# Patient Record
Sex: MALE | Race: WHITE | Employment: OTHER | URBAN - METROPOLITAN AREA
[De-identification: names, ages, dates, MRNs, and addresses within clinical notes are randomized per-mention and may not be internally consistent; named-entity substitution may affect disease eponyms.]

---

## 2018-07-31 ENCOUNTER — OFFICE VISIT (OUTPATIENT)
Dept: VASCULAR SURGERY | Age: 68
End: 2018-07-31
Payer: MEDICARE

## 2018-07-31 ENCOUNTER — TELEPHONE (OUTPATIENT)
Dept: VASCULAR SURGERY | Age: 68
End: 2018-07-31

## 2018-07-31 DIAGNOSIS — I70.213 ATHEROSCLEROSIS OF NATIVE ARTERY OF BOTH LOWER EXTREMITIES WITH INTERMITTENT CLAUDICATION (HCC): Primary | ICD-10-CM

## 2018-07-31 PROCEDURE — 99203 OFFICE O/P NEW LOW 30 MIN: CPT | Performed by: SURGERY

## 2018-07-31 RX ORDER — ASPIRIN 81 MG/1
81 TABLET ORAL NIGHTLY
Status: ON HOLD | COMMUNITY
End: 2021-01-01 | Stop reason: SDUPTHER

## 2018-07-31 RX ORDER — LISINOPRIL 5 MG/1
5 TABLET ORAL NIGHTLY
COMMUNITY
Start: 2018-07-08 | End: 2020-01-01 | Stop reason: ALTCHOICE

## 2018-07-31 RX ORDER — MELOXICAM 15 MG/1
15 TABLET ORAL DAILY
COMMUNITY
Start: 2018-07-09 | End: 2020-01-01 | Stop reason: ALTCHOICE

## 2018-07-31 RX ORDER — PRAVASTATIN SODIUM 40 MG
40 TABLET ORAL NIGHTLY
COMMUNITY
Start: 2018-05-13 | End: 2020-01-01 | Stop reason: ALTCHOICE

## 2018-07-31 NOTE — TELEPHONE ENCOUNTER
Patient's wife notified of doppler study at [address] [place] [address] Dr Barton on Tuesday, 8-7-18.  Report to admitting at 12:30 pm.

## 2018-07-31 NOTE — PROGRESS NOTES
Vascular Surgery Outpatient Consultation      Chief Complaint   Patient presents with    Surgical Consult     PVD, (R) foot numb for three months, (R) calf tightens with walking       Reason for Consult:  Peripheral vascular disease    Requesting Physician:  Dr. Davee Jeans:                The patient is a 78 y.o. male who is referred for evaluation of peripheral vascular disease. He is accompanied by his wife. He states a long history of osteoarthritis involving his right hip. He has been receiving injections for this. He had initial improvement but now is experiencing pain in his right calf with prolonged ambulation. The pain progresses to the point that he has to stop and rest.  He describes the pain as severe cramping 10 out of 10. He denies a documented history of peripheral vascular disease. He is a former smoker having quit many years ago. He denies diabetes. He denies history of foot ulcerations. Past Medical History:        Diagnosis Date    Heart attack 2008, 3-2015     Past Surgical History:        Procedure Laterality Date    CORONARY ANGIOPLASTY      INGUINAL HERNIA REPAIR Right     STOMACH SURGERY       Current Medications:   Prior to Admission medications    Medication Sig Start Date End Date Taking? Authorizing Provider   metoprolol tartrate (LOPRESSOR) 25 MG tablet Take 25 mg by mouth 2 times daily 7/21/18  Yes Historical Provider, MD   lisinopril (PRINIVIL;ZESTRIL) 5 MG tablet Take 5 mg by mouth daily 7/8/18  Yes Historical Provider, MD   pravastatin (PRAVACHOL) 40 MG tablet Take 40 mg by mouth daily 5/13/18  Yes Historical Provider, MD   meloxicam (MOBIC) 15 MG tablet Take 15 mg by mouth daily 7/9/18  Yes Historical Provider, MD   aspirin EC 81 MG EC tablet Take 81 mg by mouth daily   Yes Historical Provider, MD     Allergies:  Patient has no known allergies.     Social History     Social History    Marital status: Unknown     Spouse name: N/A    Number [x]/Yes []  Skin:              Rash:    No [x]/Yes []      Ulcers:   No [x]/Yes []              Abnorm pigment: No [x]/Yes []  :              Frequency/urgency:  No [x]/Yes []      Hematuria:    No [x]/Yes []                      Incontinence:    No [x]/Yes []    PHYSICAL EXAM:  There were no vitals filed for this visit. General Appearance: alert and oriented to person, place and time, well developed and well- nourished, in no acute distress  Skin: warm and dry, no rash or erythema  Head: normocephalic and atraumatic  Eyes: extraocular eye movements intact, conjunctivae normal  ENT: external ear and ear canal normal bilaterally, nose without deformity  Pulmonary/Chest: clear to auscultation bilaterally- no wheezes, rales or rhonchi, normal air movement, no respiratory distress  Cardiovascular: normal rate, regular rhythm, normal S1 and S2, no murmurs, no carotid bruits  Abdomen: soft, non-tender, non-distended, normal bowel sounds, no masses or organomegaly  Musculoskeletal: normal range of motion, no joint swelling, deformity or tenderness  Neurologic: no cranial nerve deficit, gait, coordination and speech normal  Extremities: no leg edema bilaterally, feet warm, intact capillary refill, hair growth on toes.     PULSE EXAM      Right      Left   Brachial     Radial 3 3   Femoral     Popliteal 0 2   Dorsalis Pedis 0 0   Posterior Tibial 0 0   (3=normal, 2=diminished, 1=barely palpable, 4=widened)      Problem List Items Addressed This Visit     Atherosclerosis of native artery of both lower extremities with intermittent claudication (HCC) - Primary    Relevant Medications    metoprolol tartrate (LOPRESSOR) 25 MG tablet    lisinopril (PRINIVIL;ZESTRIL) 5 MG tablet    pravastatin (PRAVACHOL) 40 MG tablet    aspirin EC 81 MG EC tablet    Other Relevant Orders    VL LOWER EXTREMITY ARTERIAL SEGMENTAL PRESSURES W PPG BILATERAL            I reviewed with the patient that the circulation to his feet remains adequate and he does not require urgent revascularization. His symptoms are consistent with peripheral vascular disease and claudication. I would like to start with lower extremity arterial studies. I will review the results with them. He may benefit from an arteriogram with intervention in the future. Return for testing.

## 2018-08-07 ENCOUNTER — HOSPITAL ENCOUNTER (OUTPATIENT)
Dept: INTERVENTIONAL RADIOLOGY/VASCULAR | Age: 68
Discharge: HOME | End: 2018-08-09
Payer: MEDICARE

## 2018-08-07 DIAGNOSIS — I70.213 ATHEROSCLEROSIS OF NATIVE ARTERY OF BOTH LOWER EXTREMITIES WITH INTERMITTENT CLAUDICATION (HCC): ICD-10-CM

## 2018-08-07 PROCEDURE — 93923 UPR/LXTR ART STDY 3+ LVLS: CPT

## 2018-08-08 ENCOUNTER — TELEPHONE (OUTPATIENT)
Dept: VASCULAR SURGERY | Age: 68
End: 2018-08-08

## 2018-08-15 ENCOUNTER — HOSPITAL ENCOUNTER (OUTPATIENT)
Dept: CARDIAC CATH/INVASIVE PROCEDURES | Age: 68
Setting detail: OUTPATIENT SURGERY
Discharge: HOME | End: 2018-08-15
Payer: MEDICARE

## 2018-08-15 DIAGNOSIS — I70.213 ATHEROSCLEROSIS OF NATIVE ARTERY OF BOTH LOWER EXTREMITIES WITH INTERMITTENT CLAUDICATION (HCC): Primary | ICD-10-CM

## 2018-08-15 LAB
ABO/RH: NORMAL
ANION GAP SERPL CALCULATED.3IONS-SCNC: 11 MMOL/L (ref 7–16)
ANTIBODY SCREEN: NORMAL
BUN BLDV-MCNC: 14 MG/DL (ref 8–23)
CALCIUM SERPL-MCNC: 9.3 MG/DL (ref 8.6–10.2)
CHLORIDE BLD-SCNC: 106 MMOL/L (ref 98–107)
CO2: 27 MMOL/L (ref 22–29)
CREAT SERPL-MCNC: 0.9 MG/DL (ref 0.7–1.2)
GFR AFRICAN AMERICAN: >60
GFR NON-AFRICAN AMERICAN: >60 ML/MIN/1.73
GLUCOSE BLD-MCNC: 107 MG/DL (ref 74–109)
HCT VFR BLD CALC: 47.9 % (ref 37–54)
HEMOGLOBIN: 16.4 G/DL (ref 12.5–16.5)
MCH RBC QN AUTO: 30.5 PG (ref 26–35)
MCHC RBC AUTO-ENTMCNC: 34.2 % (ref 32–34.5)
MCV RBC AUTO: 89 FL (ref 80–99.9)
PDW BLD-RTO: 12.5 FL (ref 11.5–15)
PLATELET # BLD: 184 E9/L (ref 130–450)
PMV BLD AUTO: 11.3 FL (ref 7–12)
POTASSIUM SERPL-SCNC: 4.8 MMOL/L (ref 3.5–5)
RBC # BLD: 5.38 E12/L (ref 3.8–5.8)
SODIUM BLD-SCNC: 144 MMOL/L (ref 132–146)
WBC # BLD: 7.4 E9/L (ref 4.5–11.5)

## 2018-08-15 PROCEDURE — 80048 BASIC METABOLIC PNL TOTAL CA: CPT

## 2018-08-15 PROCEDURE — 36415 COLL VENOUS BLD VENIPUNCTURE: CPT

## 2018-08-15 PROCEDURE — 86900 BLOOD TYPING SEROLOGIC ABO: CPT

## 2018-08-15 PROCEDURE — 6360000002 HC RX W HCPCS

## 2018-08-15 PROCEDURE — 36200 PLACE CATHETER IN AORTA: CPT | Performed by: SURGERY

## 2018-08-15 PROCEDURE — 75625 CONTRAST EXAM ABDOMINL AORTA: CPT | Performed by: SURGERY

## 2018-08-15 PROCEDURE — 85027 COMPLETE CBC AUTOMATED: CPT

## 2018-08-15 PROCEDURE — C1769 GUIDE WIRE: HCPCS

## 2018-08-15 PROCEDURE — 86901 BLOOD TYPING SEROLOGIC RH(D): CPT

## 2018-08-15 PROCEDURE — 2709999900 HC NON-CHARGEABLE SUPPLY

## 2018-08-15 PROCEDURE — 75716 ARTERY X-RAYS ARMS/LEGS: CPT | Performed by: SURGERY

## 2018-08-15 PROCEDURE — 2500000003 HC RX 250 WO HCPCS

## 2018-08-15 PROCEDURE — 86850 RBC ANTIBODY SCREEN: CPT

## 2018-08-15 PROCEDURE — C1894 INTRO/SHEATH, NON-LASER: HCPCS

## 2018-08-15 RX ORDER — ACETAMINOPHEN 325 MG/1
650 TABLET ORAL EVERY 4 HOURS PRN
Status: DISCONTINUED | OUTPATIENT
Start: 2018-08-15 | End: 2018-08-16 | Stop reason: HOSPADM

## 2018-08-15 RX ORDER — SODIUM CHLORIDE 9 MG/ML
INJECTION, SOLUTION INTRAVENOUS CONTINUOUS
Status: DISCONTINUED | OUTPATIENT
Start: 2018-08-15 | End: 2018-08-16 | Stop reason: HOSPADM

## 2018-08-15 RX ORDER — ONDANSETRON 2 MG/ML
4 INJECTION INTRAMUSCULAR; INTRAVENOUS EVERY 8 HOURS PRN
Status: DISCONTINUED | OUTPATIENT
Start: 2018-08-15 | End: 2018-08-16 | Stop reason: HOSPADM

## 2018-08-15 RX ORDER — SODIUM CHLORIDE 0.9 % (FLUSH) 0.9 %
10 SYRINGE (ML) INJECTION PRN
Status: DISCONTINUED | OUTPATIENT
Start: 2018-08-15 | End: 2018-08-16 | Stop reason: HOSPADM

## 2018-08-15 NOTE — OP NOTE
Mary Ching  1950    Cardiovascular Lab    DATE OF PROCEDURE: 8/15/2018     PHYSICIAN: Hari Galvan M.D.     ASSISTANT:      PRE-PROCEDURE DIAGNOSIS: Peripheral vascular disease, claudication. POST-PROCEDURE DIAGNOSIS: Same    PROCEDURE: Left transfemoral aortic catheter, limited abdominal aortogram, bilateral lower extremity runoff      ANESTHESIA: Local with intravenous sedation. ESTIMATED BLOOD LOSS: Minimal     COMPLICATIONS: None    DESCRIPTION OF PROCEDURE: The patient was identified and the procedure was confirmed. The groins were prepped and draped in the usual sterile fashion. Lidocaine 1% for local anesthesia. The left common femoral artery was percutaneously entered and a 4-Yakut sheath was inserted. A pigtail catheter was inserted into the abdominal aorta. Serial images were obtained. The images identified a patent abdominal aorta with single renal arteries bilaterally without stenosis. There was calcifications but no significant stenosis involving the distal aorta or the bifurcation, the common internal or external iliac arteries bilaterally. The catheter was moved to the bifurcation and bilateral external runoff was obtained. The right common femoral artery was densely calcified with long segment high-grade stenosis. The stenosis extended into the origin of the superficial femoral artery. The superficial femoral artery otherwise was patent through the popliteal artery. There was disease of the tibioperoneal trunk and three-vessel runoff to the foot. On the left the common femoral artery was calcified but without hemodynamically significant stenosis. No stenosis was seen in the proximal superficial femoral or deep femoral vessels. There was disease of the superficial femoral artery in the adductor canal.  The popliteal artery was patent with three-vessel runoff to the foot. The sheath was removed and pressure was held to obtain hemostasis.   A sterile pressure

## 2018-08-15 NOTE — H&P
Vascular Surgery History & Physical Exam      Chief Complaint: Peripheral vascular disease, claudication. HISTORY OF PRESENT ILLNESS:                The patient is a 80 y.o. male who presents to the hospital for elective arteriogram with possible intervention. The patient has a history of peripheral vascular disease and disabling claudication. IMPRESSION:    Active Hospital Problems    Diagnosis    Atherosclerosis of native artery of both lower extremities with intermittent claudication ([facility] [I70.213]       PLAN:  Aortogram,  Bilateral lower extremity arteriogram, possible intervention. I reviewed the procedure with the patient. I discussed the risks, benefits, and alternatives of the procedure. The patient understands and consents. All questions were answered. Patient Active Problem List   Diagnosis Code    Atherosclerosis of native artery of both lower extremities with intermittent claudication ([facility] I70.213       Past Medical History:   Diagnosis Date    Heart attack ([facility] 2008, 3-2015        Past Surgical History:   Procedure Laterality Date    CORONARY ANGIOPLASTY      INGUINAL HERNIA REPAIR Right     STOMACH SURGERY         Current Medications:     Current Outpatient Prescriptions:     metoprolol tartrate (LOPRESSOR) 25 MG tablet, Take 25 mg by mouth 2 times daily, Disp: , Rfl:     lisinopril (PRINIVIL;ZESTRIL) 5 MG tablet, Take 5 mg by mouth daily, Disp: , Rfl:     pravastatin (PRAVACHOL) 40 MG tablet, Take 40 mg by mouth daily, Disp: , Rfl:     meloxicam (MOBIC) 15 MG tablet, Take 15 mg by mouth daily, Disp: , Rfl:     aspirin EC 81 MG EC tablet, Take 81 mg by mouth daily, Disp: , Rfl:     Allergies:  Patient has no known allergies. Social History     Social History    Marital status: Unknown     Spouse name: N/A    Number of children: N/A    Years of education: N/A     Occupational History    Not on file.      Social History Main Topics    Smoking status: Former Smoker Packs/day: 1.50     Types: Cigarettes     Quit date: 7/31/2008    Smokeless tobacco: Never Used      Comment: occassional cigar    Alcohol use Yes      Comment: daily beer    Drug use: No    Sexual activity: Not on file     Other Topics Concern    Not on file     Social History Narrative    No narrative on file        History reviewed. No pertinent family history. REVIEW OF SYSTEMS:  The chart was reviewed. PHYSICAL EXAM:    There were no vitals filed for this visit.   CONSTITUTIONAL:  awake, alert, cooperative, no apparent distress, and appears stated age  NECK:  supple, symmetrical, trachea midline  LUNGS:  no increased work of breathing, good air exchange and clear to auscultation  CARDIOVASCULAR:  regular rate and rhythm and femoral pulses R 0 L 2+  ABDOMEN:  soft, non-distended and non-tender    LABS:    Lab Results   Component Value Date    WBC 7.4 08/15/2018    HGB 16.4 08/15/2018    HCT 47.9 08/15/2018     08/15/2018

## 2018-09-04 ENCOUNTER — OFFICE VISIT (OUTPATIENT)
Dept: VASCULAR SURGERY | Age: 68
End: 2018-09-04

## 2018-09-04 DIAGNOSIS — I70.213 ATHEROSCLEROSIS OF NATIVE ARTERY OF BOTH LOWER EXTREMITIES WITH INTERMITTENT CLAUDICATION (HCC): Primary | ICD-10-CM

## 2018-09-04 PROCEDURE — 99024 POSTOP FOLLOW-UP VISIT: CPT | Performed by: SURGERY

## 2018-09-04 RX ORDER — M-VIT,TX,IRON,MINS/CALC/FOLIC 27MG-0.4MG
1 TABLET ORAL NIGHTLY
COMMUNITY
End: 2020-01-01 | Stop reason: ALTCHOICE

## 2018-09-11 ENCOUNTER — HOSPITAL ENCOUNTER (OUTPATIENT)
Dept: PREADMISSION TESTING | Age: 68
Discharge: HOME | End: 2018-09-11
Payer: MEDICARE

## 2018-09-11 ENCOUNTER — ANESTHESIA EVENT (OUTPATIENT)
Dept: OPERATING ROOM | Age: 68
DRG: 254 | End: 2018-09-11
Payer: MEDICARE

## 2018-09-11 VITALS
WEIGHT: 185 LBS | BODY MASS INDEX: 29.03 KG/M2 | SYSTOLIC BLOOD PRESSURE: 143 MMHG | HEART RATE: 59 BPM | DIASTOLIC BLOOD PRESSURE: 78 MMHG | RESPIRATION RATE: 18 BRPM | TEMPERATURE: 98 F | HEIGHT: 67 IN | OXYGEN SATURATION: 98 %

## 2018-09-11 DIAGNOSIS — I70.213 ATHEROSCLEROSIS OF NATIVE ARTERY OF BOTH LOWER EXTREMITIES WITH INTERMITTENT CLAUDICATION (HCC): ICD-10-CM

## 2018-09-11 DIAGNOSIS — Z01.812 PRE-OPERATIVE LABORATORY EXAMINATION: ICD-10-CM

## 2018-09-11 LAB
ABO/RH: NORMAL
ANION GAP SERPL CALCULATED.3IONS-SCNC: 16 MMOL/L (ref 7–16)
ANTIBODY SCREEN: NORMAL
APTT: 29.9 SEC (ref 24.5–35.1)
BUN BLDV-MCNC: 14 MG/DL (ref 8–23)
CALCIUM SERPL-MCNC: 9.6 MG/DL (ref 8.6–10.2)
CHLORIDE BLD-SCNC: 105 MMOL/L (ref 98–107)
CO2: 22 MMOL/L (ref 22–29)
CREAT SERPL-MCNC: 0.9 MG/DL (ref 0.7–1.2)
GFR AFRICAN AMERICAN: >60
GFR NON-AFRICAN AMERICAN: >60 ML/MIN/1.73
GLUCOSE BLD-MCNC: 106 MG/DL (ref 74–109)
HCT VFR BLD CALC: 49.9 % (ref 37–54)
HEMOGLOBIN: 16.9 G/DL (ref 12.5–16.5)
INR BLD: 0.9
MCH RBC QN AUTO: 29.9 PG (ref 26–35)
MCHC RBC AUTO-ENTMCNC: 33.9 % (ref 32–34.5)
MCV RBC AUTO: 88.3 FL (ref 80–99.9)
PDW BLD-RTO: 12.4 FL (ref 11.5–15)
PLATELET # BLD: 122 E9/L (ref 130–450)
PMV BLD AUTO: 11.7 FL (ref 7–12)
POTASSIUM REFLEX MAGNESIUM: 4.8 MMOL/L (ref 3.5–5)
PROTHROMBIN TIME: 10.8 SEC (ref 9.3–12.4)
RBC # BLD: 5.65 E12/L (ref 3.8–5.8)
SODIUM BLD-SCNC: 143 MMOL/L (ref 132–146)
WBC # BLD: 7.9 E9/L (ref 4.5–11.5)

## 2018-09-11 PROCEDURE — 85730 THROMBOPLASTIN TIME PARTIAL: CPT

## 2018-09-11 PROCEDURE — 86901 BLOOD TYPING SEROLOGIC RH(D): CPT

## 2018-09-11 PROCEDURE — 80048 BASIC METABOLIC PNL TOTAL CA: CPT

## 2018-09-11 PROCEDURE — 36415 COLL VENOUS BLD VENIPUNCTURE: CPT

## 2018-09-11 PROCEDURE — 87081 CULTURE SCREEN ONLY: CPT

## 2018-09-11 PROCEDURE — 85610 PROTHROMBIN TIME: CPT

## 2018-09-11 PROCEDURE — 86900 BLOOD TYPING SEROLOGIC ABO: CPT

## 2018-09-11 PROCEDURE — 86850 RBC ANTIBODY SCREEN: CPT

## 2018-09-11 PROCEDURE — 85027 COMPLETE CBC AUTOMATED: CPT

## 2018-09-11 ASSESSMENT — PAIN DESCRIPTION - DESCRIPTORS: DESCRIPTORS: CRAMPING

## 2018-09-11 ASSESSMENT — PAIN DESCRIPTION - ORIENTATION: ORIENTATION: RIGHT

## 2018-09-11 ASSESSMENT — PAIN SCALES - GENERAL: PAINLEVEL_OUTOF10: 10

## 2018-09-11 NOTE — PROGRESS NOTES
transportation to and from the hospital.  Arrange for someone to be with you for the remainder of the day due to having had anesthesia. PARKING INSTRUCTIONS:   · [x] Arrival [id number]  · [x] Parking lot 1 is located on [facility] (the corner of [facility] and [facility]). To enter, press the button and the gate will lift. A free token will be provided to exit the lot. One car per patient is allowed to park in this lot. All other cars are to park on [address] [address] either in the parking garage or the handicap lot. [] Free  parking is available on [address] [address]. · [] To reach the [facility] lobby from [address] [place] [address], upon entering the hospital, take elevator B to the 3rd floor. EDUCATION INSTRUCTIONS:      [] Knee or hip replacement booklet & exercise pamphlets given. [] [address] placed in chart. [x] Pre-admission Testing educational folder given  [x] Incentive Spirometry,coughing & deep breathing exercises reviewed. []Medication information sheet(s)   []Fluoroscopy-Xray used in surgery reviewed with patient. Educational pamphlet placed in chart. [x]Pain: Post-op pain is normal and to be expected. You will be asked to rate your pain from 0-10(a zero is not acceptable-education is needed). Your post-op pain goal is:  [x] Ask your nurse for your pain medication. [] Joint camp offered. [] Joint replacement booklets given. []Other     MEDICATION INSTRUCTIONS:   [x]Bring a complete list of your medications, please write the last time you took the medicine, give this list to the nurse. [x] Take the following medications the morning of surgery with 1-2 ounces of water: metoprolol  [x] Stop herbal supplements and vitamins 5 days before your surgery. [] DO NOT take any diabetic medicine the morning of surgery. Follow instructions for insulin the day before surgery.   [] If you are diabetic and your blood sugar is low or you feel symptomatic, you may drink 1-2 ounces of apple juice or take a glucose tablet. The morning of your procedure, you may call the pre-op area if you have concerns about your blood sugar [phone]. [] Use your inhalers the morning of surgery. Bring your emergency inhaler with you day of surgery. [x] Follow physician instructions regarding any blood thinners you may be taking. WHAT TO EXPECT:  [x] The day of surgery you will be greeted and  checked in by the [employer] .  A nurse will greet you in accordance to the time you are needed in the pre-op area to prepare you for surgery. Please do not be discouraged if you are not greeted in the order you arrive as there are many variables that are involved in patient preparation. Your patience is greatly appreciated as you wait for your nurse. Please bring in items such as: books, magazines, newspapers, electronics, or any other items  to occupy your time in the waiting area. [x]  Delays may occur with surgery and staff will make a sincere effort to keep you informed of delays. If any delays occur with your procedure, we apologize ahead of time for your inconvenience as we recognize the value of your time.

## 2018-09-12 LAB — MRSA CULTURE ONLY: NORMAL

## 2018-09-16 NOTE — H&P
Vascular Surgery History & Physical Exam      Chief Complaint:     HISTORY OF PRESENT ILLNESS:                The patient is a 80 y.o. male with a history of CAD and MI who presents to the hospital for elective Right femoral endarterectomy. During his aortogram with bilateral lower extremity runoff 8/17 reveiled a densely calcified long segment high grade stenosis of the Right common femoral artery extending into the origin of the superficial femoral artery and down to the tibioperoneal trunk and three vessel runoff to the foot. Past Medical History:   Diagnosis Date    CAD (coronary artery disease)     Heart attack ([address] 2008, 3-2015        Past Surgical History:   Procedure Laterality Date    CORONARY ANGIOPLASTY  2005    CABG TIMES 4 2015    INGUINAL HERNIA REPAIR Right     STOMACH SURGERY      VASCULAR SURGERY  08/15/2018    abdominal aortogram with runoff by Dr. Leonard Ralph       Current Medications:   No current facility-administered medications for this encounter. Current Outpatient Prescriptions:     Multiple Vitamins-Minerals (THERAPEUTIC MULTIVITAMIN-MINERALS) tablet, Take 1 tablet by mouth nightly , Disp: , Rfl:     metoprolol tartrate (LOPRESSOR) 25 MG tablet, Take 25 mg by mouth 2 times daily, Disp: , Rfl:     lisinopril (PRINIVIL;ZESTRIL) 5 MG tablet, Take 5 mg by mouth nightly , Disp: , Rfl:     pravastatin (PRAVACHOL) 40 MG tablet, Take 40 mg by mouth nightly , Disp: , Rfl:     meloxicam (MOBIC) 15 MG tablet, Take 15 mg by mouth daily, Disp: , Rfl:     aspirin EC 81 MG EC tablet, Take 81 mg by mouth nightly , Disp: , Rfl:     Allergies:  Patient has no known allergies. Social History     Social History    Marital status:      Spouse name: N/A    Number of children: N/A    Years of education: N/A     Occupational History    Not on file.      Social History Main Topics    Smoking status: Former Smoker     Packs/day: 1.50     Types: Cigarettes     Quit date: 7/31/2008   [facility]

## 2018-09-17 ENCOUNTER — HOSPITAL ENCOUNTER (INPATIENT)
Age: 68
LOS: 1 days | Discharge: HOME | DRG: 254 | End: 2018-09-18
Attending: SURGERY | Admitting: SURGERY
Payer: MEDICARE

## 2018-09-17 ENCOUNTER — ANESTHESIA (OUTPATIENT)
Dept: OPERATING ROOM | Age: 68
DRG: 254 | End: 2018-09-17
Payer: MEDICARE

## 2018-09-17 VITALS — DIASTOLIC BLOOD PRESSURE: 67 MMHG | SYSTOLIC BLOOD PRESSURE: 99 MMHG | OXYGEN SATURATION: 96 %

## 2018-09-17 DIAGNOSIS — I70.211 ATHEROSCLER OF NATIVE ARTERY OF RIGHT LEG WITH INTERMIT CLAUDICATION (HCC): ICD-10-CM

## 2018-09-17 DIAGNOSIS — I70.213 ATHEROSCLEROSIS OF NATIVE ARTERY OF BOTH LOWER EXTREMITIES WITH INTERMITTENT CLAUDICATION (HCC): Primary | ICD-10-CM

## 2018-09-17 DIAGNOSIS — Z01.812 PRE-OPERATIVE LABORATORY EXAMINATION: ICD-10-CM

## 2018-09-17 LAB
BASOPHILS ABSOLUTE: 0.02 E9/L (ref 0–0.2)
BASOPHILS RELATIVE PERCENT: 0.3 % (ref 0–2)
EOSINOPHILS ABSOLUTE: 0.05 E9/L (ref 0.05–0.5)
EOSINOPHILS RELATIVE PERCENT: 0.6 % (ref 0–6)
HCT VFR BLD CALC: 44.4 % (ref 37–54)
HEMOGLOBIN: 15.3 G/DL (ref 12.5–16.5)
IMMATURE GRANULOCYTES #: 0.06 E9/L
IMMATURE GRANULOCYTES %: 0.8 % (ref 0–5)
LYMPHOCYTES ABSOLUTE: 1.04 E9/L (ref 1.5–4)
LYMPHOCYTES RELATIVE PERCENT: 13.4 % (ref 20–42)
MCH RBC QN AUTO: 30.4 PG (ref 26–35)
MCHC RBC AUTO-ENTMCNC: 34.5 % (ref 32–34.5)
MCV RBC AUTO: 88.3 FL (ref 80–99.9)
MONOCYTES ABSOLUTE: 0.13 E9/L (ref 0.1–0.95)
MONOCYTES RELATIVE PERCENT: 1.7 % (ref 2–12)
NEUTROPHILS ABSOLUTE: 6.48 E9/L (ref 1.8–7.3)
NEUTROPHILS RELATIVE PERCENT: 83.2 % (ref 43–80)
PDW BLD-RTO: 12.3 FL (ref 11.5–15)
PLATELET # BLD: 140 E9/L (ref 130–450)
PMV BLD AUTO: 11.6 FL (ref 7–12)
RBC # BLD: 5.03 E12/L (ref 3.8–5.8)
WBC # BLD: 7.8 E9/L (ref 4.5–11.5)

## 2018-09-17 PROCEDURE — 7100000001 HC PACU RECOVERY - ADDTL 15 MIN: Performed by: SURGERY

## 2018-09-17 PROCEDURE — 6360000002 HC RX W HCPCS: Performed by: SURGERY

## 2018-09-17 PROCEDURE — 35371 RECHANNELING OF ARTERY: CPT | Performed by: SURGERY

## 2018-09-17 PROCEDURE — 6370000000 HC RX 637 (ALT 250 FOR IP): Performed by: SURGERY

## 2018-09-17 PROCEDURE — 3600000005 HC SURGERY LEVEL 5 BASE: Performed by: SURGERY

## 2018-09-17 PROCEDURE — 2500000003 HC RX 250 WO HCPCS: Performed by: NURSE ANESTHETIST, CERTIFIED REGISTERED

## 2018-09-17 PROCEDURE — 3600000015 HC SURGERY LEVEL 5 ADDTL 15MIN: Performed by: SURGERY

## 2018-09-17 PROCEDURE — 36415 COLL VENOUS BLD VENIPUNCTURE: CPT

## 2018-09-17 PROCEDURE — 2000000000 HC ICU R&B

## 2018-09-17 PROCEDURE — 3700000001 HC ADD 15 MINUTES (ANESTHESIA): Performed by: SURGERY

## 2018-09-17 PROCEDURE — 6360000002 HC RX W HCPCS

## 2018-09-17 PROCEDURE — 7100000000 HC PACU RECOVERY - FIRST 15 MIN: Performed by: SURGERY

## 2018-09-17 PROCEDURE — 3700000000 HC ANESTHESIA ATTENDED CARE: Performed by: SURGERY

## 2018-09-17 PROCEDURE — 6360000002 HC RX W HCPCS: Performed by: NURSE ANESTHETIST, CERTIFIED REGISTERED

## 2018-09-17 PROCEDURE — 88304 TISSUE EXAM BY PATHOLOGIST: CPT

## 2018-09-17 PROCEDURE — 2580000003 HC RX 258: Performed by: SURGERY

## 2018-09-17 PROCEDURE — C1781 MESH (IMPLANTABLE): HCPCS | Performed by: SURGERY

## 2018-09-17 PROCEDURE — 04CK0ZZ EXTIRPATION OF MATTER FROM RIGHT FEMORAL ARTERY, OPEN APPROACH: ICD-10-PCS | Performed by: SURGERY

## 2018-09-17 PROCEDURE — 2500000003 HC RX 250 WO HCPCS

## 2018-09-17 PROCEDURE — 2709999900 HC NON-CHARGEABLE SUPPLY: Performed by: SURGERY

## 2018-09-17 PROCEDURE — 85025 COMPLETE CBC W/AUTO DIFF WBC: CPT

## 2018-09-17 DEVICE — PATCH BIOLOGIC SURG 10CM WX16CM L .55MM THICKNESSXENOSURE: Type: IMPLANTABLE DEVICE | Site: GROIN | Status: FUNCTIONAL

## 2018-09-17 RX ORDER — MIDAZOLAM HYDROCHLORIDE 1 MG/ML
INJECTION INTRAMUSCULAR; INTRAVENOUS PRN
Status: DISCONTINUED | OUTPATIENT
Start: 2018-09-17 | End: 2018-09-17 | Stop reason: SDUPTHER

## 2018-09-17 RX ORDER — HEPARIN SODIUM 10000 [USP'U]/ML
INJECTION, SOLUTION INTRAVENOUS; SUBCUTANEOUS PRN
Status: DISCONTINUED | OUTPATIENT
Start: 2018-09-17 | End: 2018-09-18 | Stop reason: HOSPADM

## 2018-09-17 RX ORDER — PROPOFOL 10 MG/ML
INJECTION, EMULSION INTRAVENOUS PRN
Status: DISCONTINUED | OUTPATIENT
Start: 2018-09-17 | End: 2018-09-17 | Stop reason: SDUPTHER

## 2018-09-17 RX ORDER — ONDANSETRON 2 MG/ML
INJECTION INTRAMUSCULAR; INTRAVENOUS PRN
Status: DISCONTINUED | OUTPATIENT
Start: 2018-09-17 | End: 2018-09-17 | Stop reason: SDUPTHER

## 2018-09-17 RX ORDER — ONDANSETRON 2 MG/ML
4 INJECTION INTRAMUSCULAR; INTRAVENOUS EVERY 6 HOURS PRN
Status: DISCONTINUED | OUTPATIENT
Start: 2018-09-17 | End: 2018-09-18 | Stop reason: HOSPADM

## 2018-09-17 RX ORDER — PROTAMINE SULFATE 10 MG/ML
INJECTION, SOLUTION INTRAVENOUS PRN
Status: DISCONTINUED | OUTPATIENT
Start: 2018-09-17 | End: 2018-09-17 | Stop reason: SDUPTHER

## 2018-09-17 RX ORDER — SODIUM CHLORIDE 0.9 % (FLUSH) 0.9 %
10 SYRINGE (ML) INJECTION EVERY 12 HOURS SCHEDULED
Status: DISCONTINUED | OUTPATIENT
Start: 2018-09-17 | End: 2018-09-18 | Stop reason: HOSPADM

## 2018-09-17 RX ORDER — CALCIUM CHLORIDE 100 MG/ML
INJECTION INTRAVENOUS; INTRAVENTRICULAR PRN
Status: DISCONTINUED | OUTPATIENT
Start: 2018-09-17 | End: 2018-09-17 | Stop reason: SDUPTHER

## 2018-09-17 RX ORDER — HEPARIN SODIUM 1000 [USP'U]/ML
INJECTION, SOLUTION INTRAVENOUS; SUBCUTANEOUS PRN
Status: DISCONTINUED | OUTPATIENT
Start: 2018-09-17 | End: 2018-09-17 | Stop reason: SDUPTHER

## 2018-09-17 RX ORDER — NEOSTIGMINE METHYLSULFATE 1 MG/ML
INJECTION, SOLUTION INTRAVENOUS PRN
Status: DISCONTINUED | OUTPATIENT
Start: 2018-09-17 | End: 2018-09-17 | Stop reason: SDUPTHER

## 2018-09-17 RX ORDER — SODIUM CHLORIDE 0.9 % (FLUSH) 0.9 %
10 SYRINGE (ML) INJECTION PRN
Status: DISCONTINUED | OUTPATIENT
Start: 2018-09-17 | End: 2018-09-18 | Stop reason: HOSPADM

## 2018-09-17 RX ORDER — OXYCODONE HYDROCHLORIDE AND ACETAMINOPHEN 5; 325 MG/1; MG/1
2 TABLET ORAL EVERY 4 HOURS PRN
Status: DISCONTINUED | OUTPATIENT
Start: 2018-09-17 | End: 2018-09-18

## 2018-09-17 RX ORDER — ACETAMINOPHEN 325 MG/1
650 TABLET ORAL EVERY 4 HOURS PRN
Status: DISCONTINUED | OUTPATIENT
Start: 2018-09-17 | End: 2018-09-18 | Stop reason: HOSPADM

## 2018-09-17 RX ORDER — VECURONIUM BROMIDE 1 MG/ML
INJECTION, POWDER, LYOPHILIZED, FOR SOLUTION INTRAVENOUS PRN
Status: DISCONTINUED | OUTPATIENT
Start: 2018-09-17 | End: 2018-09-17 | Stop reason: SDUPTHER

## 2018-09-17 RX ORDER — DEXAMETHASONE SODIUM PHOSPHATE 10 MG/ML
INJECTION, SOLUTION INTRAMUSCULAR; INTRAVENOUS PRN
Status: DISCONTINUED | OUTPATIENT
Start: 2018-09-17 | End: 2018-09-17 | Stop reason: SDUPTHER

## 2018-09-17 RX ORDER — SODIUM CHLORIDE 0.9 % (FLUSH) 0.9 %
10 SYRINGE (ML) INJECTION EVERY 12 HOURS SCHEDULED
Status: DISCONTINUED | OUTPATIENT
Start: 2018-09-17 | End: 2018-09-17

## 2018-09-17 RX ORDER — MORPHINE SULFATE 2 MG/ML
2 INJECTION, SOLUTION INTRAMUSCULAR; INTRAVENOUS
Status: DISCONTINUED | OUTPATIENT
Start: 2018-09-17 | End: 2018-09-18

## 2018-09-17 RX ORDER — SODIUM CHLORIDE 0.9 % (FLUSH) 0.9 %
10 SYRINGE (ML) INJECTION PRN
Status: DISCONTINUED | OUTPATIENT
Start: 2018-09-17 | End: 2018-09-17

## 2018-09-17 RX ORDER — MORPHINE SULFATE 4 MG/ML
4 INJECTION, SOLUTION INTRAMUSCULAR; INTRAVENOUS
Status: DISCONTINUED | OUTPATIENT
Start: 2018-09-17 | End: 2018-09-18

## 2018-09-17 RX ORDER — SODIUM CHLORIDE 9 MG/ML
INJECTION, SOLUTION INTRAVENOUS CONTINUOUS
Status: DISCONTINUED | OUTPATIENT
Start: 2018-09-17 | End: 2018-09-18

## 2018-09-17 RX ORDER — GLYCOPYRROLATE 1 MG/5 ML
SYRINGE (ML) INTRAVENOUS PRN
Status: DISCONTINUED | OUTPATIENT
Start: 2018-09-17 | End: 2018-09-17 | Stop reason: SDUPTHER

## 2018-09-17 RX ORDER — FENTANYL CITRATE 50 UG/ML
INJECTION, SOLUTION INTRAMUSCULAR; INTRAVENOUS PRN
Status: DISCONTINUED | OUTPATIENT
Start: 2018-09-17 | End: 2018-09-17 | Stop reason: SDUPTHER

## 2018-09-17 RX ORDER — SODIUM CHLORIDE 9 MG/ML
INJECTION, SOLUTION INTRAVENOUS CONTINUOUS
Status: DISCONTINUED | OUTPATIENT
Start: 2018-09-17 | End: 2018-09-17

## 2018-09-17 RX ORDER — ASPIRIN 81 MG/1
81 TABLET ORAL NIGHTLY
Status: DISCONTINUED | OUTPATIENT
Start: 2018-09-18 | End: 2018-09-18 | Stop reason: HOSPADM

## 2018-09-17 RX ORDER — OXYCODONE HYDROCHLORIDE AND ACETAMINOPHEN 5; 325 MG/1; MG/1
1 TABLET ORAL EVERY 4 HOURS PRN
Status: DISCONTINUED | OUTPATIENT
Start: 2018-09-17 | End: 2018-09-18 | Stop reason: HOSPADM

## 2018-09-17 RX ORDER — LISINOPRIL 5 MG/1
5 TABLET ORAL NIGHTLY
Status: DISCONTINUED | OUTPATIENT
Start: 2018-09-17 | End: 2018-09-18 | Stop reason: HOSPADM

## 2018-09-17 RX ORDER — LABETALOL HYDROCHLORIDE 5 MG/ML
INJECTION, SOLUTION INTRAVENOUS PRN
Status: DISCONTINUED | OUTPATIENT
Start: 2018-09-17 | End: 2018-09-17 | Stop reason: SDUPTHER

## 2018-09-17 RX ORDER — LIDOCAINE HYDROCHLORIDE 20 MG/ML
INJECTION, SOLUTION INFILTRATION; PERINEURAL PRN
Status: DISCONTINUED | OUTPATIENT
Start: 2018-09-17 | End: 2018-09-17 | Stop reason: SDUPTHER

## 2018-09-17 RX ADMIN — OXYCODONE HYDROCHLORIDE AND ACETAMINOPHEN 1 TABLET: 5; 325 TABLET ORAL at 18:40

## 2018-09-17 RX ADMIN — SODIUM CHLORIDE: 9 INJECTION, SOLUTION INTRAVENOUS at 11:48

## 2018-09-17 RX ADMIN — VECURONIUM BROMIDE FOR INJECTION 8 MG: 1 INJECTION, POWDER, LYOPHILIZED, FOR SOLUTION INTRAVENOUS at 11:57

## 2018-09-17 RX ADMIN — SODIUM CHLORIDE: 9 INJECTION, SOLUTION INTRAVENOUS at 14:07

## 2018-09-17 RX ADMIN — Medication 3 MG: at 14:01

## 2018-09-17 RX ADMIN — Medication 2 G: at 20:10

## 2018-09-17 RX ADMIN — Medication 2 G: at 11:53

## 2018-09-17 RX ADMIN — PROPOFOL 50 MG: 10 INJECTION, EMULSION INTRAVENOUS at 13:53

## 2018-09-17 RX ADMIN — FENTANYL CITRATE 100 MCG: 50 INJECTION, SOLUTION INTRAMUSCULAR; INTRAVENOUS at 11:57

## 2018-09-17 RX ADMIN — OXYCODONE HYDROCHLORIDE AND ACETAMINOPHEN 1 TABLET: 5; 325 TABLET ORAL at 22:45

## 2018-09-17 RX ADMIN — CALCIUM CHLORIDE 1 G: 100 INJECTION, SOLUTION INTRAVENOUS; INTRAVENTRICULAR at 14:07

## 2018-09-17 RX ADMIN — MIDAZOLAM HYDROCHLORIDE 2 MG: 1 INJECTION, SOLUTION INTRAMUSCULAR; INTRAVENOUS at 11:45

## 2018-09-17 RX ADMIN — PHENYLEPHRINE HYDROCHLORIDE 100 MCG: 10 INJECTION INTRAMUSCULAR; INTRAVENOUS; SUBCUTANEOUS at 14:01

## 2018-09-17 RX ADMIN — PHENYLEPHRINE HYDROCHLORIDE 100 MCG: 10 INJECTION INTRAMUSCULAR; INTRAVENOUS; SUBCUTANEOUS at 14:12

## 2018-09-17 RX ADMIN — ONDANSETRON HYDROCHLORIDE 4 MG: 2 INJECTION, SOLUTION INTRAMUSCULAR; INTRAVENOUS at 13:55

## 2018-09-17 RX ADMIN — SODIUM CHLORIDE: 9 INJECTION, SOLUTION INTRAVENOUS at 19:00

## 2018-09-17 RX ADMIN — VECURONIUM BROMIDE FOR INJECTION 2 MG: 1 INJECTION, POWDER, LYOPHILIZED, FOR SOLUTION INTRAVENOUS at 12:45

## 2018-09-17 RX ADMIN — LISINOPRIL 5 MG: 5 TABLET ORAL at 20:55

## 2018-09-17 RX ADMIN — DEXAMETHASONE SODIUM PHOSPHATE 10 MG: 10 INJECTION INTRAMUSCULAR; INTRAVENOUS at 11:57

## 2018-09-17 RX ADMIN — PROTAMINE SULFATE 50 MG: 10 INJECTION, SOLUTION INTRAVENOUS at 14:05

## 2018-09-17 RX ADMIN — PROPOFOL 150 MG: 10 INJECTION, EMULSION INTRAVENOUS at 11:57

## 2018-09-17 RX ADMIN — Medication 0.6 MG: at 14:01

## 2018-09-17 RX ADMIN — FENTANYL CITRATE 100 MCG: 50 INJECTION, SOLUTION INTRAMUSCULAR; INTRAVENOUS at 13:54

## 2018-09-17 RX ADMIN — METOPROLOL TARTRATE 25 MG: 25 TABLET ORAL at 17:55

## 2018-09-17 RX ADMIN — LABETALOL HYDROCHLORIDE 5 MG: 5 INJECTION, SOLUTION INTRAVENOUS at 14:51

## 2018-09-17 RX ADMIN — LIDOCAINE HYDROCHLORIDE 100 MG: 20 INJECTION, SOLUTION INFILTRATION; PERINEURAL at 11:57

## 2018-09-17 RX ADMIN — LABETALOL HYDROCHLORIDE 5 MG: 5 INJECTION, SOLUTION INTRAVENOUS at 14:55

## 2018-09-17 RX ADMIN — LABETALOL HYDROCHLORIDE 5 MG: 5 INJECTION, SOLUTION INTRAVENOUS at 12:25

## 2018-09-17 RX ADMIN — HEPARIN SODIUM 10000 UNITS: 1000 INJECTION, SOLUTION INTRAVENOUS; SUBCUTANEOUS at 12:50

## 2018-09-17 RX ADMIN — FENTANYL CITRATE 50 MCG: 50 INJECTION, SOLUTION INTRAMUSCULAR; INTRAVENOUS at 12:22

## 2018-09-17 RX ADMIN — LABETALOL HYDROCHLORIDE 5 MG: 5 INJECTION, SOLUTION INTRAVENOUS at 12:38

## 2018-09-17 RX ADMIN — PHENYLEPHRINE HYDROCHLORIDE 100 MCG: 10 INJECTION INTRAMUSCULAR; INTRAVENOUS; SUBCUTANEOUS at 12:55

## 2018-09-17 ASSESSMENT — PULMONARY FUNCTION TESTS
PIF_VALUE: 3
PIF_VALUE: 23
PIF_VALUE: 22
PIF_VALUE: 4
PIF_VALUE: 23
PIF_VALUE: 24
PIF_VALUE: 22
PIF_VALUE: 1
PIF_VALUE: 25
PIF_VALUE: 23
PIF_VALUE: 22
PIF_VALUE: 23
PIF_VALUE: 22
PIF_VALUE: 21
PIF_VALUE: 22
PIF_VALUE: 25
PIF_VALUE: 21
PIF_VALUE: 25
PIF_VALUE: 22
PIF_VALUE: 23
PIF_VALUE: 25
PIF_VALUE: 21
PIF_VALUE: 2
PIF_VALUE: 22
PIF_VALUE: 25
PIF_VALUE: 3
PIF_VALUE: 25
PIF_VALUE: 21
PIF_VALUE: 3
PIF_VALUE: 3
PIF_VALUE: 23
PIF_VALUE: 0
PIF_VALUE: 24
PIF_VALUE: 23
PIF_VALUE: 3
PIF_VALUE: 21
PIF_VALUE: 21
PIF_VALUE: 23
PIF_VALUE: 23
PIF_VALUE: 24
PIF_VALUE: 2
PIF_VALUE: 23
PIF_VALUE: 3
PIF_VALUE: 21
PIF_VALUE: 22
PIF_VALUE: 24
PIF_VALUE: 0
PIF_VALUE: 23
PIF_VALUE: 23
PIF_VALUE: 25
PIF_VALUE: 22
PIF_VALUE: 21
PIF_VALUE: 22
PIF_VALUE: 1
PIF_VALUE: 22
PIF_VALUE: 2
PIF_VALUE: 21
PIF_VALUE: 3
PIF_VALUE: 3
PIF_VALUE: 22
PIF_VALUE: 25
PIF_VALUE: 21
PIF_VALUE: 23
PIF_VALUE: 23
PIF_VALUE: 3
PIF_VALUE: 22
PIF_VALUE: 23
PIF_VALUE: 15
PIF_VALUE: 22
PIF_VALUE: 2
PIF_VALUE: 22
PIF_VALUE: 2
PIF_VALUE: 21
PIF_VALUE: 3
PIF_VALUE: 1
PIF_VALUE: 25
PIF_VALUE: 22
PIF_VALUE: 0
PIF_VALUE: 3
PIF_VALUE: 21
PIF_VALUE: 1
PIF_VALUE: 22
PIF_VALUE: 21
PIF_VALUE: 23
PIF_VALUE: 2
PIF_VALUE: 22
PIF_VALUE: 27
PIF_VALUE: 22
PIF_VALUE: 21
PIF_VALUE: 22
PIF_VALUE: 25
PIF_VALUE: 24
PIF_VALUE: 21
PIF_VALUE: 22
PIF_VALUE: 24
PIF_VALUE: 24
PIF_VALUE: 22
PIF_VALUE: 22
PIF_VALUE: 23
PIF_VALUE: 21
PIF_VALUE: 21
PIF_VALUE: 24
PIF_VALUE: 22
PIF_VALUE: 3
PIF_VALUE: 23
PIF_VALUE: 22
PIF_VALUE: 21
PIF_VALUE: 23
PIF_VALUE: 3
PIF_VALUE: 23
PIF_VALUE: 3
PIF_VALUE: 24
PIF_VALUE: 22
PIF_VALUE: 24
PIF_VALUE: 26
PIF_VALUE: 23
PIF_VALUE: 3
PIF_VALUE: 23
PIF_VALUE: 3
PIF_VALUE: 3
PIF_VALUE: 1
PIF_VALUE: 23
PIF_VALUE: 1
PIF_VALUE: 21
PIF_VALUE: 21
PIF_VALUE: 1
PIF_VALUE: 1
PIF_VALUE: 3
PIF_VALUE: 0
PIF_VALUE: 23
PIF_VALUE: 25
PIF_VALUE: 1
PIF_VALUE: 23
PIF_VALUE: 23
PIF_VALUE: 24
PIF_VALUE: 3
PIF_VALUE: 21
PIF_VALUE: 0
PIF_VALUE: 22
PIF_VALUE: 23
PIF_VALUE: 25
PIF_VALUE: 24
PIF_VALUE: 22
PIF_VALUE: 21
PIF_VALUE: 23
PIF_VALUE: 26
PIF_VALUE: 24
PIF_VALUE: 21
PIF_VALUE: 22
PIF_VALUE: 20
PIF_VALUE: 23
PIF_VALUE: 19
PIF_VALUE: 21
PIF_VALUE: 23
PIF_VALUE: 1
PIF_VALUE: 21
PIF_VALUE: 2
PIF_VALUE: 1
PIF_VALUE: 21
PIF_VALUE: 1
PIF_VALUE: 3
PIF_VALUE: 22
PIF_VALUE: 23
PIF_VALUE: 0
PIF_VALUE: 22
PIF_VALUE: 25
PIF_VALUE: 2
PIF_VALUE: 3
PIF_VALUE: 23
PIF_VALUE: 3
PIF_VALUE: 23
PIF_VALUE: 3
PIF_VALUE: 23
PIF_VALUE: 25
PIF_VALUE: 22
PIF_VALUE: 0
PIF_VALUE: 22
PIF_VALUE: 21

## 2018-09-17 ASSESSMENT — PAIN DESCRIPTION - LOCATION
LOCATION: GROIN

## 2018-09-17 ASSESSMENT — PAIN DESCRIPTION - PAIN TYPE
TYPE: SURGICAL PAIN

## 2018-09-17 ASSESSMENT — PAIN DESCRIPTION - ORIENTATION
ORIENTATION: RIGHT

## 2018-09-17 ASSESSMENT — PAIN SCALES - GENERAL
PAINLEVEL_OUTOF10: 0
PAINLEVEL_OUTOF10: 2
PAINLEVEL_OUTOF10: 0
PAINLEVEL_OUTOF10: 4
PAINLEVEL_OUTOF10: 4

## 2018-09-17 ASSESSMENT — PAIN DESCRIPTION - DESCRIPTORS
DESCRIPTORS: DULL;DISCOMFORT
DESCRIPTORS: ACHING;BURNING
DESCRIPTORS: DISCOMFORT;SHARP

## 2018-09-17 ASSESSMENT — PAIN - FUNCTIONAL ASSESSMENT: PAIN_FUNCTIONAL_ASSESSMENT: 0-10

## 2018-09-17 NOTE — ANESTHESIA PROCEDURE NOTES
Arterial Line:    An arterial line was placed using surface landmarks, in the OR for the following indication(s): continuous blood pressure monitoring and blood sampling needed. A 20 gauge (size), 1 and 3/4 inch (length), Arrow (type) catheter was placed, Seldinger technique not used, into the left radial artery, secured by tape and Tegaderm. Anesthesia type: General    Events:  patient tolerated procedure well with no complications. 9/17/2018 11:57 AM9/17/2018 12:01 PM  Anesthesiologist: Ulysses Mcgregor E  Resident/CRNA: Maria R Hayes  Other anesthesia staff: Sascha Yo  Performed:  Other anesthesia staff   Preanesthetic Checklist  Completed: patient identified, site marked, surgical consent, pre-op evaluation, timeout performed, IV checked, risks and benefits discussed, monitors and equipment checked, anesthesia consent given, oxygen available and patient being monitored

## 2018-09-18 VITALS
HEIGHT: 67 IN | HEART RATE: 60 BPM | DIASTOLIC BLOOD PRESSURE: 74 MMHG | BODY MASS INDEX: 30.83 KG/M2 | OXYGEN SATURATION: 99 % | TEMPERATURE: 99.1 F | WEIGHT: 196.43 LBS | SYSTOLIC BLOOD PRESSURE: 128 MMHG | RESPIRATION RATE: 24 BRPM

## 2018-09-18 PROCEDURE — 6370000000 HC RX 637 (ALT 250 FOR IP): Performed by: SURGERY

## 2018-09-18 PROCEDURE — 2580000003 HC RX 258: Performed by: SURGERY

## 2018-09-18 PROCEDURE — 6360000002 HC RX W HCPCS: Performed by: SURGERY

## 2018-09-18 RX ORDER — OXYCODONE HYDROCHLORIDE AND ACETAMINOPHEN 5; 325 MG/1; MG/1
1 TABLET ORAL EVERY 6 HOURS PRN
Qty: 28 TABLET | Refills: 0 | Status: SHIPPED | OUTPATIENT
Start: 2018-09-18 | End: 2018-09-25

## 2018-09-18 RX ADMIN — Medication 10 ML: at 07:08

## 2018-09-18 RX ADMIN — SODIUM CHLORIDE: 9 INJECTION, SOLUTION INTRAVENOUS at 03:00

## 2018-09-18 RX ADMIN — Medication 2 G: at 04:21

## 2018-09-18 RX ADMIN — METOPROLOL TARTRATE 25 MG: 25 TABLET ORAL at 08:42

## 2018-09-18 ASSESSMENT — PAIN SCALES - GENERAL
PAINLEVEL_OUTOF10: 0

## 2018-09-18 NOTE — FLOWSHEET NOTE
Admitted to [id number] [facility] from PACU. Report received. Patient A X O X 3. Oriented to room and POC. IV patent, castellanos cath patent, denies pain, vs stable. RIGHT groin surgical site incision clean, dry, intact and well approximated with surgical glue.

## 2018-09-18 NOTE — ANESTHESIA POSTPROCEDURE EVALUATION
Department of Anesthesiology  Postprocedure Note    Patient: Willam Trujillo  MRN: [id number]  YOB: 1950  Date of evaluation: 9/18/2018  Time:  7:12 AM     Procedure Summary     Date:  09/17/18 Room / Location:  [address] [facility]    Anesthesia Start:  0325 Anesthesia Stop:  1500    Procedure:  FEMORAL ENDARTERECTOMY (Right ) Diagnosis:  (PERIPHERAL VASCULAR DISEASE)    Surgeon:  Don Farr MD Responsible Provider:  Fernando Sommers MD    Anesthesia Type:  general ASA Status:  3          Anesthesia Type: general    Nicole Phase I: Nicole Score: 9    Nicole Phase II:      Last vitals: Reviewed and per EMR flowsheets.        Anesthesia Post Evaluation    Patient location during evaluation: ICU  Patient participation: complete - patient participated  Level of consciousness: awake and alert  Airway patency: patent  Nausea & Vomiting: no nausea and no vomiting  Complications: no  Cardiovascular status: blood pressure returned to baseline and hemodynamically stable  Respiratory status: acceptable  Hydration status: euvolemic

## 2018-10-02 ENCOUNTER — OFFICE VISIT (OUTPATIENT)
Dept: VASCULAR SURGERY | Age: 68
End: 2018-10-02

## 2018-10-02 ENCOUNTER — TELEPHONE (OUTPATIENT)
Dept: VASCULAR SURGERY | Age: 68
End: 2018-10-02

## 2018-10-02 DIAGNOSIS — I70.213 ATHEROSCLEROSIS OF NATIVE ARTERY OF BOTH LOWER EXTREMITIES WITH INTERMITTENT CLAUDICATION (HCC): Primary | ICD-10-CM

## 2018-10-02 PROCEDURE — 99024 POSTOP FOLLOW-UP VISIT: CPT | Performed by: NURSE PRACTITIONER

## 2018-10-02 NOTE — PROGRESS NOTES
Vascular Surgery Progress Note    Chief Complaint   Patient presents with    Post-Op Check     s/p Right femoral endarterectomy. Patient returns for post operative evaluation status post right femoral endarterectomy. The patient denies any unexpected problems since hospital discharge. He reports that he is able to walk without any limitations. He states that he has a small open area at the top of his incision. Procedure Laterality Date    CORONARY ANGIOPLASTY  2005    CABG TIMES 4 2015    INGUINAL HERNIA REPAIR Right     OH THROMBOENDARTECTMY FEMORAL COMMON Right 9/17/2018    FEMORAL ENDARTERECTOMY performed by Edwar Abel MD at [address] [place] [address]  08/15/2018    abdominal aortogram with runoff by Dr. Pia Miranda       Physical Exam:  The incision(s) are healing without evidence of infection. Small superficial open area at proximal portion of the incision. No drainage or erythema noted. Heart rhythm is regular. Right      Left   Brachial     Radial 3    Femoral     Popliteal     Dorsalis Pedis 2    Posterior Tibial biphasic    (3=normal, 2=diminished, 1=barely palpable, 4=widened)    Problem List Items Addressed This Visit     Atherosclerosis of native artery of both lower extremities with intermittent claudication (HCC) - Primary    Relevant Orders    VL LOWER EXTREMITY ARTERIAL SEGMENTAL PRESSURES W PPG BILATERAL          I reviewed with the patient that normal activities can be resumed as tolerated. I instructed him to keep the open area of his incision dry and covered. I will plan to see him in one month with new baseline arterial studies. I asked him to call back sooner with any worsening of his incision. Pt seen and plan reviewed with Dr. Pia Miranda. Santiago Klein CNP     Plan: Return in 1 month(s) for follow-up office visit and arterial studies.

## 2018-10-29 ENCOUNTER — HOSPITAL ENCOUNTER (OUTPATIENT)
Dept: INTERVENTIONAL RADIOLOGY/VASCULAR | Age: 68
Discharge: HOME | End: 2018-10-31
Payer: MEDICARE

## 2018-10-29 DIAGNOSIS — I70.213 ATHEROSCLEROSIS OF NATIVE ARTERY OF BOTH LOWER EXTREMITIES WITH INTERMITTENT CLAUDICATION (HCC): ICD-10-CM

## 2018-10-29 PROCEDURE — 93923 UPR/LXTR ART STDY 3+ LVLS: CPT

## 2018-11-06 ENCOUNTER — OFFICE VISIT (OUTPATIENT)
Dept: VASCULAR SURGERY | Age: 68
End: 2018-11-06

## 2018-11-06 DIAGNOSIS — I70.213 ATHEROSCLEROSIS OF NATIVE ARTERY OF BOTH LOWER EXTREMITIES WITH INTERMITTENT CLAUDICATION (HCC): Primary | ICD-10-CM

## 2018-11-06 PROCEDURE — 99024 POSTOP FOLLOW-UP VISIT: CPT | Performed by: NURSE PRACTITIONER

## 2018-11-06 NOTE — PROGRESS NOTES
Vascular Surgery Progress Note    Chief Complaint   Patient presents with    Post-Op Check     s/p R femoral endarterectomy        Patient returns for post operative evaluation status post right femoral endarterectomy. The patient denies any unexpected problems since his last visit. He reports that he is able to walk without any limitations. He states that his incision is now healed. Procedure Laterality Date    CORONARY ANGIOPLASTY  2005    CABG TIMES 4 2015    INGUINAL HERNIA REPAIR Right     [place] THROMBOENDARTECTMY FEMORAL COMMON Right 9/17/2018    FEMORAL ENDARTERECTOMY performed by Edwar Abel MD at [address] [place] [address]  08/15/2018    abdominal aortogram with runoff by Dr. Pia Miranda       Physical Exam:  The incision(s) are healing without evidence of infection. Heart rhythm is regular. Right      Left   Brachial     Radial 3    Femoral     Popliteal     Dorsalis Pedis 2    Posterior Tibial biphasic    (3=normal, 2=diminished, 1=barely palpable, 4=widened)    ABIs: R 0.97 L 0.96  GTP: R 118  L 115    Problem List Items Addressed This Visit     Atherosclerosis of native artery of both lower extremities with intermittent claudication ([address] - Primary          I reviewed with the patient that normal activities can be resumed as tolerated. I will plan to see him in one year with repeat arterial studies. I asked him to call back sooner with any worsening of his incision. Pt seen and plan reviewed with Dr. Pia Miranda.      Santiago Klein, TIM

## 2019-04-09 ENCOUNTER — OFFICE VISIT (OUTPATIENT)
Dept: VASCULAR SURGERY | Age: 69
End: 2019-04-09
Payer: MEDICARE

## 2019-04-09 DIAGNOSIS — I70.213 ATHEROSCLEROSIS OF NATIVE ARTERY OF BOTH LOWER EXTREMITIES WITH INTERMITTENT CLAUDICATION (HCC): Primary | ICD-10-CM

## 2019-04-09 PROCEDURE — G8417 CALC BMI ABV UP PARAM F/U: HCPCS | Performed by: SURGERY

## 2019-04-09 PROCEDURE — G8598 ASA/ANTIPLAT THER USED: HCPCS | Performed by: SURGERY

## 2019-04-09 PROCEDURE — G8428 CUR MEDS NOT DOCUMENT: HCPCS | Performed by: SURGERY

## 2019-04-09 PROCEDURE — 1123F ACP DISCUSS/DSCN MKR DOCD: CPT | Performed by: SURGERY

## 2019-04-09 PROCEDURE — 3017F COLORECTAL CA SCREEN DOC REV: CPT | Performed by: SURGERY

## 2019-04-09 PROCEDURE — 99213 OFFICE O/P EST LOW 20 MIN: CPT | Performed by: SURGERY

## 2019-04-09 PROCEDURE — 4040F PNEUMOC VAC/ADMIN/RCVD: CPT | Performed by: SURGERY

## 2019-04-09 PROCEDURE — 1036F TOBACCO NON-USER: CPT | Performed by: SURGERY

## 2019-04-09 NOTE — PROGRESS NOTES
Vascular Surgery Outpatient Progress Note      Chief Complaint   Patient presents with    Circulatory Problem     intermittent cluadication L LE        HISTORY OF PRESENT ILLNESS:                The patient is a 80 y.o. male who returns for follow-up evaluation of peripheral vascular disease. He denies any new lower extremity ulcers, rest pain in feet, or disabling claudication. While in [place] he developed L LE calf claudication at approximately 100-150 yards. He denies that this is disabling at this time. Past Medical History:        Diagnosis Date    CAD (coronary artery disease)     Heart attack ([facility] [address] 2008, 3-2015     Past Surgical History:        Procedure Laterality Date    CORONARY ANGIOPLASTY  2005    CABG TIMES 4 2015    INGUINAL HERNIA REPAIR Right     OR THROMBOENDARTECTMY FEMORAL COMMON Right 9/17/2018    FEMORAL ENDARTERECTOMY performed by Latoya Asencio MD at [address] [place] [address]  08/15/2018    abdominal aortogram with runoff by Dr. Chaitanya Valenzuela     Current Medications:   Prior to Admission medications    Medication Sig Start Date End Date Taking? Authorizing Provider   Multiple Vitamins-Minerals (THERAPEUTIC MULTIVITAMIN-MINERALS) tablet Take 1 tablet by mouth nightly     Historical Provider, MD   metoprolol tartrate (LOPRESSOR) 25 MG tablet Take 25 mg by mouth 2 times daily 7/21/18   Historical Provider, MD   lisinopril (PRINIVIL;ZESTRIL) 5 MG tablet Take 5 mg by mouth nightly  7/8/18   Historical Provider, MD   pravastatin (PRAVACHOL) 40 MG tablet Take 40 mg by mouth nightly  5/13/18   Historical Provider, MD   meloxicam (MOBIC) 15 MG tablet Take 15 mg by mouth daily 7/9/18   Historical Provider, MD   aspirin EC 81 MG EC tablet Take 81 mg by mouth nightly     Historical Provider, MD     Allergies:  Patient has no known allergies.     Social History     Socioeconomic History    Marital status:      Spouse name: Not on file    Number of children: Not on [x]/Yes []      Wheezing:   No [x]/Yes []  Cardiovascular:             Angina:   No [x]/Yes []      Palpitations:   No [x]/Yes []          Claudication:    No [x]/Yes []      Leg swelling:   No [x]/Yes []  Gastrointestinal:             Nausea or vomiting:  No [x]/Yes []               Abdominal pain:  No [x]/Yes []                     Intestinal bleeding: No [x]/Yes []  Musculoskeletal:             Leg pain:   No []/Yes [x]      Back pain:   No [x]/Yes []                    Weakness:   No [x]/Yes []  Neurologic:             Numbness:   No [x]/Yes []      Paralysis:   No [x]/Yes []                       Headaches:   No [x]/Yes []  Hematologic, lymphatic:   Anemia:   No [x]/Yes []              Bleeding or bruising:  No [x]/Yes []              Fevers or chills: No [x]/Yes []  Endocrine:             Temp intolerance:   No [x]/Yes []                       Polydipsia, polyuria:  No [x]/Yes []  Skin:              Rash:    No [x]/Yes []      Ulcers:   No [x]/Yes []              Abnorm pigment: No [x]/Yes []  :              Frequency/urgency:  No [x]/Yes []      Hematuria:    No [x]/Yes []                      Incontinence:    No [x]/Yes []    PHYSICAL EXAM:  There were no vitals filed for this visit.   General Appearance: alert and oriented to person, place and time, well developed and well- nourished, in no acute distress  Skin: warm and dry, no rash or erythema  Head: normocephalic and atraumatic  Eyes: extraocular eye movements intact, conjunctivae normal  ENT: external ear and ear canal normal bilaterally, nose without deformity  Pulmonary/Chest: clear to auscultation bilaterally- no wheezes, rales or rhonchi, normal air movement, no respiratory distress  Cardiovascular: normal rate, regular rhythm, normal S1 and S2, no murmurs, no carotid bruits  Abdomen: soft, non-tender, non-distended, normal bowel sounds, no masses or organomegaly  Musculoskeletal: normal range of motion, no joint swelling, deformity or tenderness  Neurologic: no cranial nerve deficit, gait, coordination and speech normal  Extremities: no leg edema bilaterally    PULSE EXAM      Right      Left   Brachial     Radial 2 2   Femoral     Popliteal     Dorsalis Pedis  biphasic   Posterior Tibial  biphasic   (3=normal, 2=diminished, 1=barely palpable, 4=widened)    RADIOLOGY:     IMPRESSION/RECOMMENDATIONS:      Problem List Items Addressed This Visit     Atherosclerosis of native artery of both lower extremities with intermittent claudication ([facility] [address] - Primary        I reviewed with the patient that the circulation to his feet remains adequate and that I do not feel that he requires any additional testing or intervention at this time. I will have him keep his next scheduled appointment with arterial studies in November. If his symptoms were to worsen and become disabling, I instructed him to call sooner. We can proceed with arteriogram at that point. Pt seen and plan reviewed with Dr. Erin Jones. Tomer Reynolds CNP    Return in about 7 months (around 11/9/2019).

## 2019-11-26 DIAGNOSIS — I70.213 ATHEROSCLEROSIS OF NATIVE ARTERY OF BOTH LOWER EXTREMITIES WITH INTERMITTENT CLAUDICATION (HCC): Primary | ICD-10-CM

## 2020-01-01 ENCOUNTER — HOSPITAL ENCOUNTER (OUTPATIENT)
Dept: INFUSION THERAPY | Age: 70
Discharge: HOME | End: 2020-09-11
Payer: MEDICARE

## 2020-01-01 ENCOUNTER — HOSPITAL ENCOUNTER (OUTPATIENT)
Dept: INFUSION THERAPY | Age: 70
Discharge: HOME | End: 2020-06-29
Payer: MEDICARE

## 2020-01-01 ENCOUNTER — TELEPHONE (OUTPATIENT)
Dept: INFUSION THERAPY | Age: 70
End: 2020-01-01

## 2020-01-01 ENCOUNTER — HOSPITAL ENCOUNTER (OUTPATIENT)
Dept: INFUSION THERAPY | Age: 70
Discharge: HOME | End: 2020-07-24
Payer: MEDICARE

## 2020-01-01 ENCOUNTER — OFFICE VISIT (OUTPATIENT)
Dept: ONCOLOGY | Age: 70
End: 2020-01-01
Payer: MEDICARE

## 2020-01-01 ENCOUNTER — TELEPHONE (OUTPATIENT)
Dept: ONCOLOGY | Age: 70
End: 2020-01-01

## 2020-01-01 ENCOUNTER — HOSPITAL ENCOUNTER (OUTPATIENT)
Dept: INFUSION THERAPY | Age: 70
Discharge: HOME | End: 2020-12-10
Payer: MEDICARE

## 2020-01-01 ENCOUNTER — HOSPITAL ENCOUNTER (OUTPATIENT)
Dept: INFUSION THERAPY | Age: 70
Discharge: HOME | End: 2020-08-24
Payer: MEDICARE

## 2020-01-01 ENCOUNTER — HOSPITAL ENCOUNTER (OUTPATIENT)
Dept: INFUSION THERAPY | Age: 70
Discharge: HOME | End: 2020-08-31
Payer: MEDICARE

## 2020-01-01 ENCOUNTER — HOSPITAL ENCOUNTER (OUTPATIENT)
Dept: INFUSION THERAPY | Age: 70
Discharge: HOME | End: 2020-09-18
Payer: MEDICARE

## 2020-01-01 ENCOUNTER — OFFICE VISIT (OUTPATIENT)
Dept: ONCOLOGY | Age: 70
End: 2020-01-01

## 2020-01-01 ENCOUNTER — CARE COORDINATION (OUTPATIENT)
Dept: CASE MANAGEMENT | Age: 70
End: 2020-01-01

## 2020-01-01 ENCOUNTER — HOSPITAL ENCOUNTER (OUTPATIENT)
Dept: INFUSION THERAPY | Age: 70
Discharge: HOME | End: 2020-07-10
Payer: MEDICARE

## 2020-01-01 ENCOUNTER — HOSPITAL ENCOUNTER (OUTPATIENT)
Dept: INFUSION THERAPY | Age: 70
Discharge: HOME | End: 2020-10-19
Payer: MEDICARE

## 2020-01-01 ENCOUNTER — HOSPITAL ENCOUNTER (OUTPATIENT)
Dept: INFUSION THERAPY | Age: 70
Discharge: HOME | End: 2020-06-24
Payer: MEDICARE

## 2020-01-01 ENCOUNTER — OFFICE VISIT (OUTPATIENT)
Dept: ONCOLOGY | Age: 70
End: 2020-01-01
Payer: COMMERCIAL

## 2020-01-01 ENCOUNTER — HOSPITAL ENCOUNTER (OUTPATIENT)
Dept: INFUSION THERAPY | Age: 70
Discharge: HOME | DRG: 372 | End: 2020-12-15
Payer: MEDICARE

## 2020-01-01 ENCOUNTER — HOSPITAL ENCOUNTER (OUTPATIENT)
Dept: INFUSION THERAPY | Age: 70
Discharge: HOME | End: 2020-07-13
Payer: MEDICARE

## 2020-01-01 ENCOUNTER — HOSPITAL ENCOUNTER (OUTPATIENT)
Dept: INFUSION THERAPY | Age: 70
Discharge: HOME | DRG: 372 | End: 2020-12-14
Payer: MEDICARE

## 2020-01-01 ENCOUNTER — HOSPITAL ENCOUNTER (OUTPATIENT)
Dept: INFUSION THERAPY | Age: 70
Discharge: HOME | End: 2020-12-01
Payer: MEDICARE

## 2020-01-01 ENCOUNTER — HOSPITAL ENCOUNTER (OUTPATIENT)
Dept: INFUSION THERAPY | Age: 70
Discharge: HOME | End: 2020-09-21
Payer: MEDICARE

## 2020-01-01 ENCOUNTER — CARE COORDINATION (OUTPATIENT)
Dept: CARE COORDINATION | Age: 70
End: 2020-01-01

## 2020-01-01 ENCOUNTER — HOSPITAL ENCOUNTER (OUTPATIENT)
Dept: INFUSION THERAPY | Age: 70
Discharge: HOME | End: 2020-12-08
Payer: MEDICARE

## 2020-01-01 ENCOUNTER — HOSPITAL ENCOUNTER (OUTPATIENT)
Dept: INFUSION THERAPY | Age: 70
Discharge: HOME | End: 2020-11-23
Payer: MEDICARE

## 2020-01-01 ENCOUNTER — HOSPITAL ENCOUNTER (OUTPATIENT)
Dept: INFUSION THERAPY | Age: 70
Discharge: HOME | End: 2020-10-05
Payer: MEDICARE

## 2020-01-01 ENCOUNTER — HOSPITAL ENCOUNTER (OUTPATIENT)
Dept: INFUSION THERAPY | Age: 70
Discharge: HOME | End: 2020-11-09
Payer: MEDICARE

## 2020-01-01 ENCOUNTER — HOSPITAL ENCOUNTER (OUTPATIENT)
Dept: INFUSION THERAPY | Age: 70
Discharge: HOME | End: 2020-10-16
Payer: MEDICARE

## 2020-01-01 ENCOUNTER — HOSPITAL ENCOUNTER (OUTPATIENT)
Age: 70
Discharge: HOME | End: 2020-05-17

## 2020-01-01 ENCOUNTER — HOSPITAL ENCOUNTER (OUTPATIENT)
Dept: INFUSION THERAPY | Age: 70
Discharge: HOME | End: 2020-09-23
Payer: MEDICARE

## 2020-01-01 ENCOUNTER — HOSPITAL ENCOUNTER (OUTPATIENT)
Dept: INFUSION THERAPY | Age: 70
Discharge: HOME | End: 2020-08-10
Payer: MEDICARE

## 2020-01-01 ENCOUNTER — HOSPITAL ENCOUNTER (OUTPATIENT)
Dept: INFUSION THERAPY | Age: 70
Discharge: HOME | End: 2020-07-15
Payer: MEDICARE

## 2020-01-01 ENCOUNTER — CLINICAL DOCUMENTATION (OUTPATIENT)
Dept: ONCOLOGY | Age: 70
End: 2020-01-01

## 2020-01-01 ENCOUNTER — TELEPHONE (OUTPATIENT)
Dept: CASE MANAGEMENT | Age: 70
End: 2020-01-01

## 2020-01-01 ENCOUNTER — HOSPITAL ENCOUNTER (INPATIENT)
Age: 70
LOS: 2 days | Discharge: HOME | DRG: 177 | End: 2020-11-03
Attending: EMERGENCY MEDICINE | Admitting: INTERNAL MEDICINE
Payer: MEDICARE

## 2020-01-01 ENCOUNTER — APPOINTMENT (OUTPATIENT)
Dept: GENERAL RADIOLOGY | Age: 70
DRG: 177 | End: 2020-01-01
Payer: MEDICARE

## 2020-01-01 ENCOUNTER — HOSPITAL ENCOUNTER (OUTPATIENT)
Dept: INFUSION THERAPY | Age: 70
Discharge: HOME | End: 2020-10-02
Payer: MEDICARE

## 2020-01-01 ENCOUNTER — HOSPITAL ENCOUNTER (OUTPATIENT)
Dept: INFUSION THERAPY | Age: 70
Discharge: HOME | End: 2020-10-23
Payer: MEDICARE

## 2020-01-01 ENCOUNTER — HOSPITAL ENCOUNTER (OUTPATIENT)
Dept: INFUSION THERAPY | Age: 70
Discharge: HOME | End: 2020-08-12
Payer: MEDICARE

## 2020-01-01 ENCOUNTER — HOSPITAL ENCOUNTER (OUTPATIENT)
Dept: INFUSION THERAPY | Age: 70
Discharge: HOME | End: 2020-12-29
Payer: MEDICARE

## 2020-01-01 ENCOUNTER — HOSPITAL ENCOUNTER (OUTPATIENT)
Dept: INFUSION THERAPY | Age: 70
Setting detail: INFUSION SERIES
End: 2020-01-01
Payer: MEDICARE

## 2020-01-01 ENCOUNTER — HOSPITAL ENCOUNTER (OUTPATIENT)
Dept: INFUSION THERAPY | Age: 70
Discharge: HOME | End: 2020-12-07
Payer: MEDICARE

## 2020-01-01 ENCOUNTER — HOSPITAL ENCOUNTER (OUTPATIENT)
Dept: INFUSION THERAPY | Age: 70
Discharge: HOME | End: 2020-08-28
Payer: MEDICARE

## 2020-01-01 ENCOUNTER — HOSPITAL ENCOUNTER (EMERGENCY)
Age: 70
Discharge: HOME | End: 2020-09-01
Payer: MEDICARE

## 2020-01-01 ENCOUNTER — HOSPITAL ENCOUNTER (OUTPATIENT)
Dept: INFUSION THERAPY | Age: 70
Discharge: HOME | End: 2020-11-20
Payer: MEDICARE

## 2020-01-01 ENCOUNTER — HOSPITAL ENCOUNTER (OUTPATIENT)
Dept: INFUSION THERAPY | Age: 70
Discharge: HOME | End: 2020-09-14
Payer: MEDICARE

## 2020-01-01 ENCOUNTER — HOSPITAL ENCOUNTER (OUTPATIENT)
Dept: INFUSION THERAPY | Age: 70
Discharge: HOME | End: 2020-07-29
Payer: MEDICARE

## 2020-01-01 ENCOUNTER — HOSPITAL ENCOUNTER (OUTPATIENT)
Dept: INFUSION THERAPY | Age: 70
Discharge: HOME | End: 2020-08-21
Payer: MEDICARE

## 2020-01-01 ENCOUNTER — HOSPITAL ENCOUNTER (OUTPATIENT)
Dept: INFUSION THERAPY | Age: 70
Discharge: HOME | End: 2020-10-07
Payer: MEDICARE

## 2020-01-01 ENCOUNTER — HOSPITAL ENCOUNTER (OUTPATIENT)
Dept: INFUSION THERAPY | Age: 70
Discharge: HOME | End: 2020-07-27
Payer: MEDICARE

## 2020-01-01 ENCOUNTER — HOSPITAL ENCOUNTER (INPATIENT)
Age: 70
LOS: 1 days | Discharge: HOME | DRG: 372 | End: 2020-12-18
Attending: EMERGENCY MEDICINE | Admitting: INTERNAL MEDICINE
Payer: MEDICARE

## 2020-01-01 ENCOUNTER — HOSPITAL ENCOUNTER (OUTPATIENT)
Dept: INFUSION THERAPY | Age: 70
Discharge: HOME | End: 2020-07-01
Payer: MEDICARE

## 2020-01-01 ENCOUNTER — HOSPITAL ENCOUNTER (OUTPATIENT)
Dept: INFUSION THERAPY | Age: 70
Discharge: HOME | End: 2020-12-28
Payer: MEDICARE

## 2020-01-01 ENCOUNTER — HOSPITAL ENCOUNTER (OUTPATIENT)
Dept: INFUSION THERAPY | Age: 70
Discharge: HOME | End: 2020-10-26
Payer: MEDICARE

## 2020-01-01 ENCOUNTER — HOSPITAL ENCOUNTER (OUTPATIENT)
Dept: INFUSION THERAPY | Age: 70
Discharge: HOME | End: 2020-09-02
Payer: MEDICARE

## 2020-01-01 ENCOUNTER — HOSPITAL ENCOUNTER (OUTPATIENT)
Dept: INFUSION THERAPY | Age: 70
Discharge: HOME | End: 2020-10-28
Payer: MEDICARE

## 2020-01-01 VITALS
SYSTOLIC BLOOD PRESSURE: 128 MMHG | TEMPERATURE: 98.6 F | BODY MASS INDEX: 26.37 KG/M2 | HEART RATE: 83 BPM | OXYGEN SATURATION: 96 % | HEIGHT: 67 IN | DIASTOLIC BLOOD PRESSURE: 79 MMHG | WEIGHT: 168 LBS

## 2020-01-01 VITALS
TEMPERATURE: 97.3 F | DIASTOLIC BLOOD PRESSURE: 69 MMHG | HEIGHT: 67 IN | BODY MASS INDEX: 27.59 KG/M2 | SYSTOLIC BLOOD PRESSURE: 115 MMHG | OXYGEN SATURATION: 97 % | HEART RATE: 83 BPM | WEIGHT: 175.8 LBS

## 2020-01-01 VITALS
SYSTOLIC BLOOD PRESSURE: 124 MMHG | HEIGHT: 67 IN | BODY MASS INDEX: 27.37 KG/M2 | HEART RATE: 73 BPM | WEIGHT: 174.4 LBS | TEMPERATURE: 97.6 F | DIASTOLIC BLOOD PRESSURE: 46 MMHG

## 2020-01-01 VITALS
TEMPERATURE: 99 F | BODY MASS INDEX: 27.09 KG/M2 | HEIGHT: 67 IN | WEIGHT: 172.6 LBS | RESPIRATION RATE: 16 BRPM | HEART RATE: 99 BPM | OXYGEN SATURATION: 93 % | SYSTOLIC BLOOD PRESSURE: 140 MMHG | DIASTOLIC BLOOD PRESSURE: 80 MMHG

## 2020-01-01 VITALS
TEMPERATURE: 97.3 F | WEIGHT: 175 LBS | OXYGEN SATURATION: 98 % | DIASTOLIC BLOOD PRESSURE: 88 MMHG | HEIGHT: 67 IN | BODY MASS INDEX: 27.47 KG/M2 | SYSTOLIC BLOOD PRESSURE: 168 MMHG | HEART RATE: 83 BPM

## 2020-01-01 VITALS
HEART RATE: 83 BPM | HEIGHT: 67 IN | SYSTOLIC BLOOD PRESSURE: 133 MMHG | WEIGHT: 170 LBS | TEMPERATURE: 96.7 F | BODY MASS INDEX: 26.68 KG/M2 | OXYGEN SATURATION: 97 % | DIASTOLIC BLOOD PRESSURE: 89 MMHG

## 2020-01-01 VITALS
WEIGHT: 162.6 LBS | HEART RATE: 94 BPM | SYSTOLIC BLOOD PRESSURE: 124 MMHG | OXYGEN SATURATION: 96 % | TEMPERATURE: 98.9 F | BODY MASS INDEX: 25.52 KG/M2 | DIASTOLIC BLOOD PRESSURE: 74 MMHG | HEIGHT: 67 IN

## 2020-01-01 VITALS — SYSTOLIC BLOOD PRESSURE: 154 MMHG | RESPIRATION RATE: 16 BRPM | DIASTOLIC BLOOD PRESSURE: 86 MMHG | HEART RATE: 80 BPM

## 2020-01-01 VITALS
HEART RATE: 95 BPM | TEMPERATURE: 98.8 F | SYSTOLIC BLOOD PRESSURE: 134 MMHG | OXYGEN SATURATION: 95 % | WEIGHT: 169.5 LBS | HEIGHT: 67 IN | BODY MASS INDEX: 26.6 KG/M2 | DIASTOLIC BLOOD PRESSURE: 89 MMHG

## 2020-01-01 VITALS
BODY MASS INDEX: 27.78 KG/M2 | HEIGHT: 67 IN | DIASTOLIC BLOOD PRESSURE: 78 MMHG | TEMPERATURE: 98.1 F | HEART RATE: 94 BPM | SYSTOLIC BLOOD PRESSURE: 149 MMHG | OXYGEN SATURATION: 95 % | WEIGHT: 177 LBS

## 2020-01-01 VITALS
DIASTOLIC BLOOD PRESSURE: 72 MMHG | SYSTOLIC BLOOD PRESSURE: 145 MMHG | RESPIRATION RATE: 16 BRPM | HEART RATE: 64 BPM | TEMPERATURE: 97.4 F

## 2020-01-01 VITALS — DIASTOLIC BLOOD PRESSURE: 73 MMHG | HEART RATE: 59 BPM | SYSTOLIC BLOOD PRESSURE: 170 MMHG

## 2020-01-01 VITALS
BODY MASS INDEX: 26.84 KG/M2 | OXYGEN SATURATION: 97 % | SYSTOLIC BLOOD PRESSURE: 154 MMHG | HEART RATE: 88 BPM | HEIGHT: 67 IN | DIASTOLIC BLOOD PRESSURE: 88 MMHG | TEMPERATURE: 97 F | WEIGHT: 171 LBS

## 2020-01-01 VITALS
OXYGEN SATURATION: 96 % | TEMPERATURE: 97 F | DIASTOLIC BLOOD PRESSURE: 91 MMHG | WEIGHT: 177 LBS | HEART RATE: 91 BPM | BODY MASS INDEX: 27.78 KG/M2 | SYSTOLIC BLOOD PRESSURE: 130 MMHG | HEIGHT: 67 IN

## 2020-01-01 VITALS — SYSTOLIC BLOOD PRESSURE: 134 MMHG | DIASTOLIC BLOOD PRESSURE: 67 MMHG | HEART RATE: 66 BPM

## 2020-01-01 VITALS — HEART RATE: 59 BPM | RESPIRATION RATE: 12 BRPM | SYSTOLIC BLOOD PRESSURE: 114 MMHG | DIASTOLIC BLOOD PRESSURE: 63 MMHG

## 2020-01-01 VITALS
WEIGHT: 176.9 LBS | BODY MASS INDEX: 27.76 KG/M2 | SYSTOLIC BLOOD PRESSURE: 117 MMHG | DIASTOLIC BLOOD PRESSURE: 72 MMHG | HEART RATE: 82 BPM | TEMPERATURE: 97.1 F | HEIGHT: 67 IN

## 2020-01-01 VITALS
WEIGHT: 164 LBS | RESPIRATION RATE: 16 BRPM | BODY MASS INDEX: 25.74 KG/M2 | SYSTOLIC BLOOD PRESSURE: 116 MMHG | DIASTOLIC BLOOD PRESSURE: 68 MMHG | HEIGHT: 67 IN | TEMPERATURE: 98.7 F | HEART RATE: 92 BPM | OXYGEN SATURATION: 93 %

## 2020-01-01 VITALS — RESPIRATION RATE: 12 BRPM | HEART RATE: 52 BPM | DIASTOLIC BLOOD PRESSURE: 52 MMHG | SYSTOLIC BLOOD PRESSURE: 105 MMHG

## 2020-01-01 VITALS
TEMPERATURE: 98.5 F | WEIGHT: 172.6 LBS | HEART RATE: 82 BPM | SYSTOLIC BLOOD PRESSURE: 131 MMHG | DIASTOLIC BLOOD PRESSURE: 73 MMHG | HEIGHT: 67 IN | BODY MASS INDEX: 27.09 KG/M2 | OXYGEN SATURATION: 95 %

## 2020-01-01 VITALS
SYSTOLIC BLOOD PRESSURE: 93 MMHG | HEIGHT: 67 IN | DIASTOLIC BLOOD PRESSURE: 61 MMHG | TEMPERATURE: 95.7 F | OXYGEN SATURATION: 96 % | WEIGHT: 168.5 LBS | HEART RATE: 81 BPM | BODY MASS INDEX: 26.45 KG/M2

## 2020-01-01 VITALS
TEMPERATURE: 95.6 F | OXYGEN SATURATION: 97 % | SYSTOLIC BLOOD PRESSURE: 96 MMHG | WEIGHT: 165 LBS | HEART RATE: 77 BPM | DIASTOLIC BLOOD PRESSURE: 59 MMHG | BODY MASS INDEX: 25.84 KG/M2

## 2020-01-01 VITALS
HEART RATE: 90 BPM | RESPIRATION RATE: 18 BRPM | DIASTOLIC BLOOD PRESSURE: 89 MMHG | OXYGEN SATURATION: 98 % | SYSTOLIC BLOOD PRESSURE: 157 MMHG | TEMPERATURE: 97.5 F

## 2020-01-01 VITALS
WEIGHT: 165 LBS | BODY MASS INDEX: 25.9 KG/M2 | DIASTOLIC BLOOD PRESSURE: 70 MMHG | RESPIRATION RATE: 16 BRPM | HEIGHT: 67 IN | SYSTOLIC BLOOD PRESSURE: 116 MMHG | HEART RATE: 65 BPM

## 2020-01-01 VITALS
TEMPERATURE: 99 F | RESPIRATION RATE: 18 BRPM | SYSTOLIC BLOOD PRESSURE: 145 MMHG | HEART RATE: 75 BPM | DIASTOLIC BLOOD PRESSURE: 71 MMHG

## 2020-01-01 VITALS
HEART RATE: 86 BPM | WEIGHT: 170 LBS | OXYGEN SATURATION: 97 % | SYSTOLIC BLOOD PRESSURE: 134 MMHG | DIASTOLIC BLOOD PRESSURE: 87 MMHG | BODY MASS INDEX: 26.63 KG/M2 | TEMPERATURE: 96.5 F

## 2020-01-01 VITALS
TEMPERATURE: 99 F | SYSTOLIC BLOOD PRESSURE: 178 MMHG | HEART RATE: 80 BPM | RESPIRATION RATE: 18 BRPM | DIASTOLIC BLOOD PRESSURE: 84 MMHG

## 2020-01-01 VITALS
DIASTOLIC BLOOD PRESSURE: 90 MMHG | RESPIRATION RATE: 18 BRPM | SYSTOLIC BLOOD PRESSURE: 160 MMHG | HEART RATE: 68 BPM | TEMPERATURE: 97 F

## 2020-01-01 VITALS — SYSTOLIC BLOOD PRESSURE: 141 MMHG | HEART RATE: 59 BPM | DIASTOLIC BLOOD PRESSURE: 69 MMHG

## 2020-01-01 DIAGNOSIS — C25.0 CARCINOMA OF HEAD OF PANCREAS (HCC): Primary | ICD-10-CM

## 2020-01-01 DIAGNOSIS — C25.0 CARCINOMA OF HEAD OF PANCREAS (HCC): ICD-10-CM

## 2020-01-01 DIAGNOSIS — D70.1 AGRANULOCYTOSIS SECONDARY TO CANCER CHEMOTHERAPY (CODE) (HCC): Primary | ICD-10-CM

## 2020-01-01 DIAGNOSIS — D70.1 AGRANULOCYTOSIS SECONDARY TO CANCER CHEMOTHERAPY (CODE) (HCC): ICD-10-CM

## 2020-01-01 LAB
ACANTHOCYTES: ABNORMAL
ACANTHOCYTES: ABNORMAL
ALBUMIN SERPL-MCNC: 3 G/DL (ref 3.5–5.2)
ALBUMIN SERPL-MCNC: 3.1 G/DL (ref 3.5–5.2)
ALBUMIN SERPL-MCNC: 3.1 G/DL (ref 3.5–5.2)
ALBUMIN SERPL-MCNC: 3.2 G/DL (ref 3.5–5.2)
ALBUMIN SERPL-MCNC: 3.3 G/DL (ref 3.5–5.2)
ALBUMIN SERPL-MCNC: 3.4 G/DL (ref 3.5–5.2)
ALBUMIN SERPL-MCNC: 3.5 G/DL (ref 3.5–5.2)
ALBUMIN SERPL-MCNC: 3.6 G/DL (ref 3.5–5.2)
ALBUMIN SERPL-MCNC: 3.8 G/DL (ref 3.5–5.2)
ALP BLD-CCNC: 101 U/L (ref 40–129)
ALP BLD-CCNC: 104 U/L (ref 40–129)
ALP BLD-CCNC: 112 U/L (ref 40–129)
ALP BLD-CCNC: 115 U/L (ref 40–129)
ALP BLD-CCNC: 118 U/L (ref 40–129)
ALP BLD-CCNC: 122 U/L (ref 40–129)
ALP BLD-CCNC: 124 U/L (ref 40–129)
ALP BLD-CCNC: 159 U/L (ref 40–129)
ALP BLD-CCNC: 162 U/L (ref 40–129)
ALP BLD-CCNC: 178 U/L (ref 40–129)
ALP BLD-CCNC: 190 U/L (ref 40–129)
ALP BLD-CCNC: 82 U/L (ref 40–129)
ALP BLD-CCNC: 85 U/L (ref 40–129)
ALP BLD-CCNC: 93 U/L (ref 40–129)
ALP BLD-CCNC: 94 U/L (ref 40–129)
ALT SERPL-CCNC: 10 U/L (ref 0–40)
ALT SERPL-CCNC: 10 U/L (ref 0–40)
ALT SERPL-CCNC: 11 U/L (ref 0–40)
ALT SERPL-CCNC: 13 U/L (ref 0–40)
ALT SERPL-CCNC: 13 U/L (ref 0–40)
ALT SERPL-CCNC: 14 U/L (ref 0–40)
ALT SERPL-CCNC: 16 U/L (ref 0–40)
ALT SERPL-CCNC: 17 U/L (ref 0–40)
ALT SERPL-CCNC: 20 U/L (ref 0–40)
ALT SERPL-CCNC: 20 U/L (ref 0–40)
ALT SERPL-CCNC: 24 U/L (ref 0–40)
ANION GAP SERPL CALCULATED.3IONS-SCNC: 11 MMOL/L (ref 7–16)
ANION GAP SERPL CALCULATED.3IONS-SCNC: 12 MMOL/L (ref 7–16)
ANION GAP SERPL CALCULATED.3IONS-SCNC: 13 MMOL/L (ref 7–16)
ANION GAP SERPL CALCULATED.3IONS-SCNC: 14 MMOL/L (ref 7–16)
ANION GAP SERPL CALCULATED.3IONS-SCNC: 16 MMOL/L (ref 7–16)
ANION GAP SERPL CALCULATED.3IONS-SCNC: 17 MMOL/L (ref 7–16)
ANION GAP SERPL CALCULATED.3IONS-SCNC: 17 MMOL/L (ref 7–16)
ANION GAP SERPL CALCULATED.3IONS-SCNC: 18 MMOL/L (ref 7–16)
ANION GAP SERPL CALCULATED.3IONS-SCNC: 9 MMOL/L (ref 7–16)
ANION GAP SERPL CALCULATED.3IONS-SCNC: 9 MMOL/L (ref 7–16)
ANISOCYTOSIS: ABNORMAL
APTT: 30.8 SEC (ref 24.5–35.1)
AST SERPL-CCNC: 10 U/L (ref 0–39)
AST SERPL-CCNC: 13 U/L (ref 0–39)
AST SERPL-CCNC: 15 U/L (ref 0–39)
AST SERPL-CCNC: 17 U/L (ref 0–39)
AST SERPL-CCNC: 19 U/L (ref 0–39)
AST SERPL-CCNC: 20 U/L (ref 0–39)
AST SERPL-CCNC: 20 U/L (ref 0–39)
AST SERPL-CCNC: 22 U/L (ref 0–39)
AST SERPL-CCNC: 25 U/L (ref 0–39)
AST SERPL-CCNC: 26 U/L (ref 0–39)
AST SERPL-CCNC: 28 U/L (ref 0–39)
AST SERPL-CCNC: 32 U/L (ref 0–39)
AST SERPL-CCNC: 33 U/L (ref 0–39)
AST SERPL-CCNC: 33 U/L (ref 0–39)
AST SERPL-CCNC: 34 U/L (ref 0–39)
AST SERPL-CCNC: 36 U/L (ref 0–39)
AST SERPL-CCNC: 38 U/L (ref 0–39)
AST SERPL-CCNC: 39 U/L (ref 0–39)
AST SERPL-CCNC: 44 U/L (ref 0–39)
ATYPICAL LYMPHOCYTE RELATIVE PERCENT: 1 % (ref 0–4)
ATYPICAL LYMPHOCYTE RELATIVE PERCENT: 3 % (ref 0–4)
BACTERIA: ABNORMAL /HPF
BASOPHILS ABSOLUTE: 0 E9/L (ref 0–0.2)
BASOPHILS ABSOLUTE: 0.02 E9/L (ref 0–0.2)
BASOPHILS ABSOLUTE: 0.03 E9/L (ref 0–0.2)
BASOPHILS ABSOLUTE: 0.03 E9/L (ref 0–0.2)
BASOPHILS ABSOLUTE: 0.04 E9/L (ref 0–0.2)
BASOPHILS ABSOLUTE: 0.05 E9/L (ref 0–0.2)
BASOPHILS ABSOLUTE: 0.07 E9/L (ref 0–0.2)
BASOPHILS ABSOLUTE: 0.09 E9/L (ref 0–0.2)
BASOPHILS RELATIVE PERCENT: 0 % (ref 0–2)
BASOPHILS RELATIVE PERCENT: 0.2 % (ref 0–2)
BASOPHILS RELATIVE PERCENT: 0.2 % (ref 0–2)
BASOPHILS RELATIVE PERCENT: 0.3 % (ref 0–2)
BASOPHILS RELATIVE PERCENT: 0.4 % (ref 0–2)
BASOPHILS RELATIVE PERCENT: 0.5 % (ref 0–2)
BASOPHILS RELATIVE PERCENT: 0.7 % (ref 0–2)
BASOPHILS RELATIVE PERCENT: 0.8 % (ref 0–2)
BASOPHILS RELATIVE PERCENT: 0.9 % (ref 0–2)
BASOPHILS RELATIVE PERCENT: 0.9 % (ref 0–2)
BASOPHILS RELATIVE PERCENT: 2 % (ref 0–2)
BILIRUB SERPL-MCNC: 0.2 MG/DL (ref 0–1.2)
BILIRUB SERPL-MCNC: 0.2 MG/DL (ref 0–1.2)
BILIRUB SERPL-MCNC: 0.3 MG/DL (ref 0–1.2)
BILIRUB SERPL-MCNC: 0.4 MG/DL (ref 0–1.2)
BILIRUB SERPL-MCNC: 0.5 MG/DL (ref 0–1.2)
BILIRUB SERPL-MCNC: 0.5 MG/DL (ref 0–1.2)
BILIRUB SERPL-MCNC: 0.6 MG/DL (ref 0–1.2)
BILIRUB SERPL-MCNC: 0.7 MG/DL (ref 0–1.2)
BILIRUB SERPL-MCNC: 0.8 MG/DL (ref 0–1.2)
BILIRUB SERPL-MCNC: <0.2 MG/DL (ref 0–1.2)
BILIRUB SERPL-MCNC: <0.2 MG/DL (ref 0–1.2)
BILIRUBIN DIRECT: 0.3 MG/DL (ref 0–0.3)
BILIRUBIN URINE: NEGATIVE
BILIRUBIN, INDIRECT: 0.5 MG/DL (ref 0–1)
BLOOD CULTURE, ROUTINE: NORMAL
BLOOD, URINE: ABNORMAL
BUN BLDV-MCNC: 10 MG/DL (ref 8–23)
BUN BLDV-MCNC: 10 MG/DL (ref 8–23)
BUN BLDV-MCNC: 11 MG/DL (ref 8–23)
BUN BLDV-MCNC: 12 MG/DL (ref 8–23)
BUN BLDV-MCNC: 12 MG/DL (ref 8–23)
BUN BLDV-MCNC: 13 MG/DL (ref 8–23)
BUN BLDV-MCNC: 14 MG/DL (ref 8–23)
BUN BLDV-MCNC: 14 MG/DL (ref 8–23)
BUN BLDV-MCNC: 15 MG/DL (ref 8–23)
BUN BLDV-MCNC: 16 MG/DL (ref 8–23)
BUN BLDV-MCNC: 6 MG/DL (ref 8–23)
BUN BLDV-MCNC: 7 MG/DL (ref 8–23)
BUN BLDV-MCNC: 7 MG/DL (ref 8–23)
BUN BLDV-MCNC: 8 MG/DL (ref 8–23)
BUN BLDV-MCNC: 9 MG/DL (ref 8–23)
BURR CELLS: ABNORMAL
C DIFF TOXIN/ANTIGEN: ABNORMAL
C DIFF TOXIN/ANTIGEN: NORMAL
C-REACTIVE PROTEIN: 6.1 MG/DL (ref 0–0.4)
CA 19-9: 14 U/ML (ref 0–37)
CA 19-9: 15 U/ML (ref 0–37)
CA 19-9: 7 U/ML (ref 0–37)
CA 19-9: 7 U/ML (ref 0–37)
CA 19-9: 8 U/ML (ref 0–37)
CA 19-9: 8 U/ML (ref 0–37)
CA 19-9: 9 U/ML (ref 0–37)
CA 19-9: 9 U/ML (ref 0–37)
CALCIUM SERPL-MCNC: 8 MG/DL (ref 8.6–10.2)
CALCIUM SERPL-MCNC: 8.2 MG/DL (ref 8.6–10.2)
CALCIUM SERPL-MCNC: 8.2 MG/DL (ref 8.6–10.2)
CALCIUM SERPL-MCNC: 8.3 MG/DL (ref 8.6–10.2)
CALCIUM SERPL-MCNC: 8.3 MG/DL (ref 8.6–10.2)
CALCIUM SERPL-MCNC: 8.4 MG/DL (ref 8.6–10.2)
CALCIUM SERPL-MCNC: 8.4 MG/DL (ref 8.6–10.2)
CALCIUM SERPL-MCNC: 8.5 MG/DL (ref 8.6–10.2)
CALCIUM SERPL-MCNC: 8.6 MG/DL (ref 8.6–10.2)
CALCIUM SERPL-MCNC: 8.7 MG/DL (ref 8.6–10.2)
CALCIUM SERPL-MCNC: 8.8 MG/DL (ref 8.6–10.2)
CALCIUM SERPL-MCNC: 8.8 MG/DL (ref 8.6–10.2)
CALCIUM SERPL-MCNC: 8.9 MG/DL (ref 8.6–10.2)
CALCIUM SERPL-MCNC: 9 MG/DL (ref 8.6–10.2)
CALCIUM SERPL-MCNC: 9 MG/DL (ref 8.6–10.2)
CALCIUM SERPL-MCNC: 9.1 MG/DL (ref 8.6–10.2)
CEA: 11.4 NG/ML (ref 0–5.2)
CEA: 11.6 NG/ML (ref 0–5.2)
CEA: 13 NG/ML (ref 0–5.2)
CEA: 19.2 NG/ML (ref 0–5.2)
CEA: 43.5 NG/ML (ref 0–5.2)
CEA: 8.9 NG/ML (ref 0–5.2)
CHLORIDE BLD-SCNC: 100 MMOL/L (ref 98–107)
CHLORIDE BLD-SCNC: 101 MMOL/L (ref 98–107)
CHLORIDE BLD-SCNC: 102 MMOL/L (ref 98–107)
CHLORIDE BLD-SCNC: 102 MMOL/L (ref 98–107)
CHLORIDE BLD-SCNC: 103 MMOL/L (ref 98–107)
CHLORIDE BLD-SCNC: 104 MMOL/L (ref 98–107)
CHLORIDE BLD-SCNC: 105 MMOL/L (ref 98–107)
CHLORIDE BLD-SCNC: 105 MMOL/L (ref 98–107)
CHLORIDE BLD-SCNC: 106 MMOL/L (ref 98–107)
CHLORIDE BLD-SCNC: 106 MMOL/L (ref 98–107)
CHLORIDE BLD-SCNC: 107 MMOL/L (ref 98–107)
CHLORIDE BLD-SCNC: 108 MMOL/L (ref 98–107)
CHLORIDE BLD-SCNC: 108 MMOL/L (ref 98–107)
CHLORIDE BLD-SCNC: 109 MMOL/L (ref 98–107)
CHLORIDE BLD-SCNC: 96 MMOL/L (ref 98–107)
CHLORIDE BLD-SCNC: 98 MMOL/L (ref 98–107)
CHOLESTEROL, TOTAL: 141 MG/DL (ref 0–199)
CLARITY: CLEAR
CO2: 17 MMOL/L (ref 22–29)
CO2: 18 MMOL/L (ref 22–29)
CO2: 19 MMOL/L (ref 22–29)
CO2: 19 MMOL/L (ref 22–29)
CO2: 20 MMOL/L (ref 22–29)
CO2: 20 MMOL/L (ref 22–29)
CO2: 21 MMOL/L (ref 22–29)
CO2: 22 MMOL/L (ref 22–29)
CO2: 24 MMOL/L (ref 22–29)
CO2: 28 MMOL/L (ref 22–29)
COLOR: YELLOW
CREAT SERPL-MCNC: 0.6 MG/DL (ref 0.7–1.2)
CREAT SERPL-MCNC: 0.7 MG/DL (ref 0.7–1.2)
CREAT SERPL-MCNC: 0.8 MG/DL (ref 0.7–1.2)
CREAT SERPL-MCNC: 0.9 MG/DL (ref 0.7–1.2)
CREAT SERPL-MCNC: 0.9 MG/DL (ref 0.7–1.2)
CREAT SERPL-MCNC: 1 MG/DL (ref 0.7–1.2)
CULTURE, BLOOD 2: NORMAL
CULTURE, STOOL: NORMAL
D DIMER: 2853 NG/ML DDU
DOHLE BODIES: ABNORMAL
EKG ATRIAL RATE: 99 BPM
EKG P AXIS: 22 DEGREES
EKG P-R INTERVAL: 122 MS
EKG Q-T INTERVAL: 376 MS
EKG QRS DURATION: 130 MS
EKG QTC CALCULATION (BAZETT): 482 MS
EKG R AXIS: -23 DEGREES
EKG T AXIS: 131 DEGREES
EKG VENTRICULAR RATE: 99 BPM
EOSINOPHILS ABSOLUTE: 0 E9/L (ref 0.05–0.5)
EOSINOPHILS ABSOLUTE: 0.03 E9/L (ref 0.05–0.5)
EOSINOPHILS ABSOLUTE: 0.04 E9/L (ref 0.05–0.5)
EOSINOPHILS ABSOLUTE: 0.05 E9/L (ref 0.05–0.5)
EOSINOPHILS ABSOLUTE: 0.07 E9/L (ref 0.05–0.5)
EOSINOPHILS ABSOLUTE: 0.08 E9/L (ref 0.05–0.5)
EOSINOPHILS ABSOLUTE: 0.09 E9/L (ref 0.05–0.5)
EOSINOPHILS ABSOLUTE: 0.09 E9/L (ref 0.05–0.5)
EOSINOPHILS ABSOLUTE: 0.12 E9/L (ref 0.05–0.5)
EOSINOPHILS ABSOLUTE: 0.15 E9/L (ref 0.05–0.5)
EOSINOPHILS ABSOLUTE: 0.19 E9/L (ref 0.05–0.5)
EOSINOPHILS ABSOLUTE: 0.22 E9/L (ref 0.05–0.5)
EOSINOPHILS RELATIVE PERCENT: 0 % (ref 0–6)
EOSINOPHILS RELATIVE PERCENT: 0.1 % (ref 0–6)
EOSINOPHILS RELATIVE PERCENT: 0.6 % (ref 0–6)
EOSINOPHILS RELATIVE PERCENT: 0.6 % (ref 0–6)
EOSINOPHILS RELATIVE PERCENT: 0.7 % (ref 0–6)
EOSINOPHILS RELATIVE PERCENT: 0.7 % (ref 0–6)
EOSINOPHILS RELATIVE PERCENT: 0.8 % (ref 0–6)
EOSINOPHILS RELATIVE PERCENT: 0.9 % (ref 0–6)
EOSINOPHILS RELATIVE PERCENT: 0.9 % (ref 0–6)
EOSINOPHILS RELATIVE PERCENT: 1.3 % (ref 0–6)
EOSINOPHILS RELATIVE PERCENT: 1.7 % (ref 0–6)
EOSINOPHILS RELATIVE PERCENT: 1.7 % (ref 0–6)
EOSINOPHILS RELATIVE PERCENT: 1.8 % (ref 0–6)
EOSINOPHILS RELATIVE PERCENT: 1.9 % (ref 0–6)
EOSINOPHILS RELATIVE PERCENT: 3.5 % (ref 0–6)
EPITHELIAL CELLS, UA: ABNORMAL /HPF
FERRITIN: 2361 NG/ML
FERRITIN: 2434 NG/ML
FIBRINOGEN: >700 MG/DL (ref 225–540)
GFR AFRICAN AMERICAN: >60
GFR NON-AFRICAN AMERICAN: >60 ML/MIN/1.73
GIARDIA ANTIGEN STOOL: NORMAL
GLUCOSE BLD-MCNC: 101 MG/DL (ref 74–99)
GLUCOSE BLD-MCNC: 102 MG/DL (ref 74–99)
GLUCOSE BLD-MCNC: 103 MG/DL (ref 74–99)
GLUCOSE BLD-MCNC: 108 MG/DL (ref 74–99)
GLUCOSE BLD-MCNC: 108 MG/DL (ref 74–99)
GLUCOSE BLD-MCNC: 109 MG/DL (ref 74–99)
GLUCOSE BLD-MCNC: 111 MG/DL (ref 74–99)
GLUCOSE BLD-MCNC: 112 MG/DL (ref 74–99)
GLUCOSE BLD-MCNC: 115 MG/DL (ref 74–99)
GLUCOSE BLD-MCNC: 115 MG/DL (ref 74–99)
GLUCOSE BLD-MCNC: 122 MG/DL (ref 74–99)
GLUCOSE BLD-MCNC: 130 MG/DL (ref 74–99)
GLUCOSE BLD-MCNC: 134 MG/DL (ref 74–99)
GLUCOSE BLD-MCNC: 134 MG/DL (ref 74–99)
GLUCOSE BLD-MCNC: 158 MG/DL (ref 74–99)
GLUCOSE BLD-MCNC: 161 MG/DL (ref 74–99)
GLUCOSE BLD-MCNC: 77 MG/DL (ref 74–99)
GLUCOSE BLD-MCNC: 92 MG/DL (ref 74–99)
GLUCOSE BLD-MCNC: 96 MG/DL (ref 74–99)
GLUCOSE BLD-MCNC: 99 MG/DL (ref 74–99)
GLUCOSE URINE: NEGATIVE MG/DL
HCT VFR BLD CALC: 26.6 % (ref 37–54)
HCT VFR BLD CALC: 30.6 % (ref 37–54)
HCT VFR BLD CALC: 31.1 % (ref 37–54)
HCT VFR BLD CALC: 31.4 % (ref 37–54)
HCT VFR BLD CALC: 31.7 % (ref 37–54)
HCT VFR BLD CALC: 32 % (ref 37–54)
HCT VFR BLD CALC: 32.1 % (ref 37–54)
HCT VFR BLD CALC: 32.6 % (ref 37–54)
HCT VFR BLD CALC: 32.8 % (ref 37–54)
HCT VFR BLD CALC: 33 % (ref 37–54)
HCT VFR BLD CALC: 33.3 % (ref 37–54)
HCT VFR BLD CALC: 34 % (ref 37–54)
HCT VFR BLD CALC: 34.2 % (ref 37–54)
HCT VFR BLD CALC: 34.6 % (ref 37–54)
HCT VFR BLD CALC: 34.6 % (ref 37–54)
HCT VFR BLD CALC: 35.3 % (ref 37–54)
HCT VFR BLD CALC: 36.9 % (ref 37–54)
HCT VFR BLD CALC: 38.1 % (ref 37–54)
HCT VFR BLD CALC: 39.4 % (ref 37–54)
HCT VFR BLD CALC: 40.2 % (ref 37–54)
HDLC SERPL-MCNC: 48 MG/DL
HEMOGLOBIN: 10.1 G/DL (ref 12.5–16.5)
HEMOGLOBIN: 10.3 G/DL (ref 12.5–16.5)
HEMOGLOBIN: 10.4 G/DL (ref 12.5–16.5)
HEMOGLOBIN: 10.5 G/DL (ref 12.5–16.5)
HEMOGLOBIN: 10.6 G/DL (ref 12.5–16.5)
HEMOGLOBIN: 10.7 G/DL (ref 12.5–16.5)
HEMOGLOBIN: 11 G/DL (ref 12.5–16.5)
HEMOGLOBIN: 11.2 G/DL (ref 12.5–16.5)
HEMOGLOBIN: 11.8 G/DL (ref 12.5–16.5)
HEMOGLOBIN: 11.8 G/DL (ref 12.5–16.5)
HEMOGLOBIN: 12.2 G/DL (ref 12.5–16.5)
HEMOGLOBIN: 12.4 G/DL (ref 12.5–16.5)
HEMOGLOBIN: 13 G/DL (ref 12.5–16.5)
HEMOGLOBIN: 13.2 G/DL (ref 12.5–16.5)
HEMOGLOBIN: 9 G/DL (ref 12.5–16.5)
IMMATURE GRANULOCYTES #: 0.02 E9/L
IMMATURE GRANULOCYTES #: 0.03 E9/L
IMMATURE GRANULOCYTES #: 0.05 E9/L
IMMATURE GRANULOCYTES #: 0.09 E9/L
IMMATURE GRANULOCYTES #: 0.14 E9/L
IMMATURE GRANULOCYTES %: 0.3 % (ref 0–5)
IMMATURE GRANULOCYTES %: 0.3 % (ref 0–5)
IMMATURE GRANULOCYTES %: 0.5 % (ref 0–5)
IMMATURE GRANULOCYTES %: 0.6 % (ref 0–5)
IMMATURE GRANULOCYTES %: 0.6 % (ref 0–5)
IMMATURE GRANULOCYTES %: 1.3 % (ref 0–5)
IMMATURE GRANULOCYTES %: 2.3 % (ref 0–5)
INR BLD: 1.2
KETONES, URINE: NEGATIVE MG/DL
LACTATE DEHYDROGENASE: 261 U/L (ref 135–225)
LACTIC ACID: 1.3 MMOL/L (ref 0.5–2.2)
LACTIC ACID: 1.8 MMOL/L (ref 0.5–2.2)
LACTIC ACID: 2.6 MMOL/L (ref 0.5–2.2)
LACTIC ACID: 4.9 MMOL/L (ref 0.5–2.2)
LDL CHOLESTEROL CALCULATED: 77 MG/DL (ref 0–99)
LEUKOCYTE ESTERASE, URINE: NEGATIVE
LIPASE: 22 U/L (ref 13–60)
LYMPHOCYTES ABSOLUTE: 0.28 E9/L (ref 1.5–4)
LYMPHOCYTES ABSOLUTE: 0.43 E9/L (ref 1.5–4)
LYMPHOCYTES ABSOLUTE: 0.48 E9/L (ref 1.5–4)
LYMPHOCYTES ABSOLUTE: 0.67 E9/L (ref 1.5–4)
LYMPHOCYTES ABSOLUTE: 0.83 E9/L (ref 1.5–4)
LYMPHOCYTES ABSOLUTE: 1.08 E9/L (ref 1.5–4)
LYMPHOCYTES ABSOLUTE: 1.09 E9/L (ref 1.5–4)
LYMPHOCYTES ABSOLUTE: 1.1 E9/L (ref 1.5–4)
LYMPHOCYTES ABSOLUTE: 1.2 E9/L (ref 1.5–4)
LYMPHOCYTES ABSOLUTE: 1.22 E9/L (ref 1.5–4)
LYMPHOCYTES ABSOLUTE: 1.25 E9/L (ref 1.5–4)
LYMPHOCYTES ABSOLUTE: 1.28 E9/L (ref 1.5–4)
LYMPHOCYTES ABSOLUTE: 1.44 E9/L (ref 1.5–4)
LYMPHOCYTES ABSOLUTE: 1.53 E9/L (ref 1.5–4)
LYMPHOCYTES ABSOLUTE: 1.54 E9/L (ref 1.5–4)
LYMPHOCYTES ABSOLUTE: 2.04 E9/L (ref 1.5–4)
LYMPHOCYTES ABSOLUTE: 2.11 E9/L (ref 1.5–4)
LYMPHOCYTES ABSOLUTE: 2.17 E9/L (ref 1.5–4)
LYMPHOCYTES ABSOLUTE: 3.53 E9/L (ref 1.5–4)
LYMPHOCYTES RELATIVE PERCENT: 10 % (ref 20–42)
LYMPHOCYTES RELATIVE PERCENT: 11.3 % (ref 20–42)
LYMPHOCYTES RELATIVE PERCENT: 11.6 % (ref 20–42)
LYMPHOCYTES RELATIVE PERCENT: 13.6 % (ref 20–42)
LYMPHOCYTES RELATIVE PERCENT: 14.9 % (ref 20–42)
LYMPHOCYTES RELATIVE PERCENT: 16.4 % (ref 20–42)
LYMPHOCYTES RELATIVE PERCENT: 18.9 % (ref 20–42)
LYMPHOCYTES RELATIVE PERCENT: 19.7 % (ref 20–42)
LYMPHOCYTES RELATIVE PERCENT: 20.6 % (ref 20–42)
LYMPHOCYTES RELATIVE PERCENT: 20.9 % (ref 20–42)
LYMPHOCYTES RELATIVE PERCENT: 21.7 % (ref 20–42)
LYMPHOCYTES RELATIVE PERCENT: 23 % (ref 20–42)
LYMPHOCYTES RELATIVE PERCENT: 23.3 % (ref 20–42)
LYMPHOCYTES RELATIVE PERCENT: 23.5 % (ref 20–42)
LYMPHOCYTES RELATIVE PERCENT: 29.6 % (ref 20–42)
LYMPHOCYTES RELATIVE PERCENT: 32.2 % (ref 20–42)
LYMPHOCYTES RELATIVE PERCENT: 37.8 % (ref 20–42)
LYMPHOCYTES RELATIVE PERCENT: 53 % (ref 20–42)
LYMPHOCYTES RELATIVE PERCENT: 9.6 % (ref 20–42)
MACRO-OVALOCYTES: ABNORMAL
MAGNESIUM: 1.5 MG/DL (ref 1.6–2.6)
MAGNESIUM: 1.8 MG/DL (ref 1.6–2.6)
MAGNESIUM: 1.8 MG/DL (ref 1.6–2.6)
MAGNESIUM: 1.9 MG/DL (ref 1.6–2.6)
MAGNESIUM: 1.9 MG/DL (ref 1.6–2.6)
MCH RBC QN AUTO: 28.2 PG (ref 26–35)
MCH RBC QN AUTO: 28.7 PG (ref 26–35)
MCH RBC QN AUTO: 28.9 PG (ref 26–35)
MCH RBC QN AUTO: 29.5 PG (ref 26–35)
MCH RBC QN AUTO: 30.4 PG (ref 26–35)
MCH RBC QN AUTO: 30.9 PG (ref 26–35)
MCH RBC QN AUTO: 31 PG (ref 26–35)
MCH RBC QN AUTO: 31.5 PG (ref 26–35)
MCH RBC QN AUTO: 31.5 PG (ref 26–35)
MCH RBC QN AUTO: 31.6 PG (ref 26–35)
MCH RBC QN AUTO: 31.8 PG (ref 26–35)
MCH RBC QN AUTO: 32 PG (ref 26–35)
MCH RBC QN AUTO: 32.4 PG (ref 26–35)
MCH RBC QN AUTO: 32.7 PG (ref 26–35)
MCH RBC QN AUTO: 32.9 PG (ref 26–35)
MCH RBC QN AUTO: 33 PG (ref 26–35)
MCH RBC QN AUTO: 33 PG (ref 26–35)
MCH RBC QN AUTO: 33.1 PG (ref 26–35)
MCHC RBC AUTO-ENTMCNC: 31.9 % (ref 32–34.5)
MCHC RBC AUTO-ENTMCNC: 32 % (ref 32–34.5)
MCHC RBC AUTO-ENTMCNC: 32.4 % (ref 32–34.5)
MCHC RBC AUTO-ENTMCNC: 32.7 % (ref 32–34.5)
MCHC RBC AUTO-ENTMCNC: 32.8 % (ref 32–34.5)
MCHC RBC AUTO-ENTMCNC: 33 % (ref 32–34.5)
MCHC RBC AUTO-ENTMCNC: 33 % (ref 32–34.5)
MCHC RBC AUTO-ENTMCNC: 33.4 % (ref 32–34.5)
MCHC RBC AUTO-ENTMCNC: 33.6 % (ref 32–34.5)
MCHC RBC AUTO-ENTMCNC: 33.7 % (ref 32–34.5)
MCHC RBC AUTO-ENTMCNC: 33.8 % (ref 32–34.5)
MCHC RBC AUTO-ENTMCNC: 34 % (ref 32–34.5)
MCHC RBC AUTO-ENTMCNC: 34.1 % (ref 32–34.5)
MCHC RBC AUTO-ENTMCNC: 34.1 % (ref 32–34.5)
MCHC RBC AUTO-ENTMCNC: 34.3 % (ref 32–34.5)
MCHC RBC AUTO-ENTMCNC: 34.7 % (ref 32–34.5)
MCV RBC AUTO: 100.6 FL (ref 80–99.9)
MCV RBC AUTO: 101.8 FL (ref 80–99.9)
MCV RBC AUTO: 85.4 FL (ref 80–99.9)
MCV RBC AUTO: 87.2 FL (ref 80–99.9)
MCV RBC AUTO: 87.6 FL (ref 80–99.9)
MCV RBC AUTO: 88.2 FL (ref 80–99.9)
MCV RBC AUTO: 89.1 FL (ref 80–99.9)
MCV RBC AUTO: 89.7 FL (ref 80–99.9)
MCV RBC AUTO: 90.8 FL (ref 80–99.9)
MCV RBC AUTO: 92.7 FL (ref 80–99.9)
MCV RBC AUTO: 93.3 FL (ref 80–99.9)
MCV RBC AUTO: 94.2 FL (ref 80–99.9)
MCV RBC AUTO: 94.2 FL (ref 80–99.9)
MCV RBC AUTO: 95.4 FL (ref 80–99.9)
MCV RBC AUTO: 95.4 FL (ref 80–99.9)
MCV RBC AUTO: 96.7 FL (ref 80–99.9)
MCV RBC AUTO: 96.8 FL (ref 80–99.9)
MCV RBC AUTO: 96.9 FL (ref 80–99.9)
MCV RBC AUTO: 97 FL (ref 80–99.9)
MCV RBC AUTO: 97.7 FL (ref 80–99.9)
METAMYELOCYTES RELATIVE PERCENT: 0.9 % (ref 0–1)
METAMYELOCYTES RELATIVE PERCENT: 0.9 % (ref 0–1)
METAMYELOCYTES RELATIVE PERCENT: 3 % (ref 0–1)
MONOCYTES ABSOLUTE: 0.03 E9/L (ref 0.1–0.95)
MONOCYTES ABSOLUTE: 0.09 E9/L (ref 0.1–0.95)
MONOCYTES ABSOLUTE: 0.1 E9/L (ref 0.1–0.95)
MONOCYTES ABSOLUTE: 0.22 E9/L (ref 0.1–0.95)
MONOCYTES ABSOLUTE: 0.26 E9/L (ref 0.1–0.95)
MONOCYTES ABSOLUTE: 0.29 E9/L (ref 0.1–0.95)
MONOCYTES ABSOLUTE: 0.4 E9/L (ref 0.1–0.95)
MONOCYTES ABSOLUTE: 0.41 E9/L (ref 0.1–0.95)
MONOCYTES ABSOLUTE: 0.48 E9/L (ref 0.1–0.95)
MONOCYTES ABSOLUTE: 0.53 E9/L (ref 0.1–0.95)
MONOCYTES ABSOLUTE: 0.57 E9/L (ref 0.1–0.95)
MONOCYTES ABSOLUTE: 0.62 E9/L (ref 0.1–0.95)
MONOCYTES ABSOLUTE: 0.64 E9/L (ref 0.1–0.95)
MONOCYTES ABSOLUTE: 0.65 E9/L (ref 0.1–0.95)
MONOCYTES ABSOLUTE: 0.67 E9/L (ref 0.1–0.95)
MONOCYTES ABSOLUTE: 0.85 E9/L (ref 0.1–0.95)
MONOCYTES ABSOLUTE: 0.89 E9/L (ref 0.1–0.95)
MONOCYTES ABSOLUTE: 1.18 E9/L (ref 0.1–0.95)
MONOCYTES ABSOLUTE: 1.58 E9/L (ref 0.1–0.95)
MONOCYTES RELATIVE PERCENT: 1 % (ref 2–12)
MONOCYTES RELATIVE PERCENT: 10 % (ref 2–12)
MONOCYTES RELATIVE PERCENT: 10.4 % (ref 2–12)
MONOCYTES RELATIVE PERCENT: 10.4 % (ref 2–12)
MONOCYTES RELATIVE PERCENT: 12 % (ref 2–12)
MONOCYTES RELATIVE PERCENT: 12.5 % (ref 2–12)
MONOCYTES RELATIVE PERCENT: 13 % (ref 2–12)
MONOCYTES RELATIVE PERCENT: 13.4 % (ref 2–12)
MONOCYTES RELATIVE PERCENT: 17.1 % (ref 2–12)
MONOCYTES RELATIVE PERCENT: 2.6 % (ref 2–12)
MONOCYTES RELATIVE PERCENT: 2.6 % (ref 2–12)
MONOCYTES RELATIVE PERCENT: 3.4 % (ref 2–12)
MONOCYTES RELATIVE PERCENT: 4.3 % (ref 2–12)
MONOCYTES RELATIVE PERCENT: 6.1 % (ref 2–12)
MONOCYTES RELATIVE PERCENT: 7 % (ref 2–12)
MONOCYTES RELATIVE PERCENT: 7.8 % (ref 2–12)
MONOCYTES RELATIVE PERCENT: 9 % (ref 2–12)
MONOCYTES RELATIVE PERCENT: 9 % (ref 2–12)
MONOCYTES RELATIVE PERCENT: 9.9 % (ref 2–12)
MYCOPLASMA PNEUMONIAE IGM: NORMAL
MYELOCYTE PERCENT: 0.9 % (ref 0–0)
MYELOCYTE PERCENT: 1.7 % (ref 0–0)
MYELOCYTE PERCENT: 1.7 % (ref 0–0)
MYELOCYTE PERCENT: 2 % (ref 0–0)
MYELOCYTE PERCENT: 3.4 % (ref 0–0)
MYELOCYTE PERCENT: 4.3 % (ref 0–0)
NEUTROPHILS ABSOLUTE: 1.39 E9/L (ref 1.8–7.3)
NEUTROPHILS ABSOLUTE: 2.1 E9/L (ref 1.8–7.3)
NEUTROPHILS ABSOLUTE: 2.3 E9/L (ref 1.8–7.3)
NEUTROPHILS ABSOLUTE: 2.4 E9/L (ref 1.8–7.3)
NEUTROPHILS ABSOLUTE: 2.69 E9/L (ref 1.8–7.3)
NEUTROPHILS ABSOLUTE: 2.84 E9/L (ref 1.8–7.3)
NEUTROPHILS ABSOLUTE: 2.93 E9/L (ref 1.8–7.3)
NEUTROPHILS ABSOLUTE: 3.06 E9/L (ref 1.8–7.3)
NEUTROPHILS ABSOLUTE: 3.51 E9/L (ref 1.8–7.3)
NEUTROPHILS ABSOLUTE: 3.86 E9/L (ref 1.8–7.3)
NEUTROPHILS ABSOLUTE: 4.19 E9/L (ref 1.8–7.3)
NEUTROPHILS ABSOLUTE: 4.26 E9/L (ref 1.8–7.3)
NEUTROPHILS ABSOLUTE: 4.38 E9/L (ref 1.8–7.3)
NEUTROPHILS ABSOLUTE: 5.24 E9/L (ref 1.8–7.3)
NEUTROPHILS ABSOLUTE: 6.17 E9/L (ref 1.8–7.3)
NEUTROPHILS ABSOLUTE: 6.29 E9/L (ref 1.8–7.3)
NEUTROPHILS ABSOLUTE: 6.3 E9/L (ref 1.8–7.3)
NEUTROPHILS ABSOLUTE: 7.2 E9/L (ref 1.8–7.3)
NEUTROPHILS ABSOLUTE: 9.05 E9/L (ref 1.8–7.3)
NEUTROPHILS RELATIVE PERCENT: 29 % (ref 43–80)
NEUTROPHILS RELATIVE PERCENT: 45 % (ref 43–80)
NEUTROPHILS RELATIVE PERCENT: 53.9 % (ref 43–80)
NEUTROPHILS RELATIVE PERCENT: 60 % (ref 43–80)
NEUTROPHILS RELATIVE PERCENT: 62.6 % (ref 43–80)
NEUTROPHILS RELATIVE PERCENT: 65 % (ref 43–80)
NEUTROPHILS RELATIVE PERCENT: 65.8 % (ref 43–80)
NEUTROPHILS RELATIVE PERCENT: 67.2 % (ref 43–80)
NEUTROPHILS RELATIVE PERCENT: 68 % (ref 43–80)
NEUTROPHILS RELATIVE PERCENT: 69.6 % (ref 43–80)
NEUTROPHILS RELATIVE PERCENT: 71.7 % (ref 43–80)
NEUTROPHILS RELATIVE PERCENT: 72 % (ref 43–80)
NEUTROPHILS RELATIVE PERCENT: 72.2 % (ref 43–80)
NEUTROPHILS RELATIVE PERCENT: 72.2 % (ref 43–80)
NEUTROPHILS RELATIVE PERCENT: 73.5 % (ref 43–80)
NEUTROPHILS RELATIVE PERCENT: 76.7 % (ref 43–80)
NEUTROPHILS RELATIVE PERCENT: 80.9 % (ref 43–80)
NEUTROPHILS RELATIVE PERCENT: 84.3 % (ref 43–80)
NEUTROPHILS RELATIVE PERCENT: 86 % (ref 43–80)
NITRITE, URINE: NEGATIVE
NUCLEATED RED BLOOD CELLS: 0.9 /100 WBC
NUCLEATED RED BLOOD CELLS: 4 /100 WBC
OVALOCYTES: ABNORMAL
PDW BLD-RTO: 12.6 FL (ref 11.5–15)
PDW BLD-RTO: 12.9 FL (ref 11.5–15)
PDW BLD-RTO: 13 FL (ref 11.5–15)
PDW BLD-RTO: 15.4 FL (ref 11.5–15)
PDW BLD-RTO: 15.7 FL (ref 11.5–15)
PDW BLD-RTO: 15.8 FL (ref 11.5–15)
PDW BLD-RTO: 15.9 FL (ref 11.5–15)
PDW BLD-RTO: 15.9 FL (ref 11.5–15)
PDW BLD-RTO: 16 FL (ref 11.5–15)
PDW BLD-RTO: 16.8 FL (ref 11.5–15)
PDW BLD-RTO: 17.3 FL (ref 11.5–15)
PDW BLD-RTO: 17.5 FL (ref 11.5–15)
PDW BLD-RTO: 18.3 FL (ref 11.5–15)
PDW BLD-RTO: 19.3 FL (ref 11.5–15)
PDW BLD-RTO: 20.8 FL (ref 11.5–15)
PDW BLD-RTO: 21 FL (ref 11.5–15)
PDW BLD-RTO: 21.3 FL (ref 11.5–15)
PDW BLD-RTO: 21.8 FL (ref 11.5–15)
PH UA: 6 (ref 5–9)
PHOSPHORUS: 1.9 MG/DL (ref 2.5–4.5)
PLATELET # BLD: 102 E9/L (ref 130–450)
PLATELET # BLD: 106 E9/L (ref 130–450)
PLATELET # BLD: 108 E9/L (ref 130–450)
PLATELET # BLD: 109 E9/L (ref 130–450)
PLATELET # BLD: 110 E9/L (ref 130–450)
PLATELET # BLD: 122 E9/L (ref 130–450)
PLATELET # BLD: 132 E9/L (ref 130–450)
PLATELET # BLD: 137 E9/L (ref 130–450)
PLATELET # BLD: 179 E9/L (ref 130–450)
PLATELET # BLD: 18 E9/L (ref 130–450)
PLATELET # BLD: 186 E9/L (ref 130–450)
PLATELET # BLD: 28 E9/L (ref 130–450)
PLATELET # BLD: 38 E9/L (ref 130–450)
PLATELET # BLD: 58 E9/L (ref 130–450)
PLATELET # BLD: 61 E9/L (ref 130–450)
PLATELET # BLD: 64 E9/L (ref 130–450)
PLATELET # BLD: 81 E9/L (ref 130–450)
PLATELET # BLD: 81 E9/L (ref 130–450)
PLATELET # BLD: 85 E9/L (ref 130–450)
PLATELET # BLD: 88 E9/L (ref 130–450)
PLATELET CONFIRMATION: NORMAL
PMV BLD AUTO: 10.5 FL (ref 7–12)
PMV BLD AUTO: 11.2 FL (ref 7–12)
PMV BLD AUTO: 11.2 FL (ref 7–12)
PMV BLD AUTO: 11.3 FL (ref 7–12)
PMV BLD AUTO: 11.5 FL (ref 7–12)
PMV BLD AUTO: 11.6 FL (ref 7–12)
PMV BLD AUTO: 11.7 FL (ref 7–12)
PMV BLD AUTO: 11.7 FL (ref 7–12)
PMV BLD AUTO: 11.8 FL (ref 7–12)
PMV BLD AUTO: 11.8 FL (ref 7–12)
PMV BLD AUTO: 12 FL (ref 7–12)
PMV BLD AUTO: 12.2 FL (ref 7–12)
PMV BLD AUTO: 12.3 FL (ref 7–12)
PMV BLD AUTO: 12.7 FL (ref 7–12)
PMV BLD AUTO: 12.9 FL (ref 7–12)
PMV BLD AUTO: ABNORMAL FL (ref 7–12)
POIKILOCYTES: ABNORMAL
POLYCHROMASIA: ABNORMAL
POTASSIUM REFLEX MAGNESIUM: 3 MMOL/L (ref 3.5–5)
POTASSIUM REFLEX MAGNESIUM: 3.5 MMOL/L (ref 3.5–5)
POTASSIUM REFLEX MAGNESIUM: 4.3 MMOL/L (ref 3.5–5)
POTASSIUM SERPL-SCNC: 3 MMOL/L (ref 3.5–5)
POTASSIUM SERPL-SCNC: 3.5 MMOL/L (ref 3.5–5)
POTASSIUM SERPL-SCNC: 3.5 MMOL/L (ref 3.5–5)
POTASSIUM SERPL-SCNC: 3.7 MMOL/L (ref 3.5–5)
POTASSIUM SERPL-SCNC: 3.8 MMOL/L (ref 3.5–5)
POTASSIUM SERPL-SCNC: 3.8 MMOL/L (ref 3.5–5)
POTASSIUM SERPL-SCNC: 4.1 MMOL/L (ref 3.5–5)
POTASSIUM SERPL-SCNC: 4.3 MMOL/L (ref 3.5–5)
POTASSIUM SERPL-SCNC: 4.4 MMOL/L (ref 3.5–5)
POTASSIUM SERPL-SCNC: 4.5 MMOL/L (ref 3.5–5)
POTASSIUM SERPL-SCNC: 4.5 MMOL/L (ref 3.5–5)
POTASSIUM SERPL-SCNC: 4.6 MMOL/L (ref 3.5–5)
POTASSIUM SERPL-SCNC: 4.9 MMOL/L (ref 3.5–5)
POTASSIUM SERPL-SCNC: 5 MMOL/L (ref 3.5–5)
PROCALCITONIN: 0.12 NG/ML (ref 0–0.08)
PROCALCITONIN: 0.43 NG/ML (ref 0–0.08)
PROTEIN UA: 30 MG/DL
PROTHROMBIN TIME: 13.1 SEC (ref 9.3–12.4)
RBC # BLD: 2.85 E12/L (ref 3.8–5.8)
RBC # BLD: 3.12 E12/L (ref 3.8–5.8)
RBC # BLD: 3.3 E12/L (ref 3.8–5.8)
RBC # BLD: 3.32 E12/L (ref 3.8–5.8)
RBC # BLD: 3.39 E12/L (ref 3.8–5.8)
RBC # BLD: 3.4 E12/L (ref 3.8–5.8)
RBC # BLD: 3.46 E12/L (ref 3.8–5.8)
RBC # BLD: 3.46 E12/L (ref 3.8–5.8)
RBC # BLD: 3.49 E12/L (ref 3.8–5.8)
RBC # BLD: 3.5 E12/L (ref 3.8–5.8)
RBC # BLD: 3.61 E12/L (ref 3.8–5.8)
RBC # BLD: 3.63 E12/L (ref 3.8–5.8)
RBC # BLD: 3.64 E12/L (ref 3.8–5.8)
RBC # BLD: 3.93 E12/L (ref 3.8–5.8)
RBC # BLD: 3.97 E12/L (ref 3.8–5.8)
RBC # BLD: 4.32 E12/L (ref 3.8–5.8)
RBC # BLD: 4.48 E12/L (ref 3.8–5.8)
RBC # BLD: 4.5 E12/L (ref 3.8–5.8)
RBC UA: ABNORMAL /HPF (ref 0–2)
ROTAVIRUS ANTIGEN: NORMAL
SARS-COV-2, NAAT: DETECTED
SCHISTOCYTES: ABNORMAL
SODIUM BLD-SCNC: 129 MMOL/L (ref 132–146)
SODIUM BLD-SCNC: 132 MMOL/L (ref 132–146)
SODIUM BLD-SCNC: 132 MMOL/L (ref 132–146)
SODIUM BLD-SCNC: 134 MMOL/L (ref 132–146)
SODIUM BLD-SCNC: 135 MMOL/L (ref 132–146)
SODIUM BLD-SCNC: 136 MMOL/L (ref 132–146)
SODIUM BLD-SCNC: 137 MMOL/L (ref 132–146)
SODIUM BLD-SCNC: 139 MMOL/L (ref 132–146)
SODIUM BLD-SCNC: 140 MMOL/L (ref 132–146)
SODIUM BLD-SCNC: 140 MMOL/L (ref 132–146)
SODIUM BLD-SCNC: 141 MMOL/L (ref 132–146)
SODIUM BLD-SCNC: 141 MMOL/L (ref 132–146)
SODIUM BLD-SCNC: 143 MMOL/L (ref 132–146)
SODIUM BLD-SCNC: 143 MMOL/L (ref 132–146)
SODIUM BLD-SCNC: 145 MMOL/L (ref 132–146)
SPECIFIC GRAVITY UA: 1.02 (ref 1–1.03)
SPHEROCYTES: ABNORMAL
TARGET CELLS: ABNORMAL
TARGET CELLS: ABNORMAL
TEAR DROP CELLS: ABNORMAL
TOTAL PROTEIN: 5.5 G/DL (ref 6.4–8.3)
TOTAL PROTEIN: 5.6 G/DL (ref 6.4–8.3)
TOTAL PROTEIN: 5.7 G/DL (ref 6.4–8.3)
TOTAL PROTEIN: 5.8 G/DL (ref 6.4–8.3)
TOTAL PROTEIN: 5.8 G/DL (ref 6.4–8.3)
TOTAL PROTEIN: 6 G/DL (ref 6.4–8.3)
TOTAL PROTEIN: 6.1 G/DL (ref 6.4–8.3)
TOTAL PROTEIN: 6.3 G/DL (ref 6.4–8.3)
TOTAL PROTEIN: 6.6 G/DL (ref 6.4–8.3)
TOTAL PROTEIN: 6.7 G/DL (ref 6.4–8.3)
TOXIC GRANULATION: ABNORMAL
TRIGL SERPL-MCNC: 80 MG/DL (ref 0–149)
TROPONIN: <0.01 NG/ML (ref 0–0.03)
TSH SERPL DL<=0.05 MIU/L-ACNC: 0.5 UIU/ML (ref 0.27–4.2)
URINE CULTURE, ROUTINE: NORMAL
UROBILINOGEN, URINE: 0.2 E.U./DL
VANCOMYCIN TROUGH: <4 MCG/ML (ref 5–16)
VITAMIN D 25-HYDROXY: 26 NG/ML (ref 30–100)
VLDLC SERPL CALC-MCNC: 16 MG/DL
WBC # BLD: 10.9 E9/L (ref 4.5–11.5)
WBC # BLD: 2.5 E9/L (ref 4.5–11.5)
WBC # BLD: 3 E9/L (ref 4.5–11.5)
WBC # BLD: 3.2 E9/L (ref 4.5–11.5)
WBC # BLD: 3.3 E9/L (ref 4.5–11.5)
WBC # BLD: 3.9 E9/L (ref 4.5–11.5)
WBC # BLD: 4.1 E9/L (ref 4.5–11.5)
WBC # BLD: 4.5 E9/L (ref 4.5–11.5)
WBC # BLD: 4.8 E9/L (ref 4.5–11.5)
WBC # BLD: 5.1 E9/L (ref 4.5–11.5)
WBC # BLD: 5.3 E9/L (ref 4.5–11.5)
WBC # BLD: 6.2 E9/L (ref 4.5–11.5)
WBC # BLD: 6.3 E9/L (ref 4.5–11.5)
WBC # BLD: 6.4 E9/L (ref 4.5–11.5)
WBC # BLD: 7.1 E9/L (ref 4.5–11.5)
WBC # BLD: 8.5 E9/L (ref 4.5–11.5)
WBC # BLD: 8.6 E9/L (ref 4.5–11.5)
WBC # BLD: 8.8 E9/L (ref 4.5–11.5)
WBC # BLD: 9.3 E9/L (ref 4.5–11.5)
WBC # BLD: 9.6 E9/L (ref 4.5–11.5)
WBC UA: ABNORMAL /HPF (ref 0–5)
WHITE BLOOD CELLS (WBC), STOOL: NORMAL

## 2020-01-01 PROCEDURE — 6370000000 HC RX 637 (ALT 250 FOR IP): Performed by: INTERNAL MEDICINE

## 2020-01-01 PROCEDURE — 96368 THER/DIAG CONCURRENT INF: CPT

## 2020-01-01 PROCEDURE — 96417 CHEMO IV INFUS EACH ADDL SEQ: CPT

## 2020-01-01 PROCEDURE — 36591 DRAW BLOOD OFF VENOUS DEVICE: CPT

## 2020-01-01 PROCEDURE — 96361 HYDRATE IV INFUSION ADD-ON: CPT

## 2020-01-01 PROCEDURE — 6360000002 HC RX W HCPCS: Performed by: EMERGENCY MEDICINE

## 2020-01-01 PROCEDURE — 96411 CHEMO IV PUSH ADDL DRUG: CPT

## 2020-01-01 PROCEDURE — 6360000002 HC RX W HCPCS: Performed by: INTERNAL MEDICINE

## 2020-01-01 PROCEDURE — 36415 COLL VENOUS BLD VENIPUNCTURE: CPT

## 2020-01-01 PROCEDURE — 96416 CHEMO PROLONG INFUSE W/PUMP: CPT

## 2020-01-01 PROCEDURE — 96365 THER/PROPH/DIAG IV INF INIT: CPT

## 2020-01-01 PROCEDURE — 82728 ASSAY OF FERRITIN: CPT

## 2020-01-01 PROCEDURE — 6360000002 HC RX W HCPCS: Performed by: NURSE PRACTITIONER

## 2020-01-01 PROCEDURE — 2580000003 HC RX 258: Performed by: INTERNAL MEDICINE

## 2020-01-01 PROCEDURE — 80053 COMPREHEN METABOLIC PANEL: CPT

## 2020-01-01 PROCEDURE — 96366 THER/PROPH/DIAG IV INF ADDON: CPT

## 2020-01-01 PROCEDURE — 2580000003 HC RX 258: Performed by: NURSE PRACTITIONER

## 2020-01-01 PROCEDURE — 96367 TX/PROPH/DG ADDL SEQ IV INF: CPT

## 2020-01-01 PROCEDURE — 83735 ASSAY OF MAGNESIUM: CPT

## 2020-01-01 PROCEDURE — 99214 OFFICE O/P EST MOD 30 MIN: CPT | Performed by: INTERNAL MEDICINE

## 2020-01-01 PROCEDURE — 96375 TX/PRO/DX INJ NEW DRUG ADDON: CPT

## 2020-01-01 PROCEDURE — G8417 CALC BMI ABV UP PARAM F/U: HCPCS | Performed by: INTERNAL MEDICINE

## 2020-01-01 PROCEDURE — 80061 LIPID PANEL: CPT

## 2020-01-01 PROCEDURE — G8484 FLU IMMUNIZE NO ADMIN: HCPCS | Performed by: INTERNAL MEDICINE

## 2020-01-01 PROCEDURE — 6360000002 HC RX W HCPCS

## 2020-01-01 PROCEDURE — 87045 FECES CULTURE AEROBIC BACT: CPT

## 2020-01-01 PROCEDURE — 82378 CARCINOEMBRYONIC ANTIGEN: CPT

## 2020-01-01 PROCEDURE — 80048 BASIC METABOLIC PNL TOTAL CA: CPT

## 2020-01-01 PROCEDURE — XW033E5 INTRODUCTION OF REMDESIVIR ANTI-INFECTIVE INTO PERIPHERAL VEIN, PERCUTANEOUS APPROACH, NEW TECHNOLOGY GROUP 5: ICD-10-PCS | Performed by: SPECIALIST

## 2020-01-01 PROCEDURE — 87329 GIARDIA AG IA: CPT

## 2020-01-01 PROCEDURE — 86301 IMMUNOASSAY TUMOR CA 19-9: CPT

## 2020-01-01 PROCEDURE — 1036F TOBACCO NON-USER: CPT | Performed by: INTERNAL MEDICINE

## 2020-01-01 PROCEDURE — 99214 OFFICE O/P EST MOD 30 MIN: CPT

## 2020-01-01 PROCEDURE — 4040F PNEUMOC VAC/ADMIN/RCVD: CPT | Performed by: INTERNAL MEDICINE

## 2020-01-01 PROCEDURE — 86140 C-REACTIVE PROTEIN: CPT

## 2020-01-01 PROCEDURE — 85025 COMPLETE CBC W/AUTO DIFF WBC: CPT

## 2020-01-01 PROCEDURE — 99283 EMERGENCY DEPT VISIT LOW MDM: CPT

## 2020-01-01 PROCEDURE — 96415 CHEMO IV INFUSION ADDL HR: CPT

## 2020-01-01 PROCEDURE — 96413 CHEMO IV INFUSION 1 HR: CPT

## 2020-01-01 PROCEDURE — 1111F DSCHRG MED/CURRENT MED MERGE: CPT | Performed by: INTERNAL MEDICINE

## 2020-01-01 PROCEDURE — 2580000003 HC RX 258: Performed by: EMERGENCY MEDICINE

## 2020-01-01 PROCEDURE — 96360 HYDRATION IV INFUSION INIT: CPT

## 2020-01-01 PROCEDURE — 96372 THER/PROPH/DIAG INJ SC/IM: CPT

## 2020-01-01 PROCEDURE — 87324 CLOSTRIDIUM AG IA: CPT

## 2020-01-01 PROCEDURE — 6370000000 HC RX 637 (ALT 250 FOR IP): Performed by: NURSE PRACTITIONER

## 2020-01-01 PROCEDURE — 83605 ASSAY OF LACTIC ACID: CPT

## 2020-01-01 PROCEDURE — 88342 IMHCHEM/IMCYTCHM 1ST ANTB: CPT

## 2020-01-01 PROCEDURE — 81001 URINALYSIS AUTO W/SCOPE: CPT

## 2020-01-01 PROCEDURE — 6360000002 HC RX W HCPCS: Performed by: SPECIALIST

## 2020-01-01 PROCEDURE — 87449 NOS EACH ORGANISM AG IA: CPT

## 2020-01-01 PROCEDURE — 1123F ACP DISCUSS/DSCN MKR DOCD: CPT | Performed by: INTERNAL MEDICINE

## 2020-01-01 PROCEDURE — 85730 THROMBOPLASTIN TIME PARTIAL: CPT

## 2020-01-01 PROCEDURE — 2060000000 HC ICU INTERMEDIATE R&B

## 2020-01-01 PROCEDURE — 1200000000 HC SEMI PRIVATE

## 2020-01-01 PROCEDURE — 85027 COMPLETE CBC AUTOMATED: CPT

## 2020-01-01 PROCEDURE — 3017F COLORECTAL CA SCREEN DOC REV: CPT | Performed by: INTERNAL MEDICINE

## 2020-01-01 PROCEDURE — G8427 DOCREV CUR MEDS BY ELIG CLIN: HCPCS | Performed by: INTERNAL MEDICINE

## 2020-01-01 PROCEDURE — 85384 FIBRINOGEN ACTIVITY: CPT

## 2020-01-01 PROCEDURE — 6370000000 HC RX 637 (ALT 250 FOR IP): Performed by: SPECIALIST

## 2020-01-01 PROCEDURE — 2580000003 HC RX 258

## 2020-01-01 PROCEDURE — 6370000000 HC RX 637 (ALT 250 FOR IP): Performed by: EMERGENCY MEDICINE

## 2020-01-01 PROCEDURE — 99213 OFFICE O/P EST LOW 20 MIN: CPT

## 2020-01-01 PROCEDURE — 99204 OFFICE O/P NEW MOD 45 MIN: CPT

## 2020-01-01 PROCEDURE — 2500000003 HC RX 250 WO HCPCS: Performed by: SPECIALIST

## 2020-01-01 PROCEDURE — 2580000003 HC RX 258: Performed by: SPECIALIST

## 2020-01-01 PROCEDURE — 87088 URINE BACTERIA CULTURE: CPT

## 2020-01-01 PROCEDURE — 99205 OFFICE O/P NEW HI 60 MIN: CPT | Performed by: INTERNAL MEDICINE

## 2020-01-01 PROCEDURE — 82306 VITAMIN D 25 HYDROXY: CPT

## 2020-01-01 PROCEDURE — 84145 PROCALCITONIN (PCT): CPT

## 2020-01-01 PROCEDURE — 88305 TISSUE EXAM BY PATHOLOGIST: CPT

## 2020-01-01 PROCEDURE — 87040 BLOOD CULTURE FOR BACTERIA: CPT

## 2020-01-01 PROCEDURE — 85378 FIBRIN DEGRADE SEMIQUANT: CPT

## 2020-01-01 PROCEDURE — 97161 PT EVAL LOW COMPLEX 20 MIN: CPT

## 2020-01-01 PROCEDURE — 83615 LACTATE (LD) (LDH) ENZYME: CPT

## 2020-01-01 PROCEDURE — 93005 ELECTROCARDIOGRAM TRACING: CPT | Performed by: EMERGENCY MEDICINE

## 2020-01-01 PROCEDURE — 97530 THERAPEUTIC ACTIVITIES: CPT

## 2020-01-01 PROCEDURE — 84100 ASSAY OF PHOSPHORUS: CPT

## 2020-01-01 PROCEDURE — 71045 X-RAY EXAM CHEST 1 VIEW: CPT

## 2020-01-01 PROCEDURE — 99212 OFFICE O/P EST SF 10 MIN: CPT | Performed by: INTERNAL MEDICINE

## 2020-01-01 PROCEDURE — 93010 ELECTROCARDIOGRAM REPORT: CPT | Performed by: INTERNAL MEDICINE

## 2020-01-01 PROCEDURE — 86738 MYCOPLASMA ANTIBODY: CPT

## 2020-01-01 PROCEDURE — 84484 ASSAY OF TROPONIN QUANT: CPT

## 2020-01-01 PROCEDURE — 97165 OT EVAL LOW COMPLEX 30 MIN: CPT

## 2020-01-01 PROCEDURE — 99285 EMERGENCY DEPT VISIT HI MDM: CPT

## 2020-01-01 PROCEDURE — 85610 PROTHROMBIN TIME: CPT

## 2020-01-01 PROCEDURE — 96401 CHEMO ANTI-NEOPL SQ/IM: CPT

## 2020-01-01 PROCEDURE — 87425 ROTAVIRUS AG IA: CPT

## 2020-01-01 PROCEDURE — 96549 UNLISTED CHEMOTHERAPY PX: CPT

## 2020-01-01 PROCEDURE — 83690 ASSAY OF LIPASE: CPT

## 2020-01-01 PROCEDURE — 80076 HEPATIC FUNCTION PANEL: CPT

## 2020-01-01 PROCEDURE — 99282 EMERGENCY DEPT VISIT SF MDM: CPT

## 2020-01-01 PROCEDURE — 71046 X-RAY EXAM CHEST 2 VIEWS: CPT

## 2020-01-01 PROCEDURE — U0002 COVID-19 LAB TEST NON-CDC: HCPCS

## 2020-01-01 PROCEDURE — 89055 LEUKOCYTE ASSESSMENT FECAL: CPT

## 2020-01-01 PROCEDURE — 84443 ASSAY THYROID STIM HORMONE: CPT

## 2020-01-01 PROCEDURE — 96409 CHEMO IV PUSH SNGL DRUG: CPT

## 2020-01-01 PROCEDURE — 80202 ASSAY OF VANCOMYCIN: CPT

## 2020-01-01 RX ORDER — SODIUM CHLORIDE 0.9 % (FLUSH) 0.9 %
10 SYRINGE (ML) INJECTION PRN
Status: CANCELLED | OUTPATIENT
Start: 2020-01-01

## 2020-01-01 RX ORDER — 0.9 % SODIUM CHLORIDE 0.9 %
1000 INTRAVENOUS SOLUTION INTRAVENOUS ONCE
Status: CANCELLED
Start: 2020-01-01

## 2020-01-01 RX ORDER — SODIUM CHLORIDE 0.9 % (FLUSH) 0.9 %
10 SYRINGE (ML) INJECTION PRN
Status: DISCONTINUED | OUTPATIENT
Start: 2020-01-01 | End: 2020-01-01 | Stop reason: HOSPADM

## 2020-01-01 RX ORDER — HEPARIN SODIUM (PORCINE) LOCK FLUSH IV SOLN 100 UNIT/ML 100 UNIT/ML
500 SOLUTION INTRAVENOUS PRN
Status: DISCONTINUED | OUTPATIENT
Start: 2020-01-01 | End: 2020-01-01 | Stop reason: HOSPADM

## 2020-01-01 RX ORDER — PALONOSETRON HYDROCHLORIDE 0.05 MG/ML
0.25 INJECTION, SOLUTION INTRAVENOUS ONCE
Status: COMPLETED | OUTPATIENT
Start: 2020-01-01 | End: 2020-01-01

## 2020-01-01 RX ORDER — HEPARIN SODIUM (PORCINE) LOCK FLUSH IV SOLN 100 UNIT/ML 100 UNIT/ML
500 SOLUTION INTRAVENOUS PRN
Status: CANCELLED | OUTPATIENT
Start: 2020-01-01

## 2020-01-01 RX ORDER — SODIUM CHLORIDE 9 MG/ML
INJECTION, SOLUTION INTRAVENOUS CONTINUOUS
Status: CANCELLED | OUTPATIENT
Start: 2020-01-01

## 2020-01-01 RX ORDER — EPINEPHRINE 1 MG/ML
0.3 INJECTION, SOLUTION, CONCENTRATE INTRAVENOUS PRN
Status: CANCELLED | OUTPATIENT
Start: 2020-01-01

## 2020-01-01 RX ORDER — ATROPINE SULFATE 0.4 MG/ML
0.4 AMPUL (ML) INJECTION ONCE
Status: COMPLETED | OUTPATIENT
Start: 2020-01-01 | End: 2020-01-01

## 2020-01-01 RX ORDER — DEXTROSE MONOHYDRATE 50 MG/ML
250 INJECTION, SOLUTION INTRAVENOUS ONCE
Status: COMPLETED | OUTPATIENT
Start: 2020-01-01 | End: 2020-01-01

## 2020-01-01 RX ORDER — PALONOSETRON HYDROCHLORIDE 0.05 MG/ML
0.25 INJECTION, SOLUTION INTRAVENOUS ONCE
Status: CANCELLED | OUTPATIENT
Start: 2020-01-01

## 2020-01-01 RX ORDER — PANCRELIPASE 24000; 76000; 120000 [USP'U]/1; [USP'U]/1; [USP'U]/1
24000 CAPSULE, DELAYED RELEASE PELLETS ORAL
Qty: 90 CAPSULE | Refills: 0 | Status: SHIPPED
Start: 2020-01-01 | End: 2020-01-01 | Stop reason: SDUPTHER

## 2020-01-01 RX ORDER — DULOXETIN HYDROCHLORIDE 30 MG/1
30 CAPSULE, DELAYED RELEASE ORAL 2 TIMES DAILY
COMMUNITY

## 2020-01-01 RX ORDER — DEXAMETHASONE SODIUM PHOSPHATE 10 MG/ML
10 INJECTION INTRAMUSCULAR; INTRAVENOUS ONCE
Status: COMPLETED | OUTPATIENT
Start: 2020-01-01 | End: 2020-01-01

## 2020-01-01 RX ORDER — DEXTROSE MONOHYDRATE 50 MG/ML
250 INJECTION, SOLUTION INTRAVENOUS ONCE
Status: DISCONTINUED | OUTPATIENT
Start: 2020-01-01 | End: 2020-01-01 | Stop reason: HOSPADM

## 2020-01-01 RX ORDER — 0.9 % SODIUM CHLORIDE 0.9 %
1000 INTRAVENOUS SOLUTION INTRAVENOUS ONCE
Status: COMPLETED | OUTPATIENT
Start: 2020-01-01 | End: 2020-01-01

## 2020-01-01 RX ORDER — OXYCODONE HYDROCHLORIDE 5 MG/1
CAPSULE ORAL SEE ADMIN INSTRUCTIONS
COMMUNITY

## 2020-01-01 RX ORDER — DEXTROSE MONOHYDRATE 50 MG/ML
INJECTION, SOLUTION INTRAVENOUS ONCE
Status: CANCELLED | OUTPATIENT
Start: 2020-01-01

## 2020-01-01 RX ORDER — METHYLPREDNISOLONE SODIUM SUCCINATE 125 MG/2ML
125 INJECTION, POWDER, LYOPHILIZED, FOR SOLUTION INTRAMUSCULAR; INTRAVENOUS ONCE
Status: CANCELLED | OUTPATIENT
Start: 2020-01-01

## 2020-01-01 RX ORDER — POLYETHYLENE GLYCOL 3350 17 G/17G
17 POWDER, FOR SOLUTION ORAL DAILY PRN
Status: DISCONTINUED | OUTPATIENT
Start: 2020-01-01 | End: 2020-01-01 | Stop reason: HOSPADM

## 2020-01-01 RX ORDER — HEPARIN SODIUM (PORCINE) LOCK FLUSH IV SOLN 100 UNIT/ML 100 UNIT/ML
SOLUTION INTRAVENOUS
Status: COMPLETED
Start: 2020-01-01 | End: 2020-01-01

## 2020-01-01 RX ORDER — HEPARIN SODIUM (PORCINE) LOCK FLUSH IV SOLN 100 UNIT/ML 100 UNIT/ML
SOLUTION INTRAVENOUS
Status: DISPENSED
Start: 2020-01-01 | End: 2020-01-01

## 2020-01-01 RX ORDER — ZINC GLUCONATE 50 MG
50 TABLET ORAL DAILY
Qty: 30 TABLET | Refills: 3 | Status: SHIPPED | OUTPATIENT
Start: 2020-01-01

## 2020-01-01 RX ORDER — 0.9 % SODIUM CHLORIDE 0.9 %
1000 INTRAVENOUS SOLUTION INTRAVENOUS ONCE
Status: CANCELLED | OUTPATIENT
Start: 2020-01-01

## 2020-01-01 RX ORDER — ACETAMINOPHEN 325 MG/1
650 TABLET ORAL EVERY 6 HOURS PRN
Status: DISCONTINUED | OUTPATIENT
Start: 2020-01-01 | End: 2020-01-01 | Stop reason: SDUPTHER

## 2020-01-01 RX ORDER — DIPHENHYDRAMINE HYDROCHLORIDE 50 MG/ML
50 INJECTION INTRAMUSCULAR; INTRAVENOUS ONCE
Status: CANCELLED | OUTPATIENT
Start: 2020-01-01

## 2020-01-01 RX ORDER — ATROPINE SULFATE 0.4 MG/ML
0.4 AMPUL (ML) INJECTION
Status: CANCELLED | OUTPATIENT
Start: 2020-01-01

## 2020-01-01 RX ORDER — DEXAMETHASONE SODIUM PHOSPHATE 10 MG/ML
INJECTION INTRAMUSCULAR; INTRAVENOUS
Status: COMPLETED
Start: 2020-01-01 | End: 2020-01-01

## 2020-01-01 RX ORDER — ATROPINE SULFATE 0.4 MG/ML
0.4 AMPUL (ML) INJECTION ONCE
Status: CANCELLED | OUTPATIENT
Start: 2020-01-01

## 2020-01-01 RX ORDER — FLUOROURACIL 50 MG/ML
800 INJECTION, SOLUTION INTRAVENOUS ONCE
Status: CANCELLED | OUTPATIENT
Start: 2020-01-01

## 2020-01-01 RX ORDER — ZINC SULFATE 50(220)MG
50 CAPSULE ORAL DAILY
Status: DISCONTINUED | OUTPATIENT
Start: 2020-01-01 | End: 2020-01-01 | Stop reason: HOSPADM

## 2020-01-01 RX ORDER — DOXYCYCLINE HYCLATE 100 MG
100 TABLET ORAL 2 TIMES DAILY
Qty: 14 TABLET | Refills: 0 | Status: SHIPPED | OUTPATIENT
Start: 2020-01-01 | End: 2020-01-01

## 2020-01-01 RX ORDER — 0.9 % SODIUM CHLORIDE 0.9 %
500 INTRAVENOUS SOLUTION INTRAVENOUS ONCE
Status: COMPLETED | OUTPATIENT
Start: 2020-01-01 | End: 2020-01-01

## 2020-01-01 RX ORDER — DEXTROSE MONOHYDRATE 50 MG/ML
INJECTION, SOLUTION INTRAVENOUS
Status: COMPLETED
Start: 2020-01-01 | End: 2020-01-01

## 2020-01-01 RX ORDER — FLUOROURACIL 50 MG/ML
800 INJECTION, SOLUTION INTRAVENOUS ONCE
Status: COMPLETED | OUTPATIENT
Start: 2020-01-01 | End: 2020-01-01

## 2020-01-01 RX ORDER — PREDNISONE 10 MG/1
10 TABLET ORAL DAILY
Qty: 30 TABLET | Refills: 0 | Status: SHIPPED | OUTPATIENT
Start: 2020-01-01 | End: 2020-01-01

## 2020-01-01 RX ORDER — 0.9 % SODIUM CHLORIDE 0.9 %
500 INTRAVENOUS SOLUTION INTRAVENOUS ONCE
Status: CANCELLED | OUTPATIENT
Start: 2020-01-01

## 2020-01-01 RX ORDER — ACETAMINOPHEN 325 MG/1
650 TABLET ORAL EVERY 6 HOURS PRN
Status: DISCONTINUED | OUTPATIENT
Start: 2020-01-01 | End: 2020-01-01 | Stop reason: HOSPADM

## 2020-01-01 RX ORDER — OXYCODONE HYDROCHLORIDE 5 MG/1
5 TABLET ORAL EVERY 4 HOURS PRN
Status: DISCONTINUED | OUTPATIENT
Start: 2020-01-01 | End: 2020-01-01 | Stop reason: HOSPADM

## 2020-01-01 RX ORDER — 0.9 % SODIUM CHLORIDE 0.9 %
500 INTRAVENOUS SOLUTION INTRAVENOUS ONCE
Status: CANCELLED
Start: 2020-01-01

## 2020-01-01 RX ORDER — SODIUM CHLORIDE 9 MG/ML
INJECTION, SOLUTION INTRAVENOUS ONCE
Status: CANCELLED | OUTPATIENT
Start: 2020-01-01

## 2020-01-01 RX ORDER — DEXAMETHASONE SODIUM PHOSPHATE 10 MG/ML
10 INJECTION INTRAMUSCULAR; INTRAVENOUS ONCE
Status: CANCELLED | OUTPATIENT
Start: 2020-01-01

## 2020-01-01 RX ORDER — DEXAMETHASONE SODIUM PHOSPHATE 10 MG/ML
10 INJECTION, SOLUTION INTRAMUSCULAR; INTRAVENOUS ONCE
Status: CANCELLED | OUTPATIENT
Start: 2020-01-01

## 2020-01-01 RX ORDER — MIRTAZAPINE 15 MG/1
15 TABLET, FILM COATED ORAL NIGHTLY
Qty: 30 TABLET | Refills: 2 | Status: SHIPPED | OUTPATIENT
Start: 2020-01-01 | End: 2021-01-01

## 2020-01-01 RX ORDER — POTASSIUM CHLORIDE 20 MEQ/1
40 TABLET, EXTENDED RELEASE ORAL
Status: DISCONTINUED | OUTPATIENT
Start: 2020-01-01 | End: 2020-01-01 | Stop reason: HOSPADM

## 2020-01-01 RX ORDER — SODIUM CHLORIDE 9 MG/ML
INJECTION, SOLUTION INTRAVENOUS ONCE
Status: COMPLETED | OUTPATIENT
Start: 2020-01-01 | End: 2020-01-01

## 2020-01-01 RX ORDER — LOPERAMIDE HYDROCHLORIDE 2 MG/1
2 CAPSULE ORAL 4 TIMES DAILY PRN
COMMUNITY
Start: 2020-01-01 | End: 2021-01-01

## 2020-01-01 RX ORDER — VITAMIN B COMPLEX
1 CAPSULE ORAL DAILY
COMMUNITY

## 2020-01-01 RX ORDER — DEXAMETHASONE SODIUM PHOSPHATE 10 MG/ML
10 INJECTION, SOLUTION INTRAMUSCULAR; INTRAVENOUS ONCE
Status: COMPLETED | OUTPATIENT
Start: 2020-01-01 | End: 2020-01-01

## 2020-01-01 RX ORDER — LANOLIN ALCOHOL/MO/W.PET/CERES
50 CREAM (GRAM) TOPICAL DAILY
Status: DISCONTINUED | OUTPATIENT
Start: 2020-01-01 | End: 2020-01-01 | Stop reason: HOSPADM

## 2020-01-01 RX ORDER — SODIUM CHLORIDE 9 MG/ML
1000 INJECTION, SOLUTION INTRAVENOUS CONTINUOUS
Status: DISCONTINUED | OUTPATIENT
Start: 2020-01-01 | End: 2020-01-01

## 2020-01-01 RX ORDER — SODIUM CHLORIDE 0.9 % (FLUSH) 0.9 %
10 SYRINGE (ML) INJECTION EVERY 12 HOURS SCHEDULED
Status: DISCONTINUED | OUTPATIENT
Start: 2020-01-01 | End: 2020-01-01 | Stop reason: HOSPADM

## 2020-01-01 RX ORDER — ASPIRIN 81 MG/1
81 TABLET ORAL NIGHTLY
Status: DISCONTINUED | OUTPATIENT
Start: 2020-01-01 | End: 2020-01-01 | Stop reason: HOSPADM

## 2020-01-01 RX ORDER — MIRTAZAPINE 15 MG/1
15 TABLET, FILM COATED ORAL NIGHTLY
Status: DISCONTINUED | OUTPATIENT
Start: 2020-01-01 | End: 2020-01-01 | Stop reason: HOSPADM

## 2020-01-01 RX ORDER — MIRTAZAPINE 15 MG/1
15 TABLET, FILM COATED ORAL NIGHTLY
Qty: 30 TABLET | Refills: 0 | Status: SHIPPED
Start: 2020-01-01 | End: 2020-01-01 | Stop reason: SDUPTHER

## 2020-01-01 RX ORDER — PROMETHAZINE HYDROCHLORIDE 12.5 MG/1
12.5 TABLET ORAL 4 TIMES DAILY
COMMUNITY
End: 2021-01-01

## 2020-01-01 RX ORDER — MAGNESIUM SULFATE IN WATER 40 MG/ML
2 INJECTION, SOLUTION INTRAVENOUS ONCE
Status: COMPLETED | OUTPATIENT
Start: 2020-01-01 | End: 2020-01-01

## 2020-01-01 RX ORDER — LEVOFLOXACIN 500 MG/1
500 TABLET, FILM COATED ORAL DAILY
Qty: 5 TABLET | Refills: 0 | Status: SHIPPED | OUTPATIENT
Start: 2020-01-01 | End: 2020-01-01

## 2020-01-01 RX ORDER — SODIUM CHLORIDE 0.9 % (FLUSH) 0.9 %
SYRINGE (ML) INJECTION
Status: COMPLETED
Start: 2020-01-01 | End: 2020-01-01

## 2020-01-01 RX ORDER — M-VIT,TX,IRON,MINS/CALC/FOLIC 27MG-0.4MG
1 TABLET ORAL DAILY
Status: DISCONTINUED | OUTPATIENT
Start: 2020-01-01 | End: 2020-01-01 | Stop reason: HOSPADM

## 2020-01-01 RX ORDER — SODIUM CHLORIDE 9 MG/ML
INJECTION, SOLUTION INTRAVENOUS EVERY 12 HOURS
Status: DISCONTINUED | OUTPATIENT
Start: 2020-01-01 | End: 2020-01-01

## 2020-01-01 RX ORDER — PALONOSETRON HYDROCHLORIDE 0.05 MG/ML
INJECTION, SOLUTION INTRAVENOUS
Status: COMPLETED
Start: 2020-01-01 | End: 2020-01-01

## 2020-01-01 RX ORDER — TRIAMCINOLONE ACETONIDE 1 MG/G
CREAM TOPICAL 2 TIMES DAILY
COMMUNITY
End: 2021-01-01

## 2020-01-01 RX ORDER — HEPARIN SODIUM (PORCINE) LOCK FLUSH IV SOLN 100 UNIT/ML 100 UNIT/ML
500 SOLUTION INTRAVENOUS PRN
Status: CANCELLED | OUTPATIENT
Start: 2021-01-01

## 2020-01-01 RX ORDER — ACETAMINOPHEN 500 MG
1000 TABLET ORAL ONCE
Status: COMPLETED | OUTPATIENT
Start: 2020-01-01 | End: 2020-01-01

## 2020-01-01 RX ORDER — ONDANSETRON HYDROCHLORIDE 8 MG/1
8 TABLET, FILM COATED ORAL EVERY 8 HOURS PRN
Qty: 20 TABLET | Refills: 2 | Status: SHIPPED | OUTPATIENT
Start: 2020-01-01 | End: 2020-01-01

## 2020-01-01 RX ORDER — LACTOBACILLUS RHAMNOSUS GG 10B CELL
1 CAPSULE ORAL DAILY
Status: DISCONTINUED | OUTPATIENT
Start: 2020-01-01 | End: 2020-01-01 | Stop reason: HOSPADM

## 2020-01-01 RX ORDER — DEXTROSE MONOHYDRATE 50 MG/ML
250 INJECTION, SOLUTION INTRAVENOUS ONCE
Status: CANCELLED | OUTPATIENT
Start: 2020-01-01

## 2020-01-01 RX ORDER — DULOXETIN HYDROCHLORIDE 20 MG/1
20 CAPSULE, DELAYED RELEASE ORAL DAILY
Status: DISCONTINUED | OUTPATIENT
Start: 2020-01-01 | End: 2020-01-01 | Stop reason: HOSPADM

## 2020-01-01 RX ORDER — OXYCODONE HYDROCHLORIDE 5 MG/1
5 TABLET ORAL EVERY 6 HOURS PRN
Status: DISCONTINUED | OUTPATIENT
Start: 2020-01-01 | End: 2020-01-01 | Stop reason: HOSPADM

## 2020-01-01 RX ORDER — ATROPINE SULFATE 0.4 MG/ML
AMPUL (ML) INJECTION
Status: COMPLETED
Start: 2020-01-01 | End: 2020-01-01

## 2020-01-01 RX ORDER — CARVEDILOL 6.25 MG/1
12.5 TABLET ORAL 2 TIMES DAILY WITH MEALS
Status: DISCONTINUED | OUTPATIENT
Start: 2020-01-01 | End: 2020-01-01 | Stop reason: HOSPADM

## 2020-01-01 RX ORDER — PANCRELIPASE 24000; 76000; 120000 [USP'U]/1; [USP'U]/1; [USP'U]/1
24000 CAPSULE, DELAYED RELEASE PELLETS ORAL
Qty: 90 CAPSULE | Refills: 0 | Status: SHIPPED | OUTPATIENT
Start: 2020-01-01 | End: 2021-01-01

## 2020-01-01 RX ORDER — PYRIDOXINE HCL (VITAMIN B6) 50 MG
50 TABLET ORAL DAILY
Qty: 30 TABLET | Refills: 3 | Status: SHIPPED | OUTPATIENT
Start: 2020-01-01 | End: 2021-01-01

## 2020-01-01 RX ORDER — SODIUM CHLORIDE 9 MG/ML
INJECTION, SOLUTION INTRAVENOUS
Status: COMPLETED
Start: 2020-01-01 | End: 2020-01-01

## 2020-01-01 RX ORDER — ZINC GLUCONATE 50 MG
50 TABLET ORAL DAILY
Status: DISCONTINUED | OUTPATIENT
Start: 2020-01-01 | End: 2020-01-01 | Stop reason: HOSPADM

## 2020-01-01 RX ORDER — SODIUM CHLORIDE 9 MG/ML
INJECTION, SOLUTION INTRAVENOUS
Status: DISPENSED
Start: 2020-01-01 | End: 2020-01-01

## 2020-01-01 RX ORDER — ACETAMINOPHEN 650 MG/1
650 SUPPOSITORY RECTAL EVERY 6 HOURS PRN
Status: DISCONTINUED | OUTPATIENT
Start: 2020-01-01 | End: 2020-01-01 | Stop reason: HOSPADM

## 2020-01-01 RX ORDER — CHOLECALCIFEROL (VITAMIN D3) 25 MCG
1000 TABLET ORAL DAILY
Qty: 60 TABLET | Refills: 0 | Status: SHIPPED | OUTPATIENT
Start: 2020-01-01 | End: 2020-01-01

## 2020-01-01 RX ORDER — TRIAMCINOLONE ACETONIDE 1 MG/G
CREAM TOPICAL 2 TIMES DAILY
Status: DISCONTINUED | OUTPATIENT
Start: 2020-01-01 | End: 2020-01-01 | Stop reason: HOSPADM

## 2020-01-01 RX ORDER — CARVEDILOL 12.5 MG/1
12.5 TABLET ORAL 2 TIMES DAILY WITH MEALS
COMMUNITY
Start: 2019-09-25

## 2020-01-01 RX ORDER — ASCORBIC ACID 500 MG
500 TABLET ORAL DAILY
Qty: 30 TABLET | Refills: 3 | Status: SHIPPED | OUTPATIENT
Start: 2020-01-01 | End: 2020-01-01

## 2020-01-01 RX ORDER — CEPHALEXIN 500 MG/1
500 CAPSULE ORAL 3 TIMES DAILY
Status: ON HOLD | COMMUNITY
Start: 2020-01-01 | End: 2020-01-01 | Stop reason: HOSPADM

## 2020-01-01 RX ORDER — SODIUM CHLORIDE 9 MG/ML
1000 INJECTION, SOLUTION INTRAVENOUS ONCE
Status: COMPLETED | OUTPATIENT
Start: 2020-01-01 | End: 2020-01-01

## 2020-01-01 RX ORDER — VITAMIN B COMPLEX
1000 TABLET ORAL DAILY
Status: DISCONTINUED | OUTPATIENT
Start: 2020-01-01 | End: 2020-01-01 | Stop reason: HOSPADM

## 2020-01-01 RX ORDER — POTASSIUM CHLORIDE 750 MG/1
10 CAPSULE, EXTENDED RELEASE ORAL 2 TIMES DAILY
Qty: 30 CAPSULE | Refills: 0 | Status: SHIPPED
Start: 2020-01-01 | End: 2020-01-01 | Stop reason: ALTCHOICE

## 2020-01-01 RX ORDER — LEVOFLOXACIN 500 MG/1
500 TABLET, FILM COATED ORAL DAILY
Status: DISCONTINUED | OUTPATIENT
Start: 2020-01-01 | End: 2020-01-01 | Stop reason: HOSPADM

## 2020-01-01 RX ORDER — PROCHLORPERAZINE MALEATE 10 MG
10 TABLET ORAL EVERY 6 HOURS PRN
Qty: 40 TABLET | Refills: 2 | Status: SHIPPED
Start: 2020-01-01 | End: 2021-01-01

## 2020-01-01 RX ORDER — 0.9 % SODIUM CHLORIDE 0.9 %
30 INTRAVENOUS SOLUTION INTRAVENOUS PRN
Status: DISCONTINUED | OUTPATIENT
Start: 2020-01-01 | End: 2020-01-01 | Stop reason: HOSPADM

## 2020-01-01 RX ORDER — DOXYCYCLINE HYCLATE 100 MG
100 TABLET ORAL 2 TIMES DAILY
Status: ON HOLD | COMMUNITY
End: 2020-01-01 | Stop reason: HOSPADM

## 2020-01-01 RX ORDER — DIPHENOXYLATE HYDROCHLORIDE AND ATROPINE SULFATE 2.5; .025 MG/1; MG/1
1-2 TABLET ORAL EVERY 6 HOURS
COMMUNITY
Start: 2020-01-01 | End: 2021-01-01

## 2020-01-01 RX ORDER — SODIUM CHLORIDE 0.9 % (FLUSH) 0.9 %
10 SYRINGE (ML) INJECTION PRN
Status: CANCELLED | OUTPATIENT
Start: 2021-01-01

## 2020-01-01 RX ORDER — SODIUM CHLORIDE 9 MG/ML
INJECTION, SOLUTION INTRAVENOUS ONCE
Status: DISCONTINUED | OUTPATIENT
Start: 2020-01-01 | End: 2020-01-01 | Stop reason: HOSPADM

## 2020-01-01 RX ORDER — ACETAMINOPHEN 650 MG/1
650 SUPPOSITORY RECTAL EVERY 6 HOURS PRN
Status: DISCONTINUED | OUTPATIENT
Start: 2020-01-01 | End: 2020-01-01 | Stop reason: SDUPTHER

## 2020-01-01 RX ORDER — SODIUM CHLORIDE 9 MG/ML
250 INJECTION, SOLUTION INTRAVENOUS ONCE
Status: DISCONTINUED | OUTPATIENT
Start: 2020-01-01 | End: 2020-01-01 | Stop reason: HOSPADM

## 2020-01-01 RX ORDER — ASCORBIC ACID 500 MG
500 TABLET ORAL DAILY
Status: DISCONTINUED | OUTPATIENT
Start: 2020-01-01 | End: 2020-01-01 | Stop reason: HOSPADM

## 2020-01-01 RX ORDER — SODIUM CHLORIDE, SODIUM LACTATE, POTASSIUM CHLORIDE, CALCIUM CHLORIDE 600; 310; 30; 20 MG/100ML; MG/100ML; MG/100ML; MG/100ML
1000 INJECTION, SOLUTION INTRAVENOUS ONCE
Status: COMPLETED | OUTPATIENT
Start: 2020-01-01 | End: 2020-01-01

## 2020-01-01 RX ORDER — PROCHLORPERAZINE MALEATE 10 MG
10 TABLET ORAL EVERY 6 HOURS PRN
Status: DISCONTINUED | OUTPATIENT
Start: 2020-01-01 | End: 2020-01-01 | Stop reason: HOSPADM

## 2020-01-01 RX ADMIN — LEUCOVORIN CALCIUM 800 MG: 10 INJECTION INTRAMUSCULAR; INTRAVENOUS at 13:19

## 2020-01-01 RX ADMIN — ATROPINE SULFATE 0.4 MG: 0.4 INJECTION, SOLUTION INTRAMUSCULAR; INTRAVENOUS; SUBCUTANEOUS at 13:35

## 2020-01-01 RX ADMIN — PANCRELIPASE 24000 UNITS: 60000; 12000; 38000 CAPSULE, DELAYED RELEASE PELLETS ORAL at 12:06

## 2020-01-01 RX ADMIN — OXALIPLATIN 140 MG: 5 INJECTION, SOLUTION INTRAVENOUS at 11:31

## 2020-01-01 RX ADMIN — PALONOSETRON 0.25 MG: 0.25 INJECTION, SOLUTION INTRAVENOUS at 09:37

## 2020-01-01 RX ADMIN — FOSAPREPITANT 150 MG: 150 INJECTION, POWDER, LYOPHILIZED, FOR SOLUTION INTRAVENOUS at 10:25

## 2020-01-01 RX ADMIN — FOSAPREPITANT 150 MG: 150 INJECTION, POWDER, LYOPHILIZED, FOR SOLUTION INTRAVENOUS at 10:30

## 2020-01-01 RX ADMIN — DEXTROSE MONOHYDRATE 250 ML: 50 INJECTION, SOLUTION INTRAVENOUS at 09:46

## 2020-01-01 RX ADMIN — ATROPINE SULFATE 0.4 MG: 0.4 INJECTION, SOLUTION INTRAMUSCULAR; INTRAVENOUS; SUBCUTANEOUS at 09:53

## 2020-01-01 RX ADMIN — ZINC SULFATE 220 MG (50 MG) CAPSULE 50 MG: CAPSULE at 10:12

## 2020-01-01 RX ADMIN — SODIUM CHLORIDE, PRESERVATIVE FREE 10 ML: 5 INJECTION INTRAVENOUS at 11:30

## 2020-01-01 RX ADMIN — OXYCODONE HYDROCHLORIDE AND ACETAMINOPHEN 500 MG: 500 TABLET ORAL at 08:45

## 2020-01-01 RX ADMIN — CEFEPIME 2 G: 2 INJECTION, POWDER, FOR SOLUTION INTRAVENOUS at 02:38

## 2020-01-01 RX ADMIN — PALONOSETRON 0.25 MG: 0.25 INJECTION, SOLUTION INTRAVENOUS at 09:52

## 2020-01-01 RX ADMIN — SODIUM CHLORIDE 1000 ML: 9 INJECTION, SOLUTION INTRAVENOUS at 13:57

## 2020-01-01 RX ADMIN — SODIUM CHLORIDE: 9 INJECTION, SOLUTION INTRAVENOUS at 20:31

## 2020-01-01 RX ADMIN — ATROPINE SULFATE 0.4 MG: 0.4 INJECTION, SOLUTION INTRAMUSCULAR; INTRAVENOUS; SUBCUTANEOUS at 10:01

## 2020-01-01 RX ADMIN — PEGFILGRASTIM-CBQV 6 MG: 6 INJECTION, SOLUTION SUBCUTANEOUS at 13:08

## 2020-01-01 RX ADMIN — DEXTROSE MONOHYDRATE 250 ML: 50 INJECTION, SOLUTION INTRAVENOUS at 09:28

## 2020-01-01 RX ADMIN — OXALIPLATIN 85 MG: 5 INJECTION, SOLUTION INTRAVENOUS at 10:37

## 2020-01-01 RX ADMIN — HEPARIN 500 UNITS: 100 SYRINGE at 09:51

## 2020-01-01 RX ADMIN — FLUOROURACIL 800 MG: 50 INJECTION, SOLUTION INTRAVENOUS at 14:31

## 2020-01-01 RX ADMIN — PANCRELIPASE 24000 UNITS: 60000; 12000; 38000 CAPSULE, DELAYED RELEASE PELLETS ORAL at 16:40

## 2020-01-01 RX ADMIN — SODIUM CHLORIDE, PRESERVATIVE FREE 10 ML: 5 INJECTION INTRAVENOUS at 12:29

## 2020-01-01 RX ADMIN — Medication 10 ML: at 08:45

## 2020-01-01 RX ADMIN — SODIUM CHLORIDE, PRESERVATIVE FREE 500 UNITS: 5 INJECTION INTRAVENOUS at 13:32

## 2020-01-01 RX ADMIN — SODIUM CHLORIDE 360 MG: 9 INJECTION, SOLUTION INTRAVENOUS at 13:00

## 2020-01-01 RX ADMIN — FLUOROURACIL 800 MG: 50 INJECTION, SOLUTION INTRAVENOUS at 15:12

## 2020-01-01 RX ADMIN — NYSTATIN 500000 UNITS: 100000 SUSPENSION ORAL at 13:10

## 2020-01-01 RX ADMIN — FOSAPREPITANT 150 MG: 150 INJECTION, POWDER, LYOPHILIZED, FOR SOLUTION INTRAVENOUS at 10:03

## 2020-01-01 RX ADMIN — ENOXAPARIN SODIUM 40 MG: 40 INJECTION SUBCUTANEOUS at 10:11

## 2020-01-01 RX ADMIN — PYRIDOXINE HCL TAB 50 MG 50 MG: 50 TAB at 08:45

## 2020-01-01 RX ADMIN — Medication 500 UNITS: at 12:29

## 2020-01-01 RX ADMIN — CHOLECALCIFEROL TAB 25 MCG (1000 UNIT) 1000 UNITS: 25 TAB at 17:24

## 2020-01-01 RX ADMIN — DEXAMETHASONE SODIUM PHOSPHATE 10 MG: 10 INJECTION INTRAMUSCULAR; INTRAVENOUS at 09:20

## 2020-01-01 RX ADMIN — Medication 500 ML: at 10:04

## 2020-01-01 RX ADMIN — SODIUM CHLORIDE 300 MG: 9 INJECTION, SOLUTION INTRAVENOUS at 13:46

## 2020-01-01 RX ADMIN — DEXTROSE MONOHYDRATE 250 ML: 50 INJECTION, SOLUTION INTRAVENOUS at 11:00

## 2020-01-01 RX ADMIN — PALONOSETRON 0.25 MG: 0.25 INJECTION, SOLUTION INTRAVENOUS at 09:47

## 2020-01-01 RX ADMIN — CARVEDILOL 12.5 MG: 6.25 TABLET, FILM COATED ORAL at 16:41

## 2020-01-01 RX ADMIN — PANCRELIPASE 24000 UNITS: 60000; 12000; 38000 CAPSULE, DELAYED RELEASE PELLETS ORAL at 09:09

## 2020-01-01 RX ADMIN — PYRIDOXINE HCL TAB 50 MG 50 MG: 50 TAB at 09:08

## 2020-01-01 RX ADMIN — Medication 500 UNITS: at 11:30

## 2020-01-01 RX ADMIN — SODIUM CHLORIDE: 9 INJECTION, SOLUTION INTRAVENOUS at 06:08

## 2020-01-01 RX ADMIN — FLUOROURACIL 800 MG: 50 INJECTION, SOLUTION INTRAVENOUS at 14:52

## 2020-01-01 RX ADMIN — OXYCODONE HYDROCHLORIDE AND ACETAMINOPHEN 500 MG: 500 TABLET ORAL at 09:08

## 2020-01-01 RX ADMIN — ATROPINE SULFATE 0.4 MG: 0.4 INJECTION, SOLUTION INTRAMUSCULAR; INTRAVENOUS; SUBCUTANEOUS at 13:30

## 2020-01-01 RX ADMIN — LEUCOVORIN CALCIUM 800 MG: 350 INJECTION, POWDER, LYOPHILIZED, FOR SOLUTION INTRAMUSCULAR; INTRAVENOUS at 13:05

## 2020-01-01 RX ADMIN — LEUCOVORIN CALCIUM 800 MG: 350 INJECTION, POWDER, LYOPHILIZED, FOR SOLUTION INTRAMUSCULAR; INTRAVENOUS at 14:03

## 2020-01-01 RX ADMIN — CEFEPIME 2 G: 2 INJECTION, POWDER, FOR SOLUTION INTRAVENOUS at 15:04

## 2020-01-01 RX ADMIN — SODIUM CHLORIDE, PRESERVATIVE FREE 10 ML: 5 INJECTION INTRAVENOUS at 09:49

## 2020-01-01 RX ADMIN — Medication 500 UNITS: at 13:03

## 2020-01-01 RX ADMIN — Medication 500 UNITS: at 10:39

## 2020-01-01 RX ADMIN — Medication 500 UNITS: at 14:31

## 2020-01-01 RX ADMIN — HEPARIN 500 UNITS: 100 SYRINGE at 12:07

## 2020-01-01 RX ADMIN — FLUOROURACIL 800 MG: 50 INJECTION, SOLUTION INTRAVENOUS at 15:07

## 2020-01-01 RX ADMIN — PANCRELIPASE 24000 UNITS: 60000; 12000; 38000 CAPSULE, DELAYED RELEASE PELLETS ORAL at 10:13

## 2020-01-01 RX ADMIN — FOSAPREPITANT 150 MG: 150 INJECTION, POWDER, LYOPHILIZED, FOR SOLUTION INTRAVENOUS at 09:55

## 2020-01-01 RX ADMIN — SODIUM CHLORIDE, PRESERVATIVE FREE 500 UNITS: 5 INJECTION INTRAVENOUS at 10:42

## 2020-01-01 RX ADMIN — Medication 500 UNITS: at 09:37

## 2020-01-01 RX ADMIN — SODIUM CHLORIDE, PRESERVATIVE FREE 10 ML: 5 INJECTION INTRAVENOUS at 09:18

## 2020-01-01 RX ADMIN — FLUOROURACIL 800 MG: 50 INJECTION, SOLUTION INTRAVENOUS at 15:24

## 2020-01-01 RX ADMIN — DEXTROSE MONOHYDRATE 250 ML: 5 INJECTION, SOLUTION INTRAVENOUS at 11:31

## 2020-01-01 RX ADMIN — ACETAMINOPHEN 650 MG: 325 TABLET, FILM COATED ORAL at 09:21

## 2020-01-01 RX ADMIN — PALONOSETRON HYDROCHLORIDE 0.25 MG: 0.05 INJECTION, SOLUTION INTRAVENOUS at 09:37

## 2020-01-01 RX ADMIN — DEXTROSE MONOHYDRATE 250 ML: 50 INJECTION, SOLUTION INTRAVENOUS at 10:37

## 2020-01-01 RX ADMIN — VANCOMYCIN HYDROCHLORIDE 250 MG: 10 INJECTION, POWDER, LYOPHILIZED, FOR SOLUTION INTRAVENOUS at 05:00

## 2020-01-01 RX ADMIN — SODIUM CHLORIDE, POTASSIUM CHLORIDE, SODIUM LACTATE AND CALCIUM CHLORIDE 1000 ML: 600; 310; 30; 20 INJECTION, SOLUTION INTRAVENOUS at 04:31

## 2020-01-01 RX ADMIN — Medication 500 UNITS: at 13:41

## 2020-01-01 RX ADMIN — SODIUM CHLORIDE 1000 ML: 9 INJECTION, SOLUTION INTRAVENOUS at 13:10

## 2020-01-01 RX ADMIN — OXALIPLATIN 170 MG: 5 INJECTION, SOLUTION INTRAVENOUS at 10:50

## 2020-01-01 RX ADMIN — DEXTROSE MONOHYDRATE: 5 INJECTION, SOLUTION INTRAVENOUS at 09:34

## 2020-01-01 RX ADMIN — TRIAMCINOLONE ACETONIDE: 1 CREAM TOPICAL at 09:10

## 2020-01-01 RX ADMIN — LEUCOVORIN CALCIUM 800 MG: 10 INJECTION INTRAMUSCULAR; INTRAVENOUS at 13:46

## 2020-01-01 RX ADMIN — CARVEDILOL 12.5 MG: 6.25 TABLET, FILM COATED ORAL at 10:12

## 2020-01-01 RX ADMIN — SODIUM CHLORIDE 150 MG: 900 INJECTION, SOLUTION INTRAVENOUS at 10:19

## 2020-01-01 RX ADMIN — OXALIPLATIN 130 MG: 5 INJECTION, SOLUTION INTRAVENOUS at 10:59

## 2020-01-01 RX ADMIN — SODIUM CHLORIDE 1000 ML: 9 INJECTION, SOLUTION INTRAVENOUS at 09:49

## 2020-01-01 RX ADMIN — PEGFILGRASTIM-CBQV 6 MG: 6 INJECTION, SOLUTION SUBCUTANEOUS at 13:53

## 2020-01-01 RX ADMIN — PANCRELIPASE 24000 UNITS: 60000; 12000; 38000 CAPSULE, DELAYED RELEASE PELLETS ORAL at 12:33

## 2020-01-01 RX ADMIN — NYSTATIN 500000 UNITS: 100000 SUSPENSION ORAL at 20:31

## 2020-01-01 RX ADMIN — OXALIPLATIN 170 MG: 5 INJECTION, SOLUTION INTRAVENOUS at 10:55

## 2020-01-01 RX ADMIN — SODIUM CHLORIDE, PRESERVATIVE FREE 10 ML: 5 INJECTION INTRAVENOUS at 10:12

## 2020-01-01 RX ADMIN — SODIUM CHLORIDE: 9 INJECTION, SOLUTION INTRAVENOUS at 12:48

## 2020-01-01 RX ADMIN — DEXAMETHASONE SODIUM PHOSPHATE 10 MG: 10 INJECTION INTRAMUSCULAR; INTRAVENOUS at 09:48

## 2020-01-01 RX ADMIN — OXALIPLATIN 170 MG: 5 INJECTION, SOLUTION INTRAVENOUS at 10:33

## 2020-01-01 RX ADMIN — PEGFILGRASTIM-CBQV 6 MG: 6 INJECTION, SOLUTION SUBCUTANEOUS at 13:44

## 2020-01-01 RX ADMIN — SODIUM CHLORIDE 360 MG: 9 INJECTION, SOLUTION INTRAVENOUS at 13:26

## 2020-01-01 RX ADMIN — HEPARIN 500 UNITS: 100 SYRINGE at 14:08

## 2020-01-01 RX ADMIN — PALONOSETRON 0.25 MG: 0.25 INJECTION, SOLUTION INTRAVENOUS at 09:57

## 2020-01-01 RX ADMIN — SODIUM CHLORIDE, PRESERVATIVE FREE 10 ML: 5 INJECTION INTRAVENOUS at 10:50

## 2020-01-01 RX ADMIN — SODIUM CHLORIDE, PRESERVATIVE FREE 10 ML: 5 INJECTION INTRAVENOUS at 13:00

## 2020-01-01 RX ADMIN — CARVEDILOL 12.5 MG: 6.25 TABLET, FILM COATED ORAL at 09:08

## 2020-01-01 RX ADMIN — LEUCOVORIN CALCIUM 800 MG: 10 INJECTION INTRAMUSCULAR; INTRAVENOUS at 13:13

## 2020-01-01 RX ADMIN — PEGFILGRASTIM-CBQV 6 MG: 6 INJECTION, SOLUTION SUBCUTANEOUS at 13:35

## 2020-01-01 RX ADMIN — Medication 1 TABLET: at 10:13

## 2020-01-01 RX ADMIN — Medication 500 UNITS: at 15:21

## 2020-01-01 RX ADMIN — SODIUM CHLORIDE 1000 ML: 9 INJECTION, SOLUTION INTRAVENOUS at 23:04

## 2020-01-01 RX ADMIN — SODIUM CHLORIDE 500 ML: 9 INJECTION, SOLUTION INTRAVENOUS at 10:04

## 2020-01-01 RX ADMIN — VANCOMYCIN HYDROCHLORIDE 1000 MG: 1 INJECTION, POWDER, LYOPHILIZED, FOR SOLUTION INTRAVENOUS at 23:28

## 2020-01-01 RX ADMIN — SODIUM CHLORIDE 360 MG: 9 INJECTION, SOLUTION INTRAVENOUS at 12:50

## 2020-01-01 RX ADMIN — PALONOSETRON 0.25 MG: 0.25 INJECTION, SOLUTION INTRAVENOUS at 09:15

## 2020-01-01 RX ADMIN — DEXTROSE MONOHYDRATE: 50 INJECTION, SOLUTION INTRAVENOUS at 09:34

## 2020-01-01 RX ADMIN — NYSTATIN 500000 UNITS: 100000 SUSPENSION ORAL at 16:41

## 2020-01-01 RX ADMIN — REMDESIVIR 100 MG: 100 INJECTION, POWDER, LYOPHILIZED, FOR SOLUTION INTRAVENOUS at 13:10

## 2020-01-01 RX ADMIN — SODIUM CHLORIDE 300 MG: 9 INJECTION, SOLUTION INTRAVENOUS at 13:33

## 2020-01-01 RX ADMIN — SODIUM CHLORIDE 360 MG: 9 INJECTION, SOLUTION INTRAVENOUS at 14:03

## 2020-01-01 RX ADMIN — PIPERACILLIN SODIUM,TAZOBACTAM SODIUM 3.38 G: 3; .375 INJECTION, POWDER, FOR SOLUTION INTRAVENOUS at 18:32

## 2020-01-01 RX ADMIN — OXALIPLATIN 130 MG: 5 INJECTION, SOLUTION INTRAVENOUS at 10:55

## 2020-01-01 RX ADMIN — SODIUM CHLORIDE: 9 INJECTION, SOLUTION INTRAVENOUS at 09:56

## 2020-01-01 RX ADMIN — SODIUM CHLORIDE 1000 ML: 9 INJECTION, SOLUTION INTRAVENOUS at 17:51

## 2020-01-01 RX ADMIN — OXALIPLATIN 170 MG: 5 INJECTION, SOLUTION INTRAVENOUS at 10:30

## 2020-01-01 RX ADMIN — SODIUM CHLORIDE: 9 INJECTION, SOLUTION INTRAVENOUS at 09:37

## 2020-01-01 RX ADMIN — SODIUM CHLORIDE, PRESERVATIVE FREE 10 ML: 5 INJECTION INTRAVENOUS at 09:51

## 2020-01-01 RX ADMIN — DEXAMETHASONE SODIUM PHOSPHATE 10 MG: 10 INJECTION INTRAMUSCULAR; INTRAVENOUS at 09:58

## 2020-01-01 RX ADMIN — ZINC SULFATE 220 MG (50 MG) CAPSULE 50 MG: CAPSULE at 09:09

## 2020-01-01 RX ADMIN — LEUCOVORIN CALCIUM 800 MG: 10 INJECTION INTRAMUSCULAR; INTRAVENOUS at 13:31

## 2020-01-01 RX ADMIN — Medication 0.4 MG: at 13:30

## 2020-01-01 RX ADMIN — VANCOMYCIN HYDROCHLORIDE 250 MG: 10 INJECTION, POWDER, LYOPHILIZED, FOR SOLUTION INTRAVENOUS at 12:48

## 2020-01-01 RX ADMIN — DEXTROSE MONOHYDRATE 250 ML: 50 INJECTION, SOLUTION INTRAVENOUS at 10:25

## 2020-01-01 RX ADMIN — SODIUM CHLORIDE, PRESERVATIVE FREE 10 ML: 5 INJECTION INTRAVENOUS at 09:24

## 2020-01-01 RX ADMIN — SODIUM CHLORIDE, PRESERVATIVE FREE 10 ML: 5 INJECTION INTRAVENOUS at 09:53

## 2020-01-01 RX ADMIN — CARVEDILOL 12.5 MG: 6.25 TABLET, FILM COATED ORAL at 16:55

## 2020-01-01 RX ADMIN — VANCOMYCIN HYDROCHLORIDE 250 MG: 10 INJECTION, POWDER, LYOPHILIZED, FOR SOLUTION INTRAVENOUS at 16:54

## 2020-01-01 RX ADMIN — Medication 10 ML: at 10:49

## 2020-01-01 RX ADMIN — SODIUM CHLORIDE, PRESERVATIVE FREE 10 ML: 5 INJECTION INTRAVENOUS at 10:35

## 2020-01-01 RX ADMIN — SODIUM CHLORIDE 500 ML: 9 INJECTION, SOLUTION INTRAVENOUS at 09:10

## 2020-01-01 RX ADMIN — OXALIPLATIN 170 MG: 5 INJECTION, SOLUTION INTRAVENOUS at 10:43

## 2020-01-01 RX ADMIN — HEPARIN 500 UNITS: 100 SYRINGE at 10:55

## 2020-01-01 RX ADMIN — HEPARIN SODIUM (PORCINE) LOCK FLUSH IV SOLN 100 UNIT/ML 500 UNITS: 100 SOLUTION at 12:07

## 2020-01-01 RX ADMIN — SODIUM CHLORIDE, PRESERVATIVE FREE 10 ML: 5 INJECTION INTRAVENOUS at 13:03

## 2020-01-01 RX ADMIN — SODIUM CHLORIDE 150 MG: 900 INJECTION, SOLUTION INTRAVENOUS at 09:45

## 2020-01-01 RX ADMIN — TRIAMCINOLONE ACETONIDE: 1 CREAM TOPICAL at 10:12

## 2020-01-01 RX ADMIN — SODIUM CHLORIDE: 9 INJECTION, SOLUTION INTRAVENOUS at 08:45

## 2020-01-01 RX ADMIN — SODIUM CHLORIDE, PRESERVATIVE FREE 10 ML: 5 INJECTION INTRAVENOUS at 10:54

## 2020-01-01 RX ADMIN — FLUOROURACIL 800 MG: 50 INJECTION, SOLUTION INTRAVENOUS at 15:10

## 2020-01-01 RX ADMIN — DEXTROSE MONOHYDRATE 250 ML: 50 INJECTION, SOLUTION INTRAVENOUS at 10:55

## 2020-01-01 RX ADMIN — TRIAMCINOLONE ACETONIDE: 1 CREAM TOPICAL at 21:40

## 2020-01-01 RX ADMIN — ASPIRIN 81 MG: 81 TABLET, COATED ORAL at 23:03

## 2020-01-01 RX ADMIN — CARVEDILOL 12.5 MG: 6.25 TABLET, FILM COATED ORAL at 08:45

## 2020-01-01 RX ADMIN — DEXAMETHASONE SODIUM PHOSPHATE 10 MG: 10 INJECTION INTRAMUSCULAR; INTRAVENOUS at 09:37

## 2020-01-01 RX ADMIN — SODIUM CHLORIDE 500 ML: 9 INJECTION, SOLUTION INTRAVENOUS at 09:56

## 2020-01-01 RX ADMIN — Medication 50 MG: at 10:12

## 2020-01-01 RX ADMIN — SODIUM CHLORIDE 150 MG: 900 INJECTION, SOLUTION INTRAVENOUS at 09:54

## 2020-01-01 RX ADMIN — DULOXETINE HYDROCHLORIDE 20 MG: 20 CAPSULE, DELAYED RELEASE ORAL at 09:09

## 2020-01-01 RX ADMIN — Medication 1 CAPSULE: at 10:12

## 2020-01-01 RX ADMIN — VANCOMYCIN HYDROCHLORIDE 1000 MG: 1 INJECTION, POWDER, LYOPHILIZED, FOR SOLUTION INTRAVENOUS at 11:57

## 2020-01-01 RX ADMIN — DEXTROSE MONOHYDRATE 250 ML: 50 INJECTION, SOLUTION INTRAVENOUS at 10:47

## 2020-01-01 RX ADMIN — SODIUM CHLORIDE, PRESERVATIVE FREE 10 ML: 5 INJECTION INTRAVENOUS at 13:41

## 2020-01-01 RX ADMIN — MIRTAZAPINE 15 MG: 15 TABLET, FILM COATED ORAL at 20:31

## 2020-01-01 RX ADMIN — ASPIRIN 81 MG: 81 TABLET, COATED ORAL at 21:40

## 2020-01-01 RX ADMIN — POTASSIUM CHLORIDE 40 MEQ: 1500 TABLET, EXTENDED RELEASE ORAL at 12:33

## 2020-01-01 RX ADMIN — LEUCOVORIN CALCIUM 800 MG: 350 INJECTION, POWDER, LYOPHILIZED, FOR SOLUTION INTRAMUSCULAR; INTRAVENOUS at 13:00

## 2020-01-01 RX ADMIN — PANCRELIPASE 24000 UNITS: 60000; 12000; 38000 CAPSULE, DELAYED RELEASE PELLETS ORAL at 09:07

## 2020-01-01 RX ADMIN — DEXAMETHASONE SODIUM PHOSPHATE 10 MG: 10 INJECTION INTRAMUSCULAR; INTRAVENOUS at 09:38

## 2020-01-01 RX ADMIN — DEXAMETHASONE SODIUM PHOSPHATE 10 MG: 10 INJECTION INTRAMUSCULAR; INTRAVENOUS at 09:32

## 2020-01-01 RX ADMIN — FLUOROURACIL 800 MG: 50 INJECTION, SOLUTION INTRAVENOUS at 15:15

## 2020-01-01 RX ADMIN — Medication 500 UNITS: at 10:35

## 2020-01-01 RX ADMIN — ACETAMINOPHEN 650 MG: 325 TABLET, FILM COATED ORAL at 20:35

## 2020-01-01 RX ADMIN — LEUCOVORIN CALCIUM 800 MG: 350 INJECTION, POWDER, LYOPHILIZED, FOR SOLUTION INTRAMUSCULAR; INTRAVENOUS at 12:48

## 2020-01-01 RX ADMIN — PEGFILGRASTIM-CBQV 6 MG: 6 INJECTION, SOLUTION SUBCUTANEOUS at 13:41

## 2020-01-01 RX ADMIN — Medication 50 MG: at 08:45

## 2020-01-01 RX ADMIN — Medication 10 ML: at 23:04

## 2020-01-01 RX ADMIN — DEXAMETHASONE SODIUM PHOSPHATE 10 MG: 10 INJECTION INTRAMUSCULAR; INTRAVENOUS at 09:56

## 2020-01-01 RX ADMIN — Medication 500 UNITS: at 09:33

## 2020-01-01 RX ADMIN — SODIUM CHLORIDE, PRESERVATIVE FREE 10 ML: 5 INJECTION INTRAVENOUS at 15:13

## 2020-01-01 RX ADMIN — SODIUM CHLORIDE, PRESERVATIVE FREE 10 ML: 5 INJECTION INTRAVENOUS at 09:09

## 2020-01-01 RX ADMIN — VANCOMYCIN HYDROCHLORIDE 250 MG: 10 INJECTION, POWDER, LYOPHILIZED, FOR SOLUTION INTRAVENOUS at 00:36

## 2020-01-01 RX ADMIN — Medication 500 UNITS: at 15:04

## 2020-01-01 RX ADMIN — CHOLECALCIFEROL TAB 25 MCG (1000 UNIT) 1000 UNITS: 25 TAB at 08:45

## 2020-01-01 RX ADMIN — SODIUM CHLORIDE, PRESERVATIVE FREE 10 ML: 5 INJECTION INTRAVENOUS at 15:21

## 2020-01-01 RX ADMIN — PEGFILGRASTIM-CBQV 6 MG: 6 INJECTION, SOLUTION SUBCUTANEOUS at 12:14

## 2020-01-01 RX ADMIN — SODIUM CHLORIDE, PRESERVATIVE FREE 10 ML: 5 INJECTION INTRAVENOUS at 09:37

## 2020-01-01 RX ADMIN — LEUCOVORIN CALCIUM 800 MG: 350 INJECTION, POWDER, LYOPHILIZED, FOR SOLUTION INTRAMUSCULAR; INTRAVENOUS at 13:26

## 2020-01-01 RX ADMIN — MIRTAZAPINE 15 MG: 15 TABLET, FILM COATED ORAL at 23:03

## 2020-01-01 RX ADMIN — LEVOFLOXACIN 500 MG: 500 TABLET, FILM COATED ORAL at 15:22

## 2020-01-01 RX ADMIN — CARVEDILOL 12.5 MG: 6.25 TABLET, FILM COATED ORAL at 09:07

## 2020-01-01 RX ADMIN — REMDESIVIR 200 MG: 100 INJECTION, POWDER, LYOPHILIZED, FOR SOLUTION INTRAVENOUS at 14:02

## 2020-01-01 RX ADMIN — Medication 500 UNITS: at 13:00

## 2020-01-01 RX ADMIN — SODIUM CHLORIDE, PRESERVATIVE FREE 10 ML: 5 INJECTION INTRAVENOUS at 10:49

## 2020-01-01 RX ADMIN — SODIUM CHLORIDE, PRESERVATIVE FREE 10 ML: 5 INJECTION INTRAVENOUS at 21:42

## 2020-01-01 RX ADMIN — Medication 500 UNITS: at 12:12

## 2020-01-01 RX ADMIN — SODIUM CHLORIDE, PRESERVATIVE FREE 10 ML: 5 INJECTION INTRAVENOUS at 14:31

## 2020-01-01 RX ADMIN — SODIUM CHLORIDE, PRESERVATIVE FREE 10 ML: 5 INJECTION INTRAVENOUS at 10:55

## 2020-01-01 RX ADMIN — SODIUM CHLORIDE, PRESERVATIVE FREE 10 ML: 5 INJECTION INTRAVENOUS at 09:33

## 2020-01-01 RX ADMIN — SODIUM CHLORIDE 1000 ML: 9 INJECTION, SOLUTION INTRAVENOUS at 10:49

## 2020-01-01 RX ADMIN — SODIUM CHLORIDE, PRESERVATIVE FREE 10 ML: 5 INJECTION INTRAVENOUS at 09:50

## 2020-01-01 RX ADMIN — ACETAMINOPHEN 1000 MG: 500 TABLET, FILM COATED ORAL at 17:33

## 2020-01-01 RX ADMIN — IRINOTECAN HYDROCHLORIDE 280 MG: 20 INJECTION, SOLUTION INTRAVENOUS at 13:13

## 2020-01-01 RX ADMIN — FLUOROURACIL 800 MG: 50 INJECTION, SOLUTION INTRAVENOUS at 15:51

## 2020-01-01 RX ADMIN — Medication 500 UNITS: at 09:18

## 2020-01-01 RX ADMIN — ATROPINE SULFATE 0.4 MG: 0.4 INJECTION, SOLUTION INTRAMUSCULAR; INTRAVENOUS; SUBCUTANEOUS at 09:16

## 2020-01-01 RX ADMIN — SODIUM CHLORIDE 500 ML: 9 INJECTION, SOLUTION INTRAVENOUS at 09:32

## 2020-01-01 RX ADMIN — ATROPINE SULFATE 0.4 MG: 0.4 INJECTION, SOLUTION INTRAMUSCULAR; INTRAVENOUS; SUBCUTANEOUS at 09:31

## 2020-01-01 RX ADMIN — SODIUM CHLORIDE 360 MG: 9 INJECTION, SOLUTION INTRAVENOUS at 13:20

## 2020-01-01 RX ADMIN — SODIUM CHLORIDE 500 ML: 9 INJECTION, SOLUTION INTRAVENOUS at 09:45

## 2020-01-01 RX ADMIN — SODIUM CHLORIDE 150 MG: 900 INJECTION, SOLUTION INTRAVENOUS at 10:00

## 2020-01-01 RX ADMIN — MIRTAZAPINE 15 MG: 15 TABLET, FILM COATED ORAL at 21:40

## 2020-01-01 RX ADMIN — PEGFILGRASTIM-CBQV 6 MG: 6 INJECTION, SOLUTION SUBCUTANEOUS at 13:12

## 2020-01-01 RX ADMIN — Medication 30 ML: at 10:42

## 2020-01-01 RX ADMIN — ATROPINE SULFATE 0.4 MG: 0.4 INJECTION, SOLUTION INTRAMUSCULAR; INTRAVENOUS; SUBCUTANEOUS at 13:59

## 2020-01-01 RX ADMIN — DULOXETINE HYDROCHLORIDE 20 MG: 20 CAPSULE, DELAYED RELEASE ORAL at 10:12

## 2020-01-01 RX ADMIN — Medication 20 ML: at 13:32

## 2020-01-01 RX ADMIN — SODIUM CHLORIDE 150 MG: 900 INJECTION, SOLUTION INTRAVENOUS at 10:15

## 2020-01-01 RX ADMIN — SODIUM CHLORIDE 360 MG: 9 INJECTION, SOLUTION INTRAVENOUS at 13:06

## 2020-01-01 RX ADMIN — MAGNESIUM SULFATE 2 G: 2 INJECTION INTRAVENOUS at 05:12

## 2020-01-01 RX ADMIN — FLUOROURACIL 800 MG: 50 INJECTION, SOLUTION INTRAVENOUS at 15:16

## 2020-01-01 RX ADMIN — ENOXAPARIN SODIUM 40 MG: 40 INJECTION SUBCUTANEOUS at 09:09

## 2020-01-01 RX ADMIN — Medication 1 CAPSULE: at 09:09

## 2020-01-01 RX ADMIN — SODIUM CHLORIDE, PRESERVATIVE FREE 10 ML: 5 INJECTION INTRAVENOUS at 12:12

## 2020-01-01 RX ADMIN — PALONOSETRON 0.25 MG: 0.25 INJECTION, SOLUTION INTRAVENOUS at 09:56

## 2020-01-01 RX ADMIN — Medication 500 UNITS: at 09:24

## 2020-01-01 RX ADMIN — Medication 1 TABLET: at 09:10

## 2020-01-01 RX ADMIN — Medication 10 ML: at 20:35

## 2020-01-01 RX ADMIN — SODIUM CHLORIDE 1000 ML: 9 INJECTION, SOLUTION INTRAVENOUS at 13:52

## 2020-01-01 RX ADMIN — SODIUM CHLORIDE 1000 ML: 9 INJECTION, SOLUTION INTRAVENOUS at 07:33

## 2020-01-01 RX ADMIN — PANCRELIPASE 24000 UNITS: 60000; 12000; 38000 CAPSULE, DELAYED RELEASE PELLETS ORAL at 11:56

## 2020-01-01 RX ADMIN — PANCRELIPASE 24000 UNITS: 60000; 12000; 38000 CAPSULE, DELAYED RELEASE PELLETS ORAL at 16:54

## 2020-01-01 RX ADMIN — ATROPINE SULFATE 0.4 MG: 0.4 INJECTION, SOLUTION INTRAMUSCULAR; INTRAVENOUS; SUBCUTANEOUS at 13:12

## 2020-01-01 RX ADMIN — SODIUM CHLORIDE, PRESERVATIVE FREE 10 ML: 5 INJECTION INTRAVENOUS at 10:51

## 2020-01-01 RX ADMIN — PEGFILGRASTIM-CBQV 6 MG: 6 INJECTION, SOLUTION SUBCUTANEOUS at 13:14

## 2020-01-01 RX ADMIN — ATROPINE SULFATE 0.4 MG: 0.4 INJECTION, SOLUTION INTRAMUSCULAR; INTRAVENOUS; SUBCUTANEOUS at 09:37

## 2020-01-01 RX ADMIN — Medication 50 MG: at 09:09

## 2020-01-01 RX ADMIN — PALONOSETRON 0.25 MG: 0.25 INJECTION, SOLUTION INTRAVENOUS at 09:29

## 2020-01-01 RX ADMIN — PEGFILGRASTIM-CBQV 6 MG: 6 INJECTION, SOLUTION SUBCUTANEOUS at 12:53

## 2020-01-01 RX ADMIN — SODIUM CHLORIDE, PRESERVATIVE FREE 10 ML: 5 INJECTION INTRAVENOUS at 10:38

## 2020-01-01 RX ADMIN — PANCRELIPASE 24000 UNITS: 60000; 12000; 38000 CAPSULE, DELAYED RELEASE PELLETS ORAL at 08:44

## 2020-01-01 RX ADMIN — Medication 500 UNITS: at 11:12

## 2020-01-01 RX ADMIN — DEXAMETHASONE SODIUM PHOSPHATE 10 MG: 10 INJECTION, SOLUTION INTRAMUSCULAR; INTRAVENOUS at 09:49

## 2020-01-01 RX ADMIN — SODIUM CHLORIDE 500 ML: 9 INJECTION, SOLUTION INTRAVENOUS at 09:38

## 2020-01-01 RX ADMIN — SODIUM CHLORIDE, PRESERVATIVE FREE 30 ML: 5 INJECTION INTRAVENOUS at 11:12

## 2020-01-01 RX ADMIN — Medication 50 MG: at 09:08

## 2020-01-01 RX ADMIN — SODIUM CHLORIDE, PRESERVATIVE FREE 10 ML: 5 INJECTION INTRAVENOUS at 13:50

## 2020-01-01 RX ADMIN — ASPIRIN 81 MG: 81 TABLET, COATED ORAL at 20:31

## 2020-01-01 RX ADMIN — VANCOMYCIN HYDROCHLORIDE 250 MG: 10 INJECTION, POWDER, LYOPHILIZED, FOR SOLUTION INTRAVENOUS at 13:16

## 2020-01-01 RX ADMIN — NYSTATIN 500000 UNITS: 100000 SUSPENSION ORAL at 08:45

## 2020-01-01 ASSESSMENT — PAIN SCALES - GENERAL
PAINLEVEL_OUTOF10: 0

## 2020-01-01 ASSESSMENT — ENCOUNTER SYMPTOMS
SHORTNESS OF BREATH: 0
COLOR CHANGE: 0
DIARRHEA: 1
TROUBLE SWALLOWING: 0
COUGH: 0
NAUSEA: 0
BLOOD IN STOOL: 0
ABDOMINAL PAIN: 0
VOMITING: 0
RHINORRHEA: 0

## 2020-01-01 ASSESSMENT — PAIN SCALES - WONG BAKER
WONGBAKER_NUMERICALRESPONSE: 0

## 2020-03-05 NOTE — ANESTHESIA PRE PROCEDURE
I have performed a complete physical for preop examination.  The EKG was performed today.  That EKG was normal.  I will reviewed the patient's CBC, CMP, and coagulation profile prior to medically clearing the patient for scheduled surgery.   3-2015       Past Surgical History:        Procedure Laterality Date    CORONARY ANGIOPLASTY      INGUINAL HERNIA REPAIR Right     STOMACH SURGERY      VASCULAR SURGERY  08/15/2018    abdominal aortogram with runoff by Dr. Ava Waite       Social History:    Social History   Substance Use Topics    Smoking status: Former Smoker     Packs/day: 1.50     Types: Cigarettes     Quit date: 7/31/2008    Smokeless tobacco: Never Used      Comment: occassional cigar    Alcohol use Yes      Comment: daily beer                                Counseling given: Not Answered      Vital Signs (Current): There were no vitals filed for this visit. BP Readings from Last 3 Encounters:   No data found for BP       NPO Status:  before midnight                                                                               BMI:   Wt Readings from Last 3 Encounters:   No data found for Wt     There is no height or weight on file to calculate BMI.    CBC:   Lab Results   Component Value Date    WBC 7.4 08/15/2018    RBC 5.38 08/15/2018    HGB 16.4 08/15/2018    HCT 47.9 08/15/2018    MCV 89.0 08/15/2018    RDW 12.5 08/15/2018     08/15/2018       CMP:   Lab Results   Component Value Date     08/15/2018    K 4.8 08/15/2018     08/15/2018    CO2 27 08/15/2018    BUN 14 08/15/2018    CREATININE 0.9 08/15/2018    GFRAA >60 08/15/2018    LABGLOM >60 08/15/2018    GLUCOSE 107 08/15/2018    CALCIUM 9.3 08/15/2018       POC Tests: No results for input(s): POCGLU, POCNA, POCK, POCCL, POCBUN, POCHEMO, POCHCT in the last 72 hours.     Coags: No results found for: PROTIME, INR, APTT    HCG (If Applicable): No results found for: PREGTESTUR, PREGSERUM, HCG, HCGQUANT     ABGs: No results found for: PHART, PO2ART, QJM4BKY, WHT8DBP, BEART, Y2MLMHOV     Type & Screen (If Applicable):  No results found for: LABABO, [address]    Anesthesia Evaluation  Patient summary reviewed history of anesthetic complications (Strong Gag Reflex - Place / Remove ETT while asleep. Has aspirated in past due to gaging on ETT. ): Airway: Mallampati: II  TM distance: >3 FB   Neck ROM: full  Mouth opening: > = 3 FB Dental:          Pulmonary:Negative Pulmonary ROS breath sounds clear to auscultation                             Cardiovascular:    (+) past MI: > 6 months, CABG/stent (Stent 3yrs ago):,       ECG reviewed  Rhythm: regular  Rate: normal  Echocardiogram reviewed  Stress test reviewed  Cleared by cardiology           ROS comment: Cardiology evaluation in August 2018. Low risk. Asymptomatic. EF 55-60% 6/17  Stress 2017 mild inferior ischemia. CABG 2015     Neuro/Psych:                ROS comment: Right foot numbness GI/Hepatic/Renal:   (+) hiatal hernia,           Endo/Other: Negative Endo/Other ROS                    Abdominal:           Vascular:   + PVD, aortic or cerebral (Right Femoral Artery), . Anesthesia Plan      general     ASA 3       Induction: intravenous. MIPS: Postoperative opioids intended and Prophylactic antiemetics administered. Anesthetic plan and risks discussed with patient. Deni Aguilar MD   9/11/2018      DOS STAFF ADDENDUM:    Patient seen and chart reviewed. Physical exam and history updated as indicated. NPO status confirmed. Anesthesia options and plan discussed including risks benefits with patient/legal guardian and family as available. Concerns and questions addressed. Consent verbalized to proceed.   Anesthesia plan, options and intraoperative/postoperative concerns discussed with care team.    Maricarmen Orozco MD  9/17/2018  11:06 AM

## 2020-06-23 NOTE — PROGRESS NOTES
Medications  Prior to Admission medications    Medication Sig Start Date End Date Taking? Authorizing Provider   Multiple Vitamins-Minerals (THERAPEUTIC MULTIVITAMIN-MINERALS) tablet Take 1 tablet by mouth nightly     Historical Provider, MD   metoprolol tartrate (LOPRESSOR) 25 MG tablet Take 25 mg by mouth 2 times daily 7/21/18   Historical Provider, MD   lisinopril (PRINIVIL;ZESTRIL) 5 MG tablet Take 5 mg by mouth nightly  7/8/18   Historical Provider, MD   pravastatin (PRAVACHOL) 40 MG tablet Take 40 mg by mouth nightly  5/13/18   Historical Provider, MD   meloxicam (MOBIC) 15 MG tablet Take 15 mg by mouth daily 7/9/18   Historical Provider, MD   aspirin EC 81 MG EC tablet Take 81 mg by mouth nightly     Historical Provider, MD       Allergies  No Known Allergies    Review of Systems:     Relevant for epigastric pain radiating to the back which is much better following a celiac block as well as bloating. Objective  BP (!) 130/91 (Site: Left Upper Arm, Position: Sitting, Cuff Size: Medium Adult)   Pulse 91   Temp 97 °F (36.1 °C) (Temporal)   Ht 5' 7\" (1.702 m)   Wt 177 lb (80.3 kg)   SpO2 96%   BMI 27.72 kg/m²   Physical Exam:   Performance Status:  General: AAO to person, place, time, in no acute distress,   Head and neck: PERRLA, EOMI . Sclera non icteric. Oropharynx: Clear. Neck: no JVD,  no adenopathy,   Heart: Regular rate and regular rhythm, no murmur. Lungs: Clear to auscultation . Abdomen: Soft, non-tender;no masses, no organomegaly. Extremities: No edema,no cyanosis, no clubbing. Neurologic:Cranial nerves grossly intact. No focal motor or sensory deficits. Skin:  No rash.   Medi-port: Right        Recent Laboratory Data-   Lab Results   Component Value Date    WBC 7.8 09/17/2018    HGB 15.3 09/17/2018    HCT 44.4 09/17/2018    MCV 88.3 09/17/2018     09/17/2018    LYMPHOPCT 13.4 (L) 09/17/2018    RBC 5.03 09/17/2018    MCH 30.4 09/17/2018    MCHC 34.5 09/17/2018    RDW 12.3 09/17/2018    NEUTOPHILPCT 83.2 (H) 09/17/2018    MONOPCT 1.7 (L) 09/17/2018    BASOPCT 0.3 09/17/2018    NEUTROABS 6.48 09/17/2018    LYMPHSABS 1.04 (L) 09/17/2018    MONOSABS 0.13 09/17/2018    EOSABS 0.05 09/17/2018    BASOSABS 0.02 09/17/2018       Lab Results   Component Value Date     09/11/2018    K 4.8 09/11/2018     09/11/2018    CO2 22 09/11/2018    BUN 14 09/11/2018    CREATININE 0.9 09/11/2018    GLUCOSE 106 09/11/2018    CALCIUM 9.6 09/11/2018    LABGLOM >60 09/11/2018    GFRAA >60 09/11/2018           Radiology-  No results found. ASSESSMENT/PLAN : 60-year-old man    Locally advanced inoperable pancreatic head adenocarcinoma with regional lymphadenopathy but no clear evidence of metastatic disease with tiny subcentimeter pulmonary nodules. Seen at [facility] by Dr. Gerardo Moreno and recommended systemic chemotherapy with FOLFIRINOX regimen with follow-up imaging in 3 months with repeat CT scan of chest as well as pancreas and pelvis to be done at [facility] with a CT scan of abdomen and venous phase to assess for peritoneal metastasis. Genetic counseling referral was done at [facility] and next generation sequencing was also performed and results are pending  He has been referred to palliative care and is S/P celiac plexus block with much better pain control. If he achieves adequate cytoreduction he will be considered in several months for Keshia knife radiation. All potential side effects of FOLFIRINOX were discussed. Alice Haney. Frida Zimmer M.D., F.A.C.P.   Electronically signed 6/24/2020 at 9:15 AM

## 2020-06-24 PROBLEM — C25.0 CARCINOMA OF HEAD OF PANCREAS (HCC): Status: ACTIVE | Noted: 2020-01-01

## 2020-06-24 NOTE — PROGRESS NOTES
Patient presents to clinic for office visit/labs/port flush today. 20  SQ port accessed per policy using 0.36 Garg needle for good blood return. Specimen sent to lab. Site flushed easily with 10 mL NSS followed by 5 mL Heparin solution 100 units/ml rinse prior to de-access. Dry sterile dressing to area. Tolerated procedure well. Encouraged to schedule port flush every 4 weeks.

## 2020-06-24 NOTE — PROGRESS NOTES
Patient seen by Dr. Yadav Portal today and has been prescribed FOLFIRINOX for treatment of Pancreatic cancer to start 6/29/20. Printed FOLFIRINOX handouts were given to Nurse Navigator, [place], to provide to the patient. Nurse Navigator informed me patient had also received some information about the regimen at [facility].

## 2020-06-24 NOTE — PROGRESS NOTES
Maternal Aunt         Bladder    Cancer Maternal Grandfather         Bladder    Cancer Maternal Cousin         Leukemia    Breast Cancer Maternal Aunt     Cancer Maternal Cousin         Lung       Social History     Socioeconomic History    Marital status:      Spouse name: Not on file    Number of children: Not on file    Years of education: Not on file    Highest education level: Not on file   Occupational History    Not on file   Social Needs    Financial resource strain: Not on file    Food insecurity     Worry: Not on file     Inability: Not on file    Transportation needs     Medical: Not on file     Non-medical: Not on file   Tobacco Use    Smoking status: Former Smoker     Packs/day: 1.50     Years: 40.00     Pack years: 60.00     Types: Cigarettes     Last attempt to quit: 2008     Years since quittin.9    Smokeless tobacco: Never Used    Tobacco comment: occassional cigar   Substance and Sexual Activity    Alcohol use: Not Currently    Drug use: No    Sexual activity: Not Currently     Partners: Female   Lifestyle    Physical activity     Days per week: Not on file     Minutes per session: Not on file    Stress: Not on file   Relationships    Social connections     Talks on phone: Not on file     Gets together: Not on file     Attends Jew service: Not on file     Active member of club or organization: Not on file     Attends meetings of clubs or organizations: Not on file     Relationship status: Not on file    Intimate partner violence     Fear of current or ex partner: Not on file     Emotionally abused: Not on file     Physically abused: Not on file     Forced sexual activity: Not on file   Other Topics Concern    Not on file   Social History Narrative    Not on file       Occupation: Sales  Retired:  YES: Patient is retired from [employer].     Pacemaker/Defibulator/ICD:  No    Mediport: Yes- Right CW       FALLS RISK SCREENING ASSESSMENT    Instructions:  Assess

## 2020-06-26 NOTE — PROGRESS NOTES
[address] [facility]. [place], [place]        Pt Name: Johnny Groves: 1950  Date of evaluation: 6/29/2020  Primary Care Physician: Arash Dahl MD  Reason for evaluation:   Chief Complaint   Patient presents with    Pancreatic Cancer    Follow-up    Chemotherapy        Subjective: Here for initiation of chemotherapy and follow up. Had CT Abdomen and Pelvis 6/22/2020 at [address]. OBJECTIVE:  VITALS:  height is 5' 7\" (1.702 m) and weight is 175 lb 12.8 oz (79.7 kg). His temporal temperature is 97.3 °F (36.3 °C). His blood pressure is 115/69 and his pulse is 83. His oxygen saturation is 97%. Physical Exam:  Performance Status: 0  Well developed, well nourished male  General: AAO to person, place, time, in no acute distress,   Head and neck: PERRLA, EOMI . Sclera non icteric. Oropharynx: Clear. Neck: no JVD,  no adenopathy,   Heart: Regular rate and regular rhythm, no murmur. Lungs: Clear to auscultation . Abdomen: Soft, non-tender;no masses, no organomegaly. Extremities: No edema,no cyanosis, no clubbing. Neurologic:Cranial nerves grossly intact. No focal motor or sensory deficits. Skin:  No rash. Medi-port: Right        Medications  Prior to Admission medications    Medication Sig Start Date End Date Taking? Authorizing Provider   carvedilol (COREG) 12.5 MG tablet Take 12.5 mg by mouth 2 times daily (with meals) 9/25/19  Yes Historical Provider, MD   oxyCODONE 5 MG capsule Take 5 mg by mouth every 4 hours as needed for Pain.    Yes Historical Provider, MD   promethazine (PHENERGAN) 12.5 MG tablet Take 12.5 mg by mouth 4 times daily   Yes Historical Provider, MD   lisinopril (PRINIVIL;ZESTRIL) 5 MG tablet Take 5 mg by mouth nightly  7/8/18  Yes Historical Provider, MD   aspirin EC 81 MG EC tablet Take 81 mg by mouth nightly    Yes Historical Provider, MD    Scheduled Meds:  Continuous Infusions:  PRN Meds:.        Recent Laboratory Data-     Lab

## 2020-06-29 NOTE — PROGRESS NOTES
Aniyah Wick  6/29/2020  Wt Readings from Last 10 Encounters:   06/29/20 175 lb 12.8 oz (79.7 kg)   06/24/20 177 lb (80.3 kg)   09/18/18 196 lb 6.9 oz (89.1 kg)   09/11/18 185 lb (83.9 kg)     Ht Readings from Last 1 Encounters:   06/29/20 5' 7\" (1.702 m)     There is no height or weight on file to calculate BMI. Met with patient today for introduction to this clinicians role during treatment. He is here with his wife. He reports a fair appetite, he has noticed a 5# weight loss since dx, but nothing significant. He denies n/v/d/c. He does take Colace on and off for constipation as needed. His wife cooks for him, he likes to eat a lot of small meals throughout the day. He is doing one Boost Milkshake daily, usually Boost blended with ice cream. He is not diabetic. He starts FOLFIRINOX today, he is anxious to begin. He will be on this for at least 3mo, then scans following that for progress. This clinician spent time with patient and his wife today reviewing a safe diet during chemo, reviewed what foods to avoid due to risk for bacteria contamination. Recommended small, frequent, quality meals, adding protein in lean forms. Patient was encouraged to hydrate with 84-92oz fluids daily, which he states he already does. He was encouraged to add gatorade as able as well to help with electrolytes. He was encouraged to adjust his diet with diarrhea or other side effects. Follow with need for further intervention as treatment continues. Weight change:5# loss in 3mo (1.5%)  Appetite: fair-good  N/V/D/C: constipation here and there  Calculated Needs if applicable: [id number] (80x30-32), 104gm PRO (80x1.3), 2500ml (80x32)    Pre-Hab Eligible?: no        Recommendations: Small, frequent meals, neutropenic precautions;  Aim for 84-92oz fluids daily; Continue Boost Plus once daily, but increase if appetite diminishes with the treatment        ASPEN GUIDELINES FOR CLINICAL CHARACTERISTICS OF MALNUTRITION IN CHRONIC ILLNESS     Moderate Malnutrition  Severe Malnutrition    Energy intake  <75% energy intake compared to estimated needs for >1month <75% energy intake compared to estimated needs for >1month   Weight changes  5% x 1 month  7.5% x 3 months   10% x 6 months   20% x 1 year  >5% x 1 month  >7.5% x 3 months   >10% x 6 months   >20% x 1 year    Physical findings  Mild   Decrease subcutaneous fat    Decrease muscle mass     Increase fluid/edema   Severe  Decrease subcutaneous fat    Decrease muscle mass     Increase fluid/edema      No malnutrition at this time.     Birgit Barlow

## 2020-06-29 NOTE — TELEPHONE ENCOUNTER
Verified with Camilla He from [facility], [phone], that patient's home health care pump would be covered by 80% and that all they would need is a current order for processing. Voiced understanding.

## 2020-06-29 NOTE — PROGRESS NOTES
WHAT YOU SHOULD KNOW AFTER CHEMOTHERAPY  PAIN:  If you received NEULASTA (for keeping white blood cells elevated) injection, you may have pain in your bones and feel achy. You can take Tylenol as directed for pain. Do not exceed 3,000 mg in a 24 hour period. DIFFICULTY FALLING ASLEEP: You may take acetaminophen with diphenhydramine (Tylenol PM) 1-2 tablets at bedtime or diphenhydramine 25-50 mg at bedtime. If you use acetaminophen with diphenhydramine, do not take any additional Tylenol within 8 hours of using this product. FEELING TIRED:  Fatigue caused by treatment is temporary. If you feel tired, be sure to plan rest periods to save energy. Attempt to follow your usual routine if possible, but rest when you feel tired. Accept help when offered. NAUSEA:  Preventing nausea is especially important in the first 48 hours after chemo.  Take one Ondansetron (Zofran) tonight before bedtime    Take one Ondansetron every eight hours for two days after chemotherapy. After two days, you may take this every 8 hours as needed. At any hint of nausea, take medication immediately.  If you have nausea/vomiting in between the 8 hour interval of the Ondansetron, you may take the prochlorperazine (Compazine) every 8 hours in between doses of Ondansetron. DIET:  You may eat your usual diet. However, you should avoid eating uncooked foods such as sushi, buffets, or situations in which you are unsure how the food was prepared. You may eat in restaurants, but order from the menu instead of eating from the buffet. Please call the dietitian for any specific dietary concerns or questions. MOUTH CARE:  Good oral hygiene is a must!  Be sure to brush teeth/dentures frequently. Rinse mouth often using a solution of:  1 teaspoon baking soda  1 cup of warm water  Mix well to dissolve the baking soda.  This is a good rinse to use before and after a meal because it may soothe any oral pain you have, making it easier for you to eat well. Swish and spit, but don't swallow this mixture. HYDRATION:  Fluids are important to flush the chemotherapy from your system. Drink 8-10, 8oz servings of fluid per day (water, jell-o, juice, broth, soda, milk, Gatorade); If having diarrhea or vomiting, increase fluids by at least one serving for each episode. CONSTIPATION:   Take Senekot (generic is okay) or Senekot-S. Start with 1-2 tablets in the evening. You may add 2 tablets in the morning and evening according to package directions. If no Bowel movement in 24-48 hours, take a dose of MOM or Miralax. If still no BM, or this is associated with nausea/vomiting, or abdominal distention, notify physician office. DIARRHEA:  Imodium is taken at the first sign of diarrhea. The first dose of Imodium is usually two tablets after the first episode of diarrhea and then 1 tab for each episode after, not to exceed 4 tabs in 24 hours unless instructed by your health care team.   FLUSHING:  Your face may appear flushed due to the steroid you were given during your chemotherapy treatment and should not last more than 24-48 hours. If you are a diabetic, be sure to monitor your blood sugar levels for the first 24 hours, as the steroid you were given can elevate your glucose levels. CALL OFFICE IMMEDIATELY OR REPORT TO NEAREST EMERGENCY ROOM FOR:  · A Fever over 100.5F or Temp of 100.0 Fever lasting for longer than 8 hours (you should take your temperature twice a day during chemotherapy). · Shaking or Chills with no fever. · Uncontrolled nausea, vomiting, diarrhea, or constipation. · Pain at the injection site or any uncontrolled pain.   · You are too tired to get out of bed for 24 hours  · You are feeling confused or unable to think clearly  · Fatigue becomes worse every day  · You feel short of breath or you are having hard time breathing  KEEPING YOU AND YOUR LOVED ONES SAFE      It takes about 48 hours for your body to break down and/or get rid of most chemotherapy drugs. During this time, a small amount of chemotherapy comes out in your urine, stool, and vomit. ALWAYS wear gloves when handling linen, blood, urine, stool, or emesis. Dispose of the gloves after each use and wash your hands. · If using a bedpan for body wastes or a container for vomiting, be careful not to splash or spill the contents while you are emptying them into the toilet. If the container used not disposable, rinse it with dishwashing or laundry detergent and water, and put the rinse water in the toilet. Flush the toilet with the lid down. Any sink or basin that is used for vomiting should be rinsed with dishwashing or laundry detergent and rinsed with water. Dry with paper towels and discard towels. · Wash clothing or bed linens that have body wastes on them with laundry detergent and hot water, separately from the other laundry, as soon as possible. If you are unable to wash them immediately, place them in a sealed plastic bag until they can be washed.    FLUSH TOILET TWICE:  If you have low volume toilets, be sure to flush toilets twice each time they are used. If possible, patients should use a separate toilet from others in the home. 8 Carrie Oneill Labidi YOUR HANDS: After using any devices for bodily waste, patients should thoroughly wash their hands and the devices with soap and water. Dry the devices with paper towels, and discard the towels. INTIMACY:  If you are sexually active, you should abstain from sexual activity or practice barrier method for the first 72 hours after chemotherapy in order to avoid exposure of your partner to the chemotherapy. Hugging and kissing (no intimate kissing) are safe. If you have any questions regarding the above instructions, please ask your Doctor or Nurse.

## 2020-06-29 NOTE — PROGRESS NOTES
CHEMOTHERAPY TEACHING    Chemotherapy teaching performed today for patient and wife. The teaching included:    1. Rationale for chemotherapy    2. Actions of chemotherapy    3. Actions of pre and/or post chemotherapy medications    4. Administration plan: approximate length of treatment and interval between doses. A.  Chemotherapeutic Agents:  Oxaliplatin, leucovorin, Ironotecan, 5FU, 5FU PUMP    5. Management of side effects:     A. Nausea and vomiting:  · Eat light the day of treatment  · Dietary alterations: no greasy or spicy foods. Stay away from favorites. B.  Alopecia:  · Obtain hairpiece prior to hair loss  · Alternatives to wigs  C. Bone Marrow Depression:  · Frequent CBC - Keyur count (lab time prior to appointment with doctor)   D.  WBC:  · Function and recovery  · Precautionary measures when low (temperatures BID - avoid ill people/crowds)  E. Hemoglobin:  · Function and recovery  · Signs/symptoms if low  · Possibility of transfusion  F.  Platelets:  · Function and recovery  · Signs/symptoms if low    6. Mental status changes post chemotherapy:  A. Patient will need a  after 1st treatment (pre-meds)  B. Encourage family to stay with patient 24 hours post treatment. 7.  Sexuality and Reproduction:   A. Teratogenic effects of chemotherapy (prevent pregnancy during treatment                     and at least 2 months post therapy). B. Sperm banking (young males). 8.  Encourage patient to call clinic with questions. 9.  Copy of home going instructions    10. Written consent obtained  11. Patient declined  viewing chemotherapy teaching DVD \"Understanding Chemotherapy\" by the          [facility]. After answering the patient's questions, the patient received \"Chemotherapy and You\" booklet. A thermometer was given to the patient.         Chantelle Talbot  6/29/2020

## 2020-07-01 NOTE — PROGRESS NOTES
Presents to clinic for CADD pump removal. Port site appears normal. Denies problems/concerns. Received 247.8 ml of 5-FU & reservoir 12.2 of 5-FU. Port flushed with 10 ml. NSS followed by 5 ml. Heparin Rinse prior to de access. DSD to area. Tolerated well. Encouraged to call clinic with questions/concerns.

## 2020-07-06 NOTE — TELEPHONE ENCOUNTER
Patients wife, Mikeal Rubinstein, called with questions about patients nutrition. She states that ever since treatment, patients appetite has been poor, she reports he is down to 165#. This is down 10# since last week. She reports he is just not very hungry and cannot eat meals like he used to. He maintains a good intake of water, up to 100oz daily. She is asking about encouraging to replace some of the water with Boost or another protein drink. She was given information on other protein drinks, such as protein water variations. She was encouraged by this. Spoke with her about his maximum protein needs and minimum requirements daily, and she will make sure to monitor this. She was given other recommendations for making protein pudding or jello with the Boost or protein water as well. She was receptive. Continue to follow.  Deann Ospina RD,,LD

## 2020-07-10 NOTE — PROGRESS NOTES
[address] [facility]. [facility] [place]        Pt Name: Tomasa Rivera: 1950  Date of evaluation: 7/13/2020  Primary Care Physician: Maria De Jesus Boothe MD  Reason for evaluation:   Chief Complaint   Patient presents with    Pancreatic Cancer     Head of the pancreas    Follow-up    Chemotherapy          Subjective: Here for chemotherapy and follow up. He tolerated FOLFIRI Castillo amazingly well  C/O mild constipation. Did well with Neulasta too . OBJECTIVE:  VITALS:  height is 5' 7\" (1.702 m) and weight is 174 lb 6.4 oz (79.1 kg). His temporal temperature is 97.6 °F (36.4 °C). His blood pressure is 124/46 (abnormal) and his pulse is 73. Physical Exam:  Performance Status: 0  Well developed, well nourished male  General: AAO to person, place, time, in no acute distress,   Head and neck: PERRLA, EOMI . Sclera non icteric. Oropharynx: Clear. Neck: no JVD,  no adenopathy,   Heart: Regular rate and regular rhythm, no murmur. Lungs: Clear to auscultation . Abdomen: Soft, non-tender;no masses, no organomegaly. Extremities: No edema,no cyanosis, no clubbing. Neurologic:Cranial nerves grossly intact. No focal motor or sensory deficits. Skin:  No rash. Medi-port: Right        Medications  Prior to Admission medications    Medication Sig Start Date End Date Taking? Authorizing Provider   carvedilol (COREG) 12.5 MG tablet Take 12.5 mg by mouth 2 times daily (with meals) 9/25/19  Yes Historical Provider, MD   prochlorperazine (COMPAZINE) 10 MG tablet Take 1 tablet by mouth every 6 hours as needed (NAUSEA) 6/29/20  Yes Pinky Handing, APRN - CNP   ondansetron (ZOFRAN) 8 MG tablet Take 1 tablet by mouth every 8 hours as needed for Nausea or Vomiting 6/29/20 7/19/20 Yes Pinky Handing, APRN - CNP   oxyCODONE 5 MG capsule Take 5 mg by mouth every 4 hours as needed for Pain.    Yes Historical Provider, MD   promethazine (PHENERGAN) 12.5 MG tablet Take 12.5 mg by mouth 4 times daily   Yes Historical Provider, MD   lisinopril (PRINIVIL;ZESTRIL) 5 MG tablet Take 5 mg by mouth nightly  7/8/18  Yes Historical Provider, MD   aspirin EC 81 MG EC tablet Take 81 mg by mouth nightly    Yes Historical Provider, MD    Scheduled Meds:  Continuous Infusions:  PRN Meds:.        Recent Laboratory Data-     Lab Results   Component Value Date    WBC 10.9 07/10/2020    HGB 13.0 07/10/2020    HCT 39.4 07/10/2020    MCV 87.6 07/10/2020     07/10/2020    LYMPHOPCT 9.6 (L) 07/10/2020    RBC 4.50 07/10/2020    MCH 28.9 07/10/2020    MCHC 33.0 07/10/2020    RDW 12.9 07/10/2020    NEUTOPHILPCT 80.9 (H) 07/10/2020    MONOPCT 6.1 07/10/2020    BASOPCT 0.2 07/10/2020    NEUTROABS 9.05 (H) 07/10/2020    LYMPHSABS 1.09 (L) 07/10/2020    MONOSABS 0.65 07/10/2020    EOSABS 0.19 07/10/2020    BASOSABS 0.00 07/10/2020       Lab Results   Component Value Date     07/10/2020    K 4.1 07/10/2020     07/10/2020    CO2 22 07/10/2020    BUN 11 07/10/2020    CREATININE 0.9 07/10/2020    GLUCOSE 158 (H) 07/10/2020    CALCIUM 8.6 07/10/2020    PROT 5.7 (L) 07/10/2020    LABALBU 3.5 07/10/2020    BILITOT <0.2 07/10/2020    ALKPHOS 101 07/10/2020    AST 13 07/10/2020    ALT 10 07/10/2020    LABGLOM >60 07/10/2020    GFRAA >60 07/10/2020           Lab Results   Component Value Date    CEA 43.5 (H) 06/24/2020           Radiology-  CT ABDOMEN AND PELVIS:  6/22/2020 at [address] IN SIZE OF 6.3 X 6.0 X 7.5 CM IRREGULAR,   INFILTRATIVE PANCREATIC MASS, BIOPSY PROVEN ADENOCARCINOMA, WITH   PREDOMINANT EXTRAPANCREATIC COMPONENT THAT INVADES THE LIVER, DUODENUM,   AND IVC. ADJACENT CONTIGUOUS NECROTIC LESION IN THE PORTACAVAL SPACE MAY   REPRESENT A CONGLOMERATE PORTION OF ABOVE DESCRIBED LESION VERSUS   METASTATIC ADENOPATHY. VASCULAR INVOLVEMENT AS DESCRIBED. NON-SPECIFIC WALL THICKENING OF THE DISTAL ESOPHAGUS.   CONSIDER DIRECT         ASSESSMENT/PLAN :  A 61-year-old man with:     Locally advanced inoperable pancreatic head adenocarcinoma with regional lymphadenopathy but no clear evidence of metastatic disease with tiny subcentimeter pulmonary nodules. Seen at [facility] by Dr. Aranza Patel and recommended systemic chemotherapy with FOLFIRINOX regimen with follow-up imaging in 3 months with repeat CT scan of chest as well as pancreas and pelvis to be done at [facility] with a CT scan of abdomen and venous phase to assess for peritoneal metastasis. Genetic counseling referral was done at [facility] and next generation sequencing was also performed and results are pending  He has been referred to palliative care and is S/P celiac plexus block with much better pain control. If he achieves adequate cytoreduction he will be considered in several months for Keshia knife radiation. All potential side effects of FOLFIRINOX were discussed. His CT scan of abdomen and pelvis done at [facility] on 6/22/2020 again shows large pancreatic head mass measuring up to 7 cm with a component extrinsic to the pancreas directly invading the liver duodenum and IVC with contiguous necrotic portacaval nodes. Received Day #1 of Cycle #1 FOLFIRINOX o -6/29/2020. PLAN:  To receive Cycle #2 FOLFIRINOX today-----7/13/2020. To return  7/27/2020 for Cycle #3  FOLFIRINOX. Misael Finney M.D., F.A.C.P.   Electronically signed 7/13/2020 at 8:25 AM

## 2020-07-10 NOTE — PROGRESS NOTES
PORT FLUSH    Patient presents to clinic for [facility] today. right  SQ port accessed per policy using #48 .75 Garg needle for good blood return. Aspirate for waste and specimen sent to lab. Site flushed easily with 10 mL NSS followed by 5 mL Heparin solution 100 units/ml rinse prior to de-access. Dry sterile dressing to area. Tolerated procedure well. Encouraged to schedule port flush every 4 weeks.

## 2020-07-13 NOTE — PROGRESS NOTES
Patient reports new onset of back pain over the weekend. As well as left ear pain and vision changes.

## 2020-07-15 NOTE — PROGRESS NOTES
Presents to clinic for CADD pump removal. Port site appears normal. Denies problems/concerns. Received 250.4 ml of 5-FU & reservoir 9.6 of 5-FU. Port flushed with 10 ml. NSS followed by 5 ml. Heparin Rinse prior to de access. DSD to area. Tolerated well. Encouraged to call clinic with questions/concerns.

## 2020-07-24 NOTE — PROGRESS NOTES
[address] [facility]. [facility] [place]        Pt Name: Nikunj Michael: 1950  Date of evaluation: 7/27/2020  Primary Care Physician: Yovanny Schwartz MD  Reason for evaluation:   Chief Complaint   Patient presents with    Pancreatic Cancer     Head of Pancrease    Follow-up    Chemotherapy          Subjective: Here for chemotherapy and follow up. He tolerated FOLFIRINOX but has decreased appetite and occasional diarrhea  Using Imodium, Appetite is decreased. C//O Increased pain   Taking pain medication 1 tablet every 3 hours or 2 tablets every 4-5 hours. Receives pain meds from Dr. Mike Santiago. He lost few pounds and he is to see dietitian today. OBJECTIVE:  VITALS:  height is 5' 7\" (1.702 m) and weight is 168 lb 8 oz (76.4 kg). His temporal temperature is 95.7 °F (35.4 °C). His blood pressure is 93/61 and his pulse is 81. His oxygen saturation is 96%. Physical Exam:  Performance Status: 0  Well developed, well nourished male  General: AAO to person, place, time, in no acute distress,   Head and neck: PERRLA, EOMI . Sclera non icteric. Oropharynx: Clear. Neck: no JVD,  no adenopathy,   Heart: Regular rate and regular rhythm, no murmur. Lungs: Clear to auscultation . Abdomen: Soft, non-tender;no masses, no organomegaly. Extremities: No edema,no cyanosis, no clubbing. Neurologic:Cranial nerves grossly intact. No focal motor or sensory deficits. Skin:  No rash. Medi-port: Right        Medications  Prior to Admission medications    Medication Sig Start Date End Date Taking? Authorizing Provider   carvedilol (COREG) 12.5 MG tablet Take 12.5 mg by mouth 2 times daily (with meals) 9/25/19  Yes Historical Provider, MD   prochlorperazine (COMPAZINE) 10 MG tablet Take 1 tablet by mouth every 6 hours as needed (NAUSEA) 6/29/20  Yes MIYA Hart CNP   oxyCODONE 5 MG capsule Take 5 mg by mouth every 4 hours as needed for Pain.    Yes Historical Provider, MD   promethazine (PHENERGAN) 12.5 MG tablet Take 12.5 mg by mouth 4 times daily   Yes Historical Provider, MD   lisinopril (PRINIVIL;ZESTRIL) 5 MG tablet Take 5 mg by mouth nightly  7/8/18  Yes Historical Provider, MD   aspirin EC 81 MG EC tablet Take 81 mg by mouth nightly    Yes Historical Provider, MD    Scheduled Meds:  Continuous Infusions:  PRN Meds:.        Recent Laboratory Data-     Lab Results   Component Value Date    WBC 9.3 07/24/2020    HGB 12.4 (L) 07/24/2020    HCT 36.9 (L) 07/24/2020    MCV 85.4 07/24/2020    PLT 85 (L) 07/24/2020    LYMPHOPCT 37.8 07/24/2020    RBC 4.32 07/24/2020    MCH 28.7 07/24/2020    MCHC 33.6 07/24/2020    RDW 13.0 07/24/2020    NEUTOPHILPCT 45.0 07/24/2020    MONOPCT 17.1 (H) 07/24/2020    BASOPCT 0.3 07/24/2020    NEUTROABS 4.19 07/24/2020    LYMPHSABS 3.53 07/24/2020    MONOSABS 1.58 (H) 07/24/2020    EOSABS 0.00 (L) 07/24/2020    BASOSABS 0.00 07/24/2020       Lab Results   Component Value Date     07/24/2020    K 3.8 07/24/2020    CL 98 07/24/2020    CO2 22 07/24/2020    BUN 14 07/24/2020    CREATININE 1.0 07/24/2020    GLUCOSE 108 (H) 07/24/2020    CALCIUM 9.0 07/24/2020    PROT 6.1 (L) 07/24/2020    LABALBU 3.6 07/24/2020    BILITOT 0.3 07/24/2020    ALKPHOS 94 07/24/2020    AST 15 07/24/2020    ALT 14 07/24/2020    LABGLOM >60 07/24/2020    GFRAA >60 07/24/2020           Lab Results   Component Value Date    CEA 11.4 (H) 07/24/2020           Radiology-  CT ABDOMEN AND PELVIS:  6/22/2020 at [address] IN SIZE OF 6.3 X 6.0 X 7.5 CM IRREGULAR,   INFILTRATIVE PANCREATIC MASS, BIOPSY PROVEN ADENOCARCINOMA, WITH   PREDOMINANT EXTRAPANCREATIC COMPONENT THAT INVADES THE LIVER, DUODENUM,   AND IVC. ADJACENT CONTIGUOUS NECROTIC LESION IN THE PORTACAVAL SPACE MAY   REPRESENT A CONGLOMERATE PORTION OF ABOVE DESCRIBED LESION VERSUS   METASTATIC ADENOPATHY. VASCULAR INVOLVEMENT AS DESCRIBED.     NON-SPECIFIC WALL THICKENING OF THE DISTAL ESOPHAGUS. CONSIDER DIRECT         ASSESSMENT/PLAN :  A 72-year-old man with:     Locally advanced inoperable pancreatic head adenocarcinoma with regional lymphadenopathy but no clear evidence of metastatic disease with tiny subcentimeter pulmonary nodules. Seen at [facility] by Dr. Jluis Bonds and recommended systemic chemotherapy with FOLFIRINOX regimen with follow-up imaging in 3 months with repeat CT scan of chest as well as pancreas and pelvis to be done at [facility] with a CT scan of abdomen and venous phase to assess for peritoneal metastasis. Genetic counseling referral was done at [facility] and next generation sequencing was also performed and results are pending  He has been referred to palliative care and is S/P celiac plexus block with much better pain control. If he achieves adequate cytoreduction he will be considered in several months for Keshia knife radiation. All potential side effects of FOLFIRINOX were discussed. His CT scan of abdomen and pelvis done at [facility] on 6/22/2020 again shows large pancreatic head mass measuring up to 7 cm with a component extrinsic to the pancreas directly invading the liver duodenum and IVC with contiguous necrotic portacaval nodes. Received Day #1 of Cycle #1 FOLFIRINOX on 6/29/2020. Received Day #1 of Cycle #2 FOLFIRINOX on 7/13/2020. PLAN:  To receive Cycle #3 FOLFIRINOX today-----7/27/2020. His oxaliplatin dose will be decreased today because of his grade 2 thrombocytopenia  To return  8/10/2020 for Cycle #4  FOLFIRINOX. Clinton Rodriguez. Earlyne Schlatter, M.D., F.A.C.P.   Electronically signed 7/27/2020 at 8:32 AM

## 2020-07-24 NOTE — PROGRESS NOTES
Patient presents to clinic for labs today. Right chest  SQ port accessed per policy using 20 G .75 inch Garg needle for good blood return. Aspirate for waste and specimen sent to lab. Site flushed easily with 10 mL NSS followed by 5 mL Heparin solution 100 units/ml rinse prior to de-access. Dry sterile dressing to area. Tolerated procedure well. Encouraged to schedule port flush every 4 weeks.

## 2020-07-27 NOTE — PROGRESS NOTES
Alberto Coto  7/27/2020  Wt Readings from Last 10 Encounters:   07/27/20 168 lb 8 oz (76.4 kg)   07/13/20 174 lb 6.4 oz (79.1 kg)   06/29/20 175 lb 12.8 oz (79.7 kg)   06/24/20 177 lb (80.3 kg)   09/18/18 196 lb 6.9 oz (89.1 kg)   09/11/18 185 lb (83.9 kg)     Ht Readings from Last 1 Encounters:   07/27/20 5' 7\" (1.702 m)     Body mass index is 26.39 kg/m². Met with patient today for follow up. He is doing fair. Notes decreased appetite and lost 6# in two weeks, down total of 10# since starting. He admits that he cannot eat like he used to, gets full quick. HIs stomach is often gassy and \"churning\" and he notes diarrhea for 3 days on and off. Taking Imodium for this, but worried about constipation then. He did try the [employer] instant breakfast and he does like that much better. Spent time with patient reviewing options for using CIB in his shakes, as well as other things to add to increase calories and prevent diarrhea. Patient reports he is willing to do this. He was given more information on how to help with the gas and stool regularity with use of Metamucil, Gas-x and Mylicon. He was appreciative. Materials provided. Continue to follow as needed. Weight change: 10# loss in 3 weeks (5%)  Appetite: poor  N/V/D/C: diarrhea  Calculated Needs if applicable:2400-2500kcal (80x30-32), 104gm PRO (80x1.3), 2500ml (80x32)    Pre-Hab Eligible?: no        Recommendations:Small, frequent meals, neutropenic precautions; Add [employer] 3 times daily, blended with other ingredients for tolerance; Provides at least 800kcal, 40gm PRO;   Aim for 84-92oz fluids daily          [employer] GUIDELINES FOR CLINICAL CHARACTERISTICS OF MALNUTRITION IN CHRONIC ILLNESS     Moderate Malnutrition  Severe Malnutrition    Energy intake  <75% energy intake compared to estimated needs for >1month <75% energy intake compared to estimated needs for >1month   Weight changes  5% x 1 month  7.5% x 3 months   10% x 6 months   20% x 1 year  >5% x 1 month  >7.5% x 3 months   >10% x 6 months   >20% x 1 year    Physical findings  Mild   Decrease subcutaneous fat    Decrease muscle mass     Increase fluid/edema   Severe  Decrease subcutaneous fat    Decrease muscle mass     Increase fluid/edema        Moderate malnutrition evidenced by 5% weight loss in <1mo, <75% intake x3-4 weeks.     Chrystie Aver

## 2020-07-29 NOTE — PROGRESS NOTES
Presents to clinic for CADD pump removal. Port site appears normal. Denies problems/concerns. Received 243.2 ml of 5-FU & reservoir 16.7 of 5-FU. Port flushed with 10 ml. NSS followed by 5 ml. Heparin Rinse prior to de access. DSD to area. Tolerated well. Encouraged to call clinic with questions/concerns.

## 2020-08-06 NOTE — PROGRESS NOTES
[address] [facility]. [facility]        Pt Name: Yaneth Marion: 1950  Date of evaluation: 8/10/2020  Primary Care Physician: Bailey Kam MD  Reason for evaluation:   Chief Complaint   Patient presents with    Pancreatic Cancer     Head of the pancreas    Follow-up    Chemotherapy          Subjective: Here for chemotherapy and follow up. C/O occasional diarrhea, Using Imodium. Appetite is decreased. Receives pain meds from Dr. Joan Steven. Lomotil has been added for his diarrhea and it has been working. OBJECTIVE:  VITALS:  weight is 165 lb (74.8 kg). His temporal temperature is 95.6 °F (35.3 °C). His blood pressure is 96/59 (abnormal) and his pulse is 77. His oxygen saturation is 97%. Physical Exam:  Performance Status: 0  Well developed, well nourished male  General: AAO to person, place, time, in no acute distress. Head and neck: PERRLA, EOMI . Sclera non icteric. Oropharynx: Clear. Neck: no JVD,  no adenopathy. Heart: Regular rate and regular rhythm, no murmur. Lungs: Clear to auscultation. Abdomen: Soft, non-tender;no masses, no organomegaly. Extremities: No edema,no cyanosis, no clubbing. Neurologic:Cranial nerves grossly intact. No focal motor or sensory deficits. Skin:  No rash. Medi-port: Right        Medications  Prior to Admission medications    Medication Sig Start Date End Date Taking? Authorizing Provider   carvedilol (COREG) 12.5 MG tablet Take 12.5 mg by mouth 2 times daily (with meals) 9/25/19  Yes Historical Provider, MD   prochlorperazine (COMPAZINE) 10 MG tablet Take 1 tablet by mouth every 6 hours as needed (NAUSEA) 6/29/20  Yes MIYA Penaloza - CNP   oxyCODONE 5 MG capsule Take 5 mg by mouth every 4 hours as needed for Pain.    Yes Historical Provider, MD   promethazine (PHENERGAN) 12.5 MG tablet Take 12.5 mg by mouth 4 times daily   Yes Historical Provider, MD   lisinopril (PRINIVIL;ZESTRIL) 5 MG tablet regional lymphadenopathy but no clear evidence of metastatic disease with tiny subcentimeter pulmonary nodules. Seen at [facility] by Dr. Guthrie and recommended systemic chemotherapy with FOLFIRINOX regimen with follow-up imaging in 3 months with repeat CT scan of chest as well as pancreas and pelvis to be done at [facility] with a CT scan of abdomen and venous phase to assess for peritoneal metastasis. Genetic counseling referral was done at [facility] and next generation sequencing was also performed and results are pending  He has been referred to palliative care and is S/P celiac plexus block with much better pain control. If he achieves adequate cytoreduction he will be considered in several months for Keshia knife radiation. All potential side effects of FOLFIRINOX were discussed. His CT scan of abdomen and pelvis done at [facility] on 6/22/2020 again shows large pancreatic head mass measuring up to 7 cm with a component extrinsic to the pancreas directly invading the liver duodenum and IVC with contiguous necrotic portacaval nodes. Received  Cycle #1 FOLFIRINOX on 6/29/2020. Received  Cycle #2 FOLFIRINOX on 7/13/2020. Received  Cycle #3 FOLFIRINOX on 7/27/2020. His oxaliplatin dose was decreased because of his grade 2 thrombocytopenia      PLAN:  To receive Cycle #4 FOLFIRINOX today-----8/10/2020. His oxaliplatin dose will be decreased again today because of his grade 2 thrombocytopenia. He will be given Remeron 15 mg at bedtime as an appetite stimulant  He will receive KCl supplements for his hypokalemia and he will be given a trial of Creon for his bloating  To return  8/24/2020 for Cycle #5  FOLFIRINOX. Stephan Gonzalez. Celestino Valderrama M.D., F.A.C.P.   Electronically signed 8/10/2020 at 9:04 AM

## 2020-08-10 NOTE — PROGRESS NOTES
Henry Donovan  8/10/2020  Wt Readings from Last 10 Encounters:   08/10/20 165 lb (74.8 kg)   08/10/20 165 lb (74.8 kg)   07/27/20 168 lb 8 oz (76.4 kg)   07/13/20 174 lb 6.4 oz (79.1 kg)   06/29/20 175 lb 12.8 oz (79.7 kg)   06/24/20 177 lb (80.3 kg)   09/18/18 196 lb 6.9 oz (89.1 kg)   09/11/18 185 lb (83.9 kg)     Ht Readings from Last 1 Encounters:   08/10/20 5' 7\" (1.702 m)     Body mass index is 25.84 kg/m². Met with patient today for follow up. He is doing fairly well, did have a stretch of 3 days where he had a lot of diarrhea. He was prescribed Lomotil and this helped, however, Dr. Franky Flores prescribed Creon today. Patient denies n/v. He reports that other than when he had diarrhea, his appetite was good. He lost 3# last week, down 12# in just 2mo. He did switch to the [employer] from the Ensure, which he enjoys. His wife continues to make meals for him. He was educated on how to take the Creon, spoke with him about how it should help with digestion and prevent weight loss and steatorhea. Patient continues to drink a lot of fluids, labs rev'd today and doing well. Encouraged consistent supplementation with CIB, blending with fruits, oats and other flavors to help with palatability. Patient was receptive. Weight change: 12# loss in 2mo (7%)  Appetite: comes and goes  N/V/D/C: diarrhea  Calculated Needs if applicable: [id number] (51N75), 100gm PRO (75x1.3), 2400ml (75x32)    Pre-Hab Eligible?: no        Recommendations: Continue CIB blended shake, at least once daily; Increase to two on days where appetite is insufficient; Add Creon to help with digestion, encouraged taking one tab with any meal where protein and fat is present. Push fluids when diarrhea is occurring, choose electrolyte drink.           ASPEN GUIDELINES FOR CLINICAL CHARACTERISTICS OF MALNUTRITION IN CHRONIC ILLNESS     Moderate Malnutrition  Severe Malnutrition    Energy intake  <75% energy intake compared to estimated needs for >1month <75% energy intake compared to estimated needs for >1month   Weight changes  5% x 1 month  7.5% x 3 months   10% x 6 months   20% x 1 year  >5% x 1 month  >7.5% x 3 months   >10% x 6 months   >20% x 1 year    Physical findings  Mild   Decrease subcutaneous fat    Decrease muscle mass     Increase fluid/edema   Severe  Decrease subcutaneous fat    Decrease muscle mass     Increase fluid/edema      Moderate-severe malnutrition evidenced by 7% weight loss in <3mo, <75% intake x 3-4 days this week.     Lauri Grace

## 2020-08-12 NOTE — PROGRESS NOTES
Presents to clinic for CADD pump removal. Port site appears normal. Denies problems/concerns. Received 15.1 ml of 5-FU & reservoir 244.9 of 5-FU. Port flushed with 10 ml. NSS followed by 5 ml. Heparin Rinse prior to de access. DSD to area. Tolerated well. Encouraged to call clinic with questions/concerns. Patient is eating and drinking and his wife is here too and they do not feel he needs fluids today, Xiomara Cancino NP is aware.

## 2020-08-21 NOTE — PROGRESS NOTES
[address] [facility]. [facility]        Pt Name: Raad Old: 1950  Date of evaluation: 8/21/2020  Primary Care Physician: Brittni Aponte MD  Reason for evaluation:   Chief Complaint   Patient presents with    Pancreatic Cancer    Follow-up    Chemotherapy          Subjective: Here for chemotherapy and follow up. C/O occasional diarrhea, Using Imodium. Appetite is decreased. Started on Creon and Remeron  8/10/2020. Deepthi Castellano Receives pain meds from Dr. Jessi Bonilla. Lomotil has been added for his diarrhea and it has been working. OBJECTIVE:  VITALS:  vitals were not taken for this visit. Physical Exam:  Performance Status: 0  Well developed, well nourished male  General: AAO to person, place, time, in no acute distress. Head and neck: PERRLA, EOMI . Sclera non icteric. Oropharynx: Clear. Neck: no JVD,  no adenopathy. Heart: Regular rate and regular rhythm, no murmur. Lungs: Clear to auscultation. Abdomen: Soft, non-tender;no masses, no organomegaly. Extremities: No edema,no cyanosis, no clubbing. Neurologic:Cranial nerves grossly intact. No focal motor or sensory deficits. Skin:  No rash. Medi-port: Right        Medications  Prior to Admission medications    Medication Sig Start Date End Date Taking?  Authorizing Provider   potassium chloride (MICRO-K) 10 MEQ extended release capsule Take 1 capsule by mouth 2 times daily for 15 days 8/10/20 8/25/20  MIYA Todd CNP   mirtazapine (REMERON) 15 MG tablet Take 1 tablet by mouth nightly 8/10/20 9/9/20  MIYA Todd CNP   lipase-protease-amylase 04.04.98.37.96 units delayed release capsule Take 24,000 capsules by mouth 3 times daily (before meals) 8/10/20 9/9/20  MIYA Todd CNP   carvedilol (COREG) 12.5 MG tablet Take 12.5 mg by mouth 2 times daily (with meals) 9/25/19   Historical Provider, MD   prochlorperazine (COMPAZINE) 10 MG tablet Take 1 tablet by mouth every 6 hours as needed (NAUSEA) 6/29/20   Sami Porras APRN - CNP   oxyCODONE 5 MG capsule Take 5 mg by mouth every 4 hours as needed for Pain. Historical Provider, MD   promethazine (PHENERGAN) 12.5 MG tablet Take 12.5 mg by mouth 4 times daily    Historical Provider, MD   lisinopril (PRINIVIL;ZESTRIL) 5 MG tablet Take 5 mg by mouth nightly  7/8/18   Historical Provider, MD   aspirin EC 81 MG EC tablet Take 81 mg by mouth nightly     Historical Provider, MD    Scheduled Meds:  Continuous Infusions:  PRN Meds:.        Recent Laboratory Data-     Lab Results   Component Value Date    WBC 9.6 08/10/2020    HGB 11.2 (L) 08/10/2020    HCT 34.6 (L) 08/10/2020    MCV 87.2 08/10/2020     (L) 08/10/2020    LYMPHOPCT 21.7 08/10/2020    RBC 3.97 08/10/2020    MCH 28.2 08/10/2020    MCHC 32.4 08/10/2020    RDW 15.4 (H) 08/10/2020    NEUTOPHILPCT 69.6 08/10/2020    MONOPCT 2.6 08/10/2020    BASOPCT 0.7 08/10/2020    NEUTROABS 7.20 08/10/2020    LYMPHSABS 2.11 08/10/2020    MONOSABS 0.29 08/10/2020    EOSABS 0.09 08/10/2020    BASOSABS 0.00 08/10/2020       Lab Results   Component Value Date     08/10/2020    K 3.0 (L) 08/10/2020     08/10/2020    CO2 19 (L) 08/10/2020    BUN 7 (L) 08/10/2020    CREATININE 0.8 08/10/2020    GLUCOSE 115 (H) 08/10/2020    CALCIUM 8.3 (L) 08/10/2020    PROT 5.6 (L) 08/10/2020    LABALBU 3.0 (L) 08/10/2020    BILITOT 0.3 08/10/2020    ALKPHOS 85 08/10/2020    AST 19 08/10/2020    ALT 17 08/10/2020    LABGLOM >60 08/10/2020    GFRAA >60 08/10/2020           Lab Results   Component Value Date    CEA 8.9 (H) 08/10/2020           Radiology-  CT ABDOMEN AND PELVIS:  6/22/2020 at [address] IN SIZE OF 6.3 X 6.0 X 7.5 CM IRREGULAR,   INFILTRATIVE PANCREATIC MASS, BIOPSY PROVEN ADENOCARCINOMA, WITH   PREDOMINANT EXTRAPANCREATIC COMPONENT THAT INVADES THE LIVER, DUODENUM,   AND IVC.   ADJACENT CONTIGUOUS NECROTIC LESION IN THE PORTACAVAL SPACE MAY REPRESENT A CONGLOMERATE PORTION OF ABOVE DESCRIBED LESION VERSUS   METASTATIC ADENOPATHY. VASCULAR INVOLVEMENT AS DESCRIBED. NON-SPECIFIC WALL THICKENING OF THE DISTAL ESOPHAGUS. CONSIDER DIRECT         ASSESSMENT/PLAN :  A 56-year-old man with:     Locally advanced inoperable pancreatic head adenocarcinoma with regional lymphadenopathy but no clear evidence of metastatic disease with tiny subcentimeter pulmonary nodules. Seen at [facility] by Dr. Amrita Moore and recommended systemic chemotherapy with FOLFIRINOX regimen with follow-up imaging in 3 months with repeat CT scan of chest as well as pancreas and pelvis to be done at [facility] with a CT scan of abdomen and venous phase to assess for peritoneal metastasis. Genetic counseling referral was done at [facility] and next generation sequencing was also performed and results are pending  He has been referred to palliative care and is S/P celiac plexus block with much better pain control. If he achieves adequate cytoreduction he will be considered in several months for Keshia knife radiation. All potential side effects of FOLFIRINOX were discussed. His CT scan of abdomen and pelvis done at [facility] on 6/22/2020 again shows large pancreatic head mass measuring up to 7 cm with a component extrinsic to the pancreas directly invading the liver duodenum and IVC with contiguous necrotic portacaval nodes. Received  Cycle #1 FOLFIRINOX on 6/29/2020. Received  Cycle #2 FOLFIRINOX on 7/13/2020. Received  Cycle #3 FOLFIRINOX on 7/27/2020. His oxaliplatin dose was decreased because of his grade 2 thrombocytopenia       Received  Cycle #4 FOLFIRINOX on 7/27/2020. His oxaliplatin dose was decreased again because of his grade 2 thrombocytopenia  He was given Remeron 15 mg at bedtime as an appetite stimulant  He also received KCl supplements for his hypokalemia and he was given a trial of Creon for his bloating. PLAN:  To receive Cycle #5 FOLFIRINOX today-----8/10/2020.   His oxaliplatin dose will be decreased again today because of his grade 2 thrombocytopenia. To return  9/ /2020 for Cycle #6  FOLFIRINOX. Georgia Estrada. Uriel Turner M.D., F.A.C.P.   Electronically signed 8/21/2020 at 8:53 AM

## 2020-08-24 NOTE — TELEPHONE ENCOUNTER
Catalina Murillo  8/24/2020  Wt Readings from Last 10 Encounters:   08/10/20 165 lb (74.8 kg)   08/10/20 165 lb (74.8 kg)   07/27/20 168 lb 8 oz (76.4 kg)   07/13/20 174 lb 6.4 oz (79.1 kg)   06/29/20 175 lb 12.8 oz (79.7 kg)   06/24/20 177 lb (80.3 kg)   09/18/18 196 lb 6.9 oz (89.1 kg)   09/11/18 185 lb (83.9 kg)     Ht Readings from Last 1 Encounters:   08/10/20 5' 7\" (1.702 m)     There is no height or weight on file to calculate BMI. Contacted patient's wife, [facility], who had questions about her  and taking Creon. She states that he is not eating full meals, asking about when Creon should be taken. Reviewed with her the need for taking it with the most rich meals that are highest in fat especially, and then taking with snacks as well, such as with a Boost, peanut butter crackers, or toast with PB. Spoke with her about varied intakes each day and how his intake of the tabs can change from 2 on one day with poorer intake to 5 on a good day with good intake. Patient states his diarrhea is controlled and that he is noticing that when he takes the creon it helps prevent the diarrhea. Encouraged to improve compliance with Boost. Encouraged her to call with any other questions or concerns. Weight change: 10# loss in 2mo (5.7%)  Appetite: poor some days, fair some days; remeron started for improvement  N/V/D/C: diarrhea, but more controlled now  Calculated Needs if applicable: [id number] (19T38), 100gm PRO (75x1.3), 2400ml (75x32)       Pre-Hab Eligible?: no        Recommendations: Boost Plus at least once daily, encouraged compliance for up to 3 per day, ila on poorer appetite days. Creon 3-5 capsules daily with meals or heavy snacks.          ASPEN GUIDELINES FOR CLINICAL CHARACTERISTICS OF MALNUTRITION IN CHRONIC ILLNESS     Moderate Malnutrition  Severe Malnutrition    Energy intake  <75% energy intake compared to estimated needs for >1month <75% energy intake compared to estimated needs for >1month   Weight changes  5% x 1 month  7.5% x 3 months   10% x 6 months   20% x 1 year  >5% x 1 month  >7.5% x 3 months   >10% x 6 months   >20% x 1 year    Physical findings  Mild   Decrease subcutaneous fat    Decrease muscle mass     Increase fluid/edema   Severe  Decrease subcutaneous fat    Decrease muscle mass     Increase fluid/edema      Moderate-severe malnutrition evidenced by 7% weight loss in <3mo, <75% intake x 3-4 days this week.     Sp Cartagena

## 2020-08-28 NOTE — PROGRESS NOTES
[address] [facility]. Joey Li        Pt Name: Hailey Sos: 1950  Date of evaluation: 8/28/2020  Primary Care Physician: Veena Luna MD  Reason for evaluation:   Chief Complaint   Patient presents with    Pancreatic Cancer     Head of pancreas    Follow-up    Chemotherapy          Subjective: Here for chemotherapy and follow up. Treatment held last week due to low platelet count. C/O occasional diarrhea, Using Imodium. Appetite is decreased. Only eats 2 meals/day, takes Creon BID  Started on Creon and Remeron  8/10/2020. Receives pain meds from Dr. Alphonse Canales. Lomotil has been added for his diarrhea and it has been working. OBJECTIVE:  VITALS:  height is 5' 7\" (1.702 m) and weight is 176 lb 14.4 oz (80.2 kg). His temporal temperature is 97.1 °F (36.2 °C). His blood pressure is 117/72 and his pulse is 82. Physical Exam:  Performance Status: 0  Well developed, well nourished male  General: AAO to person, place, time, in no acute distress. Head and neck: PERRLA, EOMI . Sclera non icteric. Oropharynx: Clear. Neck: no JVD,  no adenopathy. Heart: Regular rate and regular rhythm, no murmur. Lungs: Clear to auscultation. Abdomen: Soft, non-tender;no masses, no organomegaly. Extremities: No edema,no cyanosis, no clubbing. Neurologic:Cranial nerves grossly intact. No focal motor or sensory deficits. Skin:  No rash. Medi-port: Right        Medications  Prior to Admission medications    Medication Sig Start Date End Date Taking?  Authorizing Provider   potassium chloride (MICRO-K) 10 MEQ extended release capsule Take 1 capsule by mouth 2 times daily for 15 days 8/10/20 8/25/20  MIYA Ly CNP   mirtazapine (REMERON) 15 MG tablet Take 1 tablet by mouth nightly 8/10/20 9/9/20  MIYA Ly CNP   lipase-protease-amylase (CREON) 99558-91139 units delayed release capsule Take 24,000 capsules by mouth 3 times daily 6. 0 X 7.5 CM IRREGULAR,   INFILTRATIVE PANCREATIC MASS, BIOPSY PROVEN ADENOCARCINOMA, WITH   PREDOMINANT EXTRAPANCREATIC COMPONENT THAT INVADES THE LIVER, DUODENUM,   AND IVC. ADJACENT CONTIGUOUS NECROTIC LESION IN THE PORTACAVAL SPACE MAY   REPRESENT A CONGLOMERATE PORTION OF ABOVE DESCRIBED LESION VERSUS   METASTATIC ADENOPATHY. VASCULAR INVOLVEMENT AS DESCRIBED. NON-SPECIFIC WALL THICKENING OF THE DISTAL ESOPHAGUS. CONSIDER DIRECT         ASSESSMENT/PLAN :  A 70-year-old man with:     Locally advanced inoperable pancreatic head adenocarcinoma with regional lymphadenopathy but no clear evidence of metastatic disease with tiny subcentimeter pulmonary nodules. Seen at [facility] by Dr. Guthrie and recommended systemic chemotherapy with FOLFIRINOX regimen with follow-up imaging in 3 months with repeat CT scan of chest as well as pancreas and pelvis to be done at [facility] with a CT scan of abdomen and venous phase to assess for peritoneal metastasis. Genetic counseling referral was done at [facility] and next generation sequencing was also performed and results are pending  He has been referred to palliative care and is S/P celiac plexus block with much better pain control. If he achieves adequate cytoreduction he will be considered in several months for Keshia knife radiation. All potential side effects of FOLFIRINOX were discussed. His CT scan of abdomen and pelvis done at [facility] on 6/22/2020 again shows large pancreatic head mass measuring up to 7 cm with a component extrinsic to the pancreas directly invading the liver duodenum and IVC with contiguous necrotic portacaval nodes. Received  Cycle #1 FOLFIRINOX on 6/29/2020. Received  Cycle #2 FOLFIRINOX on 7/13/2020. Received  Cycle #3 FOLFIRINOX on 7/27/2020. His oxaliplatin dose was decreased because of his grade 2 thrombocytopenia       Received  Cycle #4 FOLFIRINOX on 7/27/2020.   His oxaliplatin dose was decreased again because of his grade 2 thrombocytopenia  He was given Remeron 15 mg at bedtime as an appetite stimulant  He also received KCl supplements for his hypokalemia and he was given a trial of Creon for his bloating. PLAN:  To receive Cycle #5 FOLFIRINOX today-----8/31/2020. Treatment was held last week due to his low platelet count. Platelet were 928 on 8/28/2020. Josh Ordonez His oxaliplatin dose will be decreased again today because of his grade 2 thrombocytopenia. To return  9/14 /2020 for Cycle #6  FOLFIRINOX. Manual Lisa. Celestnio Valderrama M.D., F.A.C.P.   Electronically signed 8/28/2020 at 6:53 AM

## 2020-08-28 NOTE — PROGRESS NOTES
Patient presents to clinic for labs today. Right chest  SQ port accessed per policy using 20 gauge 0.94 Garg needle for good blood return. Aspirate for waste and specimen sent to lab. Site flushed easily with 10 mL NSS followed by 5 mL Heparin solution 100 units/ml rinse prior to de-access. Dry sterile dressing to area. Tolerated procedure well. Encouraged to schedule port flush every 4 weeks.

## 2020-08-31 NOTE — PROGRESS NOTES
Susu Desouza  8/31/2020  Wt Readings from Last 10 Encounters:   08/31/20 176 lb 14.4 oz (80.2 kg)   08/10/20 165 lb (74.8 kg)   08/10/20 165 lb (74.8 kg)   07/27/20 168 lb 8 oz (76.4 kg)   07/13/20 174 lb 6.4 oz (79.1 kg)   06/29/20 175 lb 12.8 oz (79.7 kg)   06/24/20 177 lb (80.3 kg)   09/18/18 196 lb 6.9 oz (89.1 kg)   09/11/18 185 lb (83.9 kg)     Ht Readings from Last 1 Encounters:   08/31/20 5' 7\" (1.702 m)     Body mass index is 27.71 kg/m². Met with patient today for follow up. He reports he is doing well. Weight is up by 11# in two weeks. He had an extra week off and he says he felt \"normal\" last week. Appetite was raging and diarrhea controlled. He continues to use the Creon. No cramping in stomach like before. Trying to take at least 1 Ensure per day, if not two. This clinician provided encouragement and further education regarding use of Creon. Encouraged to stay on it consistently even with shakes. Encouraged two shakes per day. This is how diarrhea will be controlled, but if he eats/drinks something heavy without the enzymes, he will have diarrhea. He was receptive. Continue to follow for needs that arise. Weight change: 11# gain in 3 weeks. Hx 20# loss in 6mo (10%)  Appetite: varies, poor to good  N/V/D/C:  Diarrhea, but controlled now  Calculated Needs if applicable:2250kcal (84M78), 100gm PRO (75x1.3), 2400ml (75x32)       Pre-Hab Eligible?: no        Recommendations: Boost Plus at least once daily, encouraged compliance for up to 3 per day, ila on poorer appetite days.  Creon 3-5 capsules daily with meals or heavy snacks.              ASPEN GUIDELINES FOR CLINICAL CHARACTERISTICS OF MALNUTRITION IN CHRONIC ILLNESS     Moderate Malnutrition  Severe Malnutrition    Energy intake  <75% energy intake compared to estimated needs for >1month <75% energy intake compared to estimated needs for >1month   Weight changes  5% x 1 month  7.5% x 3 months   10% x 6 months   20% x 1 year  >5% x 1 month  >7.5% x 3 months   >10% x 6 months   >20% x 1 year    Physical findings  Mild   Decrease subcutaneous fat    Decrease muscle mass     Increase fluid/edema   Severe  Decrease subcutaneous fat    Decrease muscle mass     Increase fluid/edema      Moderate-severe malnutrition evidenced by 10% weight loss in <6mo, <75% intake x 3-4 months.     Megan Dupont

## 2020-09-02 NOTE — ED PROVIDER NOTES
[facility]     Department of Emergency Medicine   ED  Provider Note  Admit Date/RoomTime: 9/1/2020 11:31 PM  ED Room: [place] [address]  MRN: [id number]  Chief Complaint: Other (Port needs de-accessed)       History of Present Illness   Source of history provided by:  patient. History/Exam Limitations: none. Dane Bhatti is a 72 y.o. male who has a past medical history of:   Past Medical History:   Diagnosis Date    CAD (coronary artery disease)     Cancer ([facility]     Heart attack ([facility] [address] [facility] 2008, 3-2015    Hypertension       Presents to the emergency department by private vehicle with his wife to have his port de-accessed. Patient states that he has a port with a CADD PRIZM for chemotherapy due to his history of pancreatic cancer. Patient states that he was giving himself his evening medications with his pump on the counter when the pump and bag with the chemotherapy medicine was in fell off on the floor. Patient reports that the bag broke and is currently leaking. He called the facility where he gets his chemotherapy medications from they told him to come into the emergency room to have his pump to the access. Patient supposed to have his pump today accessed tomorrow at 1 PM.  Patient reports no symptoms or complications from his chemotherapy. Symptoms are nonexistent and he denies any aggravating or alleviating factors. ROS    Pertinent positives and negatives are stated within HPI, all other systems reviewed and are negative.     Past Surgical History:   Procedure Laterality Date    CARDIAC SURGERY      Qu[facility] 2014    CORONARY ANGIOPLASTY  2005    CABG TIMES 4 2015    CORONARY ANGIOPLASTY WITH STENT PLACEMENT      INGUINAL HERNIA REPAIR Right     DC THROMBOENDARTECTMY FEMORAL COMMON Right 9/17/2018    FEMORAL ENDARTERECTOMY performed by Rambo Chadwick MD at [address]  08/15/2018    abdominal aortogram with runoff by Dr. Zana Taylor     Social History: reports that he quit smoking about 12 years ago. His smoking use included cigarettes. He has a 60.00 pack-year smoking history. He has never used smokeless tobacco. He reports previous alcohol use. He reports that he does not use drugs. Family History: family history includes Breast Cancer in his maternal aunt and mother; Cancer in his father, maternal aunt, maternal cousin, maternal cousin, and maternal grandfather. Allergies: Patient has no known allergies. Physical Exam   Oxygen Saturation Interpretation: Normal.   ED Triage Vitals [09/01/20 2323]   BP Temp Temp src Pulse Resp SpO2 Height Weight   (!) 187/97 97.5 °F (36.4 °C) -- 100 18 97 % -- --     Physical Exam  · Constitutional/General: Alert and oriented x3, well appearing, non toxic  · HEENT:  NC/NT. Airway patent. · Neck: Supple, full ROM, no stridor, no meningeal signs  · Respiratory: Lungs clear to auscultation bilaterally, no wheezes, rales, or rhonchi. Not in respiratory distress  · CV:  Regular rate. Regular rhythm. No murmurs, gallops, or rubs. 2+ distal pulses  · Chest: Well-appearing venous access port right-sided chest wall without signs of infection. · GI:  Abdomen Soft, Non tender, Non distended. +BS. No rebound, guarding, or rigidity. No pulsatile masses. · Musculoskeletal: Moves all extremities x 4. Warm and well perfused, no clubbing, cyanosis, or edema. Capillary refill <3 seconds  · Integument: skin warm and dry. No rashes. · Lymphatic: no lymphadenopathy noted  · Neurologic: GCS 15, no focal deficits, symmetric strength 5/5 in the upper and lower extremities bilaterally  · Psychiatric: Normal Affect    Lab / Imaging Results   (All laboratory and radiology results have been personally reviewed by myself)  Labs:  No results found for this visit on 09/01/20. Imaging: All Radiology results interpreted by Radiologist unless otherwise noted.   No orders to display       ED Course / Medical Decision Making   Medications - No data to display     Consult(s):   None    Procedure(s):   none    MDM:   Patient presents to the emergency department to have his port de-accessed . He denies any symptoms. Specific conditions for emergent return have been discussed and the patient verbalized understanding to return immediately for new or worsening symptoms. Counseling: The emergency provider has spoken with the patient and discussed todays results, in addition to providing specific details for the plan of care and counseling regarding the diagnosis and prognosis. Questions are answered at this time and they are agreeable with the plan. Assessment      1. Encounter for care related to vascular access port      Plan   Discharge to home  Patient condition is good    New Medications     New Prescriptions    No medications on file     Electronically signed by Terrell Haider PA-C   DD: 9/1/20  **This report was transcribed using voice recognition software. Every effort was made to ensure accuracy; however, inadvertent computerized transcription errors may be present. END OF ED PROVIDER NOTE        Terrell Haider PA-C  09/01/20 0796    ATTENDING PROVIDER ATTESTATION:     Supervising Physician, on-site, available for consultation, non-participatory in the evaluation or care of this patient.          [address] [facility],   09/02/20 9470

## 2020-09-02 NOTE — ED TRIAGE NOTES
Pt sent in by provider to get port de-accessed after medication infusion bag was damaged. Pt denies injury or pain at this time.

## 2020-09-02 NOTE — PROGRESS NOTES
Wife called in, she stated her  pelon was taking his nightly medication and he took his patricia pack off which contains his chemo pump , it fell on the floor and the chemo bag began to leak. They called home care but they didn't come out because they don't have home care services. They wrapped the bag up in the chemo bag provided in their spill kit and went to emergency and he was de-accessed there. I told them to just come in at 1230 when scheduled for his udenyca injection and bring the bag and pump and we will take care of it.  Wife verbalized understanding

## 2020-09-02 NOTE — ED NOTES
[facility] flushed and de-accessed with no issue.      Sierra Valente RN  09/02/20 0000

## 2020-09-11 NOTE — PROGRESS NOTES
[address] [facility]. [facility] [place]        Pt Name: Admaaris : 1950  Date of evaluation: 9/14/2020  Primary Care Physician: Yosef Cabrera MD  Reason for evaluation:   Chief Complaint   Patient presents with    Pancreatic Cancer    Follow-up    Chemotherapy          Subjective: Here for chemotherapy and follow up. C/O occasional diarrhea, Using Imodium. Appetite is decreased. Only eats 2 meals/day, takes Creon BID    Receives pain meds from Dr. Sander Fragoso. Lomotil has been added for his diarrhea and it has been working. OBJECTIVE:  VITALS:  weight is 170 lb (77.1 kg). His temporal temperature is 96.5 °F (35.8 °C). His blood pressure is 134/87 and his pulse is 86. His oxygen saturation is 97%. Physical Exam:  Performance Status: 0  Well developed, well nourished male  General: AAO to person, place, time, in no acute distress. Head and neck: PERRLA, EOMI . Sclera non icteric. Oropharynx: Clear. Neck: no JVD,  no adenopathy. Heart: Regular rate and regular rhythm, no murmur. Lungs: Clear to auscultation. Abdomen: Soft, non-tender;no masses, no organomegaly. Extremities: No edema,no cyanosis, no clubbing. Neurologic:Cranial nerves grossly intact. No focal motor or sensory deficits. Skin:  No rash. Medi-port: Right        Medications  Prior to Admission medications    Medication Sig Start Date End Date Taking? Authorizing Provider   mirtazapine (REMERON) 15 MG tablet Take 1 tablet by mouth nightly 9/8/20 10/8/20 Yes MIYA Gomez CNP   carvedilol (COREG) 12.5 MG tablet Take 12.5 mg by mouth 2 times daily (with meals) 9/25/19  Yes Historical Provider, MD   prochlorperazine (COMPAZINE) 10 MG tablet Take 1 tablet by mouth every 6 hours as needed (NAUSEA) 6/29/20  Yes MIYA Gomez CNP   oxyCODONE 5 MG capsule Take 5 mg by mouth every 4 hours as needed for Pain.    Yes Historical Provider, MD   promethazine (PHENERGAN) 12.5 MG tablet Take 12.5 mg by mouth 4 times daily   Yes Historical Provider, MD   lisinopril (PRINIVIL;ZESTRIL) 5 MG tablet Take 5 mg by mouth nightly  7/8/18  Yes Historical Provider, MD   aspirin EC 81 MG EC tablet Take 81 mg by mouth nightly    Yes Historical Provider, MD   lipase-protease-amylase (CREON) 16381-84020 units delayed release capsule Take 24,000 capsules by mouth 3 times daily (before meals) 8/10/20 9/9/20  MIYA Penaloza - CNP    Scheduled Meds:  Continuous Infusions:  PRN Meds:.        Recent Laboratory Data-     Lab Results   Component Value Date    WBC 5.3 09/11/2020    HGB 10.6 (L) 09/11/2020    HCT 31.1 (L) 09/11/2020    MCV 89.1 09/11/2020    PLT 61 (L) 09/11/2020    LYMPHOPCT 20.6 09/11/2020    RBC 3.49 (L) 09/11/2020    MCH 30.4 09/11/2020    MCHC 34.1 09/11/2020    RDW 21.8 (H) 09/11/2020    NEUTOPHILPCT 65.8 09/11/2020    MONOPCT 12.0 09/11/2020    BASOPCT 0.4 09/11/2020    NEUTROABS 3.51 09/11/2020    LYMPHSABS 1.10 (L) 09/11/2020    MONOSABS 0.64 09/11/2020    EOSABS 0.03 (L) 09/11/2020    BASOSABS 0.02 09/11/2020       Lab Results   Component Value Date     09/11/2020    K 4.9 09/11/2020     09/11/2020    CO2 21 (L) 09/11/2020    BUN 13 09/11/2020    CREATININE 0.8 09/11/2020    GLUCOSE 134 (H) 09/11/2020    CALCIUM 8.9 09/11/2020    PROT 6.1 (L) 09/11/2020    LABALBU 3.5 09/11/2020    BILITOT 0.7 09/11/2020    ALKPHOS 118 09/11/2020    AST 36 09/11/2020    ALT 16 09/11/2020    LABGLOM >60 09/11/2020    GFRAA >60 09/11/2020           Lab Results   Component Value Date    CEA 13.0 (H) 09/11/2020           Radiology-  CT ABDOMEN AND PELVIS:  6/22/2020 at [facility]  MILD INTERVAL INCREASE IN SIZE OF 6.3 X 6.0 X 7.5 CM IRREGULAR,   INFILTRATIVE PANCREATIC MASS, BIOPSY PROVEN ADENOCARCINOMA, WITH   PREDOMINANT EXTRAPANCREATIC COMPONENT THAT INVADES THE LIVER, DUODENUM,   AND IVC.   ADJACENT CONTIGUOUS NECROTIC LESION IN THE PORTACAVAL SPACE MAY   REPRESENT A CONGLOMERATE

## 2020-09-11 NOTE — PROGRESS NOTES
Patient presents to clinic for labs today. Right chest  SQ port accessed per policy using 20 gauge 1.14 inch Garg needle for good blood return. Aspirate for waste and specimen sent to lab. Site flushed easily with 10 mL NSS followed by 5 mL Heparin solution 100 units/ml rinse prior to de-access. Dry sterile dressing to area. Tolerated procedure well. Encouraged to schedule port flush every 4 weeks.

## 2020-09-18 NOTE — PROGRESS NOTES
Patient presents to clinic for labs today. Right chest single  SQ port accessed per policy using 98K [id number] Garg needle for good blood return. Aspirate for waste and specimen sent to lab. Site flushed easily with 10 mL NSS followed by 5 mL Heparin solution 100 units/ml rinse prior to de-access. Dry sterile dressing to area. Tolerated procedure well. Encouraged to schedule port flush every 4 weeks.

## 2020-09-18 NOTE — PROGRESS NOTES
[address] [facility]. [facility] [place]        Pt Name: Rosie Must: 1950  Date of evaluation: 9/21/2020  Primary Care Physician: Radha Pineda MD  Reason for evaluation:   Chief Complaint   Patient presents with    Pancreatic Cancer     Head of pancreas    Follow-up    Chemotherapy          Subjective: Here for chemotherapy and follow up. Feels well today. C/O occasional diarrhea, Using Imodium. Appetite is decreased. Only eats 2 meals/day, takes Creon BID    Receives pain meds from Dr. Vero Chavarria. Lomotil has been added for his diarrhea and it has been working. OBJECTIVE:  VITALS:  height is 5' 7\" (1.702 m) and weight is 170 lb (77.1 kg). His temporal temperature is 96.7 °F (35.9 °C). His blood pressure is 133/89 and his pulse is 83. His oxygen saturation is 97%. Physical Exam:  Performance Status: 0  Well developed, well nourished male  General: AAO to person, place, time, in no acute distress. Head and neck: PERRLA, EOMI . Sclera non icteric. Oropharynx: Clear. Neck: no JVD,  no adenopathy. Heart: Regular rate and regular rhythm, no murmur. Lungs: Clear to auscultation. Abdomen: Soft, non-tender;no masses, no organomegaly. Extremities: No edema,no cyanosis, no clubbing. Neurologic:Cranial nerves grossly intact. No focal motor or sensory deficits. Skin:  No rash. Medi-port: Right        Medications  Prior to Admission medications    Medication Sig Start Date End Date Taking?  Authorizing Provider   mirtazapine (REMERON) 15 MG tablet Take 1 tablet by mouth nightly 9/8/20 10/8/20 Yes MIYA Gage CNP   carvedilol (COREG) 12.5 MG tablet Take 12.5 mg by mouth 2 times daily (with meals) 9/25/19  Yes Historical Provider, MD   prochlorperazine (COMPAZINE) 10 MG tablet Take 1 tablet by mouth every 6 hours as needed (NAUSEA) 6/29/20  Yes MIYA Gage CNP   oxyCODONE 5 MG capsule Take 5 mg by mouth every 4 hours as needed for Pain. Yes Historical Provider, MD   promethazine (PHENERGAN) 12.5 MG tablet Take 12.5 mg by mouth 4 times daily   Yes Historical Provider, MD   aspirin EC 81 MG EC tablet Take 81 mg by mouth nightly    Yes Historical Provider, MD   lipase-protease-amylase (CREON) 00583-19356 units delayed release capsule Take 24,000 capsules by mouth 3 times daily (before meals) 8/10/20 9/9/20  Angel Dubon, APRN - CNP    Scheduled Meds:  Continuous Infusions:  PRN Meds:.        Recent Laboratory Data-     Lab Results   Component Value Date    WBC 5.1 09/18/2020    HGB 10.7 (L) 09/18/2020    HCT 33.0 (L) 09/18/2020    MCV 95.4 09/18/2020     (L) 09/18/2020    LYMPHOPCT 29.6 09/18/2020    RBC 3.46 (L) 09/18/2020    MCH 30.9 09/18/2020    MCHC 32.4 09/18/2020    RDW 21.3 (H) 09/18/2020    NEUTOPHILPCT 60.0 09/18/2020    MONOPCT 7.8 09/18/2020    BASOPCT 0.9 09/18/2020    NEUTROABS 3.06 09/18/2020    LYMPHSABS 1.53 09/18/2020    MONOSABS 0.41 09/18/2020    EOSABS 0.09 09/18/2020    BASOSABS 0.05 09/18/2020       Lab Results   Component Value Date     09/18/2020    K 4.4 09/18/2020     09/18/2020    CO2 22 09/18/2020    BUN 6 (L) 09/18/2020    CREATININE 0.7 09/18/2020    GLUCOSE 111 (H) 09/18/2020    CALCIUM 8.6 09/18/2020    PROT 5.8 (L) 09/18/2020    LABALBU 3.4 (L) 09/18/2020    BILITOT 0.3 09/18/2020    ALKPHOS 115 09/18/2020    AST 26 09/18/2020    ALT 11 09/18/2020    LABGLOM >60 09/18/2020    GFRAA >60 09/18/2020           Lab Results   Component Value Date    CEA 13.0 (H) 09/11/2020           Radiology-  CT ABDOMEN AND PELVIS:  6/22/2020 at [address] IN SIZE OF 6.3 X 6.0 X 7.5 CM IRREGULAR,   INFILTRATIVE PANCREATIC MASS, BIOPSY PROVEN ADENOCARCINOMA, WITH   PREDOMINANT EXTRAPANCREATIC COMPONENT THAT INVADES THE LIVER, DUODENUM,   AND IVC.   ADJACENT CONTIGUOUS NECROTIC LESION IN THE PORTACAVAL SPACE MAY   REPRESENT A CONGLOMERATE PORTION OF ABOVE DESCRIBED LESION VERSUS METASTATIC ADENOPATHY. VASCULAR INVOLVEMENT AS DESCRIBED. NON-SPECIFIC WALL THICKENING OF THE DISTAL ESOPHAGUS. CONSIDER DIRECT         ASSESSMENT/PLAN :  A 69-year-old man with:     Locally advanced inoperable pancreatic head adenocarcinoma with regional lymphadenopathy but no clear evidence of metastatic disease with tiny subcentimeter pulmonary nodules. Seen at [facility] by Dr. Mary Guillen and recommended systemic chemotherapy with FOLFIRINOX regimen with follow-up imaging in 3 months with repeat CT scan of chest as well as pancreas and pelvis to be done at [facility] with a CT scan of abdomen and venous phase to assess for peritoneal metastasis. Genetic counseling referral was done at [facility] and next generation sequencing was also performed and results are pending  He has been referred to palliative care and is S/P celiac plexus block with much better pain control. If he achieves adequate cytoreduction he will be considered in several months for Keshia knife radiation. All potential side effects of FOLFIRINOX were discussed. His CT scan of abdomen and pelvis done at [facility] on 6/22/2020 again shows large pancreatic head mass measuring up to 7 cm with a component extrinsic to the pancreas directly invading the liver duodenum and IVC with contiguous necrotic portacaval nodes. Received  Cycle #1 FOLFIRINOX on 6/29/2020. Received Udenyca on 7/1/2020. Received  Cycle #2 FOLFIRINOX on 7/13/2020. Received Udenyca on 7/15/2020  Received  Cycle #3 FOLFIRINOX on 7/27/2020. His oxaliplatin dose was decreased because of his grade 2 thrombocytopenia. Received Udenyca on 7/29/2020       Received  Cycle #4 FOLFIRINOX on 8/10/2020. His oxaliplatin dose was decreased again because of his grade 2 thrombocytopenia. Received Udenyca on 8/12/2020  He was given Remeron 15 mg at bedtime as an appetite stimulant  He also received KCl supplements for his hypokalemia and he was given a trial of Creon for his bloating.     Received Cycle #5 FOLFIRINOX on 8/31/2020. Received Udenyca on 9/02/2020. His chemotherapy was postponed last week---9/14/2020-- because of his thrombocytopenia      PLAN:  To receive Cycle #6 FOLFIRINOX today-----9/21/2020  His oxaliplatin dose will be decreased again today because of his  thrombocytopenia. Platelet were 783  on  9/18/2020. Dalton Lan He is scheduled for follow-up CT scans on 9/25/2020 at [facility]  His scans will be reviewed and then he will be referred back to [facility] for consideration of either potential resection versus Keshia knife radiation depending on the extent of his response to  neoadjuvant chemo    Verner Sasean. Kaylee Whitley M.D., F.A.C.P.   Electronically signed 9/21/2020 at 8:45 AM

## 2020-09-21 NOTE — PROGRESS NOTES
CADD pump applied, all connections secured with tape, clamps open, pump running. Patient discharged home in stable condition.

## 2020-09-23 NOTE — PROGRESS NOTES
Presents to clinic for CADD pump removal. Port site appears normal. Denies problems/concerns. Received 241.3 ml of 5-FU & reservoir 18.7 of 5-FU. Port flushed with 10 ml. NSS followed by 5 ml. Heparin Rinse prior to de access. DSD to area. Tolerated well. Encouraged to call clinic with questions/concerns.

## 2020-09-29 NOTE — TELEPHONE ENCOUNTER
Attempted to contact patient due to voicemail left from his wife, RE; recipes for shakes and soups that are high calorie. Voicemail left, encouraged return call as able.  Elvia Hobson RD,,LD

## 2020-10-02 NOTE — PROGRESS NOTES
Patient presents to clinic for labs today. 20  SQ port accessed per policy using 1.44 Garg needle for good blood return. Aspirate for waste and specimen sent to lab. Site flushed easily with 10 mL NSS followed by 5 mL Heparin solution 100 units/ml rinse prior to de-access. Dry sterile dressing to area. Tolerated procedure well. Encouraged to schedule port flush every 4 weeks.

## 2020-10-07 NOTE — PROGRESS NOTES
Presents to clinic for CADD pump removal. Port site appears normal. Denies problems/concerns. Received 249.2 ml of 5-FU & reservoir 10.8 of 5-FU. Port flushed with 10 ml. NSS followed by 5 ml. Heparin Rinse prior to de access. DSD to area. Tolerated well. Encouraged to call clinic with questions/concerns.

## 2020-10-15 NOTE — PROGRESS NOTES
Mrs. Ruthanne Bumpers called to tell us that the fornuckle on his right arch of the foot was improving however he has a second 1 forming about a half a centimeter away and she will know what to do. Dr. Angle Wynne called me and I asked him and he wanted me to order doxycycline 100 mg twice daily for 7 days. I sent that to his pharmacy. He want her to continue using Neosporin ointment on the area. He has an appointment here Monday, October 19 and he will assess the foot at that time. I called her home and had to leave a message on the answer machine I asked him to call me back if there is any questions on these instructions.

## 2020-10-16 NOTE — PROGRESS NOTES
PORT FLUSH    Patient presents to clinic for [facility] today. right  SQ port accessed per policy using #42 gauge . 75\" Garg needle for good blood return. Aspirate for waste and specimen sent to lab. Site flushed easily with 10 mL NSS followed by 5 mL Heparin solution 100 units/ml rinse prior to de-access. Dry sterile dressing to area. Tolerated procedure well. Encouraged to schedule port flush every 4 weeks.

## 2020-10-16 NOTE — PROGRESS NOTES
[address] [facility] [address] [facility] [place]        Pt Name: Brooklyn Punch: 1950  Date of evaluation: 10/16/2020  Primary Care Physician: Chuy Fields MD  Reason for evaluation:   Chief Complaint   Patient presents with    Pancreatic Cancer    Follow-up    Chemotherapy            Subjective: Here for chemotherapy and follow up. Feels well today. C/O a fornuckle on his right arch of the foot was improving however now  he has a second one  forming about a half a centimeter away. He was ordered Doxycycline 100 mg BID for 7 days and to continue to use Neosporin ointment to the area. Appetite fair. Only eats 2 meals/day, takes Creon BID. Lomotil has been added for his diarrhea and it has been working. Receives pain meds from Dr. Marlen Ramos. OBJECTIVE:  VITALS:  height is 5' 7\" (1.702 m) and weight is 175 lb (79.4 kg). His temporal temperature is 97.3 °F (36.3 °C). His blood pressure is 168/88 (abnormal) and his pulse is 83. His oxygen saturation is 98%. Physical Exam:  Performance Status: 0  Well developed, well nourished male  General: AAO to person, place, time, in no acute distress. Head and neck: PERRLA, EOMI. Sclera non icteric. Oropharynx: Clear. Neck: no JVD,  no adenopathy. Heart: Regular rate and regular rhythm, no murmur. Lungs: Clear to auscultation. Abdomen: Soft, non-tender;no masses, no organomegaly. Extremities: No edema,no cyanosis, no clubbing. Neurologic:Cranial nerves grossly intact. No focal motor or sensory deficits. Skin:  No rash. Medi-port: Right        Medications  Prior to Admission medications    Medication Sig Start Date End Date Taking?  Authorizing Provider   doxycycline hyclate (VIBRA-TABS) 100 MG tablet Take 1 tablet by mouth 2 times daily for 7 days 10/15/20 10/22/20  MIYA Senior - CNP   lipase-protease-amylase (CREON) 85666-73824 units delayed release capsule Take 24,000 capsules by mouth 3 times previously described right upper lobe nodular   opacities. 2.  Residual subcentimeter nodular opacities in the left lung measuring   up to 5 mm, stable. 3.  New small left pleural effusion. 4.  Nonspecific sclerotic focus in the left posterior fourth rib, stable. Consider interval follow-up or further evaluation via bone scan. CT PANCREAS AND PELVIS:  9/25/2020 at [facility]  IMPRESSION:  5.1 x 5.1 x 5.6 cm infiltrative mass centered superior to the pancreatic   head/neck infiltrating the robert hepatis and invading the liver and   second duodenum, decreased in size compared to prior exam.  Partial   abutment of the posterior stomach, medial right hepatic lobe and anterior   IVC also noted. Extensive vascular involvement at the robert hepatis and   celiac, as described. Mild nonspecific infiltrative change in upper abdominal fat. Developing   carcinomatosis is not excluded. Attention on follow-up    Exophytic above water attenuation lesion medial lower left kidney may   represent a hyperdense cyst but is indeterminate. This could be further   characterized with dedicated renal CT or MRI, as indicated. CT ABDOMEN AND PELVIS:  6/22/2020 at [address] IN SIZE OF 6.3 X 6.0 X 7.5 CM IRREGULAR,   INFILTRATIVE PANCREATIC MASS, BIOPSY PROVEN ADENOCARCINOMA, WITH   PREDOMINANT EXTRAPANCREATIC COMPONENT THAT INVADES THE LIVER, DUODENUM,   AND IVC. ADJACENT CONTIGUOUS NECROTIC LESION IN THE PORTACAVAL SPACE MAY   REPRESENT A CONGLOMERATE PORTION OF ABOVE DESCRIBED LESION VERSUS   METASTATIC ADENOPATHY. VASCULAR INVOLVEMENT AS DESCRIBED. NON-SPECIFIC WALL THICKENING OF THE DISTAL ESOPHAGUS. CONSIDER DIRECT         ASSESSMENT/PLAN :  A 71-year-old man with:     Locally advanced inoperable pancreatic head adenocarcinoma with regional lymphadenopathy but no clear evidence of metastatic disease with tiny subcentimeter pulmonary nodules.   Seen at [facility] by Dr. Danny Ryan and recommended systemic chemotherapy with FOLFIRINOX regimen with follow-up imaging in 3 months with repeat CT scan of chest as well as pancreas and pelvis to be done at [facility] with a CT scan of abdomen and venous phase to assess for peritoneal metastasis. Genetic counseling referral was done at [facility] and next generation sequencing was also performed and results are pending  He has been referred to palliative care and is S/P celiac plexus block with much better pain control. If he achieves adequate cytoreduction he will be considered in several months for Keshia knife radiation. All potential side effects of FOLFIRINOX were discussed. His CT scan of abdomen and pelvis done at [facility] on 6/22/2020 again shows large pancreatic head mass measuring up to 7 cm with a component extrinsic to the pancreas directly invading the liver duodenum and IVC with contiguous necrotic portacaval nodes. Received  Cycle #1 FOLFIRINOX on 6/29/2020. Received Udenyca on 7/1/2020. Received  Cycle #2 FOLFIRINOX on 7/13/2020. Received Udenyca on 7/15/2020  Received  Cycle #3 FOLFIRINOX on 7/27/2020. His oxaliplatin dose was decreased because of his grade 2 thrombocytopenia. Received Udenyca on 7/29/2020       Received  Cycle #4 FOLFIRINOX on 8/10/2020. His oxaliplatin dose was decreased again because of his grade 2 thrombocytopenia. Received Udenyca on 8/12/2020  He was given Remeron 15 mg at bedtime as an appetite stimulant  He also received KCl supplements for his hypokalemia and he was given a trial of Creon for his bloating. Received Cycle #5 FOLFIRINOX on 8/31/2020. Received Udenyca on 9/02/2020. His chemotherapy was postponed last week---9/14/2020-- because of his thrombocytopenia    Received Cycle #5 FOLFIRINOX on 8/31/2020. His oxaliplatin dose will be decreased again  because of his  thrombocytopenia. Platelet were 876  on  9/18/2020. Padilla Borja     He had  for follow-up CT scans on 9/25/2020 at [facility]  His scans will be reviewed and then he will be referred back to [facility] for consideration of either potential resection versus Keshia knife radiation depending on the extent of his response to  neoadjuvant chemo    10/5/2020  CT scan of the chest showed interval resolution of previously described right upper lobe nodular opacities with a tiny small left pleural effusion of uncertain clinical significance. There was a sclerotic focus in the left posterior fourth rib stable and unchanged in size. CT scan of abdomen and pancreas showed persistent but smaller 5.1 x 5.6 cm infiltrative mass superior to the mesenteric head infiltrating the robert hepatis and invading the liver and second portion of the duodenum decrease in size. He was recommended to continue on present regimen of FOLFIRINOX   His dose will be reduced today for grade 2 thrombocytopenia. After 3 additional months of chemo he will be reevaluated for potential Keshia knife radiation      Received Cycle #7 FOLFIRINOX on 10/05 2020. His dose was reduced  for grade 2 thrombocytopenia      10/19/2020  To postpone cycle 8 of FOLFIRINOX due to thrombocytopenia and platelet count of 58. Minal Barnes. Bob Kimble M.D., F.A.C.P.   Electronically signed 10/16/2020 at 7:18 AM

## 2020-10-26 NOTE — PROGRESS NOTES
[address] [facility]. [place]        Pt Name: Stephanie Promise: 1950  Date of evaluation: 10/26/2020  Primary Care Physician: Maria A Savage MD  Reason for evaluation:   Chief Complaint   Patient presents with    Follow-up            Subjective: Here for chemotherapy and follow up. Feels well today. Had URTI last week and is on Doxycyline   Fever and sinus congestion resolved    C/O a fornuckle on his right arch of the foot was improving however now  he has a second one  forming about a half a centimeter away. He was ordered Doxycycline 100 mg BID for 7 days and to continue to use Neosporin ointment to the area. Appetite fair. Only eats 2 meals/day, takes Creon BID. Lomotil has been added for his diarrhea and it has been working. Receives pain meds from Dr. Kath Bernabe. OBJECTIVE:  VITALS:  height is 5' 7\" (1.702 m) and weight is 177 lb (80.3 kg). His temporal temperature is 98.1 °F (36.7 °C). His blood pressure is 149/78 (abnormal) and his pulse is 94. His oxygen saturation is 95%. Physical Exam:  Performance Status: 0  Well developed, well nourished male  General: AAO to person, place, time, in no acute distress. Head and neck: PERRLA, EOMI. Sclera non icteric. Oropharynx: Clear. Neck: no JVD,  no adenopathy. Heart: Regular rate and regular rhythm, no murmur. Lungs: Clear to auscultation. Abdomen: Soft, non-tender;no masses, no organomegaly. Extremities: No edema,no cyanosis, no clubbing. Neurologic:Cranial nerves grossly intact. No focal motor or sensory deficits. Skin:  No rash. Medi-port: Right        Medications  Prior to Admission medications    Medication Sig Start Date End Date Taking?  Authorizing Provider   doxycycline hyclate (VIBRA-TABS) 100 MG tablet Take 100 mg by mouth 2 times daily   Yes Historical Provider, MD   carvedilol (COREG) 12.5 MG tablet Take 12.5 mg by mouth 2 times daily (with meals) 9/25/19  Yes Historical Provider, MD   prochlorperazine (COMPAZINE) 10 MG tablet Take 1 tablet by mouth every 6 hours as needed (NAUSEA) 6/29/20  Yes [facility] C[facility]   oxyCODONE 5 MG capsule Take 5 mg by mouth every 4 hours as needed for Pain.    Yes Historical Provider, MD   promethazine (PHENERGAN) 12.5 MG tablet Take 12.5 mg by mouth 4 times daily   Yes Historical Provider, MD   aspirin EC 81 MG EC tablet Take 81 mg by mouth nightly    Yes Historical Provider, MD   cephALEXin (KEFLEX) 500 MG capsule Take 500 mg by mouth 3 times daily 10/16/20   Historical Provider, MD   lipase-protease-amylase (CREON) 83342-65556 units delayed release capsule Take 24,000 capsules by mouth 3 times daily (before meals) 9/24/20 10/24/20  [facility] CNP   mirtazapine (REMERON) 15 MG tablet Take 1 tablet by mouth nightly 9/8/20 10/8/20  [facility], APRN - CNP    Scheduled Meds:  Continuous Infusions:  PRN Meds:.        Recent Laboratory Data-     Lab Results   Component Value Date    WBC 3.2 (L) 10/23/2020    HGB 10.3 (L) 10/23/2020    HCT 31.4 (L) 10/23/2020    .6 (H) 10/23/2020    PLT 81 (L) 10/23/2020    LYMPHOPCT 20.9 10/23/2020    RBC 3.12 (L) 10/23/2020    MCH 33.0 10/23/2020    MCHC 32.8 10/23/2020    RDW 18.3 (H) 10/23/2020    NEUTOPHILPCT 72.2 10/23/2020    MONOPCT 7.0 10/23/2020    BASOPCT 0.0 10/23/2020    NEUTROABS 2.30 10/23/2020    LYMPHSABS 0.67 (L) 10/23/2020    MONOSABS 0.22 10/23/2020    EOSABS 0.00 (L) 10/23/2020    BASOSABS 0.00 10/23/2020       Lab Results   Component Value Date     10/23/2020    K 3.7 10/23/2020     10/23/2020    CO2 21 (L) 10/23/2020    BUN 9 10/23/2020    CREATININE 0.7 10/23/2020    GLUCOSE 161 (H) 10/23/2020    CALCIUM 8.5 (L) 10/23/2020    PROT 5.7 (L) 10/23/2020    LABALBU 3.4 (L) 10/23/2020    BILITOT <0.2 10/23/2020    ALKPHOS 118 10/23/2020    AST 34 10/23/2020    ALT 20 10/23/2020    LABGLOM >60 10/23/2020    GFRAA >60 10/23/2020           Lab Results   Component Value Date    CEA 19.2 (H) 10/16/2020           Radiology-  CT CHEST:  9/25/2020 at [facility]  IMPRESSION:  1. Interval resolution of previously described right upper lobe nodular   opacities. 2.  Residual subcentimeter nodular opacities in the left lung measuring   up to 5 mm, stable. 3.  New small left pleural effusion. 4.  Nonspecific sclerotic focus in the left posterior fourth rib, stable. Consider interval follow-up or further evaluation via bone scan. CT PANCREAS AND PELVIS:  9/25/2020 at [facility]  IMPRESSION:  5.1 x 5.1 x 5.6 cm infiltrative mass centered superior to the pancreatic   head/neck infiltrating the robert hepatis and invading the liver and   second duodenum, decreased in size compared to prior exam.  Partial   abutment of the posterior stomach, medial right hepatic lobe and anterior   IVC also noted. Extensive vascular involvement at the robert hepatis and   celiac, as described. Mild nonspecific infiltrative change in upper abdominal fat. Developing   carcinomatosis is not excluded. Attention on follow-up    Exophytic above water attenuation lesion medial lower left kidney may   represent a hyperdense cyst but is indeterminate. This could be further   characterized with dedicated renal CT or MRI, as indicated. CT ABDOMEN AND PELVIS:  6/22/2020 at [address] IN SIZE OF 6.3 X 6.0 X 7.5 CM IRREGULAR,   INFILTRATIVE PANCREATIC MASS, BIOPSY PROVEN ADENOCARCINOMA, WITH   PREDOMINANT EXTRAPANCREATIC COMPONENT THAT INVADES THE LIVER, DUODENUM,   AND IVC. ADJACENT CONTIGUOUS NECROTIC LESION IN THE PORTACAVAL SPACE MAY   REPRESENT A CONGLOMERATE PORTION OF ABOVE DESCRIBED LESION VERSUS   METASTATIC ADENOPATHY. VASCULAR INVOLVEMENT AS DESCRIBED. NON-SPECIFIC WALL THICKENING OF THE DISTAL ESOPHAGUS.   CONSIDER DIRECT         ASSESSMENT/PLAN :  A 71-year-old man with:     Locally advanced inoperable pancreatic head adenocarcinoma with regional lymphadenopathy but no clear evidence of metastatic disease with tiny subcentimeter pulmonary nodules. Seen at [facility] by Dr. Saint Console and recommended systemic chemotherapy with FOLFIRINOX regimen with follow-up imaging in 3 months with repeat CT scan of chest as well as pancreas and pelvis to be done at [facility] with a CT scan of abdomen and venous phase to assess for peritoneal metastasis. Genetic counseling referral was done at [facility] and next generation sequencing was also performed and results are pending  He has been referred to palliative care and is S/P celiac plexus block with much better pain control. If he achieves adequate cytoreduction he will be considered in several months for Keshia knife radiation. All potential side effects of FOLFIRINOX were discussed. His CT scan of abdomen and pelvis done at [facility] on 6/22/2020 again shows large pancreatic head mass measuring up to 7 cm with a component extrinsic to the pancreas directly invading the liver duodenum and IVC with contiguous necrotic portacaval nodes. Received  Cycle #1 FOLFIRINOX on 6/29/2020. Received Udenyca on 7/1/2020. Received  Cycle #2 FOLFIRINOX on 7/13/2020. Received Udenyca on 7/15/2020  Received  Cycle #3 FOLFIRINOX on 7/27/2020. His oxaliplatin dose was decreased because of his grade 2 thrombocytopenia. Received Udenyca on 7/29/2020       Received  Cycle #4 FOLFIRINOX on 8/10/2020. His oxaliplatin dose was decreased again because of his grade 2 thrombocytopenia. Received Udenyca on 8/12/2020  He was given Remeron 15 mg at bedtime as an appetite stimulant  He also received KCl supplements for his hypokalemia and he was given a trial of Creon for his bloating. Received Cycle #5 FOLFIRINOX on 8/31/2020. Received Udenyca on 9/02/2020. His chemotherapy was postponed last week---9/14/2020-- because of his thrombocytopenia    Received Cycle #5 FOLFIRINOX on 8/31/2020.   His oxaliplatin dose will be decreased again  because of his  thrombocytopenia. Platelet were 413  on  9/18/2020. Maureen Sultana He had  for follow-up CT scans on 9/25/2020 at [facility]  His scans will be reviewed and then he will be referred back to [facility] for consideration of either potential resection versus Keshia knife radiation depending on the extent of his response to  neoadjuvant chemo    10/5/2020  CT scan of the chest showed interval resolution of previously described right upper lobe nodular opacities with a tiny small left pleural effusion of uncertain clinical significance. There was a sclerotic focus in the left posterior fourth rib stable and unchanged in size. CT scan of abdomen and pancreas showed persistent but smaller 5.1 x 5.6 cm infiltrative mass superior to the mesenteric head infiltrating the robert hepatis and invading the liver and second portion of the duodenum decrease in size. He was recommended to continue on present regimen of FOLFIRINOX   His dose will be reduced today for grade 2 thrombocytopenia. After 3 additional months of chemo he will be reevaluated for potential Keshia knife radiation      Received Cycle #7 FOLFIRINOX on 10/05 2020. His dose was reduced  for grade 2 thrombocytopenia      10/19/2020  To postpone cycle 8 of FOLFIRINOX due to thrombocytopenia and platelet count of 58.    10/26/2020  To receive cycle 8 of FOLFIRINOX today with dose reduction because of moderate thrombocytopenia      Janice Sosa M.D., F.A.C.P.   Electronically signed 10/26/2020 at 9:02 AM

## 2020-11-01 PROBLEM — U07.1 COVID-19: Status: ACTIVE | Noted: 2020-01-01

## 2020-11-01 NOTE — ED PROVIDER NOTES
ED PROVIDER NOTE    Chief Complaint   Patient presents with    Diarrhea     HAD CHEMO ON WEDNES/ DIARRHEA SINCE/ FEELS DEHYDRATED       HPI:  11/1/20,   Time: 4:40 PM BRANDON Marion is a 72 y.o. male presenting to the ED for diarrhea and dehydration. Gradual onset over the past 3-4 days. Constant, moderate in severity, no aggravating/alleviating factors, associated lightheadedness worse w/ standing and movement. No syncope. Febrile on arrival but no known fever or chills prior to arrival. No associated nausea, vomiting, abdominal pain. No black/bloody stools. No cough, shortness of breath, urinary symptoms, flank pain, neck pain/stiffness. Hx of pancreatic ca on chemo, last chemo 6 days ago. Chart review: hx of pancreatic ca on chemo, CAD s/p CABG, HTN    Review of Systems:     Review of Systems   Constitutional: Positive for fever. Negative for appetite change and chills. HENT: Negative for congestion, rhinorrhea and trouble swallowing. Eyes: Negative for visual disturbance. Respiratory: Negative for cough and shortness of breath. Cardiovascular: Negative for chest pain and leg swelling. Gastrointestinal: Positive for diarrhea. Negative for abdominal pain, blood in stool, nausea and vomiting. Genitourinary: Negative for decreased urine volume, difficulty urinating, dysuria, frequency, hematuria and urgency. Musculoskeletal: Negative for myalgias, neck pain and neck stiffness. Skin: Negative for color change. Neurological: Positive for light-headedness.  Negative for dizziness, syncope, weakness, numbness and headaches.         --------------------------------------------- PAST HISTORY ---------------------------------------------  Past Medical History:   Past Medical History:   Diagnosis Date    CAD (coronary artery disease)     Cancer ([facility]     Heart attack ([facility] 2008, 3-2015    Hypertension        Past Surgical History:   Past Surgical History:   Procedure Laterality Date    CARDIAC SURGERY      [facility] 2014    CORONARY ANGIOPLASTY  2005    CABG TIMES 4     CORONARY ANGIOPLASTY WITH STENT PLACEMENT      INGUINAL HERNIA REPAIR Right     VT THROMBOENDARTECTMY FEMORAL COMMON Right 2018    FEMORAL ENDARTERECTOMY performed by Jermaine Loja MD at [facility] VASCULAR SURGERY  08/15/2018    abdominal aortogram with runoff by Dr. Gonzales Corral History:   Social History     Socioeconomic History    Marital status:      Spouse name: None    Number of children: None    Years of education: None    Highest education level: None   Occupational History    None   Social Needs    Financial resource strain: None    Food insecurity     Worry: None     Inability: None    Transportation needs     Medical: None     Non-medical: None   Tobacco Use    Smoking status: Former Smoker     Packs/day: 1.50     Years: 40.00     Pack years: 60.00     Types: Cigarettes     Last attempt to quit: 2008     Years since quittin.2    Smokeless tobacco: Never Used    Tobacco comment: occassional cigar   Substance and Sexual Activity    Alcohol use: Not Currently    Drug use: No    Sexual activity: Not Currently     Partners: Female   Lifestyle    Physical activity     Days per week: None     Minutes per session: None    Stress: None   Relationships    Social connections     Talks on phone: None     Gets together: None     Attends L[employer] service: None     Active member of club or organization: None     Attends meetings of clubs or organizations: None     Relationship status: None    Intimate partner violence     Fear of current or ex partner: None     Emotionally abused: None     Physically abused: None     Forced sexual activity: None   Other Topics Concern    None   Social History Narrative    None       Family History:   Family History   Problem Relation Age of Onset    Breast Cancer Mother     Cancer Father         Colon    Cancer -------------------------------------------------- RESULTS -------------------------------------------------  I have personally reviewed all laboratory and imaging results for this patient. Results are listed below. LABS:  Labs Reviewed   CBC WITH AUTO DIFFERENTIAL - Abnormal; Notable for the following components:       Result Value    WBC 3.3 (*)     RBC 3.61 (*)     Hemoglobin 11.8 (*)     Hematocrit 34.0 (*)     MCHC 34.7 (*)     RDW 16.0 (*)     Platelets 38 (*)     Neutrophils % 86.0 (*)     Lymphocytes % 10.0 (*)     Monocytes % 1.0 (*)     Lymphocytes Absolute 0.43 (*)     Monocytes Absolute 0.03 (*)     Eosinophils Absolute 0.00 (*)     All other components within normal limits   COMPREHENSIVE METABOLIC PANEL W/ REFLEX TO MG FOR LOW K - Abnormal; Notable for the following components:    CO2 20 (*)     Glucose 101 (*)     Calcium 8.2 (*)     Total Protein 6.3 (*)     All other components within normal limits   PROTIME-INR - Abnormal; Notable for the following components:    Protime 13.1 (*)     All other components within normal limits   URINALYSIS WITH MICROSCOPIC - Abnormal; Notable for the following components:    Blood, Urine TRACE (*)     Protein, UA 30 (*)     Bacteria, UA RARE (*)     All other components within normal limits   COVID-19 - Abnormal; Notable for the following components:    SARS-CoV-2, NAAT DETECTED (*)     All other components within normal limits    Narrative:     Lexa Salazar tel. [phone],  Chemistry results called to and read back by Daniela Mack RN, 11/01/2020  18:07, by LIVSOWMYA   CULTURE, BLOOD 1   CULTURE, BLOOD 2   CULTURE, URINE   APTT   LACTIC ACID, PLASMA   PLATELET CONFIRMATION   TROPONIN       RADIOLOGY:  Interpreted personally and by Radiologist.  XR CHEST PORTABLE   Final Result   Mild hazy and linear airspace disease in the left mid lung which may   represent pneumonia and atelectasis.   Follow-up PA and lateral radiographs   may be helpful in further evaluation. EKG:  This EKG is signed and interpreted by the EP. Sinus tachycardia, vent rate 104bpm, normal axis, QRS widened 124ms, ST depression in I/aVL w/ Q waves and mild ST elevation in III/aVF, does not meet STEMI criteria, no prior EKG on file for comparison. ------------------------- NURSING NOTES AND VITALS REVIEWED ---------------------------   The nursing notes within the ED encounter and vital signs as below have been reviewed by myself. Temp 102.9 °F (39.4 °C) (Infrared)   Ht 5' 7\" (1.702 m)   Wt 165 lb (74.8 kg)   BMI 25.84 kg/m²   Oxygen Saturation Interpretation: Normal    The patients available past medical records and past encounters were reviewed. ------------------------------ ED COURSE/MEDICAL DECISION MAKING----------------------  Medications   0.9 % sodium chloride bolus (1,000 mLs Intravenous New Bag 20 1751)   piperacillin-tazobactam (ZOSYN) 3.375 g in dextrose 5 % 50 mL IVPB extended infusion (mini-bag) (has no administration in time range)   acetaminophen (TYLENOL) tablet 1,000 mg (1,000 mg Oral Given 20 2246)     Counseling: The emergency provider has spoken with the patient and discussed todays results, in addition to providing specific details for the plan of care and counseling regarding the diagnosis and prognosis. Questions are answered at this time and they are agreeable with the plan. ED Course/Medical Decision Makin y.o. male here with fever and diarrhea in setting of recent chemo 6d ago. Non-toxic appearing, hemodynamically stable, and in no acute distress. Febrile and tachycardic on arrival. Otherwise nonfocal exam. Labs notable for COVID positive, metabolic acidosis, and pancytopenia. No neutropenia. EKG w/ lateral ST depressions, no prior on file for comparison. Care everywhere does show reports for prior EKG which notes prior inferior infarct.  Fever and sx likely due to COVID however on initial evaluation concern for possible neutropenic fever w/ infiltrate on CXR so one dose of zosyn given. Given EKG changes, tachypnea, and immunocompromised status, will admit for further management. Discussed findings and expected course of care and patient/surrogate agreed with plan for admission for further evaluation and management.       --------------------------------- IMPRESSION AND DISPOSITION ---------------------------------    IMPRESSION  1. COVID-19    2. Pneumonia due to organism        DISPOSITION  Disposition: Admit to telemetry  Patient condition is stable    NOTE: This report was transcribed using voice recognition software.  Every effort was made to ensure accuracy; however, inadvertent computerized transcription errors may be present    Juliette Archuleta MD  Attending Emergency Physician          Juliette Archuleta MD  11/01/20 3056

## 2020-11-02 NOTE — CARE COORDINATION
ANAIS Notes: 11/2/2020 at 1:14pm: COVID POSITIVE. CM called and spoke to the patient on the phone in his room for transition of care. States he lives with his wife in a ranch home and was independent with his ADL's. Currently on RA. States has no hx of home oxygen or nebulizer. Hx of [facility]. No hx of SNF. PCP: Dr. Jazmín Mneeses. Pharmacy: [facility] in [place]. States his plans are to return home when discharged and his wife will provide transportation.  Charisse Lubin RN

## 2020-11-02 NOTE — PROGRESS NOTES
Internal Medicine Progress Note    [facility]    Raysatena Scott. Javier Arriaga., F.A.C.O.I. Meir Alston D.O., JESUSOTRUONG Calzada D.O. Larry Tavarez, MSN, APRN, NP-C  Jimenez Barillas. Kimmy Sood, MSN, APRN-CNP     Primary Care Physician: Thu Le MD   Admitting Physician:  José Manuel Douglas DO  Admission date and time: 11/1/2020  4:25 PM    Room:  [address] [place] [address]  Admitting diagnosis: COVID-19 [U07.1]    Patient Name: Rad Veliz  MRN: [id number]    Date of Service: 11/2/2020     Subjective:    Shana Akins is a 72 y.o. male who was seen and examined today,11/2/2020, at the bedside. The patient is up and ambulatory in the room with only minimal exertional symptoms. He is coughing without sputum. He denies being significantly short of breath. He denies chest pain. Denies any overt bleeding. He continues to have voluminous diarrhea and ID is following, infectious work up is being undertaken. No family present during my examination. Review of System:   Constitutional:   Denies fever or chills, weight loss or gain, admits to malaise and fatigue  HEENT:   Denies ear pain, sore throat, sinus or eye problems. Cardiovascular:   Denies any chest pain, irregular heartbeats, or palpitations. Respiratory:   See HPI. Gastrointestinal:   Denies nausea or vomiting. Admits to diarrhea. Denies any abdominal pain. Genitourinary:    Denies any urgency, frequency, hematuria. Voiding  without difficulty. Extremities:   Denies lower extremity swelling, edema or cyanosis. Neurology:    Denies any headache or focal neurological deficits, Denies generalized weakness or memory difficulty. Psch:   Denies being anxious or depressed. Musculoskeletal:    Denies  myalgias, joint complaints or back pain. Integumentary:   Denies any rashes, ulcers, or excoriations. Denies bruising. Hematologic/Lymphatic:  Denies bruising or bleeding. Physical Exam:  No intake/output data recorded.   No intake or output data in the 24 hours ending 11/02/20 1126No intake/output data recorded. Patient Vitals for the past 96 hrs (Last 3 readings):   Weight   11/01/20 2215 172 lb 9.6 oz (78.3 kg)   11/01/20 1622 165 lb (74.8 kg)     Vital Signs:   Blood pressure 124/62, pulse 103, temperature 100.9 °F (38.3 °C), temperature source Infrared, resp. rate 18, height 5' 7\" (1.702 m), weight 172 lb 9.6 oz (78.3 kg), SpO2 93 %. General appearance:  Alert, responsive, oriented to person, place, and time. Only mildly ill appearing, no distress. Head:  Normocephalic. No masses, lesions or tenderness. Eyes:  PERRLA. EOMI. Sclera clear. Buccal mucosa moist.  ENT:  Ears normal. Mucosa normal.  Neck:    Supple. Trachea midline. No thyromegaly. No JVD. No bruits. Heart:    Rhythm regular. Rate controlled. No murmurs. Lungs:    Symmetrical.  Diminished bibasilar air exchange. Clear to auscultation bilaterally. No wheezes. No rhonchi. No rales. Abdomen:   Soft. Non-tender. Non-distended. Bowel sounds positive. No organomegaly or masses. No pain on palpation. Extremities:    Peripheral pulses present. No peripheral edema. No ulcers. No cyanosis. No clubbing. Neurologic:    Alert x 3. No focal deficit. Cranial nerves grossly intact. No focal weakness. Psych:   Behavior is normal. Mood appears normal. Speech is not rapid and/or pressured. Musculoskeletal:   Spine ROM normal. Muscular strength intact. Gait not assessed. Integumentary:  No rashes  Skin normal color and texture.   Genitalia/Breast:  Deferred    Medication:  Scheduled Meds:   vancomycin  1,000 mg Intravenous Q12H    cefepime  2 g Intravenous Q12H    sodium chloride flush  10 mL Intravenous 2 times per day    aspirin EC  81 mg Oral Nightly    carvedilol  12.5 mg Oral BID WC    mirtazapine  15 mg Oral Nightly    lipase-protease-amylase  24,000 Units Oral TID AC    vitamin B-6  50 mg Oral Daily    vitamin C  500 mg Oral Daily    zinc gluconate Maximum COVID protocol is being undertaken and inflammatory biomarkers are being monitored. Treatment is being administered at the discretion of the infectious disease team.  In regards to the diarrhea, infectious work-up will be undertaken at the discretion of infectious disease although this may be chemotherapy-induced as this has been an ongoing issue. In the setting of worsening thrombocytopenia/pancytopenia, the patient's oncology service will be consulted as well. Laboratory values and vital signs are being monitored and addressed accordingly. We will continue to address underlying comorbidities during the hospitalization. Continue current therapy. See orders for further plan of care. More than 50% of my  time was spent at the bedside counseling/coordinating care with the patient and/or family with face to face contact. This time was spent reviewing notes and laboratory data as well as instructing and counseling the patient. Time I spent with the family or surrogate(s) is included only if the patient was incapable of providing the necessary information or participating in medical decisions. I also discussed the differential diagnosis and all of the proposed management plans with the patient and individuals accompanying the patient. Prabhjot Najera requires this high level of physician care and nursing on the IMC/Telemetry unit due the complexity of decision management and chance of rapid decline or death. Continued cardiac monitoring and higher level of nursing are required. I am readily available for any further decision-making and intervention.      MIYA Gomez CNP  11/2/2020  11:26 AM

## 2020-11-02 NOTE — ACP (ADVANCE CARE PLANNING)
Advance Care Planning     Advance Care Planning Activator (Inpatient)  Conversation Note      Date of ACP Conversation: 11/2/2020  Conversation Conducted with: Patient  ACP Activator: Larry Adams RN    *When Decision Maker makes decisions on behalf of the incapacitated patient: Decision Maker is asked to consider and make decisions based on patient values, known preferences, or best interests. Health Care Decision Maker:     Current Designated Health Care Decision Maker:   Primary Decision Maker: Gopi Mayfield - Spouse - [phone]    Note: If the relationship of these Decision-Makers to the patient does NOT follow your state's Next of Kin hierarchy, recommend that patient complete ACP document that meets state-specific requirements to allow them to act on the patient's behalf when appropriate. Care Preferences    Ventilation: \"If you were in your present state of health and suddenly became very ill and were unable to breathe on your own, what would your preference be about the use of a ventilator (breathing machine) if it were available to you? \"      Would the patient desire the use of ventilator (breathing machine)?: yes    \"If your health worsens and it becomes clear that your chance of recovery is unlikely, what would your preference be about the use of a ventilator (breathing machine) if it were available to you? \"     Would the patient desire the use of ventilator (breathing machine)?: Yes      Resuscitation  \"CPR works best to restart the heart when there is a sudden event, like a heart attack, in someone who is otherwise healthy. Unfortunately, CPR does not typically restart the heart for people who have serious health conditions or who are very sick. \"    \"In the event your heart stopped as a result of an underlying serious health condition, would you want attempts to be made to restart your heart (answer \"yes\" for attempt to resuscitate) or would you prefer a natural death (answer \"no\" for do not attempt to resuscitate)? \" yes      NOTE: If the patient has a valid advance directive AND now provides care preference(s) that are inconsistent with that prior directive, advise the patient to consider either: creating a new advance directive that complies with state-specific requirements; or, if that is not possible, orally revoking that prior directive in accordance with state-specific requirements, which must be documented in the EHR. [] Yes   [x] No   Educated Patient / Lazara Stanford regarding differences between Advance Directives and portable DNR orders.     Length of ACP Conversation in minutes:      Conversation Outcomes:  [x] ACP discussion completed  [] Existing advance directive reviewed with patient; no changes to patient's previously recorded wishes  [] New Advance Directive completed  [] Portable Do Not Rescitate prepared for Provider review and signature  [] POLST/POST/MOLST/MOST prepared for Provider review and signature      Follow-up plan:    [] Schedule follow-up conversation to continue planning  [] Referred individual to Provider for additional questions/concerns   [] Advised patient/agent/surrogate to review completed ACP document and update if needed with changes in condition, patient preferences or care setting    [x] This note routed to one or more involved healthcare provide

## 2020-11-02 NOTE — TELEPHONE ENCOUNTER
Updated Dr. Denise Aguilar, per patient's wife, \"Pateint is admitted with Covoid. \" Doctor voiced understanding.

## 2020-11-02 NOTE — ED NOTES
Per the usual, IMCU unable to take nursing report at this time.       Prasanth Hernandez, MAC  11/01/20 3072

## 2020-11-02 NOTE — CONSULTS
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 He has been on neoadjuvant chemotherapy with FOLFIRINOX regimen with good partial response so far. He is admitted with cough, fatigue and diarrhea with positive COVID-19 PCR  We are consulted for worsening thrombocytopenia.     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 Dunn Prima       Family History  Family History   Problem Relation Age of Onset    Breast Cancer Mother     Cancer Father         Colon    Cancer Maternal Aunt         Bladder    Cancer Maternal Grandfather Bladder    Cancer Maternal Cousin         Leukemia    Breast Cancer Maternal Aunt     Cancer Maternal Cousin         Lung       Social History    TOBACCO:   reports that he quit smoking about 12 years ago. His smoking use included cigarettes. He has a 60.00 pack-year smoking history. He has never used smokeless tobacco.  ETOH:   reports previous alcohol use. Home Medications  Prior to Admission medications    Medication Sig Start Date End Date Taking? Authorizing Provider   carvedilol (COREG) 12.5 MG tablet Take 12.5 mg by mouth 2 times daily (with meals) 9/25/19  Yes Historical Provider, MD   oxyCODONE 5 MG capsule Take 5 mg by mouth every 4 hours as needed for Pain.    Yes Historical Provider, MD   aspirin EC 81 MG EC tablet Take 81 mg by mouth nightly    Yes Historical Provider, MD   doxycycline hyclate (VIBRA-TABS) 100 MG tablet Take 100 mg by mouth 2 times daily done    Historical Provider, MD   cephALEXin (KEFLEX) 500 MG capsule Take 500 mg by mouth 3 times daily Started this med spiked temp and changed to doxy 10/16/20   Historical Provider, MD   lipase-protease-amylase (CREON) 33841-12838 units delayed release capsule Take 24,000 capsules by mouth 3 times daily (before meals)  Patient taking differently: Take 24,000 capsules by mouth 3 times daily (before meals) Grazed when eating  Takes only when he eats a large meal or snack 9/24/20 10/24/20  Morenita Bussinquintin APRDORITA - CNP   mirtazapine (REMERON) 15 MG tablet Take 1 tablet by mouth nightly  Patient taking differently: Take 15 mg by mouth nightly Pt is still taking 9/8/20 10/8/20  Morenita Bussing APRN - CNP   prochlorperazine (COMPAZINE) 10 MG tablet Take 1 tablet by mouth every 6 hours as needed (NAUSEA) 6/29/20   Morenita Bussing, APRN - CNP   promethazine (PHENERGAN) 12.5 MG tablet Take 12.5 mg by mouth 4 times daily    Historical Provider, MD       Allergies  No Known Allergies    Review of Systems: Relevant for cough, shortness of breath, fatigue, significant diarrhea, malaise, poor appetite. Objective  /62   Pulse 103   Temp 100.9 °F (38.3 °C) (Infrared)   Resp 18   Ht 5' 7\" (1.702 m)   Wt 172 lb 9.6 oz (78.3 kg)   SpO2 93%   BMI 27.03 kg/m²     Physical Exam: Not performed due to COVID-19 infection    Recent Laboratory Data-   Lab Results   Component Value Date    WBC 3.0 (L) 11/02/2020    HGB 11.2 (L) 11/02/2020    HCT 32.8 (L) 11/02/2020    MCV 96.8 11/02/2020    PLT 28 (L) 11/02/2020    LYMPHOPCT 16.4 (L) 11/02/2020    RBC 3.39 (L) 11/02/2020    MCH 33.0 11/02/2020    MCHC 34.1 11/02/2020    RDW 15.9 (H) 11/02/2020    NEUTOPHILPCT 76.7 11/02/2020    MONOPCT 3.4 11/02/2020    BASOPCT 0.3 11/02/2020    NEUTROABS 2.40 11/02/2020    LYMPHSABS 0.48 (L) 11/02/2020    MONOSABS 0.09 (L) 11/02/2020    EOSABS 0.00 (L) 11/02/2020    BASOSABS 0.00 11/02/2020       Lab Results   Component Value Date     11/02/2020    K 3.7 11/02/2020     11/02/2020    CO2 17 (L) 11/02/2020    BUN 15 11/02/2020    CREATININE 0.7 11/02/2020    GLUCOSE 99 11/02/2020    CALCIUM 8.0 (L) 11/02/2020    PROT 6.0 (L) 11/02/2020    LABALBU 3.4 (L) 11/02/2020    BILITOT 0.5 11/02/2020    ALKPHOS 104 11/02/2020    AST 33 11/02/2020    ALT 14 11/02/2020    LABGLOM >60 11/02/2020    GFRAA >60 11/02/2020       No results found for: IRON, TIBC, FERRITIN        Radiology-    Xr Chest Portable    Result Date: 11/1/2020  EXAMINATION: ONE XRAY VIEW OF THE CHEST 11/1/2020 5:19 pm COMPARISON: None. HISTORY: ORDERING SYSTEM PROVIDED HISTORY: fever TECHNOLOGIST PROVIDED HISTORY: Reason for exam:->fever FINDINGS: Cardiac silhouette at the upper limits of normal. Status post median sternotomy. MediPort line in the SVC. Mild hazy and linear airspace disease in the left mid lung which may represent pneumonia and atelectasis. There is no evidence of pleural effusions. The pulmonary vasculature is within normal limits.      Mild hazy and linear airspace disease in the left mid lung which may represent pneumonia and atelectasis. Follow-up PA and lateral radiographs may be helpful in further evaluation. ASSESSMENT/PLAN : 71-year-old man  Locally advanced inoperable pancreatic head adenocarcinoma with regional lymphadenopathy on neoadjuvant FOLFIRINOX with good partial response and decrease in measurable disease. Chronic thrombocytopenia related to chemotherapy worsened by recent COVID-19 pneumonia. Platelet count down to 28. Transfuse if platelet count drops below 10 or active bleeding develops. Moderate leukopenia with predominant lymphopenia also related to recent viral illness as well as myelosuppression by chemo. We will follow. Ratna Jones. Tia Sosa M.D., F.A.C.P.   Electronically signed 11/2/2020 at 12:23 PM

## 2020-11-02 NOTE — CONSULTS
[facility]  Consult Note    [address]  [place], [address]  Phone [phone]   [phone]      Admit Date: 2020  4:25 PM  Pt Name: Radha Ernst  MRN: [id number]  : 1950  Reason for Consult:    Chief Complaint   Patient presents with    Diarrhea     HAD CHEMO  [address] FEELS DEHYDRATED     Requesting Physician:  Darby Carey DO  PCP: Andre Bruce MD  History Obtained From:  patient  ID consulted for COVID-19 [U07.1]  on hospital day 1  CHIEF COMPLAINT       Chief Complaint   Patient presents with    Diarrhea     HAD CHEMO  [address] FEELS DEHYDRATED     HISTORYOF PRESENT ILLNESS      Radha Ernst is a 72 y.o. male who presents with significant past medical history of  has a past medical history of CAD (coronary artery disease), Cancer ([facility] Heart attack ([facility] and Hypertension. who presents with   Chief Complaint   Patient presents with    Diarrhea     HAD CHEMO  [address] FEELS DEHYDRATED     ED TRIAGEVITALS  BP: 124/62, Temp: 100.9 °F (38.3 °C), Pulse: 103, Resp: 18, SpO2: 93 %  HPI  Pt with h/o inoperable pancreatic head adenocarcinoma with regional lymphadenopathy but no clear evidence of metastatic disease with tiny subcentimeter pulmonary nodules. Py is on chemotherapy   Pt had  Shahid Edinger and was on doxycycline pta  Pt has diarrhea  He presented to er with diarrhea and dehydration  He has been feeling tired and weak  No n/v/d/rash/itchno appetite [employer]an Brew /smell  Pt had fevers and feels lightheaded  [id number]  Wbc 3.3  received piptazo  Currently feels better    REVIEW OF SYSTEMS    (2-9 systems for level 4, 10 or more for level 5)     REVIEW OFSYSTEMS:    CONSTITUTIONAL:   No fever, chills, weight loss  ALLERGIES:    No urticaria, hay fever,    EYES:     No blurry vision, loss of vision,eye pain  ENT:      No hearing loss, sore throat  CARDIOVASCULAR:  No chest pain or palpitations  RESPIRATORY:   No cough, sob  ENDOCRINE:    No increase thirst, urination   HEME-LYMPH:   No easy bruising or bleeding  GI:     No nausea, vomiting or diarrhea  :     No urinary complaints  NEURO:    No seizures, stroke, HA  MUSCULOSKELETAL:  No muscle aches or pain, no jointpain  SKIN:     No rash or itch  PSYCH:    No depression or anxiety    Medications Prior to Admission: carvedilol (COREG) 12.5 MG tablet, Take 12.5 mg by mouth 2 times daily (with meals)  oxyCODONE 5 MG capsule, Take 5 mg by mouth every 4 hours as needed for Pain.   aspirin EC 81 MG EC tablet, Take 81 mg by mouth nightly   doxycycline hyclate (VIBRA-TABS) 100 MG tablet, Take 100 mg by mouth 2 times daily done  cephALEXin (KEFLEX) 500 MG capsule, Take 500 mg by mouth 3 times daily Started this med spiked temp and changed to doxy  lipase-protease-amylase (CREON) 54994-08359 units delayed release capsule, Take 24,000 capsules by mouth 3 times daily (before meals) (Patient taking differently: Take 24,000 capsules by mouth 3 times daily (before meals) Grazed when eating  Takes only when he eats a large meal or snack)  mirtazapine (REMERON) 15 MG tablet, Take 1 tablet by mouth nightly (Patient taking differently: Take 15 mg by mouth nightly Pt is still taking)  prochlorperazine (COMPAZINE) 10 MG tablet, Take 1 tablet by mouth every 6 hours as needed (NAUSEA)  promethazine (PHENERGAN) 12.5 MG tablet, Take 12.5 mg by mouth 4 times daily'  CURRENT MEDICATIONS     Current Facility-Administered Medications:     oxyCODONE (ROXICODONE) immediate release tablet 5 mg, 5 mg, Oral, Q6H PRN, Bonnie Stuart DO    vancomycin 1000 mg IVPB in 250 mL D5W addavial, 1,000 mg, Intravenous, Q12H, Candy Poole MD, Last Rate: 250 mL/hr at 11/02/20 1157, 1,000 mg at 11/02/20 1157    cefepime (MAXIPIME) 2 g IVPB extended (mini-bag), 2 g, Intravenous, Q12H **AND** 0.9 % sodium chloride infusion, , Intravenous, Q12H, Candy Poole MD    remdesivir 200 mg in sodium chloride 0.9 % 250 mL IVPB, 200 mg, Intravenous, Once **FOLLOWED BY** [START ON 11/3/2020] remdesivir 100 mg in sodium chloride 0.9 % 250 mL IVPB, 100 mg, Intravenous, Q24H, Indira Malloy MD    0.9 % sodium chloride bolus, 30 mL, Intravenous, PRN, Indira Malloy MD    sodium chloride flush 0.9 % injection 10 mL, 10 mL, Intravenous, 2 times per day, Melvia Hacking, DO, 10 mL at 11/01/20 2304    sodium chloride flush 0.9 % injection 10 mL, 10 mL, Intravenous, PRN, Chayitovia Hacking, DO    acetaminophen (TYLENOL) tablet 650 mg, 650 mg, Oral, Q6H PRN, 650 mg at 11/02/20 6402 **OR** acetaminophen (TYLENOL) suppository 650 mg, 650 mg, Rectal, Q6H PRN, Chayitovia Hacking, DO    polyethylene glycol (GLYCOLAX) packet 17 g, 17 g, Oral, Daily PRN, Melvia Hacking, DO    aspirin EC tablet 81 mg, 81 mg, Oral, Nightly, Melvia Hacking, DO, 81 mg at 11/01/20 2303    carvedilol (COREG) tablet 12.5 mg, 12.5 mg, Oral, BID WC, Ismail U Júnior, DO, 12.5 mg at 11/02/20 0907    mirtazapine (REMERON) tablet 15 mg, 15 mg, Oral, Nightly, Ismail U Júnior, DO, 15 mg at 11/01/20 2303    prochlorperazine (COMPAZINE) tablet 10 mg, 10 mg, Oral, Q6H PRN, Melvia Hacking, DO    lipase-protease-amylase (CREON) delayed release capsule 24,000 Units, 24,000 Units, Oral, TID AC, Ismail U Júnior, DO, 24,000 Units at 11/02/20 1156    vitamin B-6 (PYRIDOXINE) tablet 50 mg, 50 mg, Oral, Daily, Indira Malloy MD, 50 mg at 11/02/20 0908    vitamin C (ASCORBIC ACID) tablet 500 mg, 500 mg, Oral, Daily, Indira Malloy MD, 500 mg at 11/02/20 0908    zinc gluconate tablet 50 mg, 50 mg, Oral, Daily, Indira Malloy MD, 50 mg at 11/02/20 0908  ALLERGIES     Patient has no known allergies. There is no immunization history on file for this patient.   PAST MEDICAL HISTORY     Past Medical History:   Diagnosis Date    CAD (coronary artery disease)     [facility]     [facility] ([facility] 2008, 3-2015    Hypertension      SURGICAL HISTORY Mild hazy and linear airspace disease in the left mid lung which may represent pneumonia and atelectasis. There is no evidence of pleural effusions. The pulmonary vasculature is within normal limits. Mild hazy and linear airspace disease in the left mid lung which may represent pneumonia and atelectasis. Follow-up PA and lateral radiographs may be helpful in further evaluation.      LABS  Recent Labs     11/01/20  1640 11/02/20  0550   WBC 3.3* 3.0*   HGB 11.8* 11.2*   HCT 34.0* 32.8*   MCV 94.2 96.8   PLT 38* 28*     Recent Labs     11/01/20  1640 11/02/20  0550    132   K 4.3 3.7    103   CO2 20* 17*   BUN 16 15   CREATININE 0.7 0.7   GFRAA >60 >60   LABGLOM >60 >60   GLUCOSE 101* 99   PROT 6.3* 6.0*   LABALBU 3.6 3.4*   CALCIUM 8.2* 8.0*   BILITOT 0.6 0.5   ALKPHOS 122 104   AST 32 33   ALT 16 14     Recent Labs     11/01/20  1640   PROCAL 0.12*     Lab Results   Component Value Date    CRP 6.1 (H) 11/02/2020     No results found for: SEDRATE  No results found for: [id number]  Lab Results   Component Value Date    COVID19 DETECTED 11/01/2020     COVID-19/SAJNU-COV2 LABS  Recent Labs     11/01/20  1640 11/02/20  0550   CRP  --  6.1*   PROCAL 0.12*  --    *  --    TROPONINI <0.01  --    DDIMER 2853  --    FIBRINOGEN >700*  --    INR 1.2  --    PROTIME 13.1*  --    AST 32 33   ALT 16 14   TRIG  --  80     Lab Results   Component Value Date    CHOL 141 11/02/2020    TRIG 80 11/02/2020    HDL 48 11/02/2020    LDLCALC 77 11/02/2020    LABVLDL 16 11/02/2020          MICROBIOLOGY:        MRSA Culture Only   Date Value Ref Range Status   09/11/2018 Methicillin resistant Staph aureus not isolated  Final        Patient is a 72 y.o. male who presented with   Chief Complaint   Patient presents with    Diarrhea     HAD CHEMO ON WEDNES/ DIARRHEA SINCE/ FEELS DEHYDRATED        FINAL IMPRESSION    immunocompromised pt   inoperable pancreatic head adenocarcinoma with regional lymphadenopathy but no clear evidence of metastatic disease with tiny subcentimeter pulmonary nodules. pancytopenia   1. COVID-19    2. Pneumonia due to organism      SARS-COV-2 pneumonia     Consult Pharmacy    Remdesivir x5 days  · Decadron 6mg qday x10 days  ·  vitamins  · Cefepime/vanco for leukopenic fever poss hcap        Follow COVID-19/SARS-COV-2 BIOMARKERS  Type and cross  Crp  Procal  Ferritin  Ldh  Troponin   Ddimer  Fibrinogen:  Inr  PROTIME  Cytokine panel   Baseline triglyceride/LIPID PANEL   DVT prophylaxis/TREATMENT           vancomycin 1000 mg IVPB in 250 mL D5W addavial, Q12H  cefepime (MAXIPIME) 2 g IVPB extended (mini-bag), Q12H  remdesivir 200 mg in sodium chloride 0.9 % 250 mL IVPB, Once    Followed by  [START ON 11/3/2020] remdesivir 100 mg in sodium chloride 0.9 % 250 mL IVPB, Q24H     vitamin B-6 (PYRIDOXINE) tablet 50 mg, Daily  vitamin C (ASCORBIC ACID) tablet 500 mg, Daily  zinc gluconate tablet 50 mg, Daily          Imaging and labs were reviewed per medical records and any ID pertinent labs were addressed with the patient. The patient/FAMILY  was educated about the diagnosis, prognosis, indications, risks and benefits of treatment. An opportunity to ask questions was given to the patient/FAMILY and questions were answered. Thank you for involving me in the care of Deep. Please do not hesitate to call for any questions or concerns.          Electronically signed by Monica Acevedo MD on 11/2/2020 at 12:59 PM

## 2020-11-02 NOTE — H&P
Department of Internal Medicine  History and Physical    PCP: Dr. Libia Perez  Admitting Physician: Dr. Merlinda Guess  Consultants: Dr. Lela Parker:  dehydration    HISTORY OF PRESENT ILLNESS:    This 78-year-old male who presented to the ED due to diarrhea and dehydration. States that over the last 2 to 3 days he has been having decreased appetite and has developed diarrhea. He did have associated lightheadedness change improved after fluid administration in the ED. He did have a fever as well. He denies shortness of breath or cough. He had last chemotherapy on 10/26/2020. He received a reduced dose because of his thrombocytopenia. 5/20/2020 echocardiogram   - The left ventricle is dilated. Left ventricular systolic function is moderately   decreased. EF = 35 ± 5% (visual est.)  - The right ventricle is normal in size. Right ventricular systolic function is   normal.  - The left atrial cavity is moderately dilated. - There is moderate (2+ - 3+) holosystolic mitral valve regurgitation. Regurgitant   orifice area (PISA) is 0.22 cm².     PAST MEDICAL Hx:  Past Medical History:   Diagnosis Date    CAD (coronary artery disease)     [facility] ([facility]     Heart attack ([facility] 2008, 3-2015    Hypertension        PAST SURGICAL Hx:   Past Surgical History:   Procedure Laterality Date    CARDIAC SURGERY      Quad 2014    CORONARY ANGIOPLASTY  2005    CABG TIMES 4 1    CORONARY ANGIOPLASTY WITH STENT PLACEMENT      INGUINAL HERNIA REPAIR Right     [place] THROMBOENDARTECTMY FEMORAL COMMON Right 9/17/2018    FEMORAL ENDARTERECTOMY performed by Bryce Vaz MD at [address] [place] [address] VASCULAR SURGERY  08/15/2018    abdominal aortogram with runoff by Dr. Phil Boswell Hx:  Family History   Problem Relation Age of Onset    Breast Cancer Mother     Cancer Father         Colon    Cancer Maternal Aunt         Bladder    Cancer Maternal Grandfather         Bladder    Cancer Maternal Cousin extremity swelling or edema. PHYSICAL EXAM:  VITALS:  Vitals:    11/01/20 1838   BP: 123/76   Pulse: 91   Resp: 25   Temp: 99.5 °F (37.5 °C)   SpO2: 93%         CONSTITUTIONAL:    Awake, alert, cooperative, no apparent distress, and appears stated age    EYES:    PERRL, EOMI, sclera clear, conjunctiva normal    ENT:    Normocephalic, atraumatic, sinuses nontender on palpation. External ears without lesions. Oral pharynx with moist mucus membranes. Tonsils without erythema or exudates. NECK:    Supple, symmetrical, trachea midline, no adenopathy, thyroid symmetric, not enlarged and no tenderness, skin normal, no bruits, no JVD    HEMATOLOGIC/LYMPHATICS:    No cervical lymphadenopathy and no supraclavicular lymphadenopathy    LUNGS:    Symmetric. No increased work of breathing, good air exchange, clear to auscultation bilaterally, no wheezes, rhonchi, or rales,     CARDIOVASCULAR:    Normal apical impulse, regular rate and rhythm, normal S1 and S2, no S3 or S4, and no murmur noted    ABDOMEN:    No scars, normal bowel sounds, soft, non-distended, non-tender, no masses palpated, no hepatosplenomegaly, no rebound or guarding elicited on palpation     MUSCULOSKELETAL:    There is no redness, warmth, or swelling of the joints. Full range of motion noted. Motor strength is 5 out of 5 all extremities bilaterally. Tone is normal.    NEUROLOGIC:    Awake, alert, oriented to name, place and time. Cranial nerves II-XII are grossly intact. Motor is 5 out of 5 bilaterally. SKIN:    No bruising or bleeding. No redness, warmth, or swelling    EXTREMITIES:    Peripheral pulses present. No edema, cyanosis, or swelling. OSTEOPATHIC:    Examined in seated and supine positions. Normal thoracic kyphosis and lumbar lordosis. No acute somatic dysfunction.     LABORATORY DATA:  CBC with Differential:    Lab Results   Component Value Date    WBC 3.3 11/01/2020    RBC 3.61 11/01/2020    HGB 11.8 11/01/2020    HCT 34.0 11/01/2020    PLT 38 11/01/2020    MCV 94.2 11/01/2020    MCH 32.7 11/01/2020    MCHC 34.7 11/01/2020    RDW 16.0 11/01/2020    NRBC 4.0 08/21/2020    METASPCT 0.9 10/16/2020    LYMPHOPCT 10.0 11/01/2020    MONOPCT 1.0 11/01/2020    MYELOPCT 0.9 10/16/2020    BASOPCT 0.0 11/01/2020    MONOSABS 0.03 11/01/2020    LYMPHSABS 0.43 11/01/2020    EOSABS 0.00 11/01/2020    BASOSABS 0.00 11/01/2020     CMP:    Lab Results   Component Value Date     11/01/2020    K 4.3 11/01/2020     11/01/2020    CO2 20 11/01/2020    BUN 16 11/01/2020    CREATININE 0.7 11/01/2020    GFRAA >60 11/01/2020    LABGLOM >60 11/01/2020    GLUCOSE 101 11/01/2020    PROT 6.3 11/01/2020    LABALBU 3.6 11/01/2020    CALCIUM 8.2 11/01/2020    BILITOT 0.6 11/01/2020    ALKPHOS 122 11/01/2020    AST 32 11/01/2020    ALT 16 11/01/2020       ASSESSMENT/PLAN:  1. [id number] pneumonia   1. Inflammatory markers obtained. Cultures ordered. consult ID.  2. Pancytopenia secondary to chemo  3. Diarrhea and dehydration  1. Possibly related to chemotherapy or Covid. Obtain C. difficile. Placed on IV fluids. 4. pancreatic head adenocarcinoma   5. Hypertension  6. Coronary artery disease status post stent placement and CABG  7. chronic systolic congestive heart failure        Eliza Sánchez D.O.  7:13 PM  11/1/2020    Electronically signed by Eliza Sánchez DO on 11/1/20 at 7:13 PM EST    Telehealth visit completed via physician liaison, Dr. Marcus Alegria,  who personally saw and examined the patient. I personally participated in the case including review of pertinent history as augmented in the above medical record and medical decision making on the date of service and I agree with all pertinent clinical formation unless otherwise noted.     Alicia Wang D.O., F.A.C.O.I.  11/1/20  11:55 AM

## 2020-11-03 NOTE — DISCHARGE SUMMARY
Internal Medicine Progress Note     ANET[facility]     Leonard Tamayo. Tri Hong., F.A.CJoeOJoeI. Sinai Valadez D.O., JESUSOTRUONG Cazares D.O. Sam Meng, MSN, APRN, NP-VANDANA Lyon. Francia Rodrigues, MSN, APRN-CNP       Internal Medicine  Discharge Summary    NAME: Gely Ceron  :  1950  MRN:  [id number]  S-Ira Oropeza MD  ADMITTED: 2020      DISCHARGED: 11/3/20    ADMITTING PHYSICIAN: Leonard Andrade DO    CONSULTANT(S):   IP CONSULT TO INFECTIOUS DISEASES  IP CONSULT TO PHARMACY  IP CONSULT TO ONCOLOGY     ADMITTING DIAGNOSIS:   COVID-19 [U07.1]     DISCHARGE DIAGNOSES:   1. Acute respiratory failure with hypoxia due to COVID-19 pneumonia  2. Worsening Thrombocytopenia with Pancytopenia in the setting of chemotherapy  3. Voluminous diarrhea with need to exclude coinfection, possibly due to chemotherapy  4. Dehydration 2/2 poor intake and GI loss  5. Adenocarcinoma of the pancreatic head on systemic chemotherapy  6. Essential hypertension  7. CAD with history of stents/CABG  8. Chronic compensated systolic CHF  9. Moderate protein calorie malnutrition    BRIEF HISTORY OF PRESENT ILLNESS:   This 52-year-old male who presented to the ED due to diarrhea and dehydration. States that over the last 2 to 3 days he has been having decreased appetite and has developed diarrhea. He did have associated lightheadedness change improved after fluid administration in the ED. He did have a fever as well. He denies shortness of breath or cough. He had last chemotherapy on 10/26/2020. He received a reduced dose because of his thrombocytopenia.     LABS[de-identified]  Lab Results   Component Value Date    WBC 2.5 (L) 2020    HGB 11.0 (L) 2020    HCT 32.1 (L) 2020    PLT 18 (LL) 2020     2020    K 3.5 2020     2020    CREATININE 0.6 (L) 2020    BUN 15 2020    CO2 18 (L) 2020    GLUCOSE 103 (H) 2020 ALT 13 11/03/2020    AST 33 11/03/2020    INR 1.2 11/01/2020     Lab Results   Component Value Date    INR 1.2 11/01/2020    INR 0.9 09/11/2018    PROTIME 13.1 (H) 11/01/2020    PROTIME 10.8 09/11/2018      Lab Results   Component Value Date    TSH 0.501 11/02/2020     Lab Results   Component Value Date    TRIG 80 11/02/2020     Lab Results   Component Value Date    HDL 48 11/02/2020     Lab Results   Component Value Date    LDLCALC 77 11/02/2020     No results found for: LABA1C    IMAGING:  Xr Chest (2 Vw)    Result Date: 11/3/2020  EXAMINATION: TWO XRAY VIEWS OF THE CHEST 11/3/2020 8:56 am COMPARISON: 11/01/2020 HISTORY: ORDERING SYSTEM PROVIDED HISTORY: pneumonia TECHNOLOGIST PROVIDED HISTORY: Reason for exam:->pneumonia FINDINGS: There is a small infiltrate seen within the right upper lobe. There is a left perihilar infiltrate. Overall in comparison with the patient's prior study there is no significant interval change. The heart is at upper limits of normal in size. Postoperative sternotomy changes are noted. Note is made of a right MediPort catheter. 1. Patchy infiltrate within the right and left perihilar regions. Xr Chest Portable    Result Date: 11/1/2020  EXAMINATION: ONE XRAY VIEW OF THE CHEST 11/1/2020 5:19 pm COMPARISON: None. HISTORY: ORDERING SYSTEM PROVIDED HISTORY: fever TECHNOLOGIST PROVIDED HISTORY: Reason for exam:->fever FINDINGS: Cardiac silhouette at the upper limits of normal. Status post median sternotomy. MediPort line in the SVC. Mild hazy and linear airspace disease in the left mid lung which may represent pneumonia and atelectasis. There is no evidence of pleural effusions. The pulmonary vasculature is within normal limits. Mild hazy and linear airspace disease in the left mid lung which may represent pneumonia and atelectasis. Follow-up PA and lateral radiographs may be helpful in further evaluation.          HOSPITAL COURSE:   Kel Faustin did very well throughout the hospitalization. He did not require nasal cannula oxygen. He was evaluated by the infectious disease team and was initially started on remdesivir therapy. He continued to improve and became ambulatory without difficulty. He had no residual shortness of breath. He remained extremely anxious for discharge home. He will be discharged home with vitamin supplementation and antibiotics as per the infectious disease team.     BRIEF PHYSICAL EXAMINATION AND LABORATORIES ON DAY OF DISCHARGE:  VITALS:  BP (!) 140/80   Pulse 99   Temp 99 °F (37.2 °C) (Infrared)   Resp 16   Ht 5' 7\" (1.702 m)   Wt 172 lb 9.6 oz (78.3 kg)   SpO2 93%   BMI 27.03 kg/m²     HEENT:  PERRLA. EOMI. Sclera clear. Buccal mucosa moist.    Neck:  Supple. Trachea midline. No thyromegaly. No JVD. No bruits. Heart:  Rhythm regular, rate controlled. No murmurs. Lungs:  Symmetrical. Clear to auscultation bilaterally. No wheezes. No rhonchi. No rales. Abdomen: Soft. Non-tender. Non-distended. Bowel sounds positive. No organomegaly or masses. No pain on palpation    Extremities:  Peripheral pulses present. No peripheral edema. No ulcers. Neurologic:  Alert x 3. No focal deficit. Cranial nerves grossly intact. Skin:  No petechia. No hemorrhage. No wounds. DISPOSITION:  The patient's condition is good. At this time the patient is without objective evidence of an acute process requiring continuing hospitalization or inpatient management. They are stable for discharge with outpatient follow-up. I have spoken with the patient and discussed the results of the current hospitalization, in addition to providing specific details for the plan of care and counseling regarding the diagnosis and prognosis. The plan has been discussed in detail and they are aware of the specific conditions for emergent return, as well as the importance of follow-up.   Their questions are answered at this time and they are agreeable with the plan for discharge to home    DISCHARGE MEDICATIONS:    Rachel Morales   Home Medication Instructions GOP:[id number]    Printed on:11/03/20 7202   Medication Information                      aspirin EC 81 MG EC tablet  Take 81 mg by mouth nightly              carvedilol (COREG) 12.5 MG tablet  Take 12.5 mg by mouth 2 times daily (with meals)             Cholecalciferol (VITAMIN D) 25 MCG TABS  Take 1 tablet by mouth daily             lipase-protease-amylase (CREON) 01299-33550 units delayed release capsule  Take 24,000 capsules by mouth 3 times daily (before meals)             mirtazapine (REMERON) 15 MG tablet  Take 1 tablet by mouth nightly             oxyCODONE 5 MG capsule  Take 5 mg by mouth every 4 hours as needed for Pain.             prochlorperazine (COMPAZINE) 10 MG tablet  Take 1 tablet by mouth every 6 hours as needed (NAUSEA)             promethazine (PHENERGAN) 12.5 MG tablet  Take 12.5 mg by mouth 4 times daily             vitamin B-6 (B-6) 50 MG tablet  Take 1 tablet by mouth daily             vitamin C (VITAMIN C) 500 MG tablet  Take 1 tablet by mouth daily             zinc gluconate 50 MG tablet  Take 1 tablet by mouth daily                 FOLLOW UP/INSTRUCTIONS:  · This patient is instructed to follow-up with his primary care physician. · Patient is instructed to follow-up with the consults listed above as directed by them. · he is instructed to resume home medications and take new medications as indicated in the list above. · If the patient has a recurrence of symptoms, he is instructed to go to the ED. Preparing for this patient's discharge, including paperwork, orders, instructions, and meeting with patient did require > 40 minutes.     Marcos Bang DO     11/3/2020  11:59 AM

## 2020-11-03 NOTE — CARE COORDINATION
ANAIS Note: 11/3/2020 at 10:59am: COVID POSITIVE. ANAIS called and talked to the patient in his room. Remains on RA. On Remdesivir. States his plans remain to return home with his wife and she will be providing the transportation. Denies any home going needs.  Amanda Tomlinson RN

## 2020-11-03 NOTE — PROGRESS NOTES
Physician Progress Note      PATIENT:               Birgit Dasilva  [facility] #:                  [id number]  :                       1950  ADMIT DATE:       2020 4:25 PM  [address] [place] DATE:  RESPONDING  PROVIDER #:        Milly Crabtree CNP          QUERY TEXT:    Dear Attending Provider,    Patient admitted with COVID positive. Noted documentation of acute respiratory   failure in 20 progress note. Please indicate one of the following and   document in the medical record: The medical record reflects the following:  Risk Factors: COVID Pneumonia  Clinical Indicators: Per  PN:Acute respiratory failure with hypoxia due to   COVID-19 pneumonia; :93% on room air, 96%ra, resp 31, 25, 23;   :92%   on ra, 93%ra, Resp:22, 20, 18. Treatment: pulse ox monitoring and assessments    Thank you,  Lott Heimlich, RN, BSN, CCDS  [phone]  Options provided:  -- Acute Respiratory Failure currently as evidenced by, Please document   evidence. -- Currently resolved Acute Respiratory Failure was evidenced by, Please   document evidence  -- Acute Respiratory Failure ruled out after study  -- Other - I will add my own diagnosis  -- Disagree - Not applicable / Not valid  -- Disagree - Clinically unable to determine / Unknown  -- Refer to Clinical Documentation Reviewer    PROVIDER RESPONSE TEXT:    Now resolved acute respiratory failure was evidenced by hypoxia on room air   with COVID-19 positivity    Query created by:  Brisa Noland on 2020 12:35 PM      Electronically signed by:  Milly Crabtree CNP 11/3/2020 12:23 PM

## 2020-11-03 NOTE — PROGRESS NOTES
[facility]  [facility]  Progress Note    NAME:Edvin Lee  1950  DATE OF SERVICE:20    FACE TO FACE ENCOUNTER 20  ID FOLLOWING FOR     SUBJECTIVE:  Chief Complaint   Patient presents with    Diarrhea     HAD CHEMO ON / DIARRHEA SINCE/ FEELS DEHYDRATED    presented on admission  Today:    In beds on ra asking about d/c  [place]  Qvdt079.5  Wbc2.5 pl18 co2 18  Patient is tolerating medications. No reported adverse drug reactions. Review of systems:  As stated above in the chief complaint, otherwise negative. Medications:  Scheduled Meds:   levoFLOXacin  500 mg Oral Daily    vancomycin  1,000 mg Intravenous Q12H    remdesivir IVPB  100 mg Intravenous Q24H    nystatin  5 mL Oral 4x Daily    Vitamin D  1,000 Units Oral Daily    sodium chloride flush  10 mL Intravenous 2 times per day    aspirin EC  81 mg Oral Nightly    carvedilol  12.5 mg Oral BID WC    mirtazapine  15 mg Oral Nightly    lipase-protease-amylase  24,000 Units Oral TID AC    vitamin B-6  50 mg Oral Daily    vitamin C  500 mg Oral Daily    zinc gluconate  50 mg Oral Daily     Continuous Infusions:    PRN Meds:oxyCODONE, sodium chloride, sodium chloride flush, acetaminophen **OR** acetaminophen, polyethylene glycol, prochlorperazine    OBJECTIVE:  BP (!) 140/80   Pulse 99   Temp 99 °F (37.2 °C) (Infrared)   Resp 16   Ht 5' 7\" (1.702 m)   Wt 172 lb 9.6 oz (78.3 kg)   SpO2 93%   BMI 27.03 kg/m²   Temp  Av.7 °F (37.1 °C)  Min: 97.4 °F (36.3 °C)  Max: 100.5 °F (38.1 °C)  Constitutional:  The patient is awake, alert, and oriented. Skin:    Warm and dry. No rashes were noted. HEENT:   .  AT/NC  Chest:   No use of accessory muscles to breathe. Symmetrical expansion. Dec bs  Cardiovascular:  S1 and S2 are rhythmic and regular. No murmurs appreciated. Abdomen:   Positive bowel sounds to auscultation. Benign to palpation. Extremities:    no edema.   CNS    AAxO   Lines: [facility]    Radiology:  Laboratory and Tests Review:  Lab Results   Component Value Date    WBC 2.5 (L) 11/03/2020    WBC 3.0 (L) 11/02/2020    WBC 3.3 (L) 11/01/2020    HGB 11.0 (L) 11/03/2020    HCT 32.1 (L) 11/03/2020    MCV 96.7 11/03/2020    PLT 18 (LL) 11/03/2020     No results found for: CRPHS  Lab Results   Component Value Date    ALT 13 11/03/2020    AST 33 11/03/2020    ALKPHOS 93 11/03/2020    BILITOT 0.4 11/03/2020     Lab Results   Component Value Date     11/03/2020    K 3.5 11/03/2020    K 4.3 11/01/2020     11/03/2020    CO2 18 11/03/2020    BUN 15 11/03/2020    CREATININE 0.6 11/03/2020    CREATININE 0.7 11/02/2020    CREATININE 0.7 11/01/2020    GFRAA >60 11/03/2020    LABGLOM >60 11/03/2020    GLUCOSE 103 11/03/2020    PROT 5.8 11/03/2020    LABALBU 3.4 11/03/2020    CALCIUM 8.2 11/03/2020    BILITOT 0.4 11/03/2020    ALKPHOS 93 11/03/2020    AST 33 11/03/2020    ALT 13 11/03/2020     Lab Results   Component Value Date    CRP 6.1 (H) 11/02/2020     Microbiology:   Recent Labs     11/01/20  1640   COVID19 DETECTED*     Lab Results   Component Value Date    BLOODCULT2 24 Hours no growth 11/01/2020         MRSA Culture Only   Date Value Ref Range Status   09/11/2018 Methicillin resistant Staph aureus not isolated  Final       Recent Labs     11/01/20  1640   LABURIN <10,000 CFU/mL  Mixed gram positive organisms        Recent Labs     11/02/20  0920   CDIFFTOXANT C. Difficile Toxin A and/or B NOT detected  Normal Range: Not detected         ASSESSMENT/PLAN:  immunocompromised pt   inoperable pancreatic head adenocarcinoma with regional lymphadenopathy but no clear evidence of metastatic disease with tiny subcentimeter pulmonary nodules.   Pancytopenia  covid pneumonia  -on ra  -levaquin  -stop cefepime/vanco   Cont rx    levoFLOXacin (LEVAQUIN) tablet 500 mg, Daily  vancomycin 1000 mg IVPB in 250 mL D5W addavial, Q12H  remdesivir 100 mg in sodium chloride 0.9 % 250 mL IVPB, Q24H  0.9 % sodium chloride bolus, PRN  nystatin (MYCOSTATIN) 235390 UNIT/ML suspension 500,000 Units, 4x Daily  Vitamin D (CHOLECALCIFEROL) tablet 1,000 Units, Daily  vitamin B-6 (PYRIDOXINE) tablet 50 mg, Daily  vitamin C (ASCORBIC ACID) tablet 500 mg, Daily  zinc gluconate tablet 50 mg, Daily          · Monitor labs    Imaging and labs were reviewed per medical records. The patient was educated about the diagnosis, prognosis, indications, risks and benefits of treatment. An opportunity to ask questions was given to the patient/FAMILY. Thank you for involving me in the care of Phillip Hammer I will continue to follow. Please do not hesitate to call for any questions or concerns.     Electronically signed by Immanuel Combs MD on 11/3/2020 at 2:38 PM

## 2020-11-04 NOTE — CARE COORDINATION
[facility] Transitions Initial Follow Up Call    Call within 2 business days of discharge: Yes    Patient: Crista Rucker Patient : 1950   MRN: [id number]  Reason for Admission: COVID-19   Discharge Date: 11/3/20 RARS: Readmission Risk Score: 17      Last Discharge [facility]       Complaint Diagnosis Description Type Department Provider    20 Diarrhea COVID-19 . .. ED to Hosp-Admission (Discharged) (ADMITTED) [facility] [address], DO; Dominguez Aleman MD           Attempted to contact patient today 20 for hospital discharge/COVID-19 positive follow up. Left message on home/mobile number requesting a return call back to CTN and provided contact information.      Leslie Hogan APRN

## 2020-11-05 NOTE — CARE COORDINATION
[facility] Transitions Initial Follow Up Call    Call within 2 business days of discharge: Yes    Patient: Gely Ceron Patient : 1950   MRN: [id number]  Reason for Admission: COVID-19  Discharge Date: 11/3/20 RARS: Readmission Risk Score: 17      Last Discharge [facility]       Complaint Diagnosis Description Type Department Provider    20 Diarrhea COVID-19 . .. ED to Hosp-Admission (Discharged) (ADMITTED) [facility] [address], DO; Huy Sigala MD          Challenges to be reviewed by the provider   Additional needs identified to be addressed with provider No  none    Discussed COVID-19 related testing which was available at this time. Test results were positive. Patient informed of results, if available? Yes         Method of communication with provider : none    Advance Care Planning:   Does patient have an Advance Directive:  decision maker updated. Was this a readmission? No  Patient stated reason for admission: diarrhea  Patients top risk factors for readmission: functional physical ability, medical condition and polypharmacy    Care Transition Nurse (CTN) contacted the family by telephone to perform post hospital discharge assessment. Verified name and  with family as identifiers. Provided introduction to self, and explanation of the CTN role. CTN reviewed discharge instructions, medical action plan and red flags with family who verbalized understanding. Family given an opportunity to ask questions and does not have any further questions or concerns at this time. Were discharge instructions available to patient? Yes. Reviewed appropriate site of care based on symptoms and resources available to patient including: PCP, Specialist, Urgent care clinics, Home health and When to call 911. The family agrees to contact the PCP office for questions related to their healthcare.      Medication reconciliation was performed with family, who verbalizes understanding of administration of home medications. Advised obtaining a 90-day supply of all daily and as-needed medications. Covid Risk Education    Patient has following risk factors of: heart failure, immunocompromised and Pancreatic Cancer. Education provided regarding infection prevention, and signs and symptoms of COVID-19 and when to seek medical attention with family who verbalized understanding. Discussed exposure protocols and quarantine From CDC: Are you at higher risk for severe illness?   and given an opportunity for questions and concerns. The family agrees to contact the COVID-19 hotline [phone] or PCP office for questions related to COVID-19. For more information on steps you can take to protect yourself, see CDC's How to Protect Yourself     Patient/family/caregiver given information for Get[facility] Loop and agrees to enroll yes  Patient's preferred e-mail: [email or web address]  Patient's preferred phone number: [phone]    Discussed follow-up appointments. If no appointment was previously scheduled, appointment scheduling offered: Yes. Is follow up appointment scheduled within 7 days of discharge? No  Non-[facility] follow up appointment(s): CTN confirmed with patient wife Marcos Chandra) that patient follows with Dr. Makayla Garcia (PCP) at [facility] and will follow up if needed. Non-face-to-face services provided:  Scheduled appointment with PCP-CTN confirmed with patient wife Marcos Chandra) that patient folows with Dr. Makayla Garcia (PCP) from [facility] and folow up if needed  Scheduled appointment with Specialist-CTN confirmed wtih patient wife Marcos Chandra) she has been on contact with Dr. Candance Chad (onc/hem) by phone and right now chemo is on hold because of COVID-19   Obtained and reviewed discharge summary and/or continuity of care documents  Education of patient/family/caregiver/guardian to support self-management-CTN discussed CHF zone tool/COVID-19 precautions and knowing hwen to seek medical attention.    Assessment

## 2020-11-06 NOTE — CARE COORDINATION
Yellow alert noted in Loop remote symptom monitoring program. Messaged patient to notify [facility] Porter if symptoms have worsened since yesterday. Barbara Villarreal LPN, 7:19 AM  Hi, I'm Barbara Villarreal LPN from the [place] [id number] Team. I see you triggered an Alert. Thank you for checking in with us. Please let us know if your symptoms are worse than yesterday or if you wish to speak to a nurse. You can also send your message to us. We are available between 8am to 4 pm Mon-Fri. and 9am to 1pm weekends. If your symptoms are severe - Please call your doctor, call 911 and/or go to the nearest Emergency Room.

## 2020-11-08 NOTE — CARE COORDINATION
Yellow alert noted in Loop remote symptom monitoring program. Messaged patient to notify Ind[facility] Porter if symptoms have worsened since yesterday. GI Symptoms is concerning. Alert triggered on Nov 8, 8:35 AM    Kenzie Gomez LPN, 9:50 AM  Hi, I'm Kenzie Gomez LPN with the [facility] [address] [place] [address] Loop Care Team. I see you triggered an Alert earlier. Thank you for checking in with us. Please let us know if your symptoms are worse today than yesterday or if you wish to speak to a nurse. You can either call me at [phone] or E-mail a message to us. We are available between 8 am to 4 pm Mon-Fri. and 9 am to 1 pm weekends. If your symptoms worsen or are severe - Please call your doctor, call 911 and/or go to the nearest Emergency Room. Leroy Clarke, 10:46 AM  I reached out yesterday got  On call Dr. Tana Vargas. all I wanted was orser for supplemental oxygen for when he desats with activity. Dr. Willy Grullon me he wasn't his patient. Not very helpful. Kenzie Gomez LPN, 60:56 AM  Does he have a pulmonary (lung) Doctor? Who is the Family doctor? How low do his Oxygen sats go when he is active? Patient did not respond to Loop E-mail or Telephone Call.      Kenzie Gomez LPN    [phone]  Melva Gama / [facility]  Coordinator

## 2020-11-09 NOTE — CARE COORDINATION
Comment alert noted in Loop remote symptom monitoring program.     Jill Guerrero, 9:10 AM  Jill Guerrero does not have a pulmonologist. Trent Bautistavers he went down to 83% with walking from bedroom to family room about 30 yards. He got back to 90 t0 92% in about 15 minutes. He stays 90 to 92% at rest. He has a virtual with our family Dr. Dr. Tiana Wong tomorrow. His appetite was a little better also. Barbara Villarreal LPN, 9:55 AM  Continue to monitor Edvin's oxygen levels making sure that they stay above 92%. If levels continue to drop/flucuate call your PCP office, or if symptoms worsen go to the nearest ER, or call 911.

## 2020-11-20 NOTE — PROGRESS NOTES
[address] [facility]. Joey Brown Galindo        Pt Name: Danielle Lauren: 1950  Date of evaluation: 11/20/2020  Primary Care Physician: Mohsen Bolivar MD  Reason for evaluation:   Chief Complaint   Patient presents with    Pancreatic Cancer     Head of pancreas    Follow-up    Chemotherapy            Subjective: Here for chemotherapy and follow up. Feels well today. Lost 20# since last visit   Received IV Hydration 11/20/2020    Appetite fair. Only eats 2 meals/day, takes Creon BID. Lomotil has been added for his diarrhea and it has been working. Receives pain meds from Dr. Florencia Sorto. C/O a fornuckle on his right arch of the foot was improving however now  he has a second one  forming about a half a centimeter away. He was ordered Doxycycline 100 mg BID for 7 days and to continue to use Neosporin ointment to the area. OBJECTIVE:  VITALS:  vitals were not taken for this visit. Physical Exam:  Performance Status: 0  Well developed, well nourished male  General: AAO to person, place, time, in no acute distress. Head and neck: PERRLA, EOMI. Sclera non icteric. Oropharynx: Clear. Neck: no JVD,  no adenopathy. Heart: Regular rate and regular rhythm, no murmur. Lungs: Clear to auscultation. Abdomen: Soft, non-tender;no masses, no organomegaly. Extremities: No edema,no cyanosis, no clubbing. Neurologic:Cranial nerves grossly intact. No focal motor or sensory deficits. Skin:  No rash. Medi-port: Right        Medications  Prior to Admission medications    Medication Sig Start Date End Date Taking?  Authorizing Provider   predniSONE (DELTASONE) 10 MG tablet Take 1 tablet by mouth daily 11/5/20 12/5/20  MIYA Land - CNP   vitamin B-6 (B-6) 50 MG tablet Take 1 tablet by mouth daily 11/4/20   Camden Angulo DO   vitamin C (VITAMIN C) 500 MG tablet Take 1 tablet by mouth daily 11/4/20   Camden Angulo DO   Cholecalciferol (VITAMIN D) 25 MCG TABS Take 1 tablet by mouth daily 11/4/20   Lenice Twan, DO   zinc gluconate 50 MG tablet Take 1 tablet by mouth daily 11/4/20   Lenice Twan, DO   lipase-protease-amylase (CREON) 74903-87802 units delayed release capsule Take 24,000 capsules by mouth 3 times daily (before meals)  Patient taking differently: Take 24,000 capsules by mouth 3 times daily (before meals) Grazed when eating  Takes only when he eats a large meal or snack 9/24/20 10/24/20  MIYA Herrera CNP   mirtazapine (REMERON) 15 MG tablet Take 1 tablet by mouth nightly  Patient taking differently: Take 15 mg by mouth nightly Pt is still taking 9/8/20 10/8/20  MIYA Herrera CNP   carvedilol (COREG) 12.5 MG tablet Take 12.5 mg by mouth 2 times daily (with meals) 9/25/19   Historical Provider, MD   prochlorperazine (COMPAZINE) 10 MG tablet Take 1 tablet by mouth every 6 hours as needed (NAUSEA) 6/29/20   MIYA Herrera CNP   oxyCODONE 5 MG capsule Take 5 mg by mouth every 4 hours as needed for Pain.     Historical Provider, MD   promethazine (PHENERGAN) 12.5 MG tablet Take 12.5 mg by mouth 4 times daily    Historical Provider, MD   aspirin EC 81 MG EC tablet Take 81 mg by mouth nightly     Historical Provider, MD    Scheduled Meds:  Continuous Infusions:  PRN Meds:.        Recent Laboratory Data-     Lab Results   Component Value Date    WBC 8.8 11/20/2020    HGB 11.8 (L) 11/20/2020    HCT 35.3 (L) 11/20/2020    MCV 97.0 11/20/2020     (L) 11/20/2020    LYMPHOPCT 13.6 (L) 11/20/2020    RBC 3.64 (L) 11/20/2020    MCH 32.4 11/20/2020    MCHC 33.4 11/20/2020    RDW 15.8 (H) 11/20/2020    NEUTOPHILPCT 71.7 11/20/2020    MONOPCT 13.4 (H) 11/20/2020    BASOPCT 0.2 11/20/2020    NEUTROABS 6.30 11/20/2020    LYMPHSABS 1.20 (L) 11/20/2020    MONOSABS 1.18 (H) 11/20/2020    EOSABS 0.07 11/20/2020    BASOSABS 0.02 11/20/2020       Lab Results   Component Value Date     11/20/2020    K 3.5 11/20/2020     11/20/2020 CO2 24 11/20/2020    BUN 15 11/20/2020    CREATININE 0.6 (L) 11/20/2020    GLUCOSE 77 11/20/2020    CALCIUM 8.8 11/20/2020    PROT 6.6 11/20/2020    LABALBU 3.4 (L) 11/20/2020    BILITOT 0.2 11/20/2020    ALKPHOS 112 11/20/2020    AST 20 11/20/2020    ALT 16 11/20/2020    LABGLOM >60 11/20/2020    GFRAA >60 11/20/2020           Lab Results   Component Value Date    CEA 19.2 (H) 10/16/2020           Radiology-  CT CHEST:  9/25/2020 at [facility]  IMPRESSION:  1. Interval resolution of previously described right upper lobe nodular   opacities. 2.  Residual subcentimeter nodular opacities in the left lung measuring   up to 5 mm, stable. 3.  New small left pleural effusion. 4.  Nonspecific sclerotic focus in the left posterior fourth rib, stable. Consider interval follow-up or further evaluation via bone scan. CT PANCREAS AND PELVIS:  9/25/2020 at [facility]  IMPRESSION:  5.1 x 5.1 x 5.6 cm infiltrative mass centered superior to the pancreatic   head/neck infiltrating the robert hepatis and invading the liver and   second duodenum, decreased in size compared to prior exam.  Partial   abutment of the posterior stomach, medial right hepatic lobe and anterior   IVC also noted. Extensive vascular involvement at the robert hepatis and   celiac, as described. Mild nonspecific infiltrative change in upper abdominal fat. Developing   carcinomatosis is not excluded. Attention on follow-up    Exophytic above water attenuation lesion medial lower left kidney may   represent a hyperdense cyst but is indeterminate. This could be further   characterized with dedicated renal CT or MRI, as indicated. CT ABDOMEN AND PELVIS:  6/22/2020 at [address] IN SIZE OF 6.3 X 6.0 X 7.5 CM IRREGULAR,   INFILTRATIVE PANCREATIC MASS, BIOPSY PROVEN ADENOCARCINOMA, WITH   PREDOMINANT EXTRAPANCREATIC COMPONENT THAT INVADES THE LIVER, DUODENUM,   AND IVC.   ADJACENT CONTIGUOUS NECROTIC LESION IN THE PORTACAVAL SPACE MAY   REPRESENT A CONGLOMERATE PORTION OF ABOVE DESCRIBED LESION VERSUS   METASTATIC ADENOPATHY. VASCULAR INVOLVEMENT AS DESCRIBED. NON-SPECIFIC WALL THICKENING OF THE DISTAL ESOPHAGUS. CONSIDER DIRECT         ASSESSMENT/PLAN :  A 62-year-old man with:     Locally advanced inoperable pancreatic head adenocarcinoma with regional lymphadenopathy but no clear evidence of metastatic disease with tiny subcentimeter pulmonary nodules. Seen at [facility] by Dr. Hebert Villalba and recommended systemic chemotherapy with FOLFIRINOX regimen with follow-up imaging in 3 months with repeat CT scan of chest as well as pancreas and pelvis to be done at [facility] with a CT scan of abdomen and venous phase to assess for peritoneal metastasis. Genetic counseling referral was done at [facility] and next generation sequencing was also performed and results are pending  He has been referred to palliative care and is S/P celiac plexus block with much better pain control. If he achieves adequate cytoreduction he will be considered in several months for Keshia knife radiation. All potential side effects of FOLFIRINOX were discussed. His CT scan of abdomen and pelvis done at [facility] on 6/22/2020 again shows large pancreatic head mass measuring up to 7 cm with a component extrinsic to the pancreas directly invading the liver duodenum and IVC with contiguous necrotic portacaval nodes. Received  Cycle #1 FOLFIRINOX on 6/29/2020. Received Udenyca on 7/1/2020. Received  Cycle #2 FOLFIRINOX on 7/13/2020. Received Udenyca on 7/15/2020  Received  Cycle #3 FOLFIRINOX on 7/27/2020. His oxaliplatin dose was decreased because of his grade 2 thrombocytopenia. Received Udenyca on 7/29/2020       Received  Cycle #4 FOLFIRINOX on 8/10/2020. His oxaliplatin dose was decreased again because of his grade 2 thrombocytopenia.  Received Udenyca on 8/12/2020  He was given Remeron 15 mg at bedtime as an appetite stimulant  He also received KCl supplements for his hypokalemia and he was given a trial of Creon for his bloating. Received Cycle #5 FOLFIRINOX on 8/31/2020. Received Udenyca on 9/02/2020. His chemotherapy was postponed last week---9/14/2020-- because of his thrombocytopenia    Received Cycle #5 FOLFIRINOX on 8/31/2020. His oxaliplatin dose will be decreased again  because of his  thrombocytopenia. Platelet were 441  on  9/18/2020. Jessica Mark He had  for follow-up CT scans on 9/25/2020 at [facility]  His scans will be reviewed and then he will be referred back to [facility] for consideration of either potential resection versus Keshia knife radiation depending on the extent of his response to  neoadjuvant chemo    10/5/2020  CT scan of the chest showed interval resolution of previously described right upper lobe nodular opacities with a tiny small left pleural effusion of uncertain clinical significance. There was a sclerotic focus in the left posterior fourth rib stable and unchanged in size. CT scan of abdomen and pancreas showed persistent but smaller 5.1 x 5.6 cm infiltrative mass superior to the mesenteric head infiltrating the robert hepatis and invading the liver and second portion of the duodenum decrease in size. He was recommended to continue on present regimen of FOLFIRINOX   His dose will be reduced today for grade 2 thrombocytopenia. After 3 additional months of chemo he will be reevaluated for potential Keshia knife radiation      Received Cycle #7 FOLFIRINOX on 10/05/2020. His dose was reduced  for grade 2 thrombocytopenia      10/19/2020  Postponed Cycle # 8 of FOLFIRINOX due to thrombocytopenia and platelet count of 58.      10/26/2020  Received Cycle # 8 of FOLFIRINOX today with dose reduction because of moderate thrombocytopenia      11/23/2020  To receive Cycle #9 FOLFIRINOX today----11/23/2020    To return 12/07/2020 for Cycle #10 FOLFIRINOX. Bruce Montiel. Denise Aguilar M.D., F.A.C.P.   Electronically signed 11/20/2020 at 3:29 PM

## 2020-11-24 NOTE — TELEPHONE ENCOUNTER
Spoke with Emilia Castroronda (patient's wife). She was very upset that she did not receive a call back yesterday. Said RN apologized and expressed understanding for her concerns. She would like to speak with Dr. Armando Ya regarding medications and symptoms that patient is having. Jaye Boyce will ask him to call her when he is in the office tomorrow. Emilia Juarez verbalized understanding.   Trixie Campbell RN 11/24/2020 [id number]

## 2020-12-01 NOTE — PROGRESS NOTES
Laura Abbott  1950 72 y.o. Referring Physician: Self to est new oncologist    PCP: Gracie Jeronimo MD    Vitals:    20 1308   BP: 131/73   Pulse: 82   Temp: 98.5 °F (36.9 °C)   SpO2: 95%        Wt Readings from Last 3 Encounters:   20 172 lb 9.6 oz (78.3 kg)   20 172 lb 9.6 oz (78.3 kg)   10/26/20 177 lb (80.3 kg)        Body mass index is 27.03 kg/m². Chief Complaint:   Chief Complaint   Patient presents with    New Patient         Cancer Staging  No matching staging information was found for the patient. Prior Radiation Therapy? NO    Concurrent Chemo/radiation? NO    Prior Chemotherapy? YES: Site Treated: Pancreas          Facility: [facility]          Date: May 2020    Prior Hormonal Therapy? NO    Head and Neck Cancer? No, patient does NOT have HN cancer.       LMP:     Age at first Menses:     :   Para:           Current Outpatient Medications:     DULoxetine (CYMBALTA) 20 MG extended release capsule, Take 20 mg by mouth daily, Disp: , Rfl:     Lomitapide Mesylate 5 MG CAPS, Take by mouth, Disp: , Rfl:     predniSONE (DELTASONE) 10 MG tablet, Take 1 tablet by mouth daily, Disp: 30 tablet, Rfl: 0    vitamin C (VITAMIN C) 500 MG tablet, Take 1 tablet by mouth daily, Disp: 30 tablet, Rfl: 3    zinc gluconate 50 MG tablet, Take 1 tablet by mouth daily, Disp: 30 tablet, Rfl: 3    carvedilol (COREG) 12.5 MG tablet, Take 12.5 mg by mouth 2 times daily (with meals), Disp: , Rfl:     prochlorperazine (COMPAZINE) 10 MG tablet, Take 1 tablet by mouth every 6 hours as needed (NAUSEA), Disp: 40 tablet, Rfl: 2    oxyCODONE 5 MG capsule, Take 5 mg by mouth every 4 hours as needed for Pain., Disp: , Rfl:     promethazine (PHENERGAN) 12.5 MG tablet, Take 12.5 mg by mouth 4 times daily, Disp: , Rfl:     aspirin EC 81 MG EC tablet, Take 81 mg by mouth nightly , Disp: , Rfl:     vitamin B-6 (B-6) 50 MG tablet, Take 1 tablet by mouth daily (Patient not taking: Reported on 12/1/2020), Disp: 30 tablet, Rfl: 3    Cholecalciferol (VITAMIN D) 25 MCG TABS, Take 1 tablet by mouth daily (Patient not taking: Reported on 12/1/2020), Disp: 60 tablet, Rfl: 0    lipase-protease-amylase (CREON) 71894-03283 units delayed release capsule, Take 24,000 capsules by mouth 3 times daily (before meals) (Patient taking differently: Take 24,000 capsules by mouth 3 times daily (before meals) Grazed when eating  Takes only when he eats a large meal or snack), Disp: 90 capsule, Rfl: 0    mirtazapine (REMERON) 15 MG tablet, Take 1 tablet by mouth nightly (Patient taking differently: Take 15 mg by mouth nightly Pt is still taking), Disp: 30 tablet, Rfl: 2       Past Medical History:   Diagnosis Date    CAD (coronary artery disease)     Cancer ([facility] 05/2020    pancreas    Heart attack ([facility] [address] 2008, 3-2015    Hypertension        Past Surgical History:   Procedure Laterality Date    CARDIAC SURGERY      [place] 2014    CORONARY ANGIOPLASTY  2005    CABG TIMES 4 2015    CORONARY ANGIOPLASTY WITH STENT PLACEMENT      INGUINAL HERNIA REPAIR Right     IA THROMBOENDARTECTMY FEMORAL COMMON Right 9/17/2018    FEMORAL ENDARTERECTOMY performed by Cheryle Javier MD at [address] [place] [address] VASCULAR SURGERY  08/15/2018    abdominal aortogram with runoff by Dr. Natalya Rollins       Family History   Problem Relation Age of Onset    Breast Cancer Mother     Cancer Father         Colon    Cancer Maternal Aunt         Bladder    Cancer Maternal Grandfather         Bladder    Cancer Maternal Cousin         Leukemia    Breast Cancer Maternal Aunt     Cancer Maternal Cousin         Lung       Social History     Socioeconomic History    Marital status:      Spouse name: Not on file    Number of children: Not on file    Years of education: Not on file    Highest education level: Not on file   Occupational History    Not on file   Social Needs    Financial resource strain: Not on file   Frank-Robin insecurity     Worry: Not on file     Inability: Not on file    Transportation needs     Medical: Not on file     Non-medical: Not on file   Tobacco Use    Smoking status: Former Smoker     Packs/day: 1.50     Years: 40.00     Pack years: 60.00     Types: Cigarettes     Last attempt to quit: 2008     Years since quittin.3    Smokeless tobacco: Never Used    Tobacco comment: occassional cigar   Substance and Sexual Activity    Alcohol use: Not Currently    Drug use: No    Sexual activity: Not Currently     Partners: Female   Lifestyle    Physical activity     Days per week: Not on file     Minutes per session: Not on file    Stress: Not on file   Relationships    Social connections     Talks on phone: Not on file     Gets together: Not on file     Attends Shinto service: Not on file     Active member of club or organization: Not on file     Attends meetings of clubs or organizations: Not on file     Relationship status: Not on file    Intimate partner violence     Fear of current or ex partner: Not on file     Emotionally abused: Not on file     Physically abused: Not on file     Forced sexual activity: Not on file   Other Topics Concern    Not on file   Social History Narrative    Not on file           Occupation: yes  Retired:  YES: Patient is retired from car sales. REVIEW OF SYSTEMS: <<For Level 5, 10 or more systems>>     Pacemaker/Defibulator/ICD:  No    Mediport: Yes           FALLS RISK SCREENING ASSESSMENT    Instructions:  Assess the patient and [place] the appropriate indicators that are present for fall risk identification. Total the numbers circled and assign a fall risk score from Table 2.  Reassess patient at a minimum every 12 weeks or with status change. Assessment   Date  2020     1. Mental Ability: confusion/cognitively impaired No - 0       2. Elimination Issues: incontinence, frequency No - 0       3.   Ambulatory: use of assistive devices (walker, cane, off-loading devices), attached to equipment (IV pole, oxygen) No - 0     4. Sensory Limitations: dizziness, vertigo, impaired vision No - 0       5. Age 72 years or greater - 1       10. Medication: diuretics, strong analgesics, hypnotics, sedatives, antihypertensive agents   No - 0   7. Falls:  recent history of falls within the last 3 months (not to include slipping or tripping)   No - 0   TOTAL 1    If score of 4 or greater was education given? No       TABLE 2   Risk Score Risk Level Plan of Care   0-3 Little or  No Risk 1. Provide assistance as indicated for ambulation activities  2. Reorient confused/cognitively impaired patient  3. Call-light/bell within patient's reach  4. Chair/bed in low position, stretcher/bed with siderails up except when performing patient care activities  5. Educate patient/family/caregiver on falls prevention  6.  Reassess in 12 weeks or with any noted change in patient condition which places them at a risk for a fall   4-6 Moderate Risk 1. Provide assistance as indicated for ambulation activities  2. Reorient confused/cognitively impaired patient  3. Call-light/bell within patient's reach  4. Chair/bed in low position, stretcher/bed with siderails up except when performing patient care activities  5. Educate patient/family/caregiver on falls prevention  6. Falls risk precaution (Yellow sticker Level II) placed on patient chart   7 or   Higher High Risk 1. Place patient in easily observable treatment room  2. Patient attended at all times by family member or staff  3. Provide assistance as indicated for ambulation activities  4. Reorient confused/cognitively impaired patient  5. Call-light/bell within patient's reach  6. Chair/bed in low position, stretcher/bed with siderails up except when performing patient care activities  7. Educate patient/family/caregiver on falls prevention  8.   Falls risk precaution (Yellow sticker Level III) placed on patient chart MALNUTRITION RISK SCREENING ASSESSMENT    Instructions:  Assess the patient and enter the appropriate indicators that are present for nutrition risk identification. Total the numbers entered and assign a risk score. Follow the appropriate action for total score listed below. Assessment   Date  12/1/2020     1. Have you lost weight without trying? 2- Yes, 5.1 kg to 10 kg     2. Have you been eating poorly because of a decreased appetite? 0- No   3. Do you have a diagnosis of head and neck cancer?       0- No                                                                                    TOTAL 2        Score of 0-1: No action  Score 2 or greater:  · For Non-Diabetic Patient: Recommend adding Ensure Enlive 2 x daily and provide patient with Ensure wellness bag with coupons  · For Diabetic Patient: Recommend adding Glucerna Shake 2 x daily and provide patient with Glucerna Wellness bag with coupons  · Route to the dietitian via [address]    · Are you having  difficulty performing daily routine tasks  due to fatigue or weakness (ie: bathing/showering, dressing, housework, meal prep, work, child [place]): No     · Do you have any arm flexibility/ROM restrictions, swelling or pain that limit activity: No     · Any changes in memory, attention/focus that impact daily activities: No     · Do you avoid participation in leisure/social activity due weakness, fatigue or pain: No     ARE ANY OF THE ABOVE ARE ANSWERED YES: No          PT ASSESSMENT FOR REFERRAL    · Have you had any recent falls in past 2 months: No     · Do you have difficulty  going up/down stairs: No     · Are you having difficulty walking: No     · Do you often hold onto furniture/environmental supports or feel off balance when you are walking: No     · Do you need to take rest breaks when you are walking: No     · Any pain on scale of 1-10 that limits your mobility: No 0/10    ARE ANY OF THE ABOVE ARE ANSWERED YES: No                 Latanya Saavedra

## 2020-12-01 NOTE — PROGRESS NOTES
[address] [facility]  [address] [facility] [id number]  Dept: [phone]  Attending Consult Note      Reason for Visit:   Pancreatic cancer present    Referring physician: Self-referred. 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 He was started on FOLFIRINOX chemotherapy on 6/29/2020, the patient has been under the great care of Dr. Linda Tadeo locally, and Dr. Cirilo Cervantes at [facility] the patient had side effects from the chemotherapy including thrombocytopenia and diarrhea. The oxaliplatin dose has been decreased secondary to thrombocytopenia. The patient had CT scans of the chest and pancreas done in September 2020, revealing a 5.1 x 5.1 x 5.6 cm infiltrative mass centered superior to the pancreatic head/neck infiltrating the robert hepatis and invading the liver and second duodenal, had decreased in size compared to prior exam, partial abutment of the posterior stomach, medial right hepatic lobe and anterior IVC also noted, extensive vascular involvement of the robert hepatis and celiac, mild nonspecific infiltrative changes in the upper abdominal fat, developing carcinomatosis is not excluded, interval resolution of previously described right upper lobe nodular opacities, residual subcentimeter thick nodular opacity in the left lung measuring up to 5 mm, stable, new small left pleural effusion, nonspecific sclerotic focus in the left posterior fourth rib, stable.      The patient had a virtual visit with Dr. Mane Fitzgerald on 10 3/13/2020,he  was recommended to continue FOLFIRINOX for 6 more cycles, which could PLACEMENT      INGUINAL HERNIA REPAIR Right     CT THROMBOENDARTECTMY FEMORAL COMMON Right 2018    FEMORAL ENDARTERECTOMY performed by Kwesi Garsia MD at [address] [facility] VASCULAR SURGERY  08/15/2018    abdominal aortogram with runoff by Dr. Vinod Jurado       Family History:  Family History   Problem Relation Age of Onset    Breast Cancer Mother     Cancer Father         Colon    Cancer Maternal Aunt         Bladder    Cancer Maternal Grandfather         Bladder    Cancer Maternal Cousin         Leukemia    Breast Cancer Maternal Aunt     Cancer Maternal Cousin         Lung       Medications:  Reviewed and reconciled.     Social History:  Social History     Socioeconomic History    Marital status:      Spouse name: Not on file    Number of children: Not on file    Years of education: Not on file    Highest education level: Not on file   Occupational History    Not on file   Social Needs    Financial resource strain: Not on file    Food insecurity     Worry: Not on file     Inability: Not on file    Transportation needs     Medical: Not on file     Non-medical: Not on file   Tobacco Use    Smoking status: Former Smoker     Packs/day: 1.50     Years: 40.00     Pack years: 60.00     Types: Cigarettes     Last attempt to quit: 2008     Years since quittin.3    Smokeless tobacco: Never Used    Tobacco comment: occassional cigar   Substance and Sexual Activity    Alcohol use: Not Currently    Drug use: No    Sexual activity: Not Currently     Partners: Female   Lifestyle    Physical activity     Days per week: Not on file     Minutes per session: Not on file    Stress: Not on file   Relationships    Social connections     Talks on phone: Not on file     Gets together: Not on file     Attends Scientologist service: Not on file     Active member of club or organization: Not on file     Attends meetings of clubs or organizations: Not on file     Relationship status: Not on file    Intimate partner violence     Fear of current or ex partner: Not on file     Emotionally abused: Not on file     Physically abused: Not on file     Forced sexual activity: Not on file   Other Topics Concern    Not on file   Social History Narrative    Not on file       Allergies:  No Known Allergies    Physical Exam:  /73 (Site: Right Upper Arm, Position: Sitting, Cuff Size: Large Adult)   Pulse 82   Temp 98.5 °F (36.9 °C) (Temporal)   Ht 5' 7\" (1.702 m)   Wt 172 lb 9.6 oz (78.3 kg)   SpO2 95%   BMI 27.03 kg/m²   GENERAL: Alert, oriented x 3, not in acute distress. HEENT: PERRLA; EOMI. Oropharynx clear. NECK: Supple. No palpable cervical or supraclavicular lymphadenopathy. LUNGS: Good air entry bilaterally. No wheezing, crackles or rhonchi. CARDIOVASCULAR: Regular rate. No murmurs, rubs or gallops. ABDOMEN: Soft. Non-tender, non-distended. Positive bowel sounds. EXTREMITIES: Without clubbing, cyanosis, or edema. NEUROLOGIC: No focal deficits. 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 He was started on FOLFIRINOX chemotherapy on 6/29/2020, the patient has been under the great care of Dr. Temitope Miranda locally, and Dr. Reford Kussmaul at [facility] the patient had side effects from the chemotherapy including thrombocytopenia and diarrhea. The oxaliplatin dose has been decreased secondary to thrombocytopenia.   The patient had CT scans of the chest and pancreas done in September 2020, revealing a 5.1 x 5.1 x 5.6 cm infiltrative mass centered superior to the pancreatic head/neck infiltrating the robert hepatis and invading the liver and

## 2020-12-08 NOTE — PROGRESS NOTES
[address] [facility]  [address] [place] [id number]  Dept: [phone]  Attending Consult Note      Reason for Visit:   Pancreatic cancer present    Referring physician: Self-referred. PCP:  Virgil Hernandez MD    History of Present Illness: The patient is a very pleasant 73-year-old gentleman, with a past medical history significant for hypertension, coronary artery disease, and peripheral vascular disease, he was diagnosed with locally advanced pancreatic head adenocarcinoma in May 2020, the pancreatic head mass encircles the hepatic artery 360 degrees, without any evidence of metastatic disease. He was started on FOLFIRINOX chemotherapy on 6/29/2020, the patient has been under the great care of Dr. Severa Halon locally, and Dr. Danny Ryan at [facility] the patient had side effects from the chemotherapy including thrombocytopenia and diarrhea. The oxaliplatin dose has been decreased secondary to thrombocytopenia. The patient had CT scans of the chest and pancreas done in September 2020, revealing a 5.1 x 5.1 x 5.6 cm infiltrative mass centered superior to the pancreatic head/neck infiltrating the robert hepatis and invading the liver and second duodenal, had decreased in size compared to prior exam, partial abutment of the posterior stomach, medial right hepatic lobe and anterior IVC also noted, extensive vascular involvement of the robert hepatis and celiac, mild nonspecific infiltrative changes in the upper abdominal fat, developing carcinomatosis is not excluded, interval resolution of previously described right upper lobe nodular opacities, residual subcentimeter thick nodular opacity in the left lung measuring up to 5 mm, stable, new small left pleural effusion, nonspecific sclerotic focus in the left posterior fourth rib, stable.      The patient had a virtual visit with Dr. Deangelo Macdonald on 10 3/13/2020,he  was recommended to continue FOLFIRINOX for 6 more cycles, which could be modified to make it tolerable, even evaluation of one of the drops to his oxaliplatin is warranted. He could begin it for SBRT if he continues to have a locally advanced disease. The patient has received 8 cycles to date, last cycle was on 10/23/2020. The patient was admitted to the hospital on 11/1/2020 with acute respiratory failure secondary to COVID-19 pneumonia, he was also diagnosed with C. difficile colitis. He is feeling much better at this time, he completed the course of Flagyl, 12/4/2020. His stools have been formed. He developed after the last cycle of chemotherapy tingling and numbness of the hands, he was started on Cymbalta, which is helping. The abdominal pain had resolved after the first cycle of chemotherapy. Review of Systems;  CONSTITUTIONAL: No fever, chills. Good appetite, positive for fatigue. ENMT: Eyes: No diplopia; Nose: No epistaxis. Mouth: No sore throat. RESPIRATORY: No hemoptysis, shortness of breath, cough. CARDIOVASCULAR: No chest pain, palpitations. GASTROINTESTINAL: No nausea/vomiting, abdominal pain, diarrhea/constipation. GENITOURINARY: No dysuria, urinary frequency, hematuria. NEURO: No syncope, presyncope, headache.   Remainder:  ROS NEGATIVE    Past Medical History:      Diagnosis Date    CAD (coronary artery disease)     Cancer ([address] 05/2020    pancreas    Heart attack ([address] 2008, 3-2015    Hypertension      Patient Active Problem List   Diagnosis    Atherosclerosis of native artery of both lower extremities with intermittent claudication ([address]    Atheroscler of native artery of right leg with intermit claudication ([address]    Carcinoma of head of pancreas ([address]    Agranulocytosis secondary to cancer chemotherapy (CODE) ([address]     COVID-19        Past Surgical History:      Procedure Laterality Date    CARDIAC SURGERY      Quad 2014    CORONARY ANGIOPLASTY  2005    CABG TIMES 4 2015    CORONARY ANGIOPLASTY WITH STENT PLACEMENT      INGUINAL HERNIA REPAIR Right     AR THROMBOENDARTECTMY FEMORAL COMMON Right 2018    FEMORAL ENDARTERECTOMY performed by Kenyetta Wells MD at [address] [facility] [address]  08/15/2018    abdominal aortogram with runoff by Dr. Ritu Baires       Family History:  Family History   Problem Relation Age of Onset    Breast Cancer Mother     Cancer Father         Colon    Cancer Maternal Aunt         Bladder    Cancer Maternal Grandfather         Bladder    Cancer Maternal Cousin         Leukemia    Breast Cancer Maternal Aunt     Cancer Maternal Cousin         Lung       Medications:  Reviewed and reconciled.     Social History:  Social History     Socioeconomic History    Marital status:      Spouse name: Not on file    Number of children: Not on file    Years of education: Not on file    Highest education level: Not on file   Occupational History    Not on file   Social Needs    Financial resource strain: Not on file    Food insecurity     Worry: Not on file     Inability: Not on file    Transportation needs     Medical: Not on file     Non-medical: Not on file   Tobacco Use    Smoking status: Former Smoker     Packs/day: 1.50     Years: 40.00     Pack years: 60.00     Types: Cigarettes     Last attempt to quit: 2008     Years since quittin.3    Smokeless tobacco: Never Used    Tobacco comment: occassional cigar   Substance and Sexual Activity    Alcohol use: Not Currently    Drug use: No    Sexual activity: Not Currently     Partners: Female   Lifestyle    Physical activity     Days per week: Not on file     Minutes per session: Not on file    Stress: Not on file   Relationships    Social connections     Talks on phone: Not on file     Gets together: Not on file     Attends Shinto service: Not on file     Active member of club or organization: Not on file     Attends meetings of clubs or organizations: Not on file     Relationship status: Not on file    Intimate partner violence     Fear of current or ex partner: Not on file     Emotionally abused: Not on file     Physically abused: Not on file     Forced sexual activity: Not on file   Other Topics Concern    Not on file   Social History Narrative    Not on file       Allergies:  No Known Allergies    Physical Exam:  /79   Pulse 83   Temp 98.6 °F (37 °C)   Ht 5' 7\" (1.702 m)   Wt 168 lb (76.2 kg)   SpO2 96%   BMI 26.31 kg/m²     GENERAL: Alert, oriented x 3, not in acute distress. HEENT: PERRLA; EOMI. Oropharynx clear. NECK: Supple. No palpable cervical or supraclavicular lymphadenopathy. LUNGS: Good air entry bilaterally. No wheezing, crackles or rhonchi. CARDIOVASCULAR: Regular rate. No murmurs, rubs or gallops. ABDOMEN: Soft. Non-tender, non-distended. Positive bowel sounds. EXTREMITIES: Without clubbing, cyanosis, or edema. NEUROLOGIC: No focal deficits. ECOG PS 1      Impression/Plan:      The patient is a very pleasant 31-year-old gentleman, with a past medical history significant for hypertension, coronary artery disease, and peripheral vascular disease, he was diagnosed with locally advanced pancreatic head adenocarcinoma in May 2020, the pancreatic head mass encircles the hepatic artery 360 degrees, without any evidence of metastatic disease. He was started on FOLFIRINOX chemotherapy on 6/29/2020, the patient has been under the great care of Dr. Linda Tadeo locally, and Dr. Cirilo Cervantes at [facility] the patient had side effects from the chemotherapy including thrombocytopenia and diarrhea. The oxaliplatin dose has been decreased secondary to thrombocytopenia.   The patient had CT scans of the chest and pancreas done in September 2020, revealing a 5.1 x 5.1 x 5.6 cm infiltrative mass centered superior to the pancreatic head/neck infiltrating the robert hepatis and invading the liver and second duodenal, had decreased in size compared to prior exam, partial abutment of the posterior stomach, medial right

## 2020-12-08 NOTE — PROGRESS NOTES
Spoke with both Zac Kay and wife, Carmenza Leonard, via telephone regarding his diarrhea. Zac Kay hasn't taken the Creon, but will start taking now with snacks and meals. He was loaded with imodium last night and this morning with the new plan to start 2 Lomotil with first signs of diarrhea and alternating with 2 imodium every 3 hours as needed. He is to get 1 liter with treatments and pump disconnect days for 2 liters per treatment. If new creon, imodium loading with every 3 hour alternating does not make any improvement then will add on Octreotide 150 mcg sub-q injections three times a day. They both had the opportunity to ask questions with all questions being answered to their satisfaction. I told them to call if there are any questions. Wife expresses understanding and acceptance of instructions.      Electronically signed by Evie Linn, [address] [place] [address] on 12/8/2020 at 3:49 PM

## 2020-12-10 NOTE — PROGRESS NOTES
Patient here for CADD pump removal, udenyca injection and hydration. Patient stated that he felt really good and didn't feel like he needed his hydration because the doctor had changed his medications and he only had 1 episode of diarrhea and that was because he had coffee. Patient agreed for hydration to run till OK was received from Dr. Dama Prader to hold hydration. Mary Lou Pathak was notified and she contacted Dr. Dama Prader who agreed it was ok for patient not to receive hydration today. Hydration was stopped and port line with good blood return was flushed per order and patient was discharged ambulatory.

## 2020-12-14 NOTE — PROGRESS NOTES
PORT FLUSH    Patient presents to clinic for [facility] today. right  SQ port accessed per policy using 20 gauge . 75\" Garg needle for good blood return. Aspirate for waste and specimen sent to lab. Site flushed easily with 10 mL NSS followed by 5 mL Heparin solution 100 units/ml rinse prior to de-access. Dry sterile dressing to area. Tolerated procedure well. Encouraged to schedule port flush every 4 weeks.

## 2020-12-15 NOTE — PROGRESS NOTES
[address] [facility]  [address] [place] [id number]  Dept: [phone]  Attending Progress Note      Reason for Visit:   Pancreatic cancer present    Referring physician: Self-referred. 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 He was started on FOLFIRINOX chemotherapy on 6/29/2020, the patient has been under the great care of Dr. Denise santana, and Dr. Daniel Verde at [facility] the patient had side effects from the chemotherapy including thrombocytopenia and diarrhea. The oxaliplatin dose has been decreased secondary to thrombocytopenia. The patient had CT scans of the chest and pancreas done in September 2020, revealing a 5.1 x 5.1 x 5.6 cm infiltrative mass centered superior to the pancreatic head/neck infiltrating the robert hepatis and invading the liver and second duodenal, had decreased in size compared to prior exam, partial abutment of the posterior stomach, medial right hepatic lobe and anterior IVC also noted, extensive vascular involvement of the robert hepatis and celiac, mild nonspecific infiltrative changes in the upper abdominal fat, developing carcinomatosis is not excluded, interval resolution of previously described right upper lobe nodular opacities, residual subcentimeter thick nodular opacity in the left lung measuring up to 5 mm, stable, new small left pleural effusion, nonspecific sclerotic focus in the left posterior fourth rib, stable. The patient had a virtual visit with Dr. Melisa Lau on 10 3/13/2020,he  was recommended to continue FOLFIRINOX for 6 more cycles, which could be modified to make it tolerable, even evaluation of one of the drops to his oxaliplatin is warranted. He could begin it for SBRT if he continues to have a locally advanced disease. The patient has received 8 cycles to date, last cycle was on 10/23/2020. The patient was admitted to the hospital on 11/1/2020 with acute respiratory failure secondary to COVID-19 pneumonia, he was also diagnosed with C. difficile colitis. He is feeling much better at this time, he completed the course of Flagyl, 12/4/2020. Chemotherapy was restarted on 12/8/2020, the patient is having loose stools, pudding like, it is not like when he had the C. difficile colitis. He is feeling tired. Review of Systems;  CONSTITUTIONAL: No fever, chills. Decreased appetite, positive for fatigue. ENMT: Eyes: No diplopia; Nose: No epistaxis. Mouth: No sore throat. RESPIRATORY: No hemoptysis, shortness of breath, cough. CARDIOVASCULAR: No chest pain, palpitations. GASTROINTESTINAL: No nausea/vomiting, abdominal pain, positive for loose stools. GENITOURINARY: No dysuria, urinary frequency, hematuria. NEURO: No syncope, presyncope, headache. Positive for peripheral neuropathy.   Remainder:  ROS NEGATIVE    Past Medical History:      Diagnosis Date    CAD (coronary artery disease)     Cancer ([address] 05/2020    pancreas    Heart attack ([address] 2008, 3-2015    Hypertension      Patient Active Problem List   Diagnosis    Atherosclerosis of native artery of both lower extremities with intermittent claudication ([address]    Atheroscler of native artery of right leg with intermit claudication ([address]    Carcinoma of head of pancreas ([address]    Agranulocytosis secondary to cancer chemotherapy (CODE) ([address]     COVID-19        Past Surgical History:      Procedure Laterality Date    CARDIAC SURGERY Quad 2014    CORONARY ANGIOPLASTY  2005    CABG TIMES 4 2015    CORONARY ANGIOPLASTY WITH STENT PLACEMENT      INGUINAL HERNIA REPAIR Right     ID THROMBOENDARTECTMY FEMORAL COMMON Right 2018    FEMORAL ENDARTERECTOMY performed by Gina Angulo MD at [address] [place] [address] Street  08/15/2018    abdominal aortogram with runoff by Dr. Tim Patel       Family History:  Family History   Problem Relation Age of Onset    Breast Cancer Mother     Cancer Father         Colon    Cancer Maternal Aunt         Bladder    Cancer Maternal Grandfather         Bladder    Cancer Maternal Cousin         Leukemia    Breast Cancer Maternal Aunt     Cancer Maternal Cousin         Lung       Medications:  Reviewed and reconciled.     Social History:  Social History     Socioeconomic History    Marital status:      Spouse name: Not on file    Number of children: Not on file    Years of education: Not on file    Highest education level: Not on file   Occupational History    Not on file   Social Needs    Financial resource strain: Not on file    Food insecurity     Worry: Not on file     Inability: Not on file    Transportation needs     Medical: Not on file     Non-medical: Not on file   Tobacco Use    Smoking status: Former Smoker     Packs/day: 1.50     Years: 40.00     Pack years: 60.00     Types: Cigarettes     Quit date: 2008     Years since quittin.3    Smokeless tobacco: Never Used    Tobacco comment: occassional cigar   Substance and Sexual Activity    Alcohol use: Not Currently    Drug use: No    Sexual activity: Not Currently     Partners: Female   Lifestyle    Physical activity     Days per week: Not on file     Minutes per session: Not on file    Stress: Not on file   Relationships    Social connections     Talks on phone: Not on file     Gets together: Not on file     Attends Jainism service: Not on file Active member of club or organization: Not on file     Attends meetings of clubs or organizations: Not on file     Relationship status: Not on file    Intimate partner violence     Fear of current or ex partner: Not on file     Emotionally abused: Not on file     Physically abused: Not on file     Forced sexual activity: Not on file   Other Topics Concern    Not on file   Social History Narrative    Not on file       Allergies:  No Known Allergies    Physical Exam:  There were no vitals taken for this visit. GENERAL: Alert, oriented x 3, not in acute distress. HEENT: PERRLA; EOMI. Oropharynx clear. NECK: Supple. No palpable cervical or supraclavicular lymphadenopathy. LUNGS: Good air entry bilaterally. No wheezing, crackles or rhonchi. CARDIOVASCULAR: Regular rate. No murmurs, rubs or gallops. ABDOMEN: Soft. Non-tender, non-distended. Positive bowel sounds. EXTREMITIES: Without clubbing, cyanosis, or edema. NEUROLOGIC: No focal deficits.    ECOG PS 1      Impression/Plan: The patient is a very pleasant 60-year-old gentleman, with a past medical history significant for hypertension, coronary artery disease, and peripheral vascular disease, he was diagnosed with locally advanced pancreatic head adenocarcinoma in May 2020, the pancreatic head mass encircles the hepatic artery 360 degrees, without any evidence of metastatic disease. He was started on FOLFIRINOX chemotherapy on 6/29/2020, the patient has been under the great care of Dr. Deep Ashford locally, and Dr. Herman Diaz at [facility] the patient had side effects from the chemotherapy including thrombocytopenia and diarrhea. The oxaliplatin dose has been decreased secondary to thrombocytopenia. The patient had CT scans of the chest and pancreas done in September 2020, revealing a 5.1 x 5.1 x 5.6 cm infiltrative mass centered superior to the pancreatic head/neck infiltrating the robert hepatis and invading the liver and second duodenal, had decreased in size compared to prior exam, partial abutment of the posterior stomach, medial right hepatic lobe and anterior IVC also noted, extensive vascular involvement of the robert hepatis and celiac, mild nonspecific infiltrative changes in the upper abdominal fat, developing carcinomatosis is not excluded, interval resolution of previously described right upper lobe nodular opacities, residual subcentimeter thick nodular opacity in the left lung measuring up to 5 mm, stable, new small left pleural effusion, nonspecific sclerotic focus in the left posterior fourth rib, stable. The patient had a virtual visit with Dr. Lakesha Laureano on 10 3/13/2020,he  was recommended to continue FOLFIRINOX for 6 more cycles, which could be modified to make it tolerable, even evaluation of one of the drops to his oxaliplatin is warranted. He could begin it for SBRT if he continues to have a locally advanced disease. The patient has received 8 cycles to date, last cycle was on 10/23/2020. The patient was admitted to the hospital on 11/1/2020 with acute respiratory failure secondary to COVID-19 pneumonia, he was also diagnosed with C. difficile colitis. He is feeling much better at this time, he completed the course of Flagyl on 12/4/2020. He was restarted on chemotherapy with FOLFIRINOX on 12/18/2020, was cycle #9. Reviewed with the patient and his spouse the instructions for the use of both Imodium and Lomotil, he will keep himself well-hydrated, he will receive intravenous hydration today. He has mild thrombocytopenia, platelet count 967D, he will let me know if he has any bleeding. Follow-up with Verlie Files. Chemotherapy-induced neuropathy, continue Cymbalta. RTC in 2 weeks. Thank you for allowing us to participate in the care of Mr. Russell Daley.     Cecilio Cunningham MD   HEMATOLOGY/MEDICAL ONCOLOGY  [address] [place] [address] ONCOLOGY  [address]  [place] [id number]  Dept: [phone]

## 2020-12-15 NOTE — PROGRESS NOTES
[facility]  [facility]  Wt Readings from Last 10 Encounters:   12/15/20 162 lb 9.6 oz (73.8 kg)   12/08/20 168 lb (76.2 kg)   12/01/20 172 lb 9.6 oz (78.3 kg)   11/01/20 172 lb 9.6 oz (78.3 kg)   10/26/20 177 lb (80.3 kg)   10/19/20 175 lb (79.4 kg)   10/05/20 171 lb (77.6 kg)   09/21/20 170 lb (77.1 kg)   09/14/20 170 lb (77.1 kg)   08/31/20 176 lb 14.4 oz (80.2 kg)     Ht Readings from Last 1 Encounters:   12/15/20 5' 7\" (1.702 m)     Body mass index is 25.47 kg/m². Met with patient today for follow up. He was hospitalized with COVID at the end of last month, finally back for treatment last week. Reports he is feeling like he is recovering, but still weak. He reports a fair appetite, he continues to graze on foods throughout the day. He cannot tolerate large meals, never has. Weight is down to 162#, down by about 10# in one month, 15# in 3-6 mo. He feels this is from his hospitalization and COVID. He is now back to supplementing with homemade shakes daily, his wife prepares them for him. Patient is receiving IV fluids today. Patient feels he is eating enough to be able to gain weight back at this point. He denies any other needs at this time, but encouraged him to let me know if he or his wife needs any resources or any other information. Weight change: 10# loss in 1mo, 15# loss in 3mo (8.5%)  Appetite: fair  N/V/D/C: diarrhea 3-4 days post treatment, then resolves  Calculated Needs if applicable: [id number] (74x30-32), 96-105gm (74x1.30), 2400ml (74x32)    Pre-Hab Eligible?: no        Recommendations: Continue homemade shakes TID to provide ~1200kcal, 60gm PRO; Continue small, frequent meals between. Propel for fluids, aim for 72-84oz daily.           ASPEN GUIDELINES FOR CLINICAL CHARACTERISTICS OF MALNUTRITION IN CHRONIC ILLNESS     Moderate Malnutrition  Severe Malnutrition Energy intake  <75% energy intake compared to estimated needs for >1month <75% energy intake compared to estimated needs for >1month   Weight changes  5% x 1 month  7.5% x 3 months   10% x 6 months   20% x 1 year  >5% x 1 month  >7.5% x 3 months   >10% x 6 months   >20% x 1 year    Physical findings  Mild   Decrease subcutaneous fat    Decrease muscle mass     Increase fluid/edema   Severe  Decrease subcutaneous fat    Decrease muscle mass     Increase fluid/edema       Moderate malnutrition evidenced by 8.5% weight loss in 3mo, <75% intake x1mo during hospitalization.       Katheren Lennox

## 2020-12-17 PROBLEM — E83.42 HYPOMAGNESEMIA: Status: ACTIVE | Noted: 2020-01-01

## 2020-12-17 PROBLEM — Z86.16 HISTORY OF 2019 NOVEL CORONAVIRUS DISEASE (COVID-19): Status: ACTIVE | Noted: 2020-01-01

## 2020-12-17 PROBLEM — A04.72 CLOSTRIDIOIDES DIFFICILE DIARRHEA: Status: ACTIVE | Noted: 2020-01-01

## 2020-12-17 PROBLEM — E86.0 DEHYDRATION: Status: ACTIVE | Noted: 2020-01-01

## 2020-12-17 PROBLEM — I10 ESSENTIAL HYPERTENSION: Status: ACTIVE | Noted: 2020-01-01

## 2020-12-17 PROBLEM — E87.20 LACTIC ACIDOSIS: Status: ACTIVE | Noted: 2020-01-01

## 2020-12-17 PROBLEM — T45.1X5A CHEMOTHERAPY INDUCED DIARRHEA: Status: ACTIVE | Noted: 2020-01-01

## 2020-12-17 PROBLEM — R19.7 DIARRHEA: Status: ACTIVE | Noted: 2020-01-01

## 2020-12-17 PROBLEM — R09.02 HYPOXIA: Status: ACTIVE | Noted: 2020-01-01

## 2020-12-17 PROBLEM — I25.10 CAD (CORONARY ARTERY DISEASE): Status: ACTIVE | Noted: 2020-01-01

## 2020-12-17 PROBLEM — K52.1 CHEMOTHERAPY INDUCED DIARRHEA: Status: ACTIVE | Noted: 2020-01-01

## 2020-12-17 NOTE — ED PROVIDER NOTES
Department of Emergency Medicine   ED  Provider Note  Admit Date/RoomTime: 12/17/2020  3:01 AM  [facility]          History of Present Illness:  12/17/20, Time: 3:03 AM EST  Chief Complaint   Patient presents with    Fatigue     fever, states wife thinks he has c-diff, diarrhea x 2 weeks (usually last 5 days after chemo) chemo last tueday. Courtney Singh is a 72 y.o. male presenting to the ED for fatigue and diarrhea, beginning over 7 days ago. The complaint has been persistent, moderate in severity, and worsened by nothing. Patient is currently getting chemotherapy for pancreatic cancer, last treatment was approximately 2 weeks ago. He is due to have biweekly treatment, was on his sixth round last week. Patient continues to have watery stools and diarrhea, no abdominal pain or discomfort. He is slightly nauseous but has had no vomiting. Over the past several days patient has been eating and drinking less and feeling more fatigued and weak. Family did have concern for possible C. difficile colitis and other concerns with his diarrhea as he typically only has diarrhea for a few days after his treatment. This prompted ED evaluation. He denies any chest pain or shortness of breath. No recent fevers or cough. Review of Systems:   Pertinent positives and negatives are stated within HPI, all other systems reviewed and are negative.        --------------------------------------------- PAST HISTORY ---------------------------------------------  Past Medical History:  has a past medical history of CAD (coronary artery disease), Cancer [address] Heart attack ([facility] and Hypertension. Past Surgical History:  has a past surgical history that includes Stomach surgery; Inguinal hernia repair (Right); vascular surgery (08/15/2018); Coronary angioplasty (2005); pr thromboendartectmy femoral common (Right, 9/17/2018); Cardiac surgery; and Coronary angioplasty with stent.     Social History: reports that he quit smoking about 12 years ago. His smoking use included cigarettes. He has a 60.00 pack-year smoking history. He has never used smokeless tobacco. He reports previous alcohol use. He reports that he does not use drugs. Family History: family history includes Breast Cancer in his maternal aunt and mother; Cancer in his father, maternal aunt, maternal cousin, maternal cousin, and maternal grandfather. . Unless otherwise noted, family history is non contributory    The patients home medications have been reviewed. Allergies: Patient has no known allergies. ---------------------------------------------------PHYSICAL EXAM--------------------------------------    Constitutional/General: Alert and oriented x3  Head: Normocephalic and atraumatic  Eyes: PERRL, EOMI, sclera non icteric  Mouth: Oropharynx clear, handling secretions, no trismus, no asymmetry of the posterior oropharynx or uvular edema  Neck: Supple, full ROM, no stridor, no meningeal signs  Respiratory: Lungs clear to auscultation bilaterally, no wheezes, rales, or rhonchi. Not in respiratory distress  Cardiovascular:  Regular rate. Regular rhythm. No murmurs, no aortic murmurs, no gallops, or rubs. 2+ distal pulses. Equal extremity pulses. Chest: No chest wall tenderness  GI:  Abdomen Soft, Non tender, Non distended. No rebound, guarding, or rigidity. No pulsatile masses. Musculoskeletal: Moves all extremities x 4. Warm and well perfused, no clubbing, cyanosis, or edema. Capillary refill <3 seconds  Integument: skin warm and dry. No rashes. Neurologic: GCS 15, no focal deficits, symmetric strength 5/5 in the upper and lower extremities bilaterally  Psychiatric: Normal Affect          -------------------------------------------------- RESULTS -------------------------------------------------  I have personally reviewed all laboratory and imaging results for this patient. Results are listed below.      LABS: (Lab results interpreted by me)  Results for orders placed or performed during the hospital encounter of 12/17/20   CBC Auto Differential   Result Value Ref Range    WBC 7.1 4.5 - 11.5 E9/L    RBC 3.30 (L) 3.80 - 5.80 E12/L    Hemoglobin 10.4 (L) 12.5 - 16.5 g/dL    Hematocrit 30.6 (L) 37.0 - 54.0 %    MCV 92.7 80.0 - 99.9 fL    MCH 31.5 26.0 - 35.0 pg    MCHC 34.0 32.0 - 34.5 %    RDW 16.0 (H) 11.5 - 15.0 fL    Platelets 798 (L) 411 - 450 E9/L    MPV 12.3 (H) 7.0 - 12.0 fL    Neutrophils % 73.5 43.0 - 80.0 %    Immature Granulocytes % 1.3 0.0 - 5.0 %    Lymphocytes % 11.6 (L) 20.0 - 42.0 %    Monocytes % 12.5 (H) 2.0 - 12.0 %    Eosinophils % 0.7 0.0 - 6.0 %    Basophils % 0.4 0.0 - 2.0 %    Neutrophils Absolute 5.24 1.80 - 7.30 E9/L    Immature Granulocytes # 0.09 E9/L    Lymphocytes Absolute 0.83 (L) 1.50 - 4.00 E9/L    Monocytes Absolute 0.89 0.10 - 0.95 E9/L    Eosinophils Absolute 0.05 0.05 - 0.50 E9/L    Basophils Absolute 0.03 0.00 - 0.20 E9/L    Anisocytosis 1+     Polychromasia 2+     Poikilocytes 1+     Schistocytes 1+     Ovalocytes 1+     Spherocytes 1+     Tear Drop Cells 1+    Basic Metabolic Panel w/ Reflex to MG   Result Value Ref Range    Sodium 129 (L) 132 - 146 mmol/L    Potassium reflex Magnesium 3.5 3.5 - 5.0 mmol/L    Chloride 96 (L) 98 - 107 mmol/L    CO2 20 (L) 22 - 29 mmol/L    Anion Gap 13 7 - 16 mmol/L    Glucose 112 (H) 74 - 99 mg/dL    BUN 13 8 - 23 mg/dL    CREATININE 0.7 0.7 - 1.2 mg/dL    GFR Non-African American >60 >=60 mL/min/1.73    GFR African American >60     Calcium 8.4 (L) 8.6 - 10.2 mg/dL   Hepatic Function Panel   Result Value Ref Range    Total Protein 5.7 (L) 6.4 - 8.3 g/dL    Alb 3.2 (L) 3.5 - 5.2 g/dL    Alkaline Phosphatase 178 (H) 40 - 129 U/L    ALT 14 0 - 40 U/L    AST 20 0 - 39 U/L    Total Bilirubin 0.8 0.0 - 1.2 mg/dL    Bilirubin, Direct 0.3 0.0 - 0.3 mg/dL    Bilirubin, Indirect 0.5 0.0 - 1.0 mg/dL   Lipase   Result Value Ref Range    Lipase 22 13 - 60 U/L   Lactic Acid, Plasma   Result Value Ref Range    Lactic Acid 2.6 (H) 0.5 - 2.2 mmol/L   Magnesium   Result Value Ref Range    Magnesium 1.5 (L) 1.6 - 2.6 mg/dL   EKG 12 Lead   Result Value Ref Range    Ventricular Rate 99 BPM    Atrial Rate 99 BPM    P-R Interval 122 ms    QRS Duration 130 ms    Q-T Interval 376 ms    QTc Calculation (Bazett) 482 ms    P Axis 22 degrees    R Axis -23 degrees    T Axis 131 degrees   ,       RADIOLOGY:  Interpreted by Radiologist unless otherwise specified  No orders to display         EKG Interpretation  Interpreted by emergency department physician, Dr. Bro Mills    Date of EK20  Time: 421    Rhythm: normal sinus   Rate: 99  Axis: normal  Conduction: normal  ST Segments: no acute change  T Waves: no acute change    Clinical Impression: No findings suggestive of acute ischemia or injury  Comparison to prior EKG: no previous EKG      ------------------------- NURSING NOTES AND VITALS REVIEWED ---------------------------   The nursing notes within the ED encounter and vital signs as below have been reviewed by myself  /75   Pulse 102   Temp 100.5 °F (38.1 °C) (Temporal)   Resp 16   Ht 5' 7\" (1.702 m)   Wt 160 lb (72.6 kg)   SpO2 93%   BMI 25.06 kg/m²     Oxygen Saturation Interpretation: Normal    The patients available past medical records and past encounters were reviewed. ------------------------------ ED COURSE/MEDICAL DECISION MAKING----------------------  Medications   lactated ringers infusion 1,000 mL (1,000 mLs Intravenous New Bag 20 0431)   magnesium sulfate 2 g in 50 mL IVPB premix (has no administration in time range)   0.9 % sodium chloride infusion (has no administration in time range)           The cardiac monitor revealed sinus rhythm with a heart rate in the 90s as interpreted by me. The cardiac monitor was ordered secondary to the patient's chest pain and to monitor for patient for dysrhythmia.    CPT [id number]           Medical Decision Making:     I, Dr. Lea Angelucci, am the primary provider of record    80-year-old male presenting with ongoing diarrhea, history of pancreatic cancer with chemotherapy. Patient arrives slightly tachycardic but normotensive, borderline febrile. He has no increased work of breathing, no hypoxia, abdomen soft and tender on exam.  Concern for possible infectious diarrhea, possible chemotherapy related. He has a slight decrease in his sodium and chloride, slight increase in lactic acid, decreased magnesium. He has no acute kidney injury. He was given IV fluids and will be given maintenance of normal saline, Zofran for nausea. Stool was collected for testing with results pending. Patient still had a sensation of fatigue and weakness after hydration, will be admitted for further management. He rested comfortably during the remainder of his ED course. Re-Evaluations: This patient's ED course included: a personal history and physicial examination, re-evaluation prior to disposition and IV medications    This patient has remained hemodynamically stable and been closely monitored during their ED course. Counseling: The emergency provider has spoken with the patient and discussed todays results, in addition to providing specific details for the plan of care and counseling regarding the diagnosis and prognosis. Questions are answered at this time and they are agreeable with the plan.       --------------------------------- IMPRESSION AND DISPOSITION ---------------------------------    IMPRESSION  1. Diarrhea, unspecified type    2. Generalized weakness    3. Hyponatremia    4. Hypomagnesemia    5. Hypochloremia    6. Lactic acidosis        DISPOSITION  Disposition: Admit to med/surg floor  Patient condition is stable        NOTE: This report was transcribed using voice recognition software.  Every effort was made to ensure accuracy; however, inadvertent computerized transcription errors may be present Viviana Torrez, DO  12/17/20 1347

## 2020-12-17 NOTE — H&P
[address] [facility]  History and Physical      CHIEF COMPLAINT: Diarrhea      HISTORY OF PRESENT ILLNESS:      The patient is a 72 y.o. male patient of Dr. Rae Torrez history of locally advanced pancreatic cancer on  FOLFIRINOX chemotherapy, C. difficile as well as COVID-19 viral infection in November  who presents with diarrhea. Patient had chemotherapy 12/8. He has had diarrhea since then. He does have a history of diarrhea with chemotherapy but this has been continuous. Patient does have history of C. difficile. No fevers or chills. No vomiting.  + Nausea. No abdominal pain. No exacerbation relief. Patient feels like he is been trying to eat and drink as much as possible but has had decreased oral intake. Denies cough fever or Covid exposure. Past Medical History:    Past Medical History:   Diagnosis Date    CAD (coronary artery disease)     Cancer ([facility] 05/2020    pancreas    Heart attack ([facility] 2008, 3-2015    Hypertension        Past Surgical History:    Past Surgical History:   Procedure Laterality Date    CARDIAC SURGERY      Quad 2014    CORONARY ANGIOPLASTY  2005    CABG TIMES 4 2015    CORONARY ANGIOPLASTY WITH STENT PLACEMENT      INGUINAL HERNIA REPAIR Right     CO THROMBOENDARTECTMY FEMORAL COMMON Right 9/17/2018    FEMORAL ENDARTERECTOMY performed by Cheryle Javier MD at [address] [place] [address] VASCULAR SURGERY  08/15/2018    abdominal aortogram with runoff by Dr. Natalya Rollins       Medications Prior to Admission:    Medications Prior to Admission: Multiple Vitamins-Minerals (MULTIVITAMIN ADULT PO), Take by mouth  Probiotic Product (PROBIOTIC ADVANCED PO), Take by mouth  lipase-protease-amylase (CREON) 64788-00088 units delayed release capsule, Take 24,000 capsules by mouth 3 times daily (before meals)  triamcinolone (KENALOG) 0.1 % cream, Apply topically 2 times daily Apply topically 2 times daily.   diphenoxylate-atropine (LOMOTIL) 2.5-0.025 MG per tablet, Take discharge. Neck:  Supple  Heart:  RRR, no murmurs, gallops, rubs  Lungs:  CTA bilaterally, bilat symmetrical expansion, no wheeze, rales, or rhonchi  Abdomen:   Bowel sounds present, soft, nontender, no masses, no organomegaly, no peritoneal signs  Extremities:  No clubbing, cyanosis, or edema  Skin:  Warm and dry, no open lesions or rash  Neuro:  Cranial nerves 2-12 intact, no focal deficits  Breast: deferred  Rectal: deferred  Genitalia:  deferred    LABS:    CBC with Differential:    Lab Results   Component Value Date    WBC 7.1 12/17/2020    RBC 3.30 12/17/2020    HGB 10.4 12/17/2020    HCT 30.6 12/17/2020     12/17/2020    MCV 92.7 12/17/2020    MCH 31.5 12/17/2020    MCHC 34.0 12/17/2020    RDW 16.0 12/17/2020    NRBC 0.9 11/03/2020    METASPCT 0.9 10/16/2020    LYMPHOPCT 11.6 12/17/2020    MONOPCT 12.5 12/17/2020    MYELOPCT 3.4 11/02/2020    BASOPCT 0.4 12/17/2020    MONOSABS 0.89 12/17/2020    LYMPHSABS 0.83 12/17/2020    EOSABS 0.05 12/17/2020    BASOSABS 0.03 12/17/2020     CMP:    Lab Results   Component Value Date     12/17/2020    K 3.5 12/17/2020    CL 96 12/17/2020    CO2 20 12/17/2020    BUN 13 12/17/2020    CREATININE 0.7 12/17/2020    GFRAA >60 12/17/2020    LABGLOM >60 12/17/2020    GLUCOSE 112 12/17/2020    PROT 5.7 12/17/2020    LABALBU 3.2 12/17/2020    CALCIUM 8.4 12/17/2020    BILITOT 0.8 12/17/2020    ALKPHOS 178 12/17/2020    AST 20 12/17/2020    ALT 14 12/17/2020     Magnesium:    Lab Results   Component Value Date    MG 1.8 12/17/2020     Phosphorus:    Lab Results   Component Value Date    PHOS 1.9 11/02/2020     PT/INR:    Lab Results   Component Value Date    PROTIME 13.1 11/01/2020    INR 1.2 11/01/2020     Last 3 Troponin:    Lab Results   Component Value Date    TROPONINI <0.01 11/01/2020     U/A:    Lab Results   Component Value Date    COLORU Yellow 11/01/2020    PROTEINU 30 11/01/2020    PHUR 6.0 11/01/2020    WBCUA 1-3 11/01/2020    RBCUA 0-1 11/01/2020    BACTERIA RARE 11/01/2020    CLARITYU Clear 11/01/2020    SPECGRAV 1.025 11/01/2020    LEUKOCYTESUR Negative 11/01/2020    UROBILINOGEN 0.2 11/01/2020    BILIRUBINUR Negative 11/01/2020    BLOODU TRACE 11/01/2020    GLUCOSEU Negative 11/01/2020     ABG:  No results found for: PH, PCO2, PO2, HCO3, BE, THGB, TCO2, O2SAT  HgBA1c:  No results found for: LABA1C  FLP:    Lab Results   Component Value Date    TRIG 80 11/02/2020    HDL 48 11/02/2020    LDLCALC 77 11/02/2020    LABVLDL 16 11/02/2020     TSH:    Lab Results   Component Value Date    TSH 0.501 11/02/2020       No orders to display       ASSESSMENT:      Principal Problem:    Clostridioides difficile diarrhea  Active Problems:    Carcinoma of head of pancreas ([address]    Diarrhea    History of 2019 novel coronavirus disease (COVID-19)    CAD (coronary artery disease)    Essential hypertension    Hypoxia    Hypomagnesemia    Lactic acidosis    Dehydration  Resolved Problems:    * No resolved hospital problems. *      PLAN:    61-year-old male history of locally advanced pancreatic cancer currently undergoing chemotherapy, last trtment 12/8 admitted with diarrhea    IV fluids  Monitor labs  Check C.  Difficile/stool studies  Replace electrolytes  Medications for other co morbidities cont as appropriate w dosage adjustments as necessary   PT/OT  DVT PPx  DC planning       Electronically signed by Bradley Odell MD on 12/17/2020 at 8:12 AM

## 2020-12-17 NOTE — TELEPHONE ENCOUNTER
Pt's wife called into Center today to inform staff that pt had been taken to the ER last night to rule out possible C-Diff. [facility] Pt's wife will call with any updates, questions or concerns at this time. Will route note to Dr. Luisito Reyes to relay information.

## 2020-12-17 NOTE — PROGRESS NOTES
Physical Therapy  Physical Therapy Initial Assessment     Name: Fernando Tan  : 1950  MRN: [id number]    Referring Provider:  MIYA Dorman CNP    Date of Service: 2020    Evaluating PT:  Tarun Lees PT, DPT. [id number]    Room #:  8404/8404-B  Diagnosis:  Diarrhea   Reason for admission:  Fatigue, diarrhea   Precautions:  Falls, contact, C-diff, droplet plus COVID RO  Procedures: none  Equipment Recommendations:  none    SUBJECTIVE:  Pt lives with wife  in a 1 story home with 4 stair(s) to enter and 1 rail(s). Bed is on 1st floor and bath is on 1st floor. Pt ambulated with no AD PTA. Pt independent for ADL performance. Patient reports that he does not walk far distances d/t \"blockages\" in BLEs that cause claudication pain. OBJECTIVE:   Initial Evaluation  Date:  Treatment   Short Term/ Long Term   Goals   AM-PAC 6 Clicks 97/77     Was pt agreeable to Eval/treatment? yes     Does pt have pain? denies     Bed Mobility  Rolling: NT  Supine to sit:  Independent   Sit to supine: Independent   Scooting: Independent   Independent    Transfers Sit to stand: SBA  Stand to sit: SBA  Stand pivot: SBA  Independent    Ambulation    10 feet with no AD SBA    >30 feet with no AD independent   Stair negotiation: ascended and descended  NT  4 steps with 1 rail Mod I   ROM BUE:  See OT eval   BLE:  WFL     Strength BUE:  See OT eval   BLE:  knee ext 5/5  Ankle DF 5/5  Increase by 1/3 MMT grade    Balance Sitting EOB:  Independent   Dynamic Standing:  SBA  Sitting EOB:  Independent   Dynamic Standing:  Independent      -Pt is A & O x 3  -Sensation:  unremarkable   -Edema:  unremarkable     Therapeutic Exercises:  functional activity     Patient education  Pt educated on safety, sequencing of transfers, and role of PT    Patient response to education:   Pt verbalized understanding Pt demonstrated skill Pt requires further education in this area   yes yes no     ASSESSMENT:    Comments:  Pt received supine in bed and agreeable to PT session   Patient required no hands on assist for any mobility this session. Patient able to stand and ambulate short distance  within room with decreased gait speed, but steady. Patient returned to supine in bed to end session. Pt with all needs met and call light in reach. Pt would benefit from continued PT POC to address functional deficits described above. Treatment:  Patient practiced and was instructed in the following treatment:     Patient education provided continuously throughout session for sequencing, safety maintenance, and improving any deficits found during the evaluation.  Bed mobility training - pt given verbal and tactile cues to facilitate proper sequencing and safety during supine>sit    STS and pivot transfer training - pt educated on proper hand and foot placement, safety and sequencing, and use of proper technique to safely complete sit<>stand and pivot transfers  · Gait training- pt was given verbal and tactile cues to facilitate normal gait pattern and ace during ambulation       Pt's/ family goals   1. None stated    Patient and or family understand(s) diagnosis, prognosis, and plan of care. yes    PLAN:    Current Treatment Recommendations   [] Strengthening     [] ROM   [x] Balance Training   [x] Endurance Training   [x] Transfer Training   [x] Gait Training   [x] Stair Training   [] Positioning   [x] Safety and Education Training   [] Patient/Caregiver Education   [] HEP  [] Other     Frequency of treatments: 2-5x/week x 1-2 weeks. Time in  1035  Time out  1050    Total Treatment Time 8 minutes     Evaluation Time includes thorough review of current medical information, gathering information on past medical history/social history and prior level of function, completion of standardized testing/informal observation of tasks, assessment of data and education on plan of care and goals.     CPT codes:  [x] Low Complexity PT evaluation 53941  [] Moderate Complexity PT evaluation 23240  [] High Complexity PT evaluation [id number]  [] PT Re-evaluation [id number]  [] Gait training 02585 - minutes  [] Manual therapy 76537 - minutes  [x] Therapeutic activities 39686 8 minutes  [] Therapeutic exercises 21745 - minutes  [] Neuromuscular reeducation 56810 - minutes     Barb Gaviria, PT, DPT  [id number]  Luiz Carroll, SPT

## 2020-12-17 NOTE — PROGRESS NOTES
Occupational Therapy  OCCUPATIONAL THERAPY INITIAL EVALUATION      Date:2020  Patient Name: Jose Maldonado  MRN: [id number]  : 1950  Room: [address] [place], [place] [address]    Referring Provider: Re MENDEZ      Evaluating OT: Katja Becerra OTR/L [id number]    AM-PAC Daily Activity Raw Score: 2324    Comments:  Eval only. Patient able to complete ADL activity. No skilled OT needs      Diagnosis: Diarrhea    Pertinent Medical History: pancreatic CA, chemo  HTN  Precautions:  Falls, Isolation, COVID rule out      Home Living: Pt lives with wife, 1 story with   4 steps/rail   Bathroom setup: walk in shower      Prior Level of Function: Independent  with ADLs , assist as needed  with IADLs; ambulated with no device     Pain Level: no pain this session ;   Cognition: alert, oriented x3 and conversing    Memory:  good   Sequencing:  good   Problem solving:  good   Judgement/safety:  good     Functional Assessment:   Initial Eval Status  Date: 20   Feeding Independent    Grooming Independent   Standing at sink    UB Dressing Independent    LB Dressing Mod I  Able to lift leg to reach socks    Bathing    Toileting Independent    Bed Mobility  Independent   Supine <>sit    Functional Transfers Mod I   Sit-stand from bed, commode (usign grab bar)    Functional Mobility Supervision, no device  Ambulating to/from bathroom  No LOB, SOB or unsteadiness noted    Balance Sitting:     Static:  Independent     Dynamic:independent   Standing: Independent    Activity Tolerance Fair with light activity    Visual/  Perceptual Glasses: yes             Hand Dominance right    Strength ROM Additional Info:    RUE  4-/5  WFL good  and wfl FMC/dexterity noted during ADL tasks       LUE 4-/5  WFL good  and wfl FMC/dexterity noted during ADL tasks       Hearing: WFL   Sensation:  No c/o numbness or tingling   Tone: WFL       Comments: Upon arrival patient lying in bed .   At end of session, patient returned to bed  with call light and phone within reach, all lines and tubes intact. Patient / Family Goal: return home        Eval Complexity: Low      Time In:1347  Time Out: 1400        Min Units   OT Eval Low 97165  x 1   OT Eval Medium 54632      OT Eval High 86036       OT Re-Eval [id number]       Therapeutic Ex 06413       Therapeutic Activities 67351       ADL/Self Care 83716       Orthotic Management 72688       Neuro Re-Ed 79378       Non-Billable Time          Evaluation Time includes thorough review of current medical information, gathering information on past medical history/social history and prior level of function, completion of standardized testing/informal observation of tasks, assessment of data and education on plan of care and goals.             Nita Robb OTR/L [id number]

## 2020-12-17 NOTE — ED NOTES
Bed: HD  Expected date:   Expected time:   Means of arrival:   Comments:  triage     Libby Barnnon RN  12/17/20 0150

## 2020-12-17 NOTE — PLAN OF CARE
Problem: Falls - Risk of:  Goal: Will remain free from falls  Description: Will remain free from falls  Outcome: Met This Shift  Goal: Absence of physical injury  Description: Absence of physical injury  Outcome: Met This Shift     Problem: Musculor/Skeletal Functional Status  Goal: Highest potential functional level  Outcome: Met This Shift  Goal: Absence of falls  Outcome: Met This Shift

## 2020-12-17 NOTE — PROGRESS NOTES
Perfect serve message left for Dr. Smitha Carroll regarding patient critical lab. Awaiting call back or orders to be placed.

## 2020-12-17 NOTE — LETTER
Beneficiary Notification Letter  BPCI Advanced     Your Doctor or [address],    We wanted to let you know that your health care provider, [address] [place] [address] [place], has volunteered to take part in our [facility] for [facility] [employer] & Medicaid Services (CMS) Bundled Payments for [facility] (BPCI Advanced). This doesnt change your Medicare rights or benefits and you dont need to do anything. What are bundled payments? A bundled payment combines, or bundles together, payments that Medicare makes to your health care providers for the many different kinds of medical services you might get in a specific time period. In BPCI Advanced, this time period could include a hospital inpatient stay or outpatient procedure, plus 90 days. Why would Medicare bundle payments? Bundled payments are thought of as a value-based way to pay because health care providers are responsible for both the quality and cost of medical care they give. This is a relatively new way of paying health care providers compared to thefee-for-service way Medicare has traditionally paid, where providers are paid separately for each service they provide. Bundled payments encourage these providers to work together to provide better, more coordinated care during your hospital stay, or outpatient procedure, and through your recovery. What does BPCI Advance mean for me? Youre more likely to get even better care when hospitals, doctors, and other health care providers work together. In BPCI Advanced, hospitals, doctors, and other health care providers may be rewarded for providing better, more coordinated health care. Medicare will watch BPCI Advanced participants closely to make sure that you and other patients keep getting efficient, high quality care. What do I need to know about BPCI Advanced? Whats most important for you to know is that your Medicare rights and benefits wont change because your health care provider is participating in [address]. Medicare will keep covering all of your medically necessary services. Even though Medicare will pay your doctor in a different way under BPCI Advanced, how much you have to pay wont change. Health care providers and suppliers who are enrolled in Medicare will submit their Medicare claims like they always have. Youll have all the same Medicare rights and protections, including the right to choose which hospital, doctor, or other health care provider you see. If you dont want to get care from a health care provider whos participating in [address], then youll have to choose a different health care provider whos not participating in the Model. How can I give feedback about my health care? Medicare might ask you to take a voluntary survey about the services and care you received from [address] [place] [address] during your hospital stay or outpatient procedure and for a specific period of time afterwards. You can decide whether you want to take the voluntary survey, but if you do, itll help Medicare make BPCI Advanced and the care of other Medicare patients better. If you have concerns or complaints about your care, you can:   · Talk to your doctor or health care provider. · Contact your Beneficiary and Family Centered Care Quality Improvement   Organization [employer] [facility]). You can get your BF[employer]-QIOs phone number  at  Medicare.gov/contacts or by calling 1-800-MEDICARE. TTY users can call  8-263.550.1338. Where can I learn more about BPCI Advanced? Learn more about BPCI Advanced at https://innovation.cms.gov/initiatives/bpci-advanced/:  · A list of all the hospitals and physician group practices in the country participating in [address]. · All of the inpatient and outpatient Clinical Episodes that are currently included under BPCI Advanced. A Clinical Episode is a grouping of medical conditions or diagnoses that are included in the [facility].

## 2020-12-18 NOTE — PROGRESS NOTES
CLINICAL PHARMACY NOTE: MEDS TO [address] Select Patient?: No  Total # of Prescriptions Filled: 1   The following medications were delivered to the patient:  · Vancomycin 125 mg capsules   Total # of Interventions Completed: 3  Time Spent (min): 45    Additional Documentation:    Delivered to patient. We partialed medication. Patient paid for full amount and will  the other half on Monday via curbside .

## 2020-12-18 NOTE — CARE COORDINATION
Discharge order noted. Patient with a history of locally advanced pancreatic cancer on  FOLFIRINOX chemotherapy, C. difficile as well as COVID-19 viral infection in November is admitted with C. difficile diarrhea. Patient will discharge on po Vancomycin solution, it will need sent to our outpatient pharmacy to see if prior auth is needed. Charge nurse aware. I met with patient in room to explain role and discuss transition of care. He said he has no needs for discharge and his wife will be transporting him home later today. Daniele Do RN, CM      Per our outpatient pharmacy, the Vancomycin oral solution would need prior auth. I called phone# [phone] to obtain prior auth. I was told the Vanc oral solution is not on the formulary, only 250 mg capsules and 125 mg capsules. I called our pharmacy and the cash price for the Vanc po solution id $212.98. I informed the patient and he is agreeable to the price. Pharmacy made aware.   Daniele Do RN, CM

## 2020-12-18 NOTE — PROGRESS NOTES
Subjective:  Feeling better   No CP or SOB  No fever or chills   No uncontrolled pain  No vomiting or diarrhea     Objective:    /68   Pulse 88   Temp 98.6 °F (37 °C) (Oral)   Resp 18   Ht 5' 7\" (1.702 m)   Wt 164 lb (74.4 kg)   SpO2 98%   BMI 25.69 kg/m²     24HR INTAKE/OUTPUT:      Intake/Output Summary (Last 24 hours) at 12/18/2020 6530  Last data filed at 12/17/2020 1457  Gross per 24 hour   Intake 1282 ml   Output --   Net 1282 ml       General appearance: NAD, conversant  Neck: FROM, supple   Lungs: Clear bilaterally no wheezes, no rhonchi, no crackles  CV: RRR, no MRGs; normal carotid upstroke and amplitude without Bruits  Abdomen: Soft, non-tender; no masses or HSM  Extremities: No edema, no cyanosis, no clubbing  Skin: Intact no rash, no lesions, no ulcers    Psych: Alert and oriented normal affect  Neuro: Nonfocal  Most Recent Labs  Lab Results   Component Value Date    WBC 7.1 12/17/2020    HGB 10.4 (L) 12/17/2020    HCT 30.6 (L) 12/17/2020     (L) 12/17/2020     (L) 12/17/2020    K 3.5 12/17/2020    CL 96 (L) 12/17/2020    CREATININE 0.7 12/17/2020    BUN 13 12/17/2020    CO2 20 (L) 12/17/2020    GLUCOSE 112 (H) 12/17/2020    ALT 14 12/17/2020    AST 20 12/17/2020    INR 1.2 11/01/2020    TSH 0.501 11/02/2020     Recent Labs     12/17/20  0604   MG 1.8     Lab Results   Component Value Date    CALCIUM 8.4 (L) 12/17/2020    PHOS 1.9 (L) 11/02/2020        No orders to display       Assessment    Principal Problem:    Clostridioides difficile diarrhea  Active Problems:    Carcinoma of head of pancreas (HCC)    Diarrhea    History of 2019 novel coronavirus disease (COVID-19)    CAD (coronary artery disease)    Essential hypertension    Hypoxia    Hypomagnesemia    Lactic acidosis    Dehydration  Resolved Problems:    * No resolved hospital problems.  *      Plan:  58-year-old male history of locally advanced pancreatic cancer currently undergoing chemotherapy, last trtment 12/8 admitted with C. difficile diarrhea     Vancomycin p.o.--Rx for 5-week taper based on first recurrence  IV fluids completed  Monitor labs  +C.  Difficile  Replace electrolytes  Medications for other co morbidities cont as appropriate w dosage adjustments as necessary   PT/OT  DVT PPx  DC planning        Electronically signed by Yanick Goldstein MD on 12/18/2020 at 6:33 AM

## 2020-12-18 NOTE — DISCHARGE SUMMARY
Physician Discharge Summary     Patient ID:  Najma Garcia  [id number]  85 y.o.  1950    Admit date: 12/17/2020    Discharge date and time:  12/18/2020    Discharge Diagnoses: Principal Problem:    Clostridioides difficile diarrhea  Active Problems:    Carcinoma of head of pancreas ([address]    Diarrhea    History of 2019 novel coronavirus disease (COVID-19)    CAD (coronary artery disease)    Essential hypertension    Hypoxia    Hypomagnesemia    Lactic acidosis    Dehydration  Resolved Problems:    * No resolved hospital problems. *      Consults: IP CONSULT TO INTERNAL MEDICINE  IP CONSULT TO SOCIAL WORK  IP CONSULT TO CASE MANAGEMENT    Procedures: See below    Hospital Course: 60-year-old male history of locally advanced pancreatic cancer currently undergoing chemotherapy, last trtment 12/8 admitted with C. difficile diarrhea     Vancomycin p.o.--Rx for 5-week taper based on first recurrence  IV fluids completed  Monitor labs  +C.  Difficile  Replace electrolytes  Medications for other co morbidities cont as appropriate w dosage adjustments as necessary   PT/OT  DVT PPx  DC planning     Discharge Exam:  See progress note from today    Condition:  Stable    Disposition: home    Patient Instructions:   Current Discharge Medication List      START taking these medications    Details   vancomycin (VANCOCIN) 50 mg/mL oral solution 2.5 mL every 6 hours x14 days, 2.5 mL TID x7days, 2.5 ml BID x7days, 2.5 ml daily for 7 days, 2.5 ml QOD x7 days  Qty: 254 mL, Refills: 0         CONTINUE these medications which have NOT CHANGED    Details   b complex vitamins capsule Take 1 capsule by mouth daily      Multiple Vitamins-Minerals (MULTIVITAMIN ADULT PO) Take by mouth      Probiotic Product (PROBIOTIC ADVANCED PO) Take by mouth      lipase-protease-amylase (CREON) 45539-72234 units delayed release capsule Take 24,000 capsules by mouth 3 times daily (before meals)  Qty: 90 capsule, Refills: 0    Associated Diagnoses: Carcinoma of head of pancreas ([facility] Agranulocytosis secondary to cancer chemotherapy (CODE) (HCC)      triamcinolone (KENALOG) 0.1 % cream Apply topically 2 times daily Apply topically 2 times daily. DULoxetine (CYMBALTA) 20 MG extended release capsule Take 20 mg by mouth daily      vitamin B-6 (B-6) 50 MG tablet Take 1 tablet by mouth daily  Qty: 30 tablet, Refills: 3      zinc gluconate 50 MG tablet Take 1 tablet by mouth daily  Qty: 30 tablet, Refills: 3      mirtazapine (REMERON) 15 MG tablet Take 1 tablet by mouth nightly  Qty: 30 tablet, Refills: 2    Associated Diagnoses: Carcinoma of head of pancreas (HCC)      carvedilol (COREG) 12.5 MG tablet Take 12.5 mg by mouth 2 times daily (with meals)      oxyCODONE 5 MG capsule Take 5 mg by mouth every 4 hours as needed for Pain. aspirin EC 81 MG EC tablet Take 81 mg by mouth nightly       diphenoxylate-atropine (LOMOTIL) 2.5-0.025 MG per tablet Take 1-2 tablets by mouth every 6 hours. loperamide (IMODIUM A-D) 2 MG capsule Take 1 capsule by mouth 4 times daily as needed for Diarrhea  Qty:        Lomitapide Mesylate 5 MG CAPS Take by mouth      prochlorperazine (COMPAZINE) 10 MG tablet Take 1 tablet by mouth every 6 hours as needed (NAUSEA)  Qty: 40 tablet, Refills: 2    Associated Diagnoses: Carcinoma of head of pancreas ([facility] Agranulocytosis secondary to cancer chemotherapy (CODE) ([facility]  Chemotherapy-induced nausea      promethazine (PHENERGAN) 12.5 MG tablet Take 12.5 mg by mouth 4 times daily           Activity: activity as tolerated  Diet: regular diet    Follow-up with 1wk PCP, oncology 2wks    Note that over 30 minutes was spent in preparing discharge papers, discussing discharge with patient, staff, consultants, medication review, arranging follow up, etc.    Signed:  Herve Hardin MD  12/18/2020  1:20 PM

## 2020-12-18 NOTE — DISCHARGE INSTR - DIET
Good nutrition is important when healing from an illness, injury, or surgery. Follow any nutrition recommendations given to you during your hospital stay. If you were given an oral nutrition supplement while in the hospital, continue to take this supplement at home. You can take it with meals, in-between meals, and/or before bedtime. These supplements can be purchased at most local grocery stores, pharmacies, and chain [employer]-stores. If you have any questions about your diet or nutrition, call the hospital and ask for the dietitian.       Kefir yogurt drink daily for probiotics

## 2020-12-29 NOTE — PROGRESS NOTES
The patient had a virtual visit with Dr. Paras Cook on 10 3/13/2020,he  was recommended to continue FOLFIRINOX for 6 more cycles, which could be modified to make it tolerable, even evaluation of one of the drops to his oxaliplatin is warranted. He could begin it for SBRT if he continues to have a locally advanced disease. The patient has received 8 cycles, 8th cycle was on 10/23/2020. The patient was admitted to the hospital on 11/1/2020 with acute respiratory failure secondary to COVID-19 pneumonia, he was also diagnosed with C. difficile colitis. He is feeling much better at this time, he completed the course of Flagyl, 12/4/2020. Chemotherapy was restarted on 12/8/2020, he was admitted to the hospital with C. difficile colitis, he is on oral vancomycin, tolerating it well. He is no longer having diarrhea. Review of Systems;  CONSTITUTIONAL: No fever, chills. Decreased appetite, positive for fatigue. ENMT: Eyes: No diplopia; Nose: No epistaxis. Mouth: No sore throat. RESPIRATORY: No hemoptysis, shortness of breath, cough. CARDIOVASCULAR: No chest pain, palpitations. GASTROINTESTINAL: No nausea/vomiting, abdominal pain, no diarrhea. GENITOURINARY: No dysuria, urinary frequency, hematuria. NEURO: No syncope, presyncope, headache. Positive for peripheral neuropathy.   Remainder:  ROS NEGATIVE    Past Medical History:      Diagnosis Date    CAD (coronary artery disease)     Cancer ([address] 05/2020    pancreas    Heart attack ([address] 2008, 3-2015    Hypertension      Patient Active Problem List   Diagnosis    Atherosclerosis of native artery of both lower extremities with intermittent claudication ([address]    Atheroscler of native artery of right leg with intermit claudication ([address]    Carcinoma of head of pancreas ([address]    Agranulocytosis secondary to cancer chemotherapy (CODE) ([address]     COVID-19    Diarrhea    History of 2019 novel coronavirus disease (COVID-19)  CAD (coronary artery disease)    Essential hypertension    Hypoxia    Hypomagnesemia    Lactic acidosis    Dehydration    Clostridioides difficile diarrhea        Past Surgical History:      Procedure Laterality Date    CARDIAC SURGERY      Quad 2014    CORONARY ANGIOPLASTY  2005    CABG TIMES 4 2015    CORONARY ANGIOPLASTY WITH STENT PLACEMENT      INGUINAL HERNIA REPAIR Right     SD THROMBOENDARTECTMY FEMORAL COMMON Right 2018    FEMORAL ENDARTERECTOMY performed by Ney Bermudez MD at [address] [place] [address] VASCULAR SURGERY  08/15/2018    abdominal aortogram with runoff by Dr. Vonnie Talley       Family History:  Family History   Problem Relation Age of Onset    Breast Cancer Mother     Cancer Father         Colon    Cancer Maternal Aunt         Bladder    Cancer Maternal Grandfather         Bladder    Cancer Maternal Cousin         Leukemia    Breast Cancer Maternal Aunt     Cancer Maternal Cousin         Lung       Medications:  Reviewed and reconciled.     Social History:  Social History     Socioeconomic History    Marital status:      Spouse name: Not on file    Number of children: Not on file    Years of education: Not on file    Highest education level: Not on file   Occupational History    Not on file   Social Needs    Financial resource strain: Not on file    Food insecurity     Worry: Not on file     Inability: Not on file    Transportation needs     Medical: Not on file     Non-medical: Not on file   Tobacco Use    Smoking status: Former Smoker     Packs/day: 1.50     Years: 40.00     Pack years: 60.00     Types: Cigarettes     Quit date: 2008     Years since quittin.4    Smokeless tobacco: Never Used    Tobacco comment: occassional cigar   Substance and Sexual Activity    Alcohol use: Not Currently    Drug use: No    Sexual activity: Not Currently     Partners: Female   Lifestyle    Physical activity     Days per week: Not on file Minutes per session: Not on file    Stress: Not on file   Relationships    Social connections     Talks on phone: Not on file     Gets together: Not on file     Attends Confucianism service: Not on file     Active member of club or organization: Not on file     Attends meetings of clubs or organizations: Not on file     Relationship status: Not on file    Intimate partner violence     Fear of current or ex partner: Not on file     Emotionally abused: Not on file     Physically abused: Not on file     Forced sexual activity: Not on file   Other Topics Concern    Not on file   Social History Narrative    Not on file       Allergies:  No Known Allergies    Physical Exam:  /89 (Site: Right Upper Arm, Position: Sitting, Cuff Size: Medium Adult)   Pulse 95   Temp 98.8 °F (37.1 °C) (Temporal)   Ht 5' 7\" (1.702 m)   Wt 169 lb 8 oz (76.9 kg)   SpO2 95%   BMI 26.55 kg/m²     GENERAL: Alert, oriented x 3, not in acute distress. HEENT: PERRLA; EOMI. Oropharynx clear. NECK: Supple. No palpable cervical or supraclavicular lymphadenopathy. LUNGS: Good air entry bilaterally. No wheezing, crackles or rhonchi. CARDIOVASCULAR: Regular rate. No murmurs, rubs or gallops. ABDOMEN: Soft. Non-tender, non-distended. Positive bowel sounds. EXTREMITIES: Without clubbing, cyanosis, or edema. NEUROLOGIC: No focal deficits.    ECOG PS 1      Impression/Plan: The patient is a very pleasant 71-year-old gentleman, with a past medical history significant for hypertension, coronary artery disease, and peripheral vascular disease, he was diagnosed with locally advanced pancreatic head adenocarcinoma in May 2020, the pancreatic head mass encircles the hepatic artery 360 degrees, without any evidence of metastatic disease. He was started on FOLFIRINOX chemotherapy on 6/29/2020, the patient has been under the great care of Dr. Inder santana, and Dr. Agustín Chacon at [facility] the patient had side effects from the chemotherapy including thrombocytopenia and diarrhea. The oxaliplatin dose has been decreased secondary to thrombocytopenia. The patient had CT scans of the chest and pancreas done in September 2020, revealing a 5.1 x 5.1 x 5.6 cm infiltrative mass centered superior to the pancreatic head/neck infiltrating the robert hepatis and invading the liver and second duodenal, had decreased in size compared to prior exam, partial abutment of the posterior stomach, medial right hepatic lobe and anterior IVC also noted, extensive vascular involvement of the robert hepatis and celiac, mild nonspecific infiltrative changes in the upper abdominal fat, developing carcinomatosis is not excluded, interval resolution of previously described right upper lobe nodular opacities, residual subcentimeter thick nodular opacity in the left lung measuring up to 5 mm, stable, new small left pleural effusion, nonspecific sclerotic focus in the left posterior fourth rib, stable. The patient had a virtual visit with Dr. Rony Guevara on 10 3/13/2020,he  was recommended to continue FOLFIRINOX for 6 more cycles, which could be modified to make it tolerable, even evaluation of one of the drops to his oxaliplatin is warranted. He could begin it for SBRT if he continues to have a locally advanced disease. The patient has received 8 cycles, eighth cycle was on 10/23/2020. The patient was admitted to the hospital on 11/1/2020 with acute respiratory failure secondary to COVID-19 pneumonia, he was also diagnosed with C. difficile colitis. He is feeling much better at this time, he completed the course of Flagyl on 12/4/2020. He was restarted on chemotherapy with FOLFIRINOX on 12/18/2020, cycle #9. Chemotherapy was restarted on 12/8/2020, he was admitted to the hospital with C. difficile colitis, he is on oral vancomycin. We will hold chemotherapy today, will refer to ID, he will need to be cleared by ID prior to restarting chemotherapy    Chemotherapy-induced neuropathy, continue Cymbalta. RTC in 1 week. Thank you for allowing us to participate in the care of Mr. Rosario Peralta.     Agus Pablo MD   HEMATOLOGY/MEDICAL ONCOLOGY  [address] [place] [address] ONCOLOGY  [facility] [address] [facility] [id number]  Dept: [phone]

## 2021-01-01 ENCOUNTER — ANESTHESIA (OUTPATIENT)
Dept: INTERVENTIONAL RADIOLOGY/VASCULAR | Age: 71
DRG: 034 | End: 2021-01-01
Payer: MEDICARE

## 2021-01-01 ENCOUNTER — HOSPITAL ENCOUNTER (OUTPATIENT)
Dept: INFUSION THERAPY | Age: 71
Discharge: HOME | DRG: 872 | End: 2021-02-23
Payer: MEDICARE

## 2021-01-01 ENCOUNTER — TELEPHONE (OUTPATIENT)
Dept: NEUROLOGY | Age: 71
End: 2021-01-01

## 2021-01-01 ENCOUNTER — HOSPITAL ENCOUNTER (OUTPATIENT)
Dept: MRI IMAGING | Age: 71
DRG: 435 | End: 2021-01-01
Attending: TRANSPLANT SURGERY
Payer: MEDICARE

## 2021-01-01 ENCOUNTER — APPOINTMENT (OUTPATIENT)
Dept: CT IMAGING | Age: 71
DRG: 034 | End: 2021-01-01
Payer: MEDICARE

## 2021-01-01 ENCOUNTER — OFFICE VISIT (OUTPATIENT)
Dept: ONCOLOGY | Age: 71
End: 2021-01-01
Payer: MEDICARE

## 2021-01-01 ENCOUNTER — HOSPITAL ENCOUNTER (INPATIENT)
Age: 71
LOS: 2 days | Discharge: HOME | DRG: 034 | End: 2021-02-18
Attending: EMERGENCY MEDICINE | Admitting: INTERNAL MEDICINE
Payer: MEDICARE

## 2021-01-01 ENCOUNTER — HOSPITAL ENCOUNTER (OUTPATIENT)
Dept: INFUSION THERAPY | Age: 71
Discharge: HOME | End: 2021-02-01
Payer: MEDICARE

## 2021-01-01 ENCOUNTER — HOSPITAL ENCOUNTER (OUTPATIENT)
Dept: INFUSION THERAPY | Age: 71
Discharge: HOME | DRG: 872 | End: 2021-02-22
Payer: MEDICARE

## 2021-01-01 ENCOUNTER — APPOINTMENT (OUTPATIENT)
Dept: GENERAL RADIOLOGY | Age: 71
DRG: 069 | End: 2021-01-01
Payer: MEDICARE

## 2021-01-01 ENCOUNTER — TELEPHONE (OUTPATIENT)
Dept: ONCOLOGY | Age: 71
End: 2021-01-01

## 2021-01-01 ENCOUNTER — HOSPITAL ENCOUNTER (EMERGENCY)
Age: 71
Discharge: HOME | DRG: 435 | End: 2021-03-20
Attending: EMERGENCY MEDICINE
Payer: MEDICARE

## 2021-01-01 ENCOUNTER — HOSPITAL ENCOUNTER (OUTPATIENT)
Dept: INFUSION THERAPY | Age: 71
Discharge: HOME | DRG: 435 | End: 2021-03-19
Payer: MEDICARE

## 2021-01-01 ENCOUNTER — APPOINTMENT (OUTPATIENT)
Dept: CT IMAGING | Age: 71
DRG: 069 | End: 2021-01-01
Payer: MEDICARE

## 2021-01-01 ENCOUNTER — APPOINTMENT (OUTPATIENT)
Dept: RADIATION ONCOLOGY | Age: 71
End: 2021-01-01
Attending: RADIOLOGY
Payer: MEDICARE

## 2021-01-01 ENCOUNTER — TELEPHONE (OUTPATIENT)
Dept: SURGERY | Age: 71
End: 2021-01-01

## 2021-01-01 ENCOUNTER — HOSPITAL ENCOUNTER (OUTPATIENT)
Dept: INFUSION THERAPY | Age: 71
Discharge: HOME | End: 2021-01-18
Payer: MEDICARE

## 2021-01-01 ENCOUNTER — APPOINTMENT (OUTPATIENT)
Dept: MRI IMAGING | Age: 71
DRG: 034 | End: 2021-01-01
Payer: MEDICARE

## 2021-01-01 ENCOUNTER — HOSPITAL ENCOUNTER (OUTPATIENT)
Dept: INFUSION THERAPY | Age: 71
End: 2021-01-01
Payer: MEDICARE

## 2021-01-01 ENCOUNTER — HOSPITAL ENCOUNTER (OUTPATIENT)
Dept: INFUSION THERAPY | Age: 71
Discharge: HOME | End: 2021-03-09
Payer: MEDICARE

## 2021-01-01 ENCOUNTER — HOSPITAL ENCOUNTER (OUTPATIENT)
Dept: GENERAL RADIOLOGY | Age: 71
Discharge: HOME | DRG: 445 | End: 2021-03-28
Payer: MEDICARE

## 2021-01-01 ENCOUNTER — HOSPITAL ENCOUNTER (INPATIENT)
Age: 71
LOS: 1 days | Discharge: HOME | DRG: 872 | End: 2021-02-26
Attending: EMERGENCY MEDICINE | Admitting: INTERNAL MEDICINE
Payer: MEDICARE

## 2021-01-01 ENCOUNTER — OFFICE VISIT (OUTPATIENT)
Dept: ONCOLOGY | Age: 71
DRG: 435 | End: 2021-01-01
Payer: MEDICARE

## 2021-01-01 ENCOUNTER — HOSPITAL ENCOUNTER (OUTPATIENT)
Dept: INFUSION THERAPY | Age: 71
Discharge: HOME | End: 2021-03-16
Payer: MEDICARE

## 2021-01-01 ENCOUNTER — HOSPITAL ENCOUNTER (INPATIENT)
Age: 71
LOS: 1 days | Discharge: HOME | DRG: 069 | End: 2021-04-01
Attending: EMERGENCY MEDICINE | Admitting: INTERNAL MEDICINE
Payer: MEDICARE

## 2021-01-01 ENCOUNTER — TELEPHONE (OUTPATIENT)
Dept: HEMATOLOGY | Age: 71
End: 2021-01-01

## 2021-01-01 ENCOUNTER — HOSPITAL ENCOUNTER (OUTPATIENT)
Dept: RADIATION ONCOLOGY | Age: 71
Discharge: HOME | End: 2021-04-05
Payer: MEDICARE

## 2021-01-01 ENCOUNTER — HOSPITAL ENCOUNTER (OUTPATIENT)
Dept: INFUSION THERAPY | Age: 71
End: 2021-01-01

## 2021-01-01 ENCOUNTER — HOSPITAL ENCOUNTER (OUTPATIENT)
Age: 71
Setting detail: SPECIMEN
Discharge: HOME | DRG: 445 | End: 2021-03-24
Payer: MEDICARE

## 2021-01-01 ENCOUNTER — TELEPHONE (OUTPATIENT)
Dept: RADIATION ONCOLOGY | Age: 71
End: 2021-01-01

## 2021-01-01 ENCOUNTER — APPOINTMENT (OUTPATIENT)
Dept: GENERAL RADIOLOGY | Age: 71
DRG: 872 | End: 2021-01-01
Payer: MEDICARE

## 2021-01-01 ENCOUNTER — HOSPITAL ENCOUNTER (OUTPATIENT)
Dept: INFUSION THERAPY | Age: 71
Discharge: HOME | DRG: 034 | End: 2021-02-15
Payer: MEDICARE

## 2021-01-01 ENCOUNTER — OFFICE VISIT (OUTPATIENT)
Dept: HEMATOLOGY | Age: 71
DRG: 435 | End: 2021-01-01
Payer: MEDICARE

## 2021-01-01 ENCOUNTER — APPOINTMENT (OUTPATIENT)
Dept: GENERAL RADIOLOGY | Age: 71
DRG: 034 | End: 2021-01-01
Payer: MEDICARE

## 2021-01-01 ENCOUNTER — HOSPITAL ENCOUNTER (OUTPATIENT)
Dept: INFUSION THERAPY | Age: 71
Discharge: HOME | End: 2021-02-16
Payer: MEDICARE

## 2021-01-01 ENCOUNTER — APPOINTMENT (OUTPATIENT)
Dept: CT IMAGING | Age: 71
DRG: 872 | End: 2021-01-01
Payer: MEDICARE

## 2021-01-01 ENCOUNTER — HOSPITAL ENCOUNTER (INPATIENT)
Age: 71
LOS: 1 days | Discharge: HOME | DRG: 445 | End: 2021-03-27
Attending: SURGERY | Admitting: SURGERY
Payer: MEDICARE

## 2021-01-01 ENCOUNTER — ANESTHESIA (OUTPATIENT)
Dept: OPERATING ROOM | Age: 71
DRG: 445 | End: 2021-01-01
Payer: MEDICARE

## 2021-01-01 ENCOUNTER — ANESTHESIA EVENT (OUTPATIENT)
Dept: OPERATING ROOM | Age: 71
DRG: 445 | End: 2021-01-01
Payer: MEDICARE

## 2021-01-01 ENCOUNTER — APPOINTMENT (OUTPATIENT)
Dept: INTERVENTIONAL RADIOLOGY/VASCULAR | Age: 71
DRG: 034 | End: 2021-01-01
Payer: MEDICARE

## 2021-01-01 ENCOUNTER — OFFICE VISIT (OUTPATIENT)
Dept: ONCOLOGY | Age: 71
DRG: 872 | End: 2021-01-01
Payer: MEDICARE

## 2021-01-01 ENCOUNTER — HOSPITAL ENCOUNTER (OUTPATIENT)
Dept: INFUSION THERAPY | Age: 71
Discharge: HOME | End: 2021-03-08
Payer: MEDICARE

## 2021-01-01 ENCOUNTER — HOSPITAL ENCOUNTER (OUTPATIENT)
Dept: INFUSION THERAPY | Age: 71
Discharge: HOME | End: 2021-03-22
Payer: MEDICARE

## 2021-01-01 ENCOUNTER — APPOINTMENT (OUTPATIENT)
Dept: MRI IMAGING | Age: 71
DRG: 069 | End: 2021-01-01
Payer: MEDICARE

## 2021-01-01 ENCOUNTER — HOSPITAL ENCOUNTER (INPATIENT)
Age: 71
LOS: 1 days | Discharge: HOME | DRG: 435 | End: 2021-03-22
Attending: TRANSPLANT SURGERY | Admitting: TRANSPLANT SURGERY
Payer: MEDICARE

## 2021-01-01 ENCOUNTER — HOSPITAL ENCOUNTER (OUTPATIENT)
Dept: INFUSION THERAPY | Age: 71
Discharge: HOME | End: 2021-02-04
Payer: MEDICARE

## 2021-01-01 ENCOUNTER — ANESTHESIA EVENT (OUTPATIENT)
Dept: INTERVENTIONAL RADIOLOGY/VASCULAR | Age: 71
DRG: 034 | End: 2021-01-01
Payer: MEDICARE

## 2021-01-01 ENCOUNTER — HOSPITAL ENCOUNTER (OUTPATIENT)
Dept: INFUSION THERAPY | Age: 71
Discharge: HOME | End: 2021-02-02
Payer: MEDICARE

## 2021-01-01 ENCOUNTER — TELEPHONE (OUTPATIENT)
Dept: INFUSION THERAPY | Age: 71
End: 2021-01-01

## 2021-01-01 VITALS
TEMPERATURE: 98 F | BODY MASS INDEX: 27.94 KG/M2 | HEART RATE: 80 BPM | OXYGEN SATURATION: 97 % | DIASTOLIC BLOOD PRESSURE: 76 MMHG | WEIGHT: 178 LBS | SYSTOLIC BLOOD PRESSURE: 129 MMHG | HEIGHT: 67 IN

## 2021-01-01 VITALS
BODY MASS INDEX: 28.56 KG/M2 | TEMPERATURE: 97.1 F | HEIGHT: 67 IN | DIASTOLIC BLOOD PRESSURE: 85 MMHG | WEIGHT: 182 LBS | HEART RATE: 80 BPM | RESPIRATION RATE: 20 BRPM | SYSTOLIC BLOOD PRESSURE: 137 MMHG | OXYGEN SATURATION: 99 %

## 2021-01-01 VITALS
SYSTOLIC BLOOD PRESSURE: 162 MMHG | DIASTOLIC BLOOD PRESSURE: 77 MMHG | BODY MASS INDEX: 27 KG/M2 | HEIGHT: 67 IN | HEART RATE: 93 BPM | WEIGHT: 172 LBS | TEMPERATURE: 97.8 F | OXYGEN SATURATION: 96 %

## 2021-01-01 VITALS — SYSTOLIC BLOOD PRESSURE: 163 MMHG | OXYGEN SATURATION: 100 % | DIASTOLIC BLOOD PRESSURE: 89 MMHG

## 2021-01-01 VITALS
TEMPERATURE: 98 F | DIASTOLIC BLOOD PRESSURE: 88 MMHG | HEART RATE: 84 BPM | WEIGHT: 170 LBS | BODY MASS INDEX: 26.68 KG/M2 | SYSTOLIC BLOOD PRESSURE: 129 MMHG | HEIGHT: 67 IN | OXYGEN SATURATION: 97 % | RESPIRATION RATE: 18 BRPM

## 2021-01-01 VITALS
OXYGEN SATURATION: 97 % | DIASTOLIC BLOOD PRESSURE: 68 MMHG | SYSTOLIC BLOOD PRESSURE: 124 MMHG | BODY MASS INDEX: 26.63 KG/M2 | WEIGHT: 170 LBS | HEART RATE: 60 BPM | RESPIRATION RATE: 18 BRPM

## 2021-01-01 VITALS
DIASTOLIC BLOOD PRESSURE: 70 MMHG | TEMPERATURE: 99.6 F | RESPIRATION RATE: 20 BRPM | SYSTOLIC BLOOD PRESSURE: 129 MMHG | HEART RATE: 82 BPM

## 2021-01-01 VITALS
TEMPERATURE: 98.1 F | OXYGEN SATURATION: 96 % | BODY MASS INDEX: 27.94 KG/M2 | WEIGHT: 178 LBS | RESPIRATION RATE: 18 BRPM | HEIGHT: 67 IN | SYSTOLIC BLOOD PRESSURE: 142 MMHG | HEART RATE: 80 BPM | DIASTOLIC BLOOD PRESSURE: 75 MMHG

## 2021-01-01 VITALS
TEMPERATURE: 98.9 F | SYSTOLIC BLOOD PRESSURE: 138 MMHG | BODY MASS INDEX: 27.48 KG/M2 | OXYGEN SATURATION: 95 % | DIASTOLIC BLOOD PRESSURE: 83 MMHG | HEART RATE: 95 BPM | HEIGHT: 67 IN | WEIGHT: 175.1 LBS

## 2021-01-01 VITALS
HEIGHT: 67 IN | OXYGEN SATURATION: 93 % | TEMPERATURE: 98.7 F | DIASTOLIC BLOOD PRESSURE: 87 MMHG | HEART RATE: 68 BPM | SYSTOLIC BLOOD PRESSURE: 150 MMHG | BODY MASS INDEX: 26.68 KG/M2 | RESPIRATION RATE: 18 BRPM | WEIGHT: 170 LBS

## 2021-01-01 VITALS
DIASTOLIC BLOOD PRESSURE: 67 MMHG | SYSTOLIC BLOOD PRESSURE: 128 MMHG | HEART RATE: 77 BPM | BODY MASS INDEX: 27.78 KG/M2 | HEIGHT: 67 IN | WEIGHT: 177 LBS | TEMPERATURE: 98.4 F | OXYGEN SATURATION: 95 %

## 2021-01-01 VITALS
RESPIRATION RATE: 14 BRPM | OXYGEN SATURATION: 98 % | DIASTOLIC BLOOD PRESSURE: 74 MMHG | SYSTOLIC BLOOD PRESSURE: 134 MMHG

## 2021-01-01 VITALS
TEMPERATURE: 97.6 F | HEIGHT: 67 IN | BODY MASS INDEX: 27.12 KG/M2 | HEART RATE: 93 BPM | WEIGHT: 172.8 LBS | OXYGEN SATURATION: 94 % | SYSTOLIC BLOOD PRESSURE: 138 MMHG | DIASTOLIC BLOOD PRESSURE: 80 MMHG

## 2021-01-01 VITALS
DIASTOLIC BLOOD PRESSURE: 79 MMHG | WEIGHT: 170 LBS | OXYGEN SATURATION: 98 % | BODY MASS INDEX: 26.68 KG/M2 | SYSTOLIC BLOOD PRESSURE: 106 MMHG | HEIGHT: 67 IN | RESPIRATION RATE: 18 BRPM | TEMPERATURE: 99.3 F | HEART RATE: 103 BPM

## 2021-01-01 VITALS
SYSTOLIC BLOOD PRESSURE: 143 MMHG | HEIGHT: 67 IN | HEART RATE: 76 BPM | DIASTOLIC BLOOD PRESSURE: 76 MMHG | WEIGHT: 170 LBS | TEMPERATURE: 97.9 F | BODY MASS INDEX: 26.68 KG/M2 | RESPIRATION RATE: 17 BRPM | OXYGEN SATURATION: 96 %

## 2021-01-01 VITALS
BODY MASS INDEX: 27.29 KG/M2 | SYSTOLIC BLOOD PRESSURE: 125 MMHG | OXYGEN SATURATION: 96 % | WEIGHT: 173.9 LBS | HEART RATE: 95 BPM | TEMPERATURE: 98.1 F | DIASTOLIC BLOOD PRESSURE: 78 MMHG | HEIGHT: 67 IN

## 2021-01-01 VITALS — HEART RATE: 67 BPM | RESPIRATION RATE: 16 BRPM | DIASTOLIC BLOOD PRESSURE: 71 MMHG | SYSTOLIC BLOOD PRESSURE: 140 MMHG

## 2021-01-01 VITALS
TEMPERATURE: 98.1 F | HEART RATE: 70 BPM | RESPIRATION RATE: 18 BRPM | DIASTOLIC BLOOD PRESSURE: 75 MMHG | SYSTOLIC BLOOD PRESSURE: 138 MMHG

## 2021-01-01 DIAGNOSIS — C25.0 CARCINOMA OF HEAD OF PANCREAS (HCC): Primary | ICD-10-CM

## 2021-01-01 DIAGNOSIS — I63.419 CEREBROVASCULAR ACCIDENT (CVA) DUE TO EMBOLISM OF MIDDLE CEREBRAL ARTERY, UNSPECIFIED BLOOD VESSEL LATERALITY (HCC): ICD-10-CM

## 2021-01-01 DIAGNOSIS — D70.1 AGRANULOCYTOSIS SECONDARY TO CANCER CHEMOTHERAPY (CODE) (HCC): Primary | ICD-10-CM

## 2021-01-01 DIAGNOSIS — C25.0 CARCINOMA OF HEAD OF PANCREAS (HCC): ICD-10-CM

## 2021-01-01 DIAGNOSIS — R53.1 GENERALIZED WEAKNESS: ICD-10-CM

## 2021-01-01 DIAGNOSIS — T82.9XXA VASCULAR PORT COMPLICATION, INITIAL ENCOUNTER: Primary | ICD-10-CM

## 2021-01-01 DIAGNOSIS — I63.419 CEREBROVASCULAR ACCIDENT (CVA) DUE TO EMBOLISM OF MIDDLE CEREBRAL ARTERY, UNSPECIFIED BLOOD VESSEL LATERALITY (HCC): Primary | ICD-10-CM

## 2021-01-01 DIAGNOSIS — R19.7 DIARRHEA OF PRESUMED INFECTIOUS ORIGIN: Primary | ICD-10-CM

## 2021-01-01 DIAGNOSIS — I63.411 CEREBROVASCULAR ACCIDENT (CVA) DUE TO EMBOLISM OF RIGHT MIDDLE CEREBRAL ARTERY (HCC): Primary | ICD-10-CM

## 2021-01-01 DIAGNOSIS — C25.8 OVERLAPPING MALIGNANT NEOPLASM OF PANCREAS (HCC): Primary | ICD-10-CM

## 2021-01-01 DIAGNOSIS — A04.72 C. DIFFICILE COLITIS: ICD-10-CM

## 2021-01-01 DIAGNOSIS — R41.82 ALTERED MENTAL STATUS, UNSPECIFIED ALTERED MENTAL STATUS TYPE: Primary | ICD-10-CM

## 2021-01-01 LAB
ALBUMIN SERPL-MCNC: 2.3 G/DL (ref 3.5–5.2)
ALBUMIN SERPL-MCNC: 2.6 G/DL (ref 3.5–5.2)
ALBUMIN SERPL-MCNC: 2.7 G/DL (ref 3.5–5.2)
ALBUMIN SERPL-MCNC: 2.9 G/DL (ref 3.5–5.2)
ALBUMIN SERPL-MCNC: 2.9 G/DL (ref 3.5–5.2)
ALBUMIN SERPL-MCNC: 3 G/DL (ref 3.5–5.2)
ALBUMIN SERPL-MCNC: 3.2 G/DL (ref 3.5–5.2)
ALBUMIN SERPL-MCNC: 3.3 G/DL (ref 3.5–5.2)
ALBUMIN SERPL-MCNC: 3.4 G/DL (ref 3.5–5.2)
ALBUMIN SERPL-MCNC: 3.5 G/DL (ref 3.5–5.2)
ALBUMIN SERPL-MCNC: 3.5 G/DL (ref 3.5–5.2)
ALBUMIN SERPL-MCNC: 3.8 G/DL (ref 3.5–5.2)
ALBUMIN SERPL-MCNC: 3.9 G/DL (ref 3.5–5.2)
ALP BLD-CCNC: 1333 U/L (ref 40–129)
ALP BLD-CCNC: 1409 U/L (ref 40–129)
ALP BLD-CCNC: 1455 U/L (ref 40–129)
ALP BLD-CCNC: 1469 U/L (ref 40–129)
ALP BLD-CCNC: 1471 U/L (ref 40–129)
ALP BLD-CCNC: 1548 U/L (ref 40–129)
ALP BLD-CCNC: 163 U/L (ref 40–129)
ALP BLD-CCNC: 172 U/L (ref 40–129)
ALP BLD-CCNC: 176 U/L (ref 40–129)
ALP BLD-CCNC: 193 U/L (ref 40–129)
ALP BLD-CCNC: 200 U/L (ref 40–129)
ALP BLD-CCNC: 251 U/L (ref 40–129)
ALP BLD-CCNC: 596 U/L (ref 40–129)
ALP BLD-CCNC: 684 U/L (ref 40–129)
ALP BLD-CCNC: 824 U/L (ref 40–129)
ALT SERPL-CCNC: 104 U/L (ref 0–40)
ALT SERPL-CCNC: 14 U/L (ref 0–40)
ALT SERPL-CCNC: 15 U/L (ref 0–40)
ALT SERPL-CCNC: 15 U/L (ref 0–40)
ALT SERPL-CCNC: 16 U/L (ref 0–40)
ALT SERPL-CCNC: 18 U/L (ref 0–40)
ALT SERPL-CCNC: 18 U/L (ref 0–40)
ALT SERPL-CCNC: 30 U/L (ref 0–40)
ALT SERPL-CCNC: 33 U/L (ref 0–40)
ALT SERPL-CCNC: 37 U/L (ref 0–40)
ALT SERPL-CCNC: 38 U/L (ref 0–40)
ALT SERPL-CCNC: 57 U/L (ref 0–40)
ALT SERPL-CCNC: 63 U/L (ref 0–40)
ALT SERPL-CCNC: 74 U/L (ref 0–40)
ALT SERPL-CCNC: 77 U/L (ref 0–40)
AMMONIA: 33 UMOL/L (ref 16–60)
AMORPHOUS: ABNORMAL
ANION GAP SERPL CALCULATED.3IONS-SCNC: 10 MMOL/L (ref 7–16)
ANION GAP SERPL CALCULATED.3IONS-SCNC: 10 MMOL/L (ref 7–16)
ANION GAP SERPL CALCULATED.3IONS-SCNC: 11 MMOL/L (ref 7–16)
ANION GAP SERPL CALCULATED.3IONS-SCNC: 12 MMOL/L (ref 7–16)
ANION GAP SERPL CALCULATED.3IONS-SCNC: 13 MMOL/L (ref 7–16)
ANION GAP SERPL CALCULATED.3IONS-SCNC: 8 MMOL/L (ref 7–16)
ANION GAP SERPL CALCULATED.3IONS-SCNC: 9 MMOL/L (ref 7–16)
ANION GAP SERPL CALCULATED.3IONS-SCNC: 9 MMOL/L (ref 7–16)
ANISOCYTOSIS: ABNORMAL
APTT: 29.4 SEC (ref 24.5–35.1)
APTT: 30.4 SEC (ref 24.5–35.1)
APTT: 37.6 SEC (ref 24.5–35.1)
AST SERPL-CCNC: 119 U/L (ref 0–39)
AST SERPL-CCNC: 17 U/L (ref 0–39)
AST SERPL-CCNC: 18 U/L (ref 0–39)
AST SERPL-CCNC: 21 U/L (ref 0–39)
AST SERPL-CCNC: 23 U/L (ref 0–39)
AST SERPL-CCNC: 23 U/L (ref 0–39)
AST SERPL-CCNC: 24 U/L (ref 0–39)
AST SERPL-CCNC: 52 U/L (ref 0–39)
AST SERPL-CCNC: 61 U/L (ref 0–39)
AST SERPL-CCNC: 66 U/L (ref 0–39)
AST SERPL-CCNC: 72 U/L (ref 0–39)
AST SERPL-CCNC: 75 U/L (ref 0–39)
AST SERPL-CCNC: 80 U/L (ref 0–39)
AST SERPL-CCNC: 82 U/L (ref 0–39)
AST SERPL-CCNC: 86 U/L (ref 0–39)
B.E.: 1 MMOL/L (ref -3–3)
BACTERIA: ABNORMAL /HPF
BASOPHILS ABSOLUTE: 0 E9/L (ref 0–0.2)
BASOPHILS ABSOLUTE: 0.01 E9/L (ref 0–0.2)
BASOPHILS ABSOLUTE: 0.02 E9/L (ref 0–0.2)
BASOPHILS ABSOLUTE: 0.03 E9/L (ref 0–0.2)
BASOPHILS RELATIVE PERCENT: 0 % (ref 0–2)
BASOPHILS RELATIVE PERCENT: 0.1 % (ref 0–2)
BASOPHILS RELATIVE PERCENT: 0.2 % (ref 0–2)
BASOPHILS RELATIVE PERCENT: 0.2 % (ref 0–2)
BASOPHILS RELATIVE PERCENT: 0.3 % (ref 0–2)
BASOPHILS RELATIVE PERCENT: 0.3 % (ref 0–2)
BASOPHILS RELATIVE PERCENT: 0.4 % (ref 0–2)
BASOPHILS RELATIVE PERCENT: 0.4 % (ref 0–2)
BASOPHILS RELATIVE PERCENT: 0.5 % (ref 0–2)
BASOPHILS RELATIVE PERCENT: 0.5 % (ref 0–2)
BASOPHILS RELATIVE PERCENT: 0.9 % (ref 0–2)
BILIRUB SERPL-MCNC: 0.2 MG/DL (ref 0–1.2)
BILIRUB SERPL-MCNC: 0.3 MG/DL (ref 0–1.2)
BILIRUB SERPL-MCNC: 0.7 MG/DL (ref 0–1.2)
BILIRUB SERPL-MCNC: 1.2 MG/DL (ref 0–1.2)
BILIRUB SERPL-MCNC: 1.9 MG/DL (ref 0–1.2)
BILIRUB SERPL-MCNC: 2 MG/DL (ref 0–1.2)
BILIRUB SERPL-MCNC: 2.2 MG/DL (ref 0–1.2)
BILIRUB SERPL-MCNC: 2.5 MG/DL (ref 0–1.2)
BILIRUB SERPL-MCNC: 3 MG/DL (ref 0–1.2)
BILIRUB SERPL-MCNC: 3.4 MG/DL (ref 0–1.2)
BILIRUB SERPL-MCNC: 6.3 MG/DL (ref 0–1.2)
BILIRUB SERPL-MCNC: <0.2 MG/DL (ref 0–1.2)
BILIRUBIN DIRECT: 1.4 MG/DL (ref 0–0.3)
BILIRUBIN DIRECT: 5.6 MG/DL (ref 0–0.3)
BILIRUBIN DIRECT: <0.2 MG/DL (ref 0–0.3)
BILIRUBIN URINE: ABNORMAL
BILIRUBIN, INDIRECT: 0.6 MG/DL (ref 0–1)
BILIRUBIN, INDIRECT: 0.7 MG/DL (ref 0–1)
BILIRUBIN, INDIRECT: ABNORMAL MG/DL (ref 0–1)
BLOOD CULTURE, ROUTINE: NORMAL
BLOOD, URINE: NEGATIVE
BUN BLDV-MCNC: 10 MG/DL (ref 8–23)
BUN BLDV-MCNC: 11 MG/DL (ref 8–23)
BUN BLDV-MCNC: 12 MG/DL (ref 8–23)
BUN BLDV-MCNC: 13 MG/DL (ref 8–23)
BUN BLDV-MCNC: 14 MG/DL (ref 8–23)
BUN BLDV-MCNC: 15 MG/DL (ref 8–23)
BUN BLDV-MCNC: 7 MG/DL (ref 8–23)
BUN BLDV-MCNC: 8 MG/DL (ref 8–23)
BUN BLDV-MCNC: 9 MG/DL (ref 8–23)
BURR CELLS: ABNORMAL
C DIFF TOXIN/ANTIGEN: NORMAL
CA 19-9: 13 U/ML (ref 0–37)
CALCIUM SERPL-MCNC: 7.9 MG/DL (ref 8.6–10.2)
CALCIUM SERPL-MCNC: 8.1 MG/DL (ref 8.6–10.2)
CALCIUM SERPL-MCNC: 8.1 MG/DL (ref 8.6–10.2)
CALCIUM SERPL-MCNC: 8.3 MG/DL (ref 8.6–10.2)
CALCIUM SERPL-MCNC: 8.4 MG/DL (ref 8.6–10.2)
CALCIUM SERPL-MCNC: 8.5 MG/DL (ref 8.6–10.2)
CALCIUM SERPL-MCNC: 8.7 MG/DL (ref 8.6–10.2)
CALCIUM SERPL-MCNC: 8.8 MG/DL (ref 8.6–10.2)
CALCIUM SERPL-MCNC: 8.9 MG/DL (ref 8.6–10.2)
CALCIUM SERPL-MCNC: 9 MG/DL (ref 8.6–10.2)
CALCIUM SERPL-MCNC: 9.1 MG/DL (ref 8.6–10.2)
CHLORIDE BLD-SCNC: 100 MMOL/L (ref 98–107)
CHLORIDE BLD-SCNC: 100 MMOL/L (ref 98–107)
CHLORIDE BLD-SCNC: 101 MMOL/L (ref 98–107)
CHLORIDE BLD-SCNC: 102 MMOL/L (ref 98–107)
CHLORIDE BLD-SCNC: 104 MMOL/L (ref 98–107)
CHLORIDE BLD-SCNC: 106 MMOL/L (ref 98–107)
CHLORIDE BLD-SCNC: 107 MMOL/L (ref 98–107)
CHLORIDE BLD-SCNC: 108 MMOL/L (ref 98–107)
CHLORIDE BLD-SCNC: 108 MMOL/L (ref 98–107)
CHLORIDE BLD-SCNC: 109 MMOL/L (ref 98–107)
CHLORIDE BLD-SCNC: 110 MMOL/L (ref 98–107)
CHLORIDE BLD-SCNC: 97 MMOL/L (ref 98–107)
CHLORIDE BLD-SCNC: 99 MMOL/L (ref 98–107)
CHLORIDE BLD-SCNC: 99 MMOL/L (ref 98–107)
CHOLESTEROL, TOTAL: 161 MG/DL (ref 0–199)
CK MB: 1.8 NG/ML (ref 0–7.7)
CLARITY: CLEAR
CO2: 17 MMOL/L (ref 22–29)
CO2: 20 MMOL/L (ref 22–29)
CO2: 21 MMOL/L (ref 22–29)
CO2: 21 MMOL/L (ref 22–29)
CO2: 22 MMOL/L (ref 22–29)
CO2: 23 MMOL/L (ref 22–29)
CO2: 24 MMOL/L (ref 22–29)
CO2: 25 MMOL/L (ref 22–29)
CO2: 26 MMOL/L (ref 22–29)
COHB: 1.1 % (ref 0–1.5)
COLOR: YELLOW
CREAT SERPL-MCNC: 0.6 MG/DL (ref 0.7–1.2)
CREAT SERPL-MCNC: 0.7 MG/DL (ref 0.7–1.2)
CREAT SERPL-MCNC: 0.8 MG/DL (ref 0.7–1.2)
CRITICAL: ABNORMAL
CULTURE, BLOOD 2: NORMAL
CULTURE, STOOL: NORMAL
DATE ANALYZED: ABNORMAL
DATE OF COLLECTION: ABNORMAL
EKG ATRIAL RATE: 121 BPM
EKG ATRIAL RATE: 72 BPM
EKG ATRIAL RATE: 73 BPM
EKG ATRIAL RATE: 80 BPM
EKG P AXIS: 14 DEGREES
EKG P AXIS: 24 DEGREES
EKG P AXIS: 26 DEGREES
EKG P AXIS: 40 DEGREES
EKG P-R INTERVAL: 128 MS
EKG P-R INTERVAL: 136 MS
EKG P-R INTERVAL: 144 MS
EKG P-R INTERVAL: 144 MS
EKG Q-T INTERVAL: 334 MS
EKG Q-T INTERVAL: 426 MS
EKG Q-T INTERVAL: 436 MS
EKG Q-T INTERVAL: 462 MS
EKG QRS DURATION: 126 MS
EKG QRS DURATION: 130 MS
EKG QRS DURATION: 138 MS
EKG QRS DURATION: 142 MS
EKG QTC CALCULATION (BAZETT): 474 MS
EKG QTC CALCULATION (BAZETT): 480 MS
EKG QTC CALCULATION (BAZETT): 491 MS
EKG QTC CALCULATION (BAZETT): 505 MS
EKG R AXIS: -10 DEGREES
EKG R AXIS: -16 DEGREES
EKG R AXIS: -19 DEGREES
EKG R AXIS: 112 DEGREES
EKG T AXIS: -26 DEGREES
EKG T AXIS: 110 DEGREES
EKG T AXIS: 60 DEGREES
EKG T AXIS: 61 DEGREES
EKG VENTRICULAR RATE: 121 BPM
EKG VENTRICULAR RATE: 72 BPM
EKG VENTRICULAR RATE: 73 BPM
EKG VENTRICULAR RATE: 80 BPM
EOSINOPHILS ABSOLUTE: 0 E9/L (ref 0.05–0.5)
EOSINOPHILS ABSOLUTE: 0 E9/L (ref 0.05–0.5)
EOSINOPHILS ABSOLUTE: 0.04 E9/L (ref 0.05–0.5)
EOSINOPHILS ABSOLUTE: 0.05 E9/L (ref 0.05–0.5)
EOSINOPHILS ABSOLUTE: 0.06 E9/L (ref 0.05–0.5)
EOSINOPHILS ABSOLUTE: 0.07 E9/L (ref 0.05–0.5)
EOSINOPHILS ABSOLUTE: 0.08 E9/L (ref 0.05–0.5)
EOSINOPHILS ABSOLUTE: 0.09 E9/L (ref 0.05–0.5)
EOSINOPHILS ABSOLUTE: 0.09 E9/L (ref 0.05–0.5)
EOSINOPHILS ABSOLUTE: 0.1 E9/L (ref 0.05–0.5)
EOSINOPHILS ABSOLUTE: 0.11 E9/L (ref 0.05–0.5)
EOSINOPHILS ABSOLUTE: 0.13 E9/L (ref 0.05–0.5)
EOSINOPHILS ABSOLUTE: 0.2 E9/L (ref 0.05–0.5)
EOSINOPHILS RELATIVE PERCENT: 0 % (ref 0–6)
EOSINOPHILS RELATIVE PERCENT: 0 % (ref 0–6)
EOSINOPHILS RELATIVE PERCENT: 0.7 % (ref 0–6)
EOSINOPHILS RELATIVE PERCENT: 1 % (ref 0–6)
EOSINOPHILS RELATIVE PERCENT: 1 % (ref 0–6)
EOSINOPHILS RELATIVE PERCENT: 1.7 % (ref 0–6)
EOSINOPHILS RELATIVE PERCENT: 2 % (ref 0–6)
EOSINOPHILS RELATIVE PERCENT: 2.2 % (ref 0–6)
EOSINOPHILS RELATIVE PERCENT: 2.5 % (ref 0–6)
EOSINOPHILS RELATIVE PERCENT: 2.9 % (ref 0–6)
EOSINOPHILS RELATIVE PERCENT: 3 % (ref 0–6)
EOSINOPHILS RELATIVE PERCENT: 3.2 % (ref 0–6)
EOSINOPHILS RELATIVE PERCENT: 3.4 % (ref 0–6)
GFR AFRICAN AMERICAN: >60
GFR NON-AFRICAN AMERICAN: >60 ML/MIN/1.73
GIARDIA ANTIGEN STOOL: NORMAL
GLUCOSE BLD-MCNC: 100 MG/DL (ref 74–99)
GLUCOSE BLD-MCNC: 101 MG/DL (ref 74–99)
GLUCOSE BLD-MCNC: 101 MG/DL (ref 74–99)
GLUCOSE BLD-MCNC: 111 MG/DL (ref 74–99)
GLUCOSE BLD-MCNC: 112 MG/DL (ref 74–99)
GLUCOSE BLD-MCNC: 114 MG/DL (ref 74–99)
GLUCOSE BLD-MCNC: 115 MG/DL (ref 74–99)
GLUCOSE BLD-MCNC: 119 MG/DL (ref 74–99)
GLUCOSE BLD-MCNC: 119 MG/DL (ref 74–99)
GLUCOSE BLD-MCNC: 130 MG/DL (ref 74–99)
GLUCOSE BLD-MCNC: 137 MG/DL (ref 74–99)
GLUCOSE BLD-MCNC: 150 MG/DL (ref 74–99)
GLUCOSE BLD-MCNC: 164 MG/DL (ref 74–99)
GLUCOSE BLD-MCNC: 173 MG/DL (ref 74–99)
GLUCOSE BLD-MCNC: 93 MG/DL (ref 74–99)
GLUCOSE BLD-MCNC: 97 MG/DL (ref 74–99)
GLUCOSE BLD-MCNC: 98 MG/DL (ref 74–99)
GLUCOSE URINE: NEGATIVE MG/DL
HBA1C MFR BLD: 5.1 % (ref 4–5.6)
HCO3: 23.5 MMOL/L (ref 22–26)
HCT VFR BLD CALC: 28 % (ref 37–54)
HCT VFR BLD CALC: 28.5 % (ref 37–54)
HCT VFR BLD CALC: 29.3 % (ref 37–54)
HCT VFR BLD CALC: 29.5 % (ref 37–54)
HCT VFR BLD CALC: 30.8 % (ref 37–54)
HCT VFR BLD CALC: 31.8 % (ref 37–54)
HCT VFR BLD CALC: 32 % (ref 37–54)
HCT VFR BLD CALC: 32 % (ref 37–54)
HCT VFR BLD CALC: 32.2 % (ref 37–54)
HCT VFR BLD CALC: 32.8 % (ref 37–54)
HCT VFR BLD CALC: 33.5 % (ref 37–54)
HCT VFR BLD CALC: 34.2 % (ref 37–54)
HCT VFR BLD CALC: 36.1 % (ref 37–54)
HCT VFR BLD CALC: 36.5 % (ref 37–54)
HCT VFR BLD CALC: 36.6 % (ref 37–54)
HDLC SERPL-MCNC: 44 MG/DL
HEMOGLOBIN: 10.1 G/DL (ref 12.5–16.5)
HEMOGLOBIN: 10.3 G/DL (ref 12.5–16.5)
HEMOGLOBIN: 10.3 G/DL (ref 12.5–16.5)
HEMOGLOBIN: 10.5 G/DL (ref 12.5–16.5)
HEMOGLOBIN: 10.8 G/DL (ref 12.5–16.5)
HEMOGLOBIN: 11.1 G/DL (ref 12.5–16.5)
HEMOGLOBIN: 11.4 G/DL (ref 12.5–16.5)
HEMOGLOBIN: 11.8 G/DL (ref 12.5–16.5)
HEMOGLOBIN: 11.9 G/DL (ref 12.5–16.5)
HEMOGLOBIN: 9.3 G/DL (ref 12.5–16.5)
HEMOGLOBIN: 9.5 G/DL (ref 12.5–16.5)
HEMOGLOBIN: 9.8 G/DL (ref 12.5–16.5)
HEMOGLOBIN: 9.8 G/DL (ref 12.5–16.5)
HHB: 2.4 % (ref 0–5)
HYPOCHROMIA: ABNORMAL
HYPOCHROMIA: ABNORMAL
IMMATURE GRANULOCYTES #: 0.01 E9/L
IMMATURE GRANULOCYTES #: 0.01 E9/L
IMMATURE GRANULOCYTES #: 0.02 E9/L
IMMATURE GRANULOCYTES #: 0.03 E9/L
IMMATURE GRANULOCYTES #: 0.03 E9/L
IMMATURE GRANULOCYTES #: 0.04 E9/L
IMMATURE GRANULOCYTES #: 0.04 E9/L
IMMATURE GRANULOCYTES %: 0.3 % (ref 0–5)
IMMATURE GRANULOCYTES %: 0.4 % (ref 0–5)
IMMATURE GRANULOCYTES %: 0.5 % (ref 0–5)
IMMATURE GRANULOCYTES %: 0.6 % (ref 0–5)
IMMATURE GRANULOCYTES %: 0.6 % (ref 0–5)
IMMATURE GRANULOCYTES %: 0.7 % (ref 0–5)
IMMATURE GRANULOCYTES %: 0.8 % (ref 0–5)
INR BLD: 1.1
INR BLD: 1.2
INR BLD: 1.3
KETONES, URINE: NEGATIVE MG/DL
LAB: ABNORMAL
LACTIC ACID, SEPSIS: 1.5 MMOL/L (ref 0.5–1.9)
LACTIC ACID, SEPSIS: 1.8 MMOL/L (ref 0.5–1.9)
LACTIC ACID, SEPSIS: 2.8 MMOL/L (ref 0.5–1.9)
LACTIC ACID: 0.8 MMOL/L (ref 0.5–2.2)
LDL CHOLESTEROL CALCULATED: 95 MG/DL (ref 0–99)
LEUKOCYTE ESTERASE, URINE: NEGATIVE
LIPASE: 75 U/L (ref 13–60)
LYMPHOCYTES ABSOLUTE: 0.47 E9/L (ref 1.5–4)
LYMPHOCYTES ABSOLUTE: 0.5 E9/L (ref 1.5–4)
LYMPHOCYTES ABSOLUTE: 0.55 E9/L (ref 1.5–4)
LYMPHOCYTES ABSOLUTE: 0.61 E9/L (ref 1.5–4)
LYMPHOCYTES ABSOLUTE: 0.66 E9/L (ref 1.5–4)
LYMPHOCYTES ABSOLUTE: 0.78 E9/L (ref 1.5–4)
LYMPHOCYTES ABSOLUTE: 0.82 E9/L (ref 1.5–4)
LYMPHOCYTES ABSOLUTE: 0.88 E9/L (ref 1.5–4)
LYMPHOCYTES ABSOLUTE: 0.96 E9/L (ref 1.5–4)
LYMPHOCYTES ABSOLUTE: 1.07 E9/L (ref 1.5–4)
LYMPHOCYTES ABSOLUTE: 1.08 E9/L (ref 1.5–4)
LYMPHOCYTES ABSOLUTE: 1.15 E9/L (ref 1.5–4)
LYMPHOCYTES ABSOLUTE: 1.33 E9/L (ref 1.5–4)
LYMPHOCYTES RELATIVE PERCENT: 1.7 % (ref 20–42)
LYMPHOCYTES RELATIVE PERCENT: 15 % (ref 20–42)
LYMPHOCYTES RELATIVE PERCENT: 15.1 % (ref 20–42)
LYMPHOCYTES RELATIVE PERCENT: 16 % (ref 20–42)
LYMPHOCYTES RELATIVE PERCENT: 17.2 % (ref 20–42)
LYMPHOCYTES RELATIVE PERCENT: 17.7 % (ref 20–42)
LYMPHOCYTES RELATIVE PERCENT: 18.1 % (ref 20–42)
LYMPHOCYTES RELATIVE PERCENT: 19.1 % (ref 20–42)
LYMPHOCYTES RELATIVE PERCENT: 21.9 % (ref 20–42)
LYMPHOCYTES RELATIVE PERCENT: 24 % (ref 20–42)
LYMPHOCYTES RELATIVE PERCENT: 26.8 % (ref 20–42)
LYMPHOCYTES RELATIVE PERCENT: 27.6 % (ref 20–42)
LYMPHOCYTES RELATIVE PERCENT: 9.7 % (ref 20–42)
Lab: ABNORMAL
MAGNESIUM: 1.7 MG/DL (ref 1.6–2.6)
MAGNESIUM: 1.8 MG/DL (ref 1.6–2.6)
MAGNESIUM: 1.8 MG/DL (ref 1.6–2.6)
MAGNESIUM: 1.9 MG/DL (ref 1.6–2.6)
MAGNESIUM: 2 MG/DL (ref 1.6–2.6)
MAGNESIUM: 2.2 MG/DL (ref 1.6–2.6)
MAGNESIUM: 2.2 MG/DL (ref 1.6–2.6)
MCH RBC QN AUTO: 28 PG (ref 26–35)
MCH RBC QN AUTO: 28.2 PG (ref 26–35)
MCH RBC QN AUTO: 28.4 PG (ref 26–35)
MCH RBC QN AUTO: 28.4 PG (ref 26–35)
MCH RBC QN AUTO: 28.7 PG (ref 26–35)
MCH RBC QN AUTO: 28.7 PG (ref 26–35)
MCH RBC QN AUTO: 28.8 PG (ref 26–35)
MCH RBC QN AUTO: 28.8 PG (ref 26–35)
MCH RBC QN AUTO: 29.1 PG (ref 26–35)
MCH RBC QN AUTO: 29.3 PG (ref 26–35)
MCH RBC QN AUTO: 29.4 PG (ref 26–35)
MCH RBC QN AUTO: 29.8 PG (ref 26–35)
MCH RBC QN AUTO: 30.5 PG (ref 26–35)
MCHC RBC AUTO-ENTMCNC: 31.4 % (ref 32–34.5)
MCHC RBC AUTO-ENTMCNC: 31.4 % (ref 32–34.5)
MCHC RBC AUTO-ENTMCNC: 31.6 % (ref 32–34.5)
MCHC RBC AUTO-ENTMCNC: 32.2 % (ref 32–34.5)
MCHC RBC AUTO-ENTMCNC: 32.4 % (ref 32–34.5)
MCHC RBC AUTO-ENTMCNC: 32.4 % (ref 32–34.5)
MCHC RBC AUTO-ENTMCNC: 32.6 % (ref 32–34.5)
MCHC RBC AUTO-ENTMCNC: 32.8 % (ref 32–34.5)
MCHC RBC AUTO-ENTMCNC: 32.8 % (ref 32–34.5)
MCHC RBC AUTO-ENTMCNC: 33.1 % (ref 32–34.5)
MCHC RBC AUTO-ENTMCNC: 33.2 % (ref 32–34.5)
MCHC RBC AUTO-ENTMCNC: 33.2 % (ref 32–34.5)
MCHC RBC AUTO-ENTMCNC: 34.4 % (ref 32–34.5)
MCV RBC AUTO: 85.3 FL (ref 80–99.9)
MCV RBC AUTO: 85.6 FL (ref 80–99.9)
MCV RBC AUTO: 86.8 FL (ref 80–99.9)
MCV RBC AUTO: 87.1 FL (ref 80–99.9)
MCV RBC AUTO: 87.4 FL (ref 80–99.9)
MCV RBC AUTO: 87.7 FL (ref 80–99.9)
MCV RBC AUTO: 88.4 FL (ref 80–99.9)
MCV RBC AUTO: 89.1 FL (ref 80–99.9)
MCV RBC AUTO: 89.5 FL (ref 80–99.9)
MCV RBC AUTO: 91 FL (ref 80–99.9)
MCV RBC AUTO: 91.7 FL (ref 80–99.9)
MCV RBC AUTO: 92.2 FL (ref 80–99.9)
MCV RBC AUTO: 92.4 FL (ref 80–99.9)
MCV RBC AUTO: 92.8 FL (ref 80–99.9)
MCV RBC AUTO: 93.6 FL (ref 80–99.9)
METAMYELOCYTES RELATIVE PERCENT: 0.9 % (ref 0–1)
METER GLUCOSE: 108 MG/DL (ref 74–99)
METHB: 0.4 % (ref 0–1.5)
MODE: ABNORMAL
MONOCYTES ABSOLUTE: 0 E9/L (ref 0.1–0.95)
MONOCYTES ABSOLUTE: 0.29 E9/L (ref 0.1–0.95)
MONOCYTES ABSOLUTE: 0.3 E9/L (ref 0.1–0.95)
MONOCYTES ABSOLUTE: 0.31 E9/L (ref 0.1–0.95)
MONOCYTES ABSOLUTE: 0.33 E9/L (ref 0.1–0.95)
MONOCYTES ABSOLUTE: 0.36 E9/L (ref 0.1–0.95)
MONOCYTES ABSOLUTE: 0.39 E9/L (ref 0.1–0.95)
MONOCYTES ABSOLUTE: 0.39 E9/L (ref 0.1–0.95)
MONOCYTES ABSOLUTE: 0.45 E9/L (ref 0.1–0.95)
MONOCYTES ABSOLUTE: 0.46 E9/L (ref 0.1–0.95)
MONOCYTES ABSOLUTE: 0.47 E9/L (ref 0.1–0.95)
MONOCYTES ABSOLUTE: 0.69 E9/L (ref 0.1–0.95)
MONOCYTES ABSOLUTE: 0.76 E9/L (ref 0.1–0.95)
MONOCYTES RELATIVE PERCENT: 0.6 % (ref 2–12)
MONOCYTES RELATIVE PERCENT: 10.1 % (ref 2–12)
MONOCYTES RELATIVE PERCENT: 10.3 % (ref 2–12)
MONOCYTES RELATIVE PERCENT: 10.5 % (ref 2–12)
MONOCYTES RELATIVE PERCENT: 10.6 % (ref 2–12)
MONOCYTES RELATIVE PERCENT: 11 % (ref 2–12)
MONOCYTES RELATIVE PERCENT: 11.3 % (ref 2–12)
MONOCYTES RELATIVE PERCENT: 20.2 % (ref 2–12)
MONOCYTES RELATIVE PERCENT: 5.8 % (ref 2–12)
MONOCYTES RELATIVE PERCENT: 6 % (ref 2–12)
MONOCYTES RELATIVE PERCENT: 6.2 % (ref 2–12)
MONOCYTES RELATIVE PERCENT: 7 % (ref 2–12)
MONOCYTES RELATIVE PERCENT: 8.3 % (ref 2–12)
MYELOCYTE PERCENT: 0.9 % (ref 0–0)
NEUTROPHILS ABSOLUTE: 1.77 E9/L (ref 1.8–7.3)
NEUTROPHILS ABSOLUTE: 1.78 E9/L (ref 1.8–7.3)
NEUTROPHILS ABSOLUTE: 2.26 E9/L (ref 1.8–7.3)
NEUTROPHILS ABSOLUTE: 2.52 E9/L (ref 1.8–7.3)
NEUTROPHILS ABSOLUTE: 2.73 E9/L (ref 1.8–7.3)
NEUTROPHILS ABSOLUTE: 2.82 E9/L (ref 1.8–7.3)
NEUTROPHILS ABSOLUTE: 24.4 E9/L (ref 1.8–7.3)
NEUTROPHILS ABSOLUTE: 3.23 E9/L (ref 1.8–7.3)
NEUTROPHILS ABSOLUTE: 3.34 E9/L (ref 1.8–7.3)
NEUTROPHILS ABSOLUTE: 4.29 E9/L (ref 1.8–7.3)
NEUTROPHILS ABSOLUTE: 4.54 E9/L (ref 1.8–7.3)
NEUTROPHILS ABSOLUTE: 4.59 E9/L (ref 1.8–7.3)
NEUTROPHILS ABSOLUTE: 5.2 E9/L (ref 1.8–7.3)
NEUTROPHILS RELATIVE PERCENT: 52 % (ref 43–80)
NEUTROPHILS RELATIVE PERCENT: 58.1 % (ref 43–80)
NEUTROPHILS RELATIVE PERCENT: 64.2 % (ref 43–80)
NEUTROPHILS RELATIVE PERCENT: 65.2 % (ref 43–80)
NEUTROPHILS RELATIVE PERCENT: 66.7 % (ref 43–80)
NEUTROPHILS RELATIVE PERCENT: 67.2 % (ref 43–80)
NEUTROPHILS RELATIVE PERCENT: 69.8 % (ref 43–80)
NEUTROPHILS RELATIVE PERCENT: 71.6 % (ref 43–80)
NEUTROPHILS RELATIVE PERCENT: 72.8 % (ref 43–80)
NEUTROPHILS RELATIVE PERCENT: 76 % (ref 43–80)
NEUTROPHILS RELATIVE PERCENT: 78 % (ref 43–80)
NEUTROPHILS RELATIVE PERCENT: 80.9 % (ref 43–80)
NEUTROPHILS RELATIVE PERCENT: 96.5 % (ref 43–80)
NITRITE, URINE: NEGATIVE
O2 CONTENT: 16.2 ML/DL
O2 SATURATION: 97.6 % (ref 92–98.5)
O2HB: 96.1 % (ref 94–97)
OPERATOR ID: 1768
OVALOCYTES: ABNORMAL
PATIENT TEMP: 37 C
PCO2: 30.5 MMHG (ref 35–45)
PDW BLD-RTO: 14.7 FL (ref 11.5–15)
PDW BLD-RTO: 14.9 FL (ref 11.5–15)
PDW BLD-RTO: 15.1 FL (ref 11.5–15)
PDW BLD-RTO: 15.2 FL (ref 11.5–15)
PDW BLD-RTO: 15.5 FL (ref 11.5–15)
PDW BLD-RTO: 15.5 FL (ref 11.5–15)
PDW BLD-RTO: 15.6 FL (ref 11.5–15)
PDW BLD-RTO: 16 FL (ref 11.5–15)
PDW BLD-RTO: 16 FL (ref 11.5–15)
PDW BLD-RTO: 16.4 FL (ref 11.5–15)
PDW BLD-RTO: 16.4 FL (ref 11.5–15)
PDW BLD-RTO: 18 FL (ref 11.5–15)
PDW BLD-RTO: 18.4 FL (ref 11.5–15)
PDW BLD-RTO: 19 FL (ref 11.5–15)
PDW BLD-RTO: 19.4 FL (ref 11.5–15)
PH BLOOD GAS: 7.5 (ref 7.35–7.45)
PH UA: 5.5 (ref 5–9)
PLATELET # BLD: 101 E9/L (ref 130–450)
PLATELET # BLD: 104 E9/L (ref 130–450)
PLATELET # BLD: 108 E9/L (ref 130–450)
PLATELET # BLD: 110 E9/L (ref 130–450)
PLATELET # BLD: 130 E9/L (ref 130–450)
PLATELET # BLD: 132 E9/L (ref 130–450)
PLATELET # BLD: 134 E9/L (ref 130–450)
PLATELET # BLD: 135 E9/L (ref 130–450)
PLATELET # BLD: 145 E9/L (ref 130–450)
PLATELET # BLD: 182 E9/L (ref 130–450)
PLATELET # BLD: 80 E9/L (ref 130–450)
PLATELET # BLD: 88 E9/L (ref 130–450)
PLATELET # BLD: 89 E9/L (ref 130–450)
PLATELET # BLD: 91 E9/L (ref 130–450)
PLATELET # BLD: 93 E9/L (ref 130–450)
PLATELET CONFIRMATION: NORMAL
PMV BLD AUTO: 11.6 FL (ref 7–12)
PMV BLD AUTO: 11.8 FL (ref 7–12)
PMV BLD AUTO: 11.9 FL (ref 7–12)
PMV BLD AUTO: 11.9 FL (ref 7–12)
PMV BLD AUTO: 12.3 FL (ref 7–12)
PMV BLD AUTO: 12.4 FL (ref 7–12)
PMV BLD AUTO: 12.6 FL (ref 7–12)
PMV BLD AUTO: 12.7 FL (ref 7–12)
PMV BLD AUTO: 12.7 FL (ref 7–12)
PMV BLD AUTO: 12.9 FL (ref 7–12)
PMV BLD AUTO: 13.7 FL (ref 7–12)
PMV BLD AUTO: ABNORMAL FL (ref 7–12)
PO2: 90.9 MMHG (ref 75–100)
POIKILOCYTES: ABNORMAL
POLYCHROMASIA: ABNORMAL
POTASSIUM REFLEX MAGNESIUM: 3.3 MMOL/L (ref 3.5–5)
POTASSIUM REFLEX MAGNESIUM: 3.3 MMOL/L (ref 3.5–5)
POTASSIUM REFLEX MAGNESIUM: 3.4 MMOL/L (ref 3.5–5)
POTASSIUM REFLEX MAGNESIUM: 3.4 MMOL/L (ref 3.5–5)
POTASSIUM REFLEX MAGNESIUM: 3.6 MMOL/L (ref 3.5–5)
POTASSIUM REFLEX MAGNESIUM: 3.7 MMOL/L (ref 3.5–5)
POTASSIUM REFLEX MAGNESIUM: 4 MMOL/L (ref 3.5–5)
POTASSIUM REFLEX MAGNESIUM: 4.1 MMOL/L (ref 3.5–5)
POTASSIUM REFLEX MAGNESIUM: 4.3 MMOL/L (ref 3.5–5)
POTASSIUM SERPL-SCNC: 3.7 MMOL/L (ref 3.5–5)
POTASSIUM SERPL-SCNC: 3.7 MMOL/L (ref 3.5–5)
POTASSIUM SERPL-SCNC: 3.8 MMOL/L (ref 3.5–5)
POTASSIUM SERPL-SCNC: 3.9 MMOL/L (ref 3.5–5)
POTASSIUM SERPL-SCNC: 3.9 MMOL/L (ref 3.5–5)
POTASSIUM SERPL-SCNC: 4.1 MMOL/L (ref 3.5–5)
POTASSIUM SERPL-SCNC: 4.1 MMOL/L (ref 3.5–5)
POTASSIUM SERPL-SCNC: 4.3 MMOL/L (ref 3.5–5)
POTASSIUM SERPL-SCNC: 4.4 MMOL/L (ref 3.5–5)
PROCALCITONIN: 0.32 NG/ML (ref 0–0.08)
PROTEIN UA: 100 MG/DL
PROTHROMBIN TIME: 12.3 SEC (ref 9.3–12.4)
PROTHROMBIN TIME: 13.2 SEC (ref 9.3–12.4)
PROTHROMBIN TIME: 14.7 SEC (ref 9.3–12.4)
RBC # BLD: 3.27 E12/L (ref 3.8–5.8)
RBC # BLD: 3.34 E12/L (ref 3.8–5.8)
RBC # BLD: 3.34 E12/L (ref 3.8–5.8)
RBC # BLD: 3.4 E12/L (ref 3.8–5.8)
RBC # BLD: 3.44 E12/L (ref 3.8–5.8)
RBC # BLD: 3.47 E12/L (ref 3.8–5.8)
RBC # BLD: 3.51 E12/L (ref 3.8–5.8)
RBC # BLD: 3.65 E12/L (ref 3.8–5.8)
RBC # BLD: 3.66 E12/L (ref 3.8–5.8)
RBC # BLD: 3.68 E12/L (ref 3.8–5.8)
RBC # BLD: 3.76 E12/L (ref 3.8–5.8)
RBC # BLD: 3.79 E12/L (ref 3.8–5.8)
RBC # BLD: 3.89 E12/L (ref 3.8–5.8)
RBC # BLD: 3.9 E12/L (ref 3.8–5.8)
RBC # BLD: 3.96 E12/L (ref 3.8–5.8)
RBC UA: ABNORMAL /HPF (ref 0–2)
REASON FOR REJECTION: NORMAL
REJECTED TEST: NORMAL
ROTAVIRUS ANTIGEN: NORMAL
SARS-COV-2, NAAT: NOT DETECTED
SCHISTOCYTES: ABNORMAL
SEDIMENTATION RATE, ERYTHROCYTE: 105 MM/HR (ref 0–15)
SODIUM BLD-SCNC: 130 MMOL/L (ref 132–146)
SODIUM BLD-SCNC: 131 MMOL/L (ref 132–146)
SODIUM BLD-SCNC: 131 MMOL/L (ref 132–146)
SODIUM BLD-SCNC: 133 MMOL/L (ref 132–146)
SODIUM BLD-SCNC: 136 MMOL/L (ref 132–146)
SODIUM BLD-SCNC: 137 MMOL/L (ref 132–146)
SODIUM BLD-SCNC: 139 MMOL/L (ref 132–146)
SODIUM BLD-SCNC: 140 MMOL/L (ref 132–146)
SODIUM BLD-SCNC: 140 MMOL/L (ref 132–146)
SODIUM BLD-SCNC: 141 MMOL/L (ref 132–146)
SODIUM BLD-SCNC: 142 MMOL/L (ref 132–146)
SODIUM BLD-SCNC: 143 MMOL/L (ref 132–146)
SODIUM BLD-SCNC: 146 MMOL/L (ref 132–146)
SOURCE, BLOOD GAS: ABNORMAL
SPECIFIC GRAVITY UA: >=1.03 (ref 1–1.03)
TARGET CELLS: ABNORMAL
THB: 11.9 G/DL (ref 11.5–16.5)
TIME ANALYZED: 2226
TOTAL CK: 41 U/L (ref 20–200)
TOTAL PROTEIN: 5.7 G/DL (ref 6.4–8.3)
TOTAL PROTEIN: 5.7 G/DL (ref 6.4–8.3)
TOTAL PROTEIN: 6 G/DL (ref 6.4–8.3)
TOTAL PROTEIN: 6 G/DL (ref 6.4–8.3)
TOTAL PROTEIN: 6.1 G/DL (ref 6.4–8.3)
TOTAL PROTEIN: 6.2 G/DL (ref 6.4–8.3)
TOTAL PROTEIN: 6.4 G/DL (ref 6.4–8.3)
TOTAL PROTEIN: 6.6 G/DL (ref 6.4–8.3)
TOTAL PROTEIN: 6.6 G/DL (ref 6.4–8.3)
TOTAL PROTEIN: 6.7 G/DL (ref 6.4–8.3)
TOTAL PROTEIN: 6.7 G/DL (ref 6.4–8.3)
TOTAL PROTEIN: 6.8 G/DL (ref 6.4–8.3)
TOTAL PROTEIN: 6.9 G/DL (ref 6.4–8.3)
TRIGL SERPL-MCNC: 110 MG/DL (ref 0–149)
TROPONIN: <0.01 NG/ML (ref 0–0.03)
URINE CULTURE, ROUTINE: NORMAL
UROBILINOGEN, URINE: 0.2 E.U./DL
VLDLC SERPL CALC-MCNC: 22 MG/DL
WBC # BLD: 11.7 E9/L (ref 4.5–11.5)
WBC # BLD: 2.7 E9/L (ref 4.5–11.5)
WBC # BLD: 24.9 E9/L (ref 4.5–11.5)
WBC # BLD: 3.4 E9/L (ref 4.5–11.5)
WBC # BLD: 3.6 E9/L (ref 4.5–11.5)
WBC # BLD: 3.9 E9/L (ref 4.5–11.5)
WBC # BLD: 4.1 E9/L (ref 4.5–11.5)
WBC # BLD: 4.4 E9/L (ref 4.5–11.5)
WBC # BLD: 4.4 E9/L (ref 4.5–11.5)
WBC # BLD: 5 E9/L (ref 4.5–11.5)
WBC # BLD: 5.5 E9/L (ref 4.5–11.5)
WBC # BLD: 5.7 E9/L (ref 4.5–11.5)
WBC # BLD: 6.1 E9/L (ref 4.5–11.5)
WBC # BLD: 6.3 E9/L (ref 4.5–11.5)
WBC # BLD: 7.2 E9/L (ref 4.5–11.5)
WBC UA: ABNORMAL /HPF (ref 0–5)

## 2021-01-01 PROCEDURE — 43264 ERCP REMOVE DUCT CALCULI: CPT | Performed by: SURGERY

## 2021-01-01 PROCEDURE — 7100000001 HC PACU RECOVERY - ADDTL 15 MIN

## 2021-01-01 PROCEDURE — 6370000000 HC RX 637 (ALT 250 FOR IP): Performed by: INTERNAL MEDICINE

## 2021-01-01 PROCEDURE — 1123F ACP DISCUSS/DSCN MKR DOCD: CPT | Performed by: INTERNAL MEDICINE

## 2021-01-01 PROCEDURE — 36591 DRAW BLOOD OFF VENOUS DEVICE: CPT

## 2021-01-01 PROCEDURE — 82553 CREATINE MB FRACTION: CPT

## 2021-01-01 PROCEDURE — 6360000004 HC RX CONTRAST MEDICATION: Performed by: RADIOLOGY

## 2021-01-01 PROCEDURE — G8417 CALC BMI ABV UP PARAM F/U: HCPCS | Performed by: INTERNAL MEDICINE

## 2021-01-01 PROCEDURE — 99214 OFFICE O/P EST MOD 30 MIN: CPT | Performed by: INTERNAL MEDICINE

## 2021-01-01 PROCEDURE — 85610 PROTHROMBIN TIME: CPT

## 2021-01-01 PROCEDURE — 6360000004 HC RX CONTRAST MEDICATION: Performed by: PSYCHIATRY & NEUROLOGY

## 2021-01-01 PROCEDURE — 94660 CPAP INITIATION&MGMT: CPT

## 2021-01-01 PROCEDURE — G8484 FLU IMMUNIZE NO ADMIN: HCPCS | Performed by: INTERNAL MEDICINE

## 2021-01-01 PROCEDURE — 3700000000 HC ANESTHESIA ATTENDED CARE: Performed by: SURGERY

## 2021-01-01 PROCEDURE — 6370000000 HC RX 637 (ALT 250 FOR IP): Performed by: NURSE PRACTITIONER

## 2021-01-01 PROCEDURE — 80053 COMPREHEN METABOLIC PANEL: CPT

## 2021-01-01 PROCEDURE — 1111F DSCHRG MED/CURRENT MED MERGE: CPT | Performed by: INTERNAL MEDICINE

## 2021-01-01 PROCEDURE — 96360 HYDRATION IV INFUSION INIT: CPT

## 2021-01-01 PROCEDURE — 96411 CHEMO IV PUSH ADDL DRUG: CPT

## 2021-01-01 PROCEDURE — 2580000003 HC RX 258: Performed by: NURSE PRACTITIONER

## 2021-01-01 PROCEDURE — 6360000002 HC RX W HCPCS

## 2021-01-01 PROCEDURE — 99024 POSTOP FOLLOW-UP VISIT: CPT | Performed by: TRANSPLANT SURGERY

## 2021-01-01 PROCEDURE — G0378 HOSPITAL OBSERVATION PER HR: HCPCS

## 2021-01-01 PROCEDURE — 0042T CT BRAIN PERFUSION: CPT

## 2021-01-01 PROCEDURE — 6370000000 HC RX 637 (ALT 250 FOR IP): Performed by: SURGERY

## 2021-01-01 PROCEDURE — 96374 THER/PROPH/DIAG INJ IV PUSH: CPT

## 2021-01-01 PROCEDURE — 83036 HEMOGLOBIN GLYCOSYLATED A1C: CPT

## 2021-01-01 PROCEDURE — 87635 SARS-COV-2 COVID-19 AMP PRB: CPT

## 2021-01-01 PROCEDURE — 96366 THER/PROPH/DIAG IV INF ADDON: CPT

## 2021-01-01 PROCEDURE — 6360000002 HC RX W HCPCS: Performed by: NURSE PRACTITIONER

## 2021-01-01 PROCEDURE — 83735 ASSAY OF MAGNESIUM: CPT

## 2021-01-01 PROCEDURE — 96417 CHEMO IV INFUS EACH ADDL SEQ: CPT

## 2021-01-01 PROCEDURE — 80076 HEPATIC FUNCTION PANEL: CPT

## 2021-01-01 PROCEDURE — 70496 CT ANGIOGRAPHY HEAD: CPT

## 2021-01-01 PROCEDURE — 1200000000 HC SEMI PRIVATE

## 2021-01-01 PROCEDURE — 2580000003 HC RX 258: Performed by: RADIOLOGY

## 2021-01-01 PROCEDURE — 99284 EMERGENCY DEPT VISIT MOD MDM: CPT

## 2021-01-01 PROCEDURE — 2580000003 HC RX 258: Performed by: INTERNAL MEDICINE

## 2021-01-01 PROCEDURE — 3017F COLORECTAL CA SCREEN DOC REV: CPT | Performed by: INTERNAL MEDICINE

## 2021-01-01 PROCEDURE — 93005 ELECTROCARDIOGRAM TRACING: CPT | Performed by: EMERGENCY MEDICINE

## 2021-01-01 PROCEDURE — 85025 COMPLETE CBC W/AUTO DIFF WBC: CPT

## 2021-01-01 PROCEDURE — G8427 DOCREV CUR MEDS BY ELIG CLIN: HCPCS | Performed by: INTERNAL MEDICINE

## 2021-01-01 PROCEDURE — 71045 X-RAY EXAM CHEST 1 VIEW: CPT

## 2021-01-01 PROCEDURE — 99214 OFFICE O/P EST MOD 30 MIN: CPT

## 2021-01-01 PROCEDURE — 2580000003 HC RX 258: Performed by: EMERGENCY MEDICINE

## 2021-01-01 PROCEDURE — 84145 PROCALCITONIN (PCT): CPT

## 2021-01-01 PROCEDURE — 97161 PT EVAL LOW COMPLEX 20 MIN: CPT

## 2021-01-01 PROCEDURE — 36415 COLL VENOUS BLD VENIPUNCTURE: CPT

## 2021-01-01 PROCEDURE — 83605 ASSAY OF LACTIC ACID: CPT

## 2021-01-01 PROCEDURE — C1884 EMBOLIZATION PROTECT SYST: HCPCS

## 2021-01-01 PROCEDURE — 96375 TX/PRO/DX INJ NEW DRUG ADDON: CPT

## 2021-01-01 PROCEDURE — 85027 COMPLETE CBC AUTOMATED: CPT

## 2021-01-01 PROCEDURE — 80048 BASIC METABOLIC PNL TOTAL CA: CPT

## 2021-01-01 PROCEDURE — 43274 ERCP DUCT STENT PLACEMENT: CPT | Performed by: SURGERY

## 2021-01-01 PROCEDURE — 1036F TOBACCO NON-USER: CPT | Performed by: INTERNAL MEDICINE

## 2021-01-01 PROCEDURE — C1874 STENT, COATED/COV W/DEL SYS: HCPCS | Performed by: SURGERY

## 2021-01-01 PROCEDURE — 0FC98ZZ EXTIRPATION OF MATTER FROM COMMON BILE DUCT, VIA NATURAL OR ARTIFICIAL OPENING ENDOSCOPIC: ICD-10-PCS | Performed by: SURGERY

## 2021-01-01 PROCEDURE — 96413 CHEMO IV INFUSION 1 HR: CPT

## 2021-01-01 PROCEDURE — 97530 THERAPEUTIC ACTIVITIES: CPT

## 2021-01-01 PROCEDURE — 2500000003 HC RX 250 WO HCPCS

## 2021-01-01 PROCEDURE — 83690 ASSAY OF LIPASE: CPT

## 2021-01-01 PROCEDURE — 82805 BLOOD GASES W/O2 SATURATION: CPT

## 2021-01-01 PROCEDURE — 2500000003 HC RX 250 WO HCPCS: Performed by: NURSE ANESTHETIST, CERTIFIED REGISTERED

## 2021-01-01 PROCEDURE — 2580000003 HC RX 258: Performed by: PSYCHIATRY & NEUROLOGY

## 2021-01-01 PROCEDURE — 87324 CLOSTRIDIUM AG IA: CPT

## 2021-01-01 PROCEDURE — 87045 FECES CULTURE AEROBIC BACT: CPT

## 2021-01-01 PROCEDURE — 94640 AIRWAY INHALATION TREATMENT: CPT

## 2021-01-01 PROCEDURE — 93005 ELECTROCARDIOGRAM TRACING: CPT | Performed by: NURSE PRACTITIONER

## 2021-01-01 PROCEDURE — 2500000003 HC RX 250 WO HCPCS: Performed by: PSYCHIATRY & NEUROLOGY

## 2021-01-01 PROCEDURE — 2580000003 HC RX 258: Performed by: ANESTHESIOLOGY

## 2021-01-01 PROCEDURE — 82140 ASSAY OF AMMONIA: CPT

## 2021-01-01 PROCEDURE — 7100000001 HC PACU RECOVERY - ADDTL 15 MIN: Performed by: SURGERY

## 2021-01-01 PROCEDURE — 36600 WITHDRAWAL OF ARTERIAL BLOOD: CPT

## 2021-01-01 PROCEDURE — 2700000000 HC OXYGEN THERAPY PER DAY

## 2021-01-01 PROCEDURE — 2580000003 HC RX 258: Performed by: TRANSPLANT SURGERY

## 2021-01-01 PROCEDURE — C1769 GUIDE WIRE: HCPCS | Performed by: SURGERY

## 2021-01-01 PROCEDURE — 74177 CT ABD & PELVIS W/CONTRAST: CPT

## 2021-01-01 PROCEDURE — 70551 MRI BRAIN STEM W/O DYE: CPT

## 2021-01-01 PROCEDURE — 84484 ASSAY OF TROPONIN QUANT: CPT

## 2021-01-01 PROCEDURE — 2500000003 HC RX 250 WO HCPCS: Performed by: TRANSPLANT SURGERY

## 2021-01-01 PROCEDURE — 6360000002 HC RX W HCPCS: Performed by: TRANSPLANT SURGERY

## 2021-01-01 PROCEDURE — 4040F PNEUMOC VAC/ADMIN/RCVD: CPT | Performed by: INTERNAL MEDICINE

## 2021-01-01 PROCEDURE — 3700000001 HC ADD 15 MINUTES (ANESTHESIA)

## 2021-01-01 PROCEDURE — 99233 SBSQ HOSP IP/OBS HIGH 50: CPT | Performed by: NURSE PRACTITIONER

## 2021-01-01 PROCEDURE — 3E05317 INTRODUCTION OF OTHER THROMBOLYTIC INTO PERIPHERAL ARTERY, PERCUTANEOUS APPROACH: ICD-10-PCS | Performed by: PSYCHIATRY & NEUROLOGY

## 2021-01-01 PROCEDURE — 96361 HYDRATE IV INFUSION ADD-ON: CPT

## 2021-01-01 PROCEDURE — 87077 CULTURE AEROBIC IDENTIFY: CPT

## 2021-01-01 PROCEDURE — 70496 CT ANGIOGRAPHY HEAD: CPT | Performed by: RADIOLOGY

## 2021-01-01 PROCEDURE — 87040 BLOOD CULTURE FOR BACTERIA: CPT

## 2021-01-01 PROCEDURE — 2060000000 HC ICU INTERMEDIATE R&B

## 2021-01-01 PROCEDURE — 99205 OFFICE O/P NEW HI 60 MIN: CPT | Performed by: RADIOLOGY

## 2021-01-01 PROCEDURE — 97165 OT EVAL LOW COMPLEX 30 MIN: CPT

## 2021-01-01 PROCEDURE — 6360000002 HC RX W HCPCS: Performed by: INTERNAL MEDICINE

## 2021-01-01 PROCEDURE — 7100000000 HC PACU RECOVERY - FIRST 15 MIN

## 2021-01-01 PROCEDURE — 70498 CT ANGIOGRAPHY NECK: CPT | Performed by: RADIOLOGY

## 2021-01-01 PROCEDURE — 97535 SELF CARE MNGMENT TRAINING: CPT

## 2021-01-01 PROCEDURE — 2500000003 HC RX 250 WO HCPCS: Performed by: EMERGENCY MEDICINE

## 2021-01-01 PROCEDURE — 37215 TRANSCATH STENT CCA W/EPS: CPT

## 2021-01-01 PROCEDURE — 3700000001 HC ADD 15 MINUTES (ANESTHESIA): Performed by: SURGERY

## 2021-01-01 PROCEDURE — 6360000002 HC RX W HCPCS: Performed by: NURSE ANESTHETIST, CERTIFIED REGISTERED

## 2021-01-01 PROCEDURE — 93010 ELECTROCARDIOGRAM REPORT: CPT | Performed by: INTERNAL MEDICINE

## 2021-01-01 PROCEDURE — 99205 OFFICE O/P NEW HI 60 MIN: CPT

## 2021-01-01 PROCEDURE — 70450 CT HEAD/BRAIN W/O DYE: CPT

## 2021-01-01 PROCEDURE — 2580000003 HC RX 258: Performed by: SURGERY

## 2021-01-01 PROCEDURE — 6370000000 HC RX 637 (ALT 250 FOR IP): Performed by: TRANSPLANT SURGERY

## 2021-01-01 PROCEDURE — 99232 SBSQ HOSP IP/OBS MODERATE 35: CPT | Performed by: PSYCHIATRY & NEUROLOGY

## 2021-01-01 PROCEDURE — 96415 CHEMO IV INFUSION ADDL HR: CPT

## 2021-01-01 PROCEDURE — 99283 EMERGENCY DEPT VISIT LOW MDM: CPT

## 2021-01-01 PROCEDURE — 2720000010 HC SURG SUPPLY STERILE: Performed by: SURGERY

## 2021-01-01 PROCEDURE — 96372 THER/PROPH/DIAG INJ SC/IM: CPT

## 2021-01-01 PROCEDURE — 85730 THROMBOPLASTIN TIME PARTIAL: CPT

## 2021-01-01 PROCEDURE — 7100000000 HC PACU RECOVERY - FIRST 15 MIN: Performed by: SURGERY

## 2021-01-01 PROCEDURE — 99215 OFFICE O/P EST HI 40 MIN: CPT

## 2021-01-01 PROCEDURE — 99225 PR SBSQ OBSERVATION CARE/DAY 25 MINUTES: CPT | Performed by: TRANSPLANT SURGERY

## 2021-01-01 PROCEDURE — 2000000000 HC ICU R&B

## 2021-01-01 PROCEDURE — 71046 X-RAY EXAM CHEST 2 VIEWS: CPT

## 2021-01-01 PROCEDURE — 85651 RBC SED RATE NONAUTOMATED: CPT

## 2021-01-01 PROCEDURE — 81001 URINALYSIS AUTO W/SCOPE: CPT

## 2021-01-01 PROCEDURE — 96416 CHEMO PROLONG INFUSE W/PUMP: CPT

## 2021-01-01 PROCEDURE — 0042T CT BRAIN PERFUSION: CPT | Performed by: RADIOLOGY

## 2021-01-01 PROCEDURE — 99202 OFFICE O/P NEW SF 15 MIN: CPT | Performed by: TRANSPLANT SURGERY

## 2021-01-01 PROCEDURE — G0269 OCCLUSIVE DEVICE IN VEIN ART: HCPCS

## 2021-01-01 PROCEDURE — 93005 ELECTROCARDIOGRAM TRACING: CPT | Performed by: STUDENT IN AN ORGANIZED HEALTH CARE EDUCATION/TRAINING PROGRAM

## 2021-01-01 PROCEDURE — 87329 GIARDIA AG IA: CPT

## 2021-01-01 PROCEDURE — 6370000000 HC RX 637 (ALT 250 FOR IP): Performed by: EMERGENCY MEDICINE

## 2021-01-01 PROCEDURE — 87088 URINE BACTERIA CULTURE: CPT

## 2021-01-01 PROCEDURE — 87449 NOS EACH ORGANISM AG IA: CPT

## 2021-01-01 PROCEDURE — 70498 CT ANGIOGRAPHY NECK: CPT

## 2021-01-01 PROCEDURE — 99222 1ST HOSP IP/OBS MODERATE 55: CPT | Performed by: PSYCHIATRY & NEUROLOGY

## 2021-01-01 PROCEDURE — 3600007513: Performed by: SURGERY

## 2021-01-01 PROCEDURE — 99291 CRITICAL CARE FIRST HOUR: CPT | Performed by: PSYCHIATRY & NEUROLOGY

## 2021-01-01 PROCEDURE — 82962 GLUCOSE BLOOD TEST: CPT

## 2021-01-01 PROCEDURE — 87425 ROTAVIRUS AG IA: CPT

## 2021-01-01 PROCEDURE — 3700000000 HC ANESTHESIA ATTENDED CARE

## 2021-01-01 PROCEDURE — 2709999900 HC NON-CHARGEABLE SUPPLY: Performed by: SURGERY

## 2021-01-01 PROCEDURE — 86301 IMMUNOASSAY TUMOR CA 19-9: CPT

## 2021-01-01 PROCEDURE — 99212 OFFICE O/P EST SF 10 MIN: CPT

## 2021-01-01 PROCEDURE — 037K3DZ DILATION OF RIGHT INTERNAL CAROTID ARTERY WITH INTRALUMINAL DEVICE, PERCUTANEOUS APPROACH: ICD-10-PCS | Performed by: PSYCHIATRY & NEUROLOGY

## 2021-01-01 PROCEDURE — 99232 SBSQ HOSP IP/OBS MODERATE 35: CPT | Performed by: PHYSICIAN ASSISTANT

## 2021-01-01 PROCEDURE — 96365 THER/PROPH/DIAG IV INF INIT: CPT

## 2021-01-01 PROCEDURE — 82550 ASSAY OF CK (CPK): CPT

## 2021-01-01 PROCEDURE — 80061 LIPID PANEL: CPT

## 2021-01-01 PROCEDURE — 99222 1ST HOSP IP/OBS MODERATE 55: CPT | Performed by: SURGERY

## 2021-01-01 PROCEDURE — 3600007503: Performed by: SURGERY

## 2021-01-01 PROCEDURE — 97162 PT EVAL MOD COMPLEX 30 MIN: CPT

## 2021-01-01 PROCEDURE — 93005 ELECTROCARDIOGRAM TRACING: CPT | Performed by: INTERNAL MEDICINE

## 2021-01-01 PROCEDURE — 96549 UNLISTED CHEMOTHERAPY PX: CPT

## 2021-01-01 PROCEDURE — 74330 X-RAY BILE/PANC ENDOSCOPY: CPT

## 2021-01-01 PROCEDURE — 94664 DEMO&/EVAL PT USE INHALER: CPT

## 2021-01-01 PROCEDURE — 6370000000 HC RX 637 (ALT 250 FOR IP): Performed by: STUDENT IN AN ORGANIZED HEALTH CARE EDUCATION/TRAINING PROGRAM

## 2021-01-01 PROCEDURE — 0F798DZ DILATION OF COMMON BILE DUCT WITH INTRALUMINAL DEVICE, VIA NATURAL OR ARTIFICIAL OPENING ENDOSCOPIC: ICD-10-PCS | Performed by: SURGERY

## 2021-01-01 DEVICE — PANCREATIC STENT
Type: IMPLANTABLE DEVICE | Site: PANCREAS | Status: FUNCTIONAL
Brand: ADVANIX™ PANCREATIC STENT

## 2021-01-01 DEVICE — STENT SYSTEM RMV
Type: IMPLANTABLE DEVICE | Site: BILE DUCT | Status: FUNCTIONAL
Brand: WALLFLEX BILIARY

## 2021-01-01 RX ORDER — SODIUM CHLORIDE 0.9 % (FLUSH) 0.9 %
10 SYRINGE (ML) INJECTION PRN
Status: DISCONTINUED | OUTPATIENT
Start: 2021-01-01 | End: 2021-01-01 | Stop reason: HOSPADM

## 2021-01-01 RX ORDER — MIRTAZAPINE 15 MG/1
15 TABLET, FILM COATED ORAL NIGHTLY
Status: DISCONTINUED | OUTPATIENT
Start: 2021-01-01 | End: 2021-01-01 | Stop reason: HOSPADM

## 2021-01-01 RX ORDER — LIDOCAINE HYDROCHLORIDE 20 MG/ML
INJECTION, SOLUTION INTRAVENOUS PRN
Status: DISCONTINUED | OUTPATIENT
Start: 2021-01-01 | End: 2021-01-01 | Stop reason: SDUPTHER

## 2021-01-01 RX ORDER — SODIUM CHLORIDE 0.9 % (FLUSH) 0.9 %
10 SYRINGE (ML) INJECTION EVERY 12 HOURS SCHEDULED
Status: DISCONTINUED | OUTPATIENT
Start: 2021-01-01 | End: 2021-01-01 | Stop reason: HOSPADM

## 2021-01-01 RX ORDER — SODIUM CHLORIDE 9 MG/ML
INJECTION, SOLUTION INTRAVENOUS CONTINUOUS
Status: CANCELLED | OUTPATIENT
Start: 2021-01-01

## 2021-01-01 RX ORDER — 0.9 % SODIUM CHLORIDE 0.9 %
1000 INTRAVENOUS SOLUTION INTRAVENOUS ONCE
Status: CANCELLED
Start: 2021-01-01 | End: 2021-01-01

## 2021-01-01 RX ORDER — DULOXETIN HYDROCHLORIDE 30 MG/1
30 CAPSULE, DELAYED RELEASE ORAL 2 TIMES DAILY
Status: DISCONTINUED | OUTPATIENT
Start: 2021-01-01 | End: 2021-01-01 | Stop reason: HOSPADM

## 2021-01-01 RX ORDER — ELECTROLYTES/DEXTROSE
1 SOLUTION, ORAL ORAL DAILY
Status: DISCONTINUED | OUTPATIENT
Start: 2021-01-01 | End: 2021-01-01 | Stop reason: CLARIF

## 2021-01-01 RX ORDER — SODIUM CHLORIDE 0.9 % (FLUSH) 0.9 %
10 SYRINGE (ML) INJECTION PRN
Status: CANCELLED | OUTPATIENT
Start: 2021-01-01

## 2021-01-01 RX ORDER — CEFDINIR 300 MG/1
300 CAPSULE ORAL 2 TIMES DAILY
Qty: 20 CAPSULE | Refills: 0
Start: 2021-01-01 | End: 2021-01-01 | Stop reason: SDUPTHER

## 2021-01-01 RX ORDER — M-VIT,TX,IRON,MINS/CALC/FOLIC 27MG-0.4MG
1 TABLET ORAL DAILY
Status: DISCONTINUED | OUTPATIENT
Start: 2021-01-01 | End: 2021-01-01 | Stop reason: HOSPADM

## 2021-01-01 RX ORDER — HEPARIN SODIUM (PORCINE) LOCK FLUSH IV SOLN 100 UNIT/ML 100 UNIT/ML
500 SOLUTION INTRAVENOUS PRN
Status: DISCONTINUED | OUTPATIENT
Start: 2021-01-01 | End: 2021-01-01 | Stop reason: HOSPADM

## 2021-01-01 RX ORDER — PALONOSETRON HYDROCHLORIDE 0.05 MG/ML
0.25 INJECTION, SOLUTION INTRAVENOUS ONCE
Status: CANCELLED | OUTPATIENT
Start: 2021-01-01

## 2021-01-01 RX ORDER — CEFDINIR 300 MG/1
300 CAPSULE ORAL 2 TIMES DAILY
Status: DISCONTINUED | OUTPATIENT
Start: 2021-01-01 | End: 2021-01-01 | Stop reason: HOSPADM

## 2021-01-01 RX ORDER — POLYETHYLENE GLYCOL 3350 17 G/17G
17 POWDER, FOR SOLUTION ORAL DAILY PRN
Status: DISCONTINUED | OUTPATIENT
Start: 2021-01-01 | End: 2021-01-01 | Stop reason: HOSPADM

## 2021-01-01 RX ORDER — 0.9 % SODIUM CHLORIDE 0.9 %
1000 INTRAVENOUS SOLUTION INTRAVENOUS ONCE
Status: COMPLETED | OUTPATIENT
Start: 2021-01-01 | End: 2021-01-01

## 2021-01-01 RX ORDER — PREGABALIN 200 MG/1
200 CAPSULE ORAL DAILY
COMMUNITY
Start: 2021-01-01

## 2021-01-01 RX ORDER — SODIUM CHLORIDE 0.9 % (FLUSH) 0.9 %
10 SYRINGE (ML) INJECTION PRN
Status: DISCONTINUED | OUTPATIENT
Start: 2021-01-01 | End: 2021-01-01

## 2021-01-01 RX ORDER — CEFDINIR 300 MG/1
300 CAPSULE ORAL 2 TIMES DAILY
Qty: 20 CAPSULE | Refills: 0 | Status: SHIPPED | OUTPATIENT
Start: 2021-01-01 | End: 2021-01-01 | Stop reason: SDUPTHER

## 2021-01-01 RX ORDER — SODIUM CHLORIDE 9 MG/ML
25 INJECTION, SOLUTION INTRAVENOUS PRN
Status: DISCONTINUED | OUTPATIENT
Start: 2021-01-01 | End: 2021-01-01 | Stop reason: HOSPADM

## 2021-01-01 RX ORDER — PANCRELIPASE 24000; 76000; 120000 [USP'U]/1; [USP'U]/1; [USP'U]/1
1 CAPSULE, DELAYED RELEASE PELLETS ORAL 3 TIMES DAILY PRN
Status: ON HOLD | COMMUNITY
End: 2021-01-01

## 2021-01-01 RX ORDER — DEXAMETHASONE SODIUM PHOSPHATE 10 MG/ML
8 INJECTION, SOLUTION INTRAMUSCULAR; INTRAVENOUS ONCE
Status: COMPLETED | OUTPATIENT
Start: 2021-01-01 | End: 2021-01-01

## 2021-01-01 RX ORDER — OXYCODONE HYDROCHLORIDE 5 MG/1
5 TABLET ORAL EVERY 4 HOURS PRN
Status: DISCONTINUED | OUTPATIENT
Start: 2021-01-01 | End: 2021-01-01 | Stop reason: HOSPADM

## 2021-01-01 RX ORDER — DEXTROSE MONOHYDRATE 50 MG/ML
250 INJECTION, SOLUTION INTRAVENOUS ONCE
Status: CANCELLED | OUTPATIENT
Start: 2021-01-01 | End: 2021-01-01

## 2021-01-01 RX ORDER — HEPARIN SODIUM (PORCINE) LOCK FLUSH IV SOLN 100 UNIT/ML 100 UNIT/ML
500 SOLUTION INTRAVENOUS PRN
Status: CANCELLED | OUTPATIENT
Start: 2021-01-01

## 2021-01-01 RX ORDER — METRONIDAZOLE 500 MG/1
500 TABLET ORAL 3 TIMES DAILY
Qty: 30 TABLET | Refills: 0
Start: 2021-01-01 | End: 2021-01-01 | Stop reason: SDUPTHER

## 2021-01-01 RX ORDER — ATORVASTATIN CALCIUM 40 MG/1
40 TABLET, FILM COATED ORAL NIGHTLY
Status: DISCONTINUED | OUTPATIENT
Start: 2021-01-01 | End: 2021-01-01 | Stop reason: CLARIF

## 2021-01-01 RX ORDER — ONDANSETRON 2 MG/ML
4 INJECTION INTRAMUSCULAR; INTRAVENOUS EVERY 6 HOURS PRN
Status: DISCONTINUED | OUTPATIENT
Start: 2021-01-01 | End: 2021-01-01 | Stop reason: HOSPADM

## 2021-01-01 RX ORDER — MIDAZOLAM HYDROCHLORIDE 1 MG/ML
INJECTION INTRAMUSCULAR; INTRAVENOUS PRN
Status: DISCONTINUED | OUTPATIENT
Start: 2021-01-01 | End: 2021-01-01 | Stop reason: SDUPTHER

## 2021-01-01 RX ORDER — PROMETHAZINE HYDROCHLORIDE 25 MG/1
12.5 TABLET ORAL EVERY 6 HOURS PRN
Status: DISCONTINUED | OUTPATIENT
Start: 2021-01-01 | End: 2021-01-01 | Stop reason: HOSPADM

## 2021-01-01 RX ORDER — METRONIDAZOLE 500 MG/1
500 TABLET ORAL 3 TIMES DAILY
Qty: 30 TABLET | Refills: 0 | Status: SHIPPED | OUTPATIENT
Start: 2021-01-01 | End: 2021-01-01

## 2021-01-01 RX ORDER — FENTANYL CITRATE 50 UG/ML
50 INJECTION, SOLUTION INTRAMUSCULAR; INTRAVENOUS EVERY 5 MIN PRN
Status: DISCONTINUED | OUTPATIENT
Start: 2021-01-01 | End: 2021-01-01 | Stop reason: HOSPADM

## 2021-01-01 RX ORDER — LANOLIN ALCOHOL/MO/W.PET/CERES
50 CREAM (GRAM) TOPICAL DAILY
Status: DISCONTINUED | OUTPATIENT
Start: 2021-01-01 | End: 2021-01-01 | Stop reason: CLARIF

## 2021-01-01 RX ORDER — FLUOROURACIL 50 MG/ML
800 INJECTION, SOLUTION INTRAVENOUS ONCE
Status: COMPLETED | OUTPATIENT
Start: 2021-01-01 | End: 2021-01-01

## 2021-01-01 RX ORDER — ATORVASTATIN CALCIUM 40 MG/1
40 TABLET, FILM COATED ORAL NIGHTLY
Status: ON HOLD | COMMUNITY
Start: 2021-01-01 | End: 2021-01-01

## 2021-01-01 RX ORDER — VANCOMYCIN HYDROCHLORIDE 125 MG/1
125 CAPSULE ORAL PRN
Qty: 60 CAPSULE | Refills: 1 | Status: SHIPPED
Start: 2021-01-01 | End: 2021-01-01

## 2021-01-01 RX ORDER — ZINC SULFATE 50(220)MG
50 CAPSULE ORAL DAILY
Status: DISCONTINUED | OUTPATIENT
Start: 2021-01-01 | End: 2021-01-01 | Stop reason: HOSPADM

## 2021-01-01 RX ORDER — ONDANSETRON 4 MG/1
4 TABLET, ORALLY DISINTEGRATING ORAL EVERY 8 HOURS PRN
Status: DISCONTINUED | OUTPATIENT
Start: 2021-01-01 | End: 2021-01-01 | Stop reason: HOSPADM

## 2021-01-01 RX ORDER — MEPERIDINE HYDROCHLORIDE 25 MG/ML
12.5 INJECTION INTRAMUSCULAR; INTRAVENOUS; SUBCUTANEOUS ONCE
Status: CANCELLED | OUTPATIENT
Start: 2021-01-01 | End: 2021-01-01

## 2021-01-01 RX ORDER — MAGNESIUM HYDROXIDE/ALUMINUM HYDROXICE/SIMETHICONE 120; 1200; 1200 MG/30ML; MG/30ML; MG/30ML
5 SUSPENSION ORAL EVERY 6 HOURS PRN
COMMUNITY
End: 2021-01-01

## 2021-01-01 RX ORDER — PREGABALIN 50 MG/1
200 CAPSULE ORAL DAILY
Status: DISCONTINUED | OUTPATIENT
Start: 2021-01-01 | End: 2021-01-01 | Stop reason: HOSPADM

## 2021-01-01 RX ORDER — DEXAMETHASONE SODIUM PHOSPHATE 10 MG/ML
8 INJECTION, SOLUTION INTRAMUSCULAR; INTRAVENOUS ONCE
Status: CANCELLED | OUTPATIENT
Start: 2021-01-01

## 2021-01-01 RX ORDER — ATORVASTATIN CALCIUM 80 MG/1
80 TABLET, FILM COATED ORAL NIGHTLY
COMMUNITY
Start: 2021-01-01

## 2021-01-01 RX ORDER — MULTIVITAMIN WITH IRON
1 TABLET ORAL DAILY
Status: DISCONTINUED | OUTPATIENT
Start: 2021-01-01 | End: 2021-01-01 | Stop reason: HOSPADM

## 2021-01-01 RX ORDER — PANTOPRAZOLE SODIUM 40 MG/1
40 TABLET, DELAYED RELEASE ORAL DAILY
Qty: 30 TABLET | Refills: 3
Start: 2021-01-01 | End: 2021-01-01

## 2021-01-01 RX ORDER — PANTOPRAZOLE SODIUM 40 MG/1
40 TABLET, DELAYED RELEASE ORAL DAILY
Status: DISCONTINUED | OUTPATIENT
Start: 2021-01-01 | End: 2021-01-01 | Stop reason: HOSPADM

## 2021-01-01 RX ORDER — PANCRELIPASE 60000; 12000; 38000 [USP'U]/1; [USP'U]/1; [USP'U]/1
24000 CAPSULE, DELAYED RELEASE PELLETS ORAL
Qty: 270 CAPSULE | Refills: 0 | Status: SHIPPED | OUTPATIENT
Start: 2021-01-01

## 2021-01-01 RX ORDER — SODIUM CHLORIDE 9 MG/ML
20 INJECTION, SOLUTION INTRAVENOUS ONCE
Status: CANCELLED | OUTPATIENT
Start: 2021-01-01 | End: 2021-01-01

## 2021-01-01 RX ORDER — ASPIRIN 81 MG/1
81 TABLET, CHEWABLE ORAL NIGHTLY
Status: DISCONTINUED | OUTPATIENT
Start: 2021-01-01 | End: 2021-01-01 | Stop reason: HOSPADM

## 2021-01-01 RX ORDER — SODIUM CHLORIDE 0.9 % (FLUSH) 0.9 %
5 SYRINGE (ML) INJECTION PRN
Status: CANCELLED | OUTPATIENT
Start: 2021-01-01

## 2021-01-01 RX ORDER — EPINEPHRINE 1 MG/ML
0.3 INJECTION, SOLUTION, CONCENTRATE INTRAVENOUS PRN
Status: CANCELLED | OUTPATIENT
Start: 2021-01-01

## 2021-01-01 RX ORDER — HYDROCODONE BITARTRATE AND ACETAMINOPHEN 5; 325 MG/1; MG/1
1 TABLET ORAL PRN
Status: DISCONTINUED | OUTPATIENT
Start: 2021-01-01 | End: 2021-01-01 | Stop reason: HOSPADM

## 2021-01-01 RX ORDER — DIPHENHYDRAMINE HYDROCHLORIDE 50 MG/ML
50 INJECTION INTRAMUSCULAR; INTRAVENOUS ONCE
Status: CANCELLED | OUTPATIENT
Start: 2021-01-01 | End: 2021-01-01

## 2021-01-01 RX ORDER — SODIUM CHLORIDE 0.9 % (FLUSH) 0.9 %
10 SYRINGE (ML) INJECTION EVERY 12 HOURS SCHEDULED
Status: CANCELLED | OUTPATIENT
Start: 2021-01-01

## 2021-01-01 RX ORDER — ATORVASTATIN CALCIUM 40 MG/1
40 TABLET, FILM COATED ORAL DAILY
Qty: 30 TABLET | Refills: 3 | Status: SHIPPED | OUTPATIENT
Start: 2021-01-01 | End: 2021-01-01

## 2021-01-01 RX ORDER — ACETAMINOPHEN 500 MG
1000 TABLET ORAL ONCE
Status: COMPLETED | OUTPATIENT
Start: 2021-01-01 | End: 2021-01-01

## 2021-01-01 RX ORDER — PANTOPRAZOLE SODIUM 40 MG/1
40 TABLET, DELAYED RELEASE ORAL DAILY
Qty: 30 TABLET | Refills: 3 | Status: SHIPPED | OUTPATIENT
Start: 2021-01-01 | End: 2021-01-01

## 2021-01-01 RX ORDER — SODIUM CHLORIDE, SODIUM LACTATE, POTASSIUM CHLORIDE, CALCIUM CHLORIDE 600; 310; 30; 20 MG/100ML; MG/100ML; MG/100ML; MG/100ML
INJECTION, SOLUTION INTRAVENOUS CONTINUOUS
Status: DISCONTINUED | OUTPATIENT
Start: 2021-01-01 | End: 2021-01-01 | Stop reason: HOSPADM

## 2021-01-01 RX ORDER — CARVEDILOL 6.25 MG/1
12.5 TABLET ORAL 2 TIMES DAILY WITH MEALS
Status: DISCONTINUED | OUTPATIENT
Start: 2021-01-01 | End: 2021-01-01 | Stop reason: HOSPADM

## 2021-01-01 RX ORDER — VITAMIN B COMPLEX
1 CAPSULE ORAL DAILY
Status: DISCONTINUED | OUTPATIENT
Start: 2021-01-01 | End: 2021-01-01 | Stop reason: CLARIF

## 2021-01-01 RX ORDER — DULOXETIN HYDROCHLORIDE 30 MG/1
30 CAPSULE, DELAYED RELEASE ORAL DAILY
Status: DISCONTINUED | OUTPATIENT
Start: 2021-01-01 | End: 2021-01-01 | Stop reason: HOSPADM

## 2021-01-01 RX ORDER — SODIUM CHLORIDE, SODIUM LACTATE, POTASSIUM CHLORIDE, CALCIUM CHLORIDE 600; 310; 30; 20 MG/100ML; MG/100ML; MG/100ML; MG/100ML
INJECTION, SOLUTION INTRAVENOUS CONTINUOUS
Status: DISCONTINUED | OUTPATIENT
Start: 2021-01-01 | End: 2021-01-01

## 2021-01-01 RX ORDER — MEPERIDINE HYDROCHLORIDE 25 MG/ML
12.5 INJECTION INTRAMUSCULAR; INTRAVENOUS; SUBCUTANEOUS EVERY 5 MIN PRN
Status: DISCONTINUED | OUTPATIENT
Start: 2021-01-01 | End: 2021-01-01 | Stop reason: HOSPADM

## 2021-01-01 RX ORDER — M-VIT,TX,IRON,MINS/CALC/FOLIC 27MG-0.4MG
1 TABLET ORAL DAILY
COMMUNITY

## 2021-01-01 RX ORDER — PALONOSETRON HYDROCHLORIDE 0.05 MG/ML
0.25 INJECTION, SOLUTION INTRAVENOUS ONCE
Status: COMPLETED | OUTPATIENT
Start: 2021-01-01 | End: 2021-01-01

## 2021-01-01 RX ORDER — PACLITAXEL 100 MG/20ML
200 INJECTION, POWDER, LYOPHILIZED, FOR SUSPENSION INTRAVENOUS ONCE
Status: COMPLETED | OUTPATIENT
Start: 2021-01-01 | End: 2021-01-01

## 2021-01-01 RX ORDER — CEFDINIR 300 MG/1
300 CAPSULE ORAL 2 TIMES DAILY
Qty: 20 CAPSULE | Refills: 0 | Status: SHIPPED | OUTPATIENT
Start: 2021-01-01 | End: 2021-01-01

## 2021-01-01 RX ORDER — FAMOTIDINE 20 MG/1
20 TABLET, FILM COATED ORAL DAILY
Status: DISCONTINUED | OUTPATIENT
Start: 2021-01-01 | End: 2021-01-01 | Stop reason: HOSPADM

## 2021-01-01 RX ORDER — TRIAMCINOLONE ACETONIDE 1 MG/G
CREAM TOPICAL 2 TIMES DAILY
Status: DISCONTINUED | OUTPATIENT
Start: 2021-01-01 | End: 2021-01-01 | Stop reason: HOSPADM

## 2021-01-01 RX ORDER — ATORVASTATIN CALCIUM 40 MG/1
80 TABLET, FILM COATED ORAL NIGHTLY
Status: DISCONTINUED | OUTPATIENT
Start: 2021-01-01 | End: 2021-01-01 | Stop reason: HOSPADM

## 2021-01-01 RX ORDER — POTASSIUM CHLORIDE 20 MEQ/1
40 TABLET, EXTENDED RELEASE ORAL ONCE
Status: COMPLETED | OUTPATIENT
Start: 2021-01-01 | End: 2021-01-01

## 2021-01-01 RX ORDER — FUROSEMIDE 10 MG/ML
40 INJECTION INTRAMUSCULAR; INTRAVENOUS ONCE
Status: COMPLETED | OUTPATIENT
Start: 2021-01-01 | End: 2021-01-01

## 2021-01-01 RX ORDER — ACETAMINOPHEN 325 MG/1
650 TABLET ORAL EVERY 4 HOURS PRN
Status: DISCONTINUED | OUTPATIENT
Start: 2021-01-01 | End: 2021-01-01 | Stop reason: HOSPADM

## 2021-01-01 RX ORDER — FENTANYL CITRATE 50 UG/ML
25 INJECTION, SOLUTION INTRAMUSCULAR; INTRAVENOUS EVERY 5 MIN PRN
Status: DISCONTINUED | OUTPATIENT
Start: 2021-01-01 | End: 2021-01-01 | Stop reason: HOSPADM

## 2021-01-01 RX ORDER — ASPIRIN 81 MG/1
81 TABLET ORAL NIGHTLY
Qty: 30 TABLET | Refills: 3 | Status: SHIPPED | OUTPATIENT
Start: 2021-01-01

## 2021-01-01 RX ORDER — IPRATROPIUM BROMIDE AND ALBUTEROL SULFATE 2.5; .5 MG/3ML; MG/3ML
1 SOLUTION RESPIRATORY (INHALATION)
Status: DISCONTINUED | OUTPATIENT
Start: 2021-01-01 | End: 2021-01-01 | Stop reason: HOSPADM

## 2021-01-01 RX ORDER — POTASSIUM CHLORIDE 20 MEQ/1
20 TABLET, EXTENDED RELEASE ORAL EVERY OTHER DAY
Qty: 60 TABLET | Refills: 3 | Status: SHIPPED | OUTPATIENT
Start: 2021-01-01

## 2021-01-01 RX ORDER — ASPIRIN 81 MG/1
81 TABLET, CHEWABLE ORAL ONCE
Status: COMPLETED | OUTPATIENT
Start: 2021-01-01 | End: 2021-01-01

## 2021-01-01 RX ORDER — KETAMINE HYDROCHLORIDE 50 MG/ML
INJECTION, SOLUTION, CONCENTRATE INTRAMUSCULAR; INTRAVENOUS PRN
Status: DISCONTINUED | OUTPATIENT
Start: 2021-01-01 | End: 2021-01-01 | Stop reason: SDUPTHER

## 2021-01-01 RX ORDER — LIDOCAINE HYDROCHLORIDE 20 MG/ML
INJECTION, SOLUTION INFILTRATION; PERINEURAL
Status: COMPLETED | OUTPATIENT
Start: 2021-01-01 | End: 2021-01-01

## 2021-01-01 RX ORDER — SODIUM CHLORIDE 9 MG/ML
INJECTION, SOLUTION INTRAVENOUS CONTINUOUS
Status: DISCONTINUED | OUTPATIENT
Start: 2021-01-01 | End: 2021-01-01 | Stop reason: HOSPADM

## 2021-01-01 RX ORDER — METHYLPREDNISOLONE SODIUM SUCCINATE 125 MG/2ML
125 INJECTION, POWDER, LYOPHILIZED, FOR SOLUTION INTRAMUSCULAR; INTRAVENOUS ONCE
Status: CANCELLED | OUTPATIENT
Start: 2021-01-01 | End: 2021-01-01

## 2021-01-01 RX ORDER — HYDRALAZINE HYDROCHLORIDE 20 MG/ML
5 INJECTION INTRAMUSCULAR; INTRAVENOUS EVERY 6 HOURS PRN
Status: DISCONTINUED | OUTPATIENT
Start: 2021-01-01 | End: 2021-01-01 | Stop reason: HOSPADM

## 2021-01-01 RX ORDER — VITAMIN C
1 TAB ORAL DAILY
Status: DISCONTINUED | OUTPATIENT
Start: 2021-01-01 | End: 2021-01-01 | Stop reason: HOSPADM

## 2021-01-01 RX ORDER — PACLITAXEL 100 MG/20ML
196 INJECTION, POWDER, LYOPHILIZED, FOR SUSPENSION INTRAVENOUS ONCE
Status: CANCELLED | OUTPATIENT
Start: 2021-01-01

## 2021-01-01 RX ORDER — ASPIRIN 81 MG/1
81 TABLET ORAL DAILY
Status: DISCONTINUED | OUTPATIENT
Start: 2021-01-01 | End: 2021-01-01 | Stop reason: HOSPADM

## 2021-01-01 RX ORDER — VANCOMYCIN HYDROCHLORIDE 125 MG/1
125 CAPSULE ORAL DAILY
COMMUNITY

## 2021-01-01 RX ORDER — LIDOCAINE HYDROCHLORIDE AND EPINEPHRINE 10; 10 MG/ML; UG/ML
INJECTION, SOLUTION INFILTRATION; PERINEURAL
Status: COMPLETED
Start: 2021-01-01 | End: 2021-01-01

## 2021-01-01 RX ORDER — SODIUM CHLORIDE 9 MG/ML
INJECTION, SOLUTION INTRAVENOUS CONTINUOUS
Status: DISCONTINUED | OUTPATIENT
Start: 2021-01-01 | End: 2021-01-01

## 2021-01-01 RX ORDER — PANTOPRAZOLE SODIUM 40 MG/1
40 TABLET, DELAYED RELEASE ORAL DAILY
Qty: 30 TABLET | Refills: 3 | Status: ON HOLD | COMMUNITY
Start: 2021-01-01 | End: 2021-01-01

## 2021-01-01 RX ORDER — MIRTAZAPINE 15 MG/1
15 TABLET, FILM COATED ORAL NIGHTLY
COMMUNITY
End: 2021-01-01

## 2021-01-01 RX ORDER — PREGABALIN 100 MG/1
200 CAPSULE ORAL 2 TIMES DAILY
Status: DISCONTINUED | OUTPATIENT
Start: 2021-01-01 | End: 2021-01-01 | Stop reason: HOSPADM

## 2021-01-01 RX ORDER — ACETAMINOPHEN 325 MG/1
650 TABLET ORAL EVERY 6 HOURS PRN
Status: DISCONTINUED | OUTPATIENT
Start: 2021-01-01 | End: 2021-01-01 | Stop reason: HOSPADM

## 2021-01-01 RX ORDER — FENTANYL CITRATE 50 UG/ML
INJECTION, SOLUTION INTRAMUSCULAR; INTRAVENOUS PRN
Status: DISCONTINUED | OUTPATIENT
Start: 2021-01-01 | End: 2021-01-01 | Stop reason: SDUPTHER

## 2021-01-01 RX ORDER — CA/D3/MAG OX/ZINC/COP/MANG/BOR 600 MG-800
1 TABLET,CHEWABLE ORAL DAILY
Status: DISCONTINUED | OUTPATIENT
Start: 2021-01-01 | End: 2021-01-01 | Stop reason: CLARIF

## 2021-01-01 RX ORDER — DEXAMETHASONE SODIUM PHOSPHATE 10 MG/ML
10 INJECTION, SOLUTION INTRAMUSCULAR; INTRAVENOUS ONCE
Status: COMPLETED | OUTPATIENT
Start: 2021-01-01 | End: 2021-01-01

## 2021-01-01 RX ORDER — TRIAMCINOLONE ACETONIDE 1 MG/G
CREAM TOPICAL 2 TIMES DAILY
Status: DISCONTINUED | OUTPATIENT
Start: 2021-01-01 | End: 2021-01-01 | Stop reason: CLARIF

## 2021-01-01 RX ORDER — HEPARIN SODIUM (PORCINE) LOCK FLUSH IV SOLN 100 UNIT/ML 100 UNIT/ML
500 SOLUTION INTRAVENOUS PRN
Status: DISCONTINUED | OUTPATIENT
Start: 2021-01-01 | End: 2021-01-01

## 2021-01-01 RX ORDER — POTASSIUM CHLORIDE AND SODIUM CHLORIDE 900; 300 MG/100ML; MG/100ML
INJECTION, SOLUTION INTRAVENOUS CONTINUOUS
Status: DISCONTINUED | OUTPATIENT
Start: 2021-01-01 | End: 2021-01-01 | Stop reason: HOSPADM

## 2021-01-01 RX ORDER — ATROPINE SULFATE 0.4 MG/ML
0.4 AMPUL (ML) INJECTION ONCE
Status: COMPLETED | OUTPATIENT
Start: 2021-01-01 | End: 2021-01-01

## 2021-01-01 RX ORDER — POTASSIUM CHLORIDE 20 MEQ/1
40 TABLET, EXTENDED RELEASE ORAL DAILY
Status: DISCONTINUED | OUTPATIENT
Start: 2021-01-01 | End: 2021-01-01 | Stop reason: HOSPADM

## 2021-01-01 RX ORDER — NITROGLYCERIN 5 MG/ML
INJECTION, SOLUTION INTRAVENOUS PRN
Status: DISCONTINUED | OUTPATIENT
Start: 2021-01-01 | End: 2021-01-01 | Stop reason: SDUPTHER

## 2021-01-01 RX ORDER — LABETALOL HYDROCHLORIDE 5 MG/ML
5 INJECTION, SOLUTION INTRAVENOUS EVERY 10 MIN PRN
Status: DISCONTINUED | OUTPATIENT
Start: 2021-01-01 | End: 2021-01-01 | Stop reason: HOSPADM

## 2021-01-01 RX ORDER — MIRTAZAPINE 15 MG/1
15 TABLET, FILM COATED ORAL NIGHTLY
Status: DISCONTINUED | OUTPATIENT
Start: 2021-01-01 | End: 2021-01-01 | Stop reason: CLARIF

## 2021-01-01 RX ORDER — ACETAMINOPHEN 650 MG/1
650 SUPPOSITORY RECTAL EVERY 6 HOURS PRN
Status: DISCONTINUED | OUTPATIENT
Start: 2021-01-01 | End: 2021-01-01 | Stop reason: HOSPADM

## 2021-01-01 RX ORDER — METRONIDAZOLE 500 MG/1
500 TABLET ORAL 3 TIMES DAILY
Qty: 30 TABLET | Refills: 0 | Status: SHIPPED | OUTPATIENT
Start: 2021-01-01 | End: 2021-01-01 | Stop reason: SDUPTHER

## 2021-01-01 RX ORDER — LACTOBACILLUS RHAMNOSUS GG 10B CELL
1 CAPSULE ORAL DAILY
Status: DISCONTINUED | OUTPATIENT
Start: 2021-01-01 | End: 2021-01-01 | Stop reason: HOSPADM

## 2021-01-01 RX ORDER — METRONIDAZOLE 500 MG/1
500 TABLET ORAL 3 TIMES DAILY
Status: DISCONTINUED | OUTPATIENT
Start: 2021-01-01 | End: 2021-01-01 | Stop reason: HOSPADM

## 2021-01-01 RX ORDER — FUROSEMIDE 20 MG/1
20 TABLET ORAL EVERY OTHER DAY
Qty: 60 TABLET | Refills: 3 | Status: SHIPPED | OUTPATIENT
Start: 2021-01-01

## 2021-01-01 RX ORDER — LANOLIN ALCOHOL/MO/W.PET/CERES
50 CREAM (GRAM) TOPICAL DAILY
Status: DISCONTINUED | OUTPATIENT
Start: 2021-01-01 | End: 2021-01-01 | Stop reason: HOSPADM

## 2021-01-01 RX ORDER — VANCOMYCIN HYDROCHLORIDE 125 MG/1
CAPSULE ORAL
Qty: 120 CAPSULE | Refills: 1 | Status: ON HOLD | OUTPATIENT
Start: 2021-01-01 | End: 2021-01-01

## 2021-01-01 RX ORDER — PROPOFOL 10 MG/ML
INJECTION, EMULSION INTRAVENOUS PRN
Status: DISCONTINUED | OUTPATIENT
Start: 2021-01-01 | End: 2021-01-01 | Stop reason: SDUPTHER

## 2021-01-01 RX ORDER — PROPOFOL 10 MG/ML
INJECTION, EMULSION INTRAVENOUS CONTINUOUS PRN
Status: DISCONTINUED | OUTPATIENT
Start: 2021-01-01 | End: 2021-01-01 | Stop reason: SDUPTHER

## 2021-01-01 RX ORDER — HYDROCODONE BITARTRATE AND ACETAMINOPHEN 5; 325 MG/1; MG/1
2 TABLET ORAL PRN
Status: DISCONTINUED | OUTPATIENT
Start: 2021-01-01 | End: 2021-01-01 | Stop reason: HOSPADM

## 2021-01-01 RX ORDER — ASPIRIN 81 MG/1
81 TABLET ORAL NIGHTLY
Status: DISCONTINUED | OUTPATIENT
Start: 2021-01-01 | End: 2021-01-01 | Stop reason: HOSPADM

## 2021-01-01 RX ORDER — SODIUM CHLORIDE, SODIUM LACTATE, POTASSIUM CHLORIDE, CALCIUM CHLORIDE 600; 310; 30; 20 MG/100ML; MG/100ML; MG/100ML; MG/100ML
1000 INJECTION, SOLUTION INTRAVENOUS ONCE
Status: COMPLETED | OUTPATIENT
Start: 2021-01-01 | End: 2021-01-01

## 2021-01-01 RX ORDER — SODIUM CHLORIDE 0.9 % (FLUSH) 0.9 %
10 SYRINGE (ML) INJECTION
Status: COMPLETED | OUTPATIENT
Start: 2021-01-01 | End: 2021-01-01

## 2021-01-01 RX ORDER — HYDRALAZINE HYDROCHLORIDE 20 MG/ML
5 INJECTION INTRAMUSCULAR; INTRAVENOUS EVERY 10 MIN PRN
Status: DISCONTINUED | OUTPATIENT
Start: 2021-01-01 | End: 2021-01-01 | Stop reason: HOSPADM

## 2021-01-01 RX ORDER — VANCOMYCIN HYDROCHLORIDE 125 MG/1
125 CAPSULE ORAL
Status: ON HOLD | COMMUNITY
Start: 2021-01-01 | End: 2021-01-01 | Stop reason: HOSPADM

## 2021-01-01 RX ORDER — CHOLECALCIFEROL (VITAMIN D3) 10 MCG
1 TABLET ORAL DAILY
Status: DISCONTINUED | OUTPATIENT
Start: 2021-01-01 | End: 2021-01-01 | Stop reason: HOSPADM

## 2021-01-01 RX ORDER — CEFAZOLIN SODIUM 1 G/3ML
INJECTION, POWDER, FOR SOLUTION INTRAMUSCULAR; INTRAVENOUS PRN
Status: DISCONTINUED | OUTPATIENT
Start: 2021-01-01 | End: 2021-01-01 | Stop reason: SDUPTHER

## 2021-01-01 RX ORDER — SODIUM CHLORIDE 9 MG/ML
25 INJECTION, SOLUTION INTRAVENOUS PRN
Status: CANCELLED | OUTPATIENT
Start: 2021-01-01

## 2021-01-01 RX ADMIN — Medication 1 TABLET: at 09:53

## 2021-01-01 RX ADMIN — PREGABALIN 200 MG: 25 CAPSULE ORAL at 16:06

## 2021-01-01 RX ADMIN — PREGABALIN 200 MG: 25 CAPSULE ORAL at 09:16

## 2021-01-01 RX ADMIN — IOPAMIDOL 21 ML: 612 INJECTION, SOLUTION INTRAVENOUS at 18:45

## 2021-01-01 RX ADMIN — SODIUM CHLORIDE, PRESERVATIVE FREE 10 ML: 5 INJECTION INTRAVENOUS at 09:21

## 2021-01-01 RX ADMIN — PREGABALIN 200 MG: 50 CAPSULE ORAL at 10:21

## 2021-01-01 RX ADMIN — GEMCITABINE 1938 MG: 38 INJECTION, SOLUTION INTRAVENOUS at 10:58

## 2021-01-01 RX ADMIN — IPRATROPIUM BROMIDE AND ALBUTEROL SULFATE 1 AMPULE: .5; 3 SOLUTION RESPIRATORY (INHALATION) at 13:25

## 2021-01-01 RX ADMIN — TICAGRELOR 90 MG: 90 TABLET ORAL at 09:15

## 2021-01-01 RX ADMIN — HEPARIN 500 UNITS: 100 SYRINGE at 09:14

## 2021-01-01 RX ADMIN — INDOMETHACIN 100 MG: 50 SUPPOSITORY RECTAL at 09:46

## 2021-01-01 RX ADMIN — PALONOSETRON 0.25 MG: 0.25 INJECTION, SOLUTION INTRAVENOUS at 09:35

## 2021-01-01 RX ADMIN — METRONIDAZOLE 500 MG: 500 TABLET ORAL at 18:17

## 2021-01-01 RX ADMIN — CEFDINIR 300 MG: 300 CAPSULE ORAL at 20:21

## 2021-01-01 RX ADMIN — METRONIDAZOLE 500 MG: 500 TABLET ORAL at 08:13

## 2021-01-01 RX ADMIN — SODIUM CHLORIDE, PRESERVATIVE FREE 10 ML: 5 INJECTION INTRAVENOUS at 10:05

## 2021-01-01 RX ADMIN — VANCOMYCIN HYDROCHLORIDE 250 MG: 10 INJECTION, POWDER, LYOPHILIZED, FOR SOLUTION INTRAVENOUS at 11:36

## 2021-01-01 RX ADMIN — SODIUM CHLORIDE, PRESERVATIVE FREE 10 ML: 5 INJECTION INTRAVENOUS at 08:55

## 2021-01-01 RX ADMIN — IOPAMIDOL 90 ML: 755 INJECTION, SOLUTION INTRAVENOUS at 05:03

## 2021-01-01 RX ADMIN — ENOXAPARIN SODIUM 40 MG: 40 INJECTION SUBCUTANEOUS at 09:54

## 2021-01-01 RX ADMIN — VANCOMYCIN HYDROCHLORIDE 250 MG: 10 INJECTION, POWDER, LYOPHILIZED, FOR SOLUTION INTRAVENOUS at 00:25

## 2021-01-01 RX ADMIN — SODIUM CHLORIDE, POTASSIUM CHLORIDE, SODIUM LACTATE AND CALCIUM CHLORIDE: 600; 310; 30; 20 INJECTION, SOLUTION INTRAVENOUS at 04:42

## 2021-01-01 RX ADMIN — Medication 10 ML: at 09:19

## 2021-01-01 RX ADMIN — OXYCODONE 5 MG: 5 TABLET ORAL at 21:26

## 2021-01-01 RX ADMIN — PANCRELIPASE 36000 UNITS: 60000; 12000; 38000 CAPSULE, DELAYED RELEASE PELLETS ORAL at 08:11

## 2021-01-01 RX ADMIN — SODIUM CHLORIDE, POTASSIUM CHLORIDE, SODIUM LACTATE AND CALCIUM CHLORIDE: 600; 310; 30; 20 INJECTION, SOLUTION INTRAVENOUS at 09:50

## 2021-01-01 RX ADMIN — VANCOMYCIN HYDROCHLORIDE 250 MG: 10 INJECTION, POWDER, LYOPHILIZED, FOR SOLUTION INTRAVENOUS at 12:56

## 2021-01-01 RX ADMIN — SODIUM CHLORIDE, POTASSIUM CHLORIDE, SODIUM LACTATE AND CALCIUM CHLORIDE 1000 ML: 600; 310; 30; 20 INJECTION, SOLUTION INTRAVENOUS at 03:22

## 2021-01-01 RX ADMIN — PANCRELIPASE 24000 UNITS: 60000; 12000; 38000 CAPSULE, DELAYED RELEASE PELLETS ORAL at 08:09

## 2021-01-01 RX ADMIN — PANCRELIPASE 72000 UNITS: 60000; 12000; 38000 CAPSULE, DELAYED RELEASE PELLETS ORAL at 12:00

## 2021-01-01 RX ADMIN — POTASSIUM CHLORIDE 40 MEQ: 1500 TABLET, EXTENDED RELEASE ORAL at 19:02

## 2021-01-01 RX ADMIN — DULOXETINE HYDROCHLORIDE 30 MG: 30 CAPSULE, DELAYED RELEASE ORAL at 09:15

## 2021-01-01 RX ADMIN — DEXAMETHASONE SODIUM PHOSPHATE 10 MG: 10 INJECTION, SOLUTION INTRAMUSCULAR; INTRAVENOUS at 09:37

## 2021-01-01 RX ADMIN — PANCRELIPASE 36000 UNITS: 60000; 12000; 38000 CAPSULE, DELAYED RELEASE PELLETS ORAL at 17:04

## 2021-01-01 RX ADMIN — Medication 1 TABLET: at 08:35

## 2021-01-01 RX ADMIN — POTASSIUM CHLORIDE 40 MEQ: 1500 TABLET, EXTENDED RELEASE ORAL at 09:16

## 2021-01-01 RX ADMIN — SODIUM CHLORIDE: 9 INJECTION, SOLUTION INTRAVENOUS at 19:13

## 2021-01-01 RX ADMIN — SODIUM CHLORIDE, PRESERVATIVE FREE 10 ML: 5 INJECTION INTRAVENOUS at 21:57

## 2021-01-01 RX ADMIN — TICAGRELOR 90 MG: 90 TABLET ORAL at 09:19

## 2021-01-01 RX ADMIN — SODIUM CHLORIDE: 9 INJECTION, SOLUTION INTRAVENOUS at 10:08

## 2021-01-01 RX ADMIN — NEPHROCAP 1 MG: 1 CAP ORAL at 09:15

## 2021-01-01 RX ADMIN — ASPIRIN 81 MG: 81 TABLET, CHEWABLE ORAL at 10:01

## 2021-01-01 RX ADMIN — SODIUM CHLORIDE, PRESERVATIVE FREE 10 ML: 5 INJECTION INTRAVENOUS at 08:40

## 2021-01-01 RX ADMIN — CEFDINIR 300 MG: 300 CAPSULE ORAL at 09:15

## 2021-01-01 RX ADMIN — DULOXETINE HYDROCHLORIDE 30 MG: 30 CAPSULE, DELAYED RELEASE ORAL at 08:18

## 2021-01-01 RX ADMIN — PREGABALIN 200 MG: 75 CAPSULE ORAL at 08:17

## 2021-01-01 RX ADMIN — CARVEDILOL 12.5 MG: 6.25 TABLET, FILM COATED ORAL at 17:05

## 2021-01-01 RX ADMIN — SODIUM CHLORIDE, PRESERVATIVE FREE 10 ML: 5 INJECTION INTRAVENOUS at 09:37

## 2021-01-01 RX ADMIN — SODIUM CHLORIDE, PRESERVATIVE FREE 10 ML: 5 INJECTION INTRAVENOUS at 09:41

## 2021-01-01 RX ADMIN — SODIUM CHLORIDE, PRESERVATIVE FREE 10 ML: 5 INJECTION INTRAVENOUS at 09:16

## 2021-01-01 RX ADMIN — CEFDINIR 300 MG: 300 CAPSULE ORAL at 09:53

## 2021-01-01 RX ADMIN — HEPARIN 500 UNITS: 100 SYRINGE at 10:06

## 2021-01-01 RX ADMIN — PANCRELIPASE 24000 UNITS: 60000; 12000; 38000 CAPSULE, DELAYED RELEASE PELLETS ORAL at 09:15

## 2021-01-01 RX ADMIN — IOPAMIDOL 100 ML: 755 INJECTION, SOLUTION INTRAVENOUS at 09:19

## 2021-01-01 RX ADMIN — CARVEDILOL 12.5 MG: 6.25 TABLET, FILM COATED ORAL at 17:20

## 2021-01-01 RX ADMIN — TICAGRELOR 90 MG: 90 TABLET ORAL at 08:14

## 2021-01-01 RX ADMIN — Medication 10 ML: at 13:46

## 2021-01-01 RX ADMIN — SODIUM CHLORIDE, PRESERVATIVE FREE 10 ML: 5 INJECTION INTRAVENOUS at 09:13

## 2021-01-01 RX ADMIN — PANCRELIPASE 72000 UNITS: 60000; 12000; 38000 CAPSULE, DELAYED RELEASE PELLETS ORAL at 17:00

## 2021-01-01 RX ADMIN — METRONIDAZOLE 500 MG: 500 TABLET ORAL at 14:29

## 2021-01-01 RX ADMIN — CEFDINIR 300 MG: 300 CAPSULE ORAL at 21:56

## 2021-01-01 RX ADMIN — SODIUM CHLORIDE, PRESERVATIVE FREE 10 ML: 5 INJECTION INTRAVENOUS at 20:20

## 2021-01-01 RX ADMIN — DULOXETINE HYDROCHLORIDE 30 MG: 30 CAPSULE, DELAYED RELEASE ORAL at 08:35

## 2021-01-01 RX ADMIN — PANTOPRAZOLE SODIUM 40 MG: 40 TABLET, DELAYED RELEASE ORAL at 09:16

## 2021-01-01 RX ADMIN — NEPHROCAP 1 MG: 1 CAP ORAL at 08:09

## 2021-01-01 RX ADMIN — CARVEDILOL 12.5 MG: 6.25 TABLET, FILM COATED ORAL at 18:17

## 2021-01-01 RX ADMIN — SODIUM CHLORIDE, PRESERVATIVE FREE 10 ML: 5 INJECTION INTRAVENOUS at 10:14

## 2021-01-01 RX ADMIN — SODIUM CHLORIDE, PRESERVATIVE FREE 10 ML: 5 INJECTION INTRAVENOUS at 10:07

## 2021-01-01 RX ADMIN — PREGABALIN 200 MG: 75 CAPSULE ORAL at 21:30

## 2021-01-01 RX ADMIN — DULOXETINE HYDROCHLORIDE 30 MG: 30 CAPSULE, DELAYED RELEASE ORAL at 18:19

## 2021-01-01 RX ADMIN — PEGFILGRASTIM-CBQV 6 MG: 6 INJECTION, SOLUTION SUBCUTANEOUS at 11:16

## 2021-01-01 RX ADMIN — METRONIDAZOLE 500 MG: 500 TABLET ORAL at 10:00

## 2021-01-01 RX ADMIN — SODIUM CHLORIDE, PRESERVATIVE FREE 10 ML: 5 INJECTION INTRAVENOUS at 10:06

## 2021-01-01 RX ADMIN — IPRATROPIUM BROMIDE AND ALBUTEROL SULFATE 1 AMPULE: .5; 3 SOLUTION RESPIRATORY (INHALATION) at 13:46

## 2021-01-01 RX ADMIN — METRONIDAZOLE 500 MG: 500 INJECTION, SOLUTION INTRAVENOUS at 01:38

## 2021-01-01 RX ADMIN — CARVEDILOL 12.5 MG: 6.25 TABLET, FILM COATED ORAL at 08:05

## 2021-01-01 RX ADMIN — PANTOPRAZOLE SODIUM 40 MG: 40 TABLET, DELAYED RELEASE ORAL at 09:19

## 2021-01-01 RX ADMIN — NITROGLYCERIN 50 MCG: 5 INJECTION, SOLUTION INTRAVENOUS at 18:12

## 2021-01-01 RX ADMIN — CARVEDILOL 12.5 MG: 6.25 TABLET, FILM COATED ORAL at 16:45

## 2021-01-01 RX ADMIN — PREGABALIN 200 MG: 100 CAPSULE ORAL at 20:44

## 2021-01-01 RX ADMIN — NITROGLYCERIN 50 MCG: 5 INJECTION, SOLUTION INTRAVENOUS at 18:09

## 2021-01-01 RX ADMIN — FAMOTIDINE 20 MG: 20 TABLET, FILM COATED ORAL at 10:13

## 2021-01-01 RX ADMIN — PACLITAXEL 200 MG: 100 INJECTION, POWDER, LYOPHILIZED, FOR SUSPENSION INTRAVENOUS at 09:45

## 2021-01-01 RX ADMIN — DULOXETINE HYDROCHLORIDE 30 MG: 30 CAPSULE, DELAYED RELEASE ORAL at 16:06

## 2021-01-01 RX ADMIN — MULTIPLE VITAMINS W/ MINERALS TAB 1 TABLET: TAB at 09:15

## 2021-01-01 RX ADMIN — PROPOFOL 30 MG: 10 INJECTION, EMULSION INTRAVENOUS at 17:58

## 2021-01-01 RX ADMIN — FENTANYL CITRATE 50 MCG: 50 INJECTION, SOLUTION INTRAMUSCULAR; INTRAVENOUS at 10:55

## 2021-01-01 RX ADMIN — PANCRELIPASE 24000 UNITS: 60000; 12000; 38000 CAPSULE, DELAYED RELEASE PELLETS ORAL at 09:54

## 2021-01-01 RX ADMIN — CARVEDILOL 12.5 MG: 6.25 TABLET, FILM COATED ORAL at 10:12

## 2021-01-01 RX ADMIN — LEUCOVORIN CALCIUM 800 MG: 10 INJECTION INTRAMUSCULAR; INTRAVENOUS at 10:32

## 2021-01-01 RX ADMIN — IPRATROPIUM BROMIDE AND ALBUTEROL SULFATE 1 AMPULE: .5; 3 SOLUTION RESPIRATORY (INHALATION) at 09:50

## 2021-01-01 RX ADMIN — ACETAMINOPHEN 1000 MG: 500 TABLET ORAL at 04:23

## 2021-01-01 RX ADMIN — NEPHROCAP 1 MG: 1 CAP ORAL at 09:53

## 2021-01-01 RX ADMIN — ZINC SULFATE 220 MG (50 MG) CAPSULE 50 MG: CAPSULE at 08:18

## 2021-01-01 RX ADMIN — VANCOMYCIN HYDROCHLORIDE 125 MG: 10 INJECTION, POWDER, LYOPHILIZED, FOR SOLUTION INTRAVENOUS at 21:57

## 2021-01-01 RX ADMIN — FUROSEMIDE 40 MG: 10 INJECTION, SOLUTION INTRAVENOUS at 22:07

## 2021-01-01 RX ADMIN — GLUCAGON HYDROCHLORIDE 1 MG: KIT at 11:19

## 2021-01-01 RX ADMIN — CARVEDILOL 12.5 MG: 6.25 TABLET, FILM COATED ORAL at 08:18

## 2021-01-01 RX ADMIN — METRONIDAZOLE 500 MG: 500 INJECTION, SOLUTION INTRAVENOUS at 17:06

## 2021-01-01 RX ADMIN — DEXAMETHASONE SODIUM PHOSPHATE 8 MG: 10 INJECTION, SOLUTION INTRAMUSCULAR; INTRAVENOUS at 08:55

## 2021-01-01 RX ADMIN — ZINC SULFATE 220 MG (50 MG) CAPSULE 50 MG: CAPSULE at 08:13

## 2021-01-01 RX ADMIN — MIRTAZAPINE 15 MG: 15 TABLET, FILM COATED ORAL at 19:52

## 2021-01-01 RX ADMIN — SODIUM CHLORIDE: 9 INJECTION, SOLUTION INTRAVENOUS at 13:46

## 2021-01-01 RX ADMIN — ASPIRIN 81 MG: 81 TABLET, COATED ORAL at 21:30

## 2021-01-01 RX ADMIN — CARVEDILOL 12.5 MG: 6.25 TABLET, FILM COATED ORAL at 09:54

## 2021-01-01 RX ADMIN — MIDAZOLAM 1 MG: 1 INJECTION INTRAMUSCULAR; INTRAVENOUS at 17:55

## 2021-01-01 RX ADMIN — CEFTRIAXONE SODIUM 2000 MG: 2 INJECTION, POWDER, FOR SOLUTION INTRAMUSCULAR; INTRAVENOUS at 09:38

## 2021-01-01 RX ADMIN — PREGABALIN 200 MG: 100 CAPSULE ORAL at 08:11

## 2021-01-01 RX ADMIN — PANCRELIPASE 24000 UNITS: 60000; 12000; 38000 CAPSULE, DELAYED RELEASE PELLETS ORAL at 16:47

## 2021-01-01 RX ADMIN — SODIUM CHLORIDE: 9 INJECTION, SOLUTION INTRAVENOUS at 22:00

## 2021-01-01 RX ADMIN — DULOXETINE HYDROCHLORIDE 30 MG: 30 CAPSULE, DELAYED RELEASE ORAL at 09:53

## 2021-01-01 RX ADMIN — DULOXETINE HYDROCHLORIDE 30 MG: 30 CAPSULE, DELAYED RELEASE ORAL at 20:43

## 2021-01-01 RX ADMIN — IPRATROPIUM BROMIDE AND ALBUTEROL SULFATE 1 AMPULE: .5; 3 SOLUTION RESPIRATORY (INHALATION) at 20:57

## 2021-01-01 RX ADMIN — TICAGRELOR 90 MG: 90 TABLET ORAL at 08:19

## 2021-01-01 RX ADMIN — SODIUM CHLORIDE, PRESERVATIVE FREE 10 ML: 5 INJECTION INTRAVENOUS at 10:26

## 2021-01-01 RX ADMIN — METRONIDAZOLE 500 MG: 500 TABLET ORAL at 21:56

## 2021-01-01 RX ADMIN — SODIUM CHLORIDE 1000 ML: 9 INJECTION, SOLUTION INTRAVENOUS at 10:08

## 2021-01-01 RX ADMIN — METRONIDAZOLE 500 MG: 500 TABLET ORAL at 14:00

## 2021-01-01 RX ADMIN — ATORVASTATIN CALCIUM 80 MG: 40 TABLET, FILM COATED ORAL at 20:20

## 2021-01-01 RX ADMIN — SODIUM CHLORIDE, PRESERVATIVE FREE 10 ML: 5 INJECTION INTRAVENOUS at 09:38

## 2021-01-01 RX ADMIN — METRONIDAZOLE 500 MG: 500 INJECTION, SOLUTION INTRAVENOUS at 08:13

## 2021-01-01 RX ADMIN — PANCRELIPASE 36000 UNITS: 60000; 12000; 38000 CAPSULE, DELAYED RELEASE PELLETS ORAL at 13:45

## 2021-01-01 RX ADMIN — CARVEDILOL 12.5 MG: 6.25 TABLET, FILM COATED ORAL at 17:48

## 2021-01-01 RX ADMIN — PANTOPRAZOLE SODIUM 40 MG: 40 TABLET, DELAYED RELEASE ORAL at 09:54

## 2021-01-01 RX ADMIN — METRONIDAZOLE 500 MG: 500 TABLET ORAL at 20:21

## 2021-01-01 RX ADMIN — LIDOCAINE HYDROCHLORIDE AND EPINEPHRINE: 10; 10 INJECTION, SOLUTION INFILTRATION; PERINEURAL at 13:35

## 2021-01-01 RX ADMIN — SODIUM CHLORIDE: 9 INJECTION, SOLUTION INTRAVENOUS at 23:56

## 2021-01-01 RX ADMIN — PREGABALIN 200 MG: 25 CAPSULE ORAL at 09:19

## 2021-01-01 RX ADMIN — Medication 1 TABLET: at 10:13

## 2021-01-01 RX ADMIN — ASPIRIN 81 MG: 81 TABLET, COATED ORAL at 08:34

## 2021-01-01 RX ADMIN — Medication 50 MG: at 08:34

## 2021-01-01 RX ADMIN — PANCRELIPASE 72000 UNITS: 60000; 12000; 38000 CAPSULE, DELAYED RELEASE PELLETS ORAL at 12:09

## 2021-01-01 RX ADMIN — DULOXETINE HYDROCHLORIDE 30 MG: 30 CAPSULE, DELAYED RELEASE ORAL at 10:13

## 2021-01-01 RX ADMIN — Medication 1 CAPSULE: at 10:13

## 2021-01-01 RX ADMIN — SODIUM CHLORIDE 1000 ML: 9 INJECTION, SOLUTION INTRAVENOUS at 08:45

## 2021-01-01 RX ADMIN — CARVEDILOL 12.5 MG: 6.25 TABLET, FILM COATED ORAL at 08:35

## 2021-01-01 RX ADMIN — PREGABALIN 200 MG: 50 CAPSULE ORAL at 10:13

## 2021-01-01 RX ADMIN — FOSAPREPITANT 150 MG: 150 INJECTION, POWDER, LYOPHILIZED, FOR SOLUTION INTRAVENOUS at 09:44

## 2021-01-01 RX ADMIN — ACETAMINOPHEN 650 MG: 325 TABLET ORAL at 21:30

## 2021-01-01 RX ADMIN — Medication 1 CAPSULE: at 08:34

## 2021-01-01 RX ADMIN — POTASSIUM CHLORIDE AND SODIUM CHLORIDE: 900; 300 INJECTION, SOLUTION INTRAVENOUS at 09:13

## 2021-01-01 RX ADMIN — TICAGRELOR 90 MG: 90 TABLET ORAL at 20:20

## 2021-01-01 RX ADMIN — SODIUM CHLORIDE, PRESERVATIVE FREE 10 ML: 5 INJECTION INTRAVENOUS at 11:17

## 2021-01-01 RX ADMIN — Medication 1 TABLET: at 09:15

## 2021-01-01 RX ADMIN — SODIUM CHLORIDE, POTASSIUM CHLORIDE, SODIUM LACTATE AND CALCIUM CHLORIDE: 600; 310; 30; 20 INJECTION, SOLUTION INTRAVENOUS at 09:45

## 2021-01-01 RX ADMIN — SODIUM CHLORIDE, PRESERVATIVE FREE 10 ML: 5 INJECTION INTRAVENOUS at 09:24

## 2021-01-01 RX ADMIN — PREGABALIN 200 MG: 100 CAPSULE ORAL at 09:15

## 2021-01-01 RX ADMIN — PANCRELIPASE 24000 UNITS: 60000; 12000; 38000 CAPSULE, DELAYED RELEASE PELLETS ORAL at 12:14

## 2021-01-01 RX ADMIN — Medication 50 MG: at 09:15

## 2021-01-01 RX ADMIN — PANTOPRAZOLE SODIUM 40 MG: 40 TABLET, DELAYED RELEASE ORAL at 17:20

## 2021-01-01 RX ADMIN — CEFTRIAXONE SODIUM 2000 MG: 2 INJECTION, POWDER, FOR SOLUTION INTRAMUSCULAR; INTRAVENOUS at 10:45

## 2021-01-01 RX ADMIN — SODIUM CHLORIDE, PRESERVATIVE FREE 10 ML: 5 INJECTION INTRAVENOUS at 10:15

## 2021-01-01 RX ADMIN — SODIUM CHLORIDE, POTASSIUM CHLORIDE, SODIUM LACTATE AND CALCIUM CHLORIDE: 600; 310; 30; 20 INJECTION, SOLUTION INTRAVENOUS at 22:34

## 2021-01-01 RX ADMIN — CARVEDILOL 12.5 MG: 6.25 TABLET, FILM COATED ORAL at 08:12

## 2021-01-01 RX ADMIN — CEFAZOLIN 2000 MG: 1 INJECTION, POWDER, FOR SOLUTION INTRAMUSCULAR; INTRAVENOUS at 17:52

## 2021-01-01 RX ADMIN — TICAGRELOR 90 MG: 90 TABLET ORAL at 21:57

## 2021-01-01 RX ADMIN — PANTOPRAZOLE SODIUM 40 MG: 40 TABLET, DELAYED RELEASE ORAL at 08:12

## 2021-01-01 RX ADMIN — IRINOTECAN HYDROCHLORIDE 280 MG: 20 INJECTION, SOLUTION INTRAVENOUS at 10:37

## 2021-01-01 RX ADMIN — SODIUM CHLORIDE, PRESERVATIVE FREE 10 ML: 5 INJECTION INTRAVENOUS at 08:16

## 2021-01-01 RX ADMIN — CARVEDILOL 12.5 MG: 6.25 TABLET, FILM COATED ORAL at 09:16

## 2021-01-01 RX ADMIN — CARVEDILOL 12.5 MG: 6.25 TABLET, FILM COATED ORAL at 08:09

## 2021-01-01 RX ADMIN — SODIUM CHLORIDE, PRESERVATIVE FREE 10 ML: 5 INJECTION INTRAVENOUS at 12:10

## 2021-01-01 RX ADMIN — PANCRELIPASE 36000 UNITS: 60000; 12000; 38000 CAPSULE, DELAYED RELEASE PELLETS ORAL at 14:35

## 2021-01-01 RX ADMIN — PANCRELIPASE 24000 UNITS: 60000; 12000; 38000 CAPSULE, DELAYED RELEASE PELLETS ORAL at 12:09

## 2021-01-01 RX ADMIN — Medication 50 MG: at 10:13

## 2021-01-01 RX ADMIN — ASPIRIN 81 MG CHEWABLE TABLET 81 MG: 81 TABLET CHEWABLE at 20:31

## 2021-01-01 RX ADMIN — ENOXAPARIN SODIUM 40 MG: 40 INJECTION SUBCUTANEOUS at 08:13

## 2021-01-01 RX ADMIN — HEPARIN 500 UNITS: 100 SYRINGE at 10:07

## 2021-01-01 RX ADMIN — PREGABALIN 200 MG: 100 CAPSULE ORAL at 17:03

## 2021-01-01 RX ADMIN — MIDAZOLAM 1 MG: 1 INJECTION INTRAMUSCULAR; INTRAVENOUS at 17:35

## 2021-01-01 RX ADMIN — FAMOTIDINE 20 MG: 20 TABLET, FILM COATED ORAL at 08:34

## 2021-01-01 RX ADMIN — ZINC SULFATE 220 MG (50 MG) CAPSULE 50 MG: CAPSULE at 09:16

## 2021-01-01 RX ADMIN — POTASSIUM CHLORIDE 40 MEQ: 1500 TABLET, EXTENDED RELEASE ORAL at 09:54

## 2021-01-01 RX ADMIN — PREGABALIN 200 MG: 25 CAPSULE ORAL at 08:10

## 2021-01-01 RX ADMIN — LIDOCAINE HYDROCHLORIDE 50 MG: 20 INJECTION, SOLUTION INTRAVENOUS at 10:55

## 2021-01-01 RX ADMIN — PANTOPRAZOLE SODIUM 40 MG: 40 TABLET, DELAYED RELEASE ORAL at 08:14

## 2021-01-01 RX ADMIN — METRONIDAZOLE 500 MG: 500 INJECTION, SOLUTION INTRAVENOUS at 03:38

## 2021-01-01 RX ADMIN — DULOXETINE HYDROCHLORIDE 30 MG: 30 CAPSULE, DELAYED RELEASE ORAL at 21:30

## 2021-01-01 RX ADMIN — ATORVASTATIN CALCIUM 80 MG: 40 TABLET, FILM COATED ORAL at 21:56

## 2021-01-01 RX ADMIN — CEFDINIR 300 MG: 300 CAPSULE ORAL at 08:09

## 2021-01-01 RX ADMIN — TICAGRELOR 180 MG: 90 TABLET ORAL at 10:02

## 2021-01-01 RX ADMIN — METRONIDAZOLE 500 MG: 500 TABLET ORAL at 09:15

## 2021-01-01 RX ADMIN — PANCRELIPASE 24000 UNITS: 60000; 12000; 38000 CAPSULE, DELAYED RELEASE PELLETS ORAL at 08:05

## 2021-01-01 RX ADMIN — SODIUM CHLORIDE, PRESERVATIVE FREE 10 ML: 5 INJECTION INTRAVENOUS at 09:58

## 2021-01-01 RX ADMIN — TRIAMCINOLONE ACETONIDE: 1 CREAM TOPICAL at 08:33

## 2021-01-01 RX ADMIN — HEPARIN 500 UNITS: 100 SYRINGE at 10:15

## 2021-01-01 RX ADMIN — SODIUM CHLORIDE 1000 ML: 9 INJECTION, SOLUTION INTRAVENOUS at 09:33

## 2021-01-01 RX ADMIN — ZINC SULFATE 220 MG (50 MG) CAPSULE 50 MG: CAPSULE at 08:34

## 2021-01-01 RX ADMIN — FLUOROURACIL 800 MG: 50 INJECTION, SOLUTION INTRAVENOUS at 12:47

## 2021-01-01 RX ADMIN — DULOXETINE HYDROCHLORIDE 30 MG: 30 CAPSULE, DELAYED RELEASE ORAL at 17:05

## 2021-01-01 RX ADMIN — ATROPINE SULFATE 0.4 MG: 0.4 INJECTION, SOLUTION INTRAMUSCULAR; INTRAVENOUS; SUBCUTANEOUS at 10:26

## 2021-01-01 RX ADMIN — METRONIDAZOLE 500 MG: 500 INJECTION, SOLUTION INTRAVENOUS at 09:54

## 2021-01-01 RX ADMIN — SODIUM CHLORIDE, POTASSIUM CHLORIDE, SODIUM LACTATE AND CALCIUM CHLORIDE: 600; 310; 30; 20 INJECTION, SOLUTION INTRAVENOUS at 08:16

## 2021-01-01 RX ADMIN — SODIUM CHLORIDE: 9 INJECTION, SOLUTION INTRAVENOUS at 04:00

## 2021-01-01 RX ADMIN — DULOXETINE HYDROCHLORIDE 30 MG: 30 CAPSULE, DELAYED RELEASE ORAL at 08:14

## 2021-01-01 RX ADMIN — PALONOSETRON 0.25 MG: 0.25 INJECTION, SOLUTION INTRAVENOUS at 08:51

## 2021-01-01 RX ADMIN — SODIUM CHLORIDE: 9 INJECTION, SOLUTION INTRAVENOUS at 10:23

## 2021-01-01 RX ADMIN — Medication 1 TABLET: at 08:10

## 2021-01-01 RX ADMIN — VANCOMYCIN HYDROCHLORIDE 125 MG: 10 INJECTION, POWDER, LYOPHILIZED, FOR SOLUTION INTRAVENOUS at 05:40

## 2021-01-01 RX ADMIN — ASPIRIN 81 MG: 81 TABLET, COATED ORAL at 10:13

## 2021-01-01 RX ADMIN — KETAMINE HYDROCHLORIDE 50 MG: 50 INJECTION INTRAMUSCULAR; INTRAVENOUS at 10:55

## 2021-01-01 RX ADMIN — VANCOMYCIN HYDROCHLORIDE 125 MG: 10 INJECTION, POWDER, LYOPHILIZED, FOR SOLUTION INTRAVENOUS at 14:00

## 2021-01-01 RX ADMIN — POTASSIUM CHLORIDE 40 MEQ: 1500 TABLET, EXTENDED RELEASE ORAL at 09:15

## 2021-01-01 RX ADMIN — SODIUM CHLORIDE: 9 INJECTION, SOLUTION INTRAVENOUS at 06:57

## 2021-01-01 RX ADMIN — HEPARIN 500 UNITS: 100 SYRINGE at 11:17

## 2021-01-01 RX ADMIN — SODIUM CHLORIDE: 9 INJECTION, SOLUTION INTRAVENOUS at 17:35

## 2021-01-01 RX ADMIN — HEPARIN 500 UNITS: 100 SYRINGE at 12:10

## 2021-01-01 RX ADMIN — PANCRELIPASE 72000 UNITS: 60000; 12000; 38000 CAPSULE, DELAYED RELEASE PELLETS ORAL at 05:53

## 2021-01-01 RX ADMIN — ATORVASTATIN CALCIUM 80 MG: 40 TABLET, FILM COATED ORAL at 20:45

## 2021-01-01 RX ADMIN — HEPARIN 500 UNITS: 100 SYRINGE at 10:37

## 2021-01-01 RX ADMIN — POTASSIUM CHLORIDE 40 MEQ: 1500 TABLET, EXTENDED RELEASE ORAL at 08:13

## 2021-01-01 RX ADMIN — VANCOMYCIN HYDROCHLORIDE 125 MG: 10 INJECTION, POWDER, LYOPHILIZED, FOR SOLUTION INTRAVENOUS at 08:11

## 2021-01-01 RX ADMIN — ASPIRIN 81 MG CHEWABLE TABLET 81 MG: 81 TABLET CHEWABLE at 21:56

## 2021-01-01 RX ADMIN — VANCOMYCIN HYDROCHLORIDE 250 MG: 10 INJECTION, POWDER, LYOPHILIZED, FOR SOLUTION INTRAVENOUS at 05:57

## 2021-01-01 RX ADMIN — HYDRALAZINE HYDROCHLORIDE 5 MG: 20 INJECTION INTRAMUSCULAR; INTRAVENOUS at 20:18

## 2021-01-01 RX ADMIN — LIDOCAINE HYDROCHLORIDE 6 ML: 20 INJECTION, SOLUTION INFILTRATION; PERINEURAL at 17:54

## 2021-01-01 RX ADMIN — DULOXETINE HYDROCHLORIDE 30 MG: 30 CAPSULE, DELAYED RELEASE ORAL at 08:12

## 2021-01-01 RX ADMIN — ZINC SULFATE 220 MG (50 MG) CAPSULE 50 MG: CAPSULE at 09:19

## 2021-01-01 RX ADMIN — VANCOMYCIN HYDROCHLORIDE 250 MG: 10 INJECTION, POWDER, LYOPHILIZED, FOR SOLUTION INTRAVENOUS at 18:29

## 2021-01-01 RX ADMIN — MIDAZOLAM 2 MG: 1 INJECTION INTRAMUSCULAR; INTRAVENOUS at 10:51

## 2021-01-01 RX ADMIN — ZINC SULFATE 220 MG (50 MG) CAPSULE 50 MG: CAPSULE at 10:13

## 2021-01-01 RX ADMIN — SODIUM CHLORIDE, POTASSIUM CHLORIDE, SODIUM LACTATE AND CALCIUM CHLORIDE: 600; 310; 30; 20 INJECTION, SOLUTION INTRAVENOUS at 11:36

## 2021-01-01 RX ADMIN — PROPOFOL INJECTABLE EMULSION 75 MCG/KG/MIN: 10 INJECTION, EMULSION INTRAVENOUS at 10:55

## 2021-01-01 SDOH — ECONOMIC STABILITY: FOOD INSECURITY: WITHIN THE PAST 12 MONTHS, THE FOOD YOU BOUGHT JUST DIDN'T LAST AND YOU DIDN'T HAVE MONEY TO GET MORE.: NOT ASKED

## 2021-01-01 SDOH — ECONOMIC STABILITY: FOOD INSECURITY: WITHIN THE PAST 12 MONTHS, YOU WORRIED THAT YOUR FOOD WOULD RUN OUT BEFORE YOU GOT MONEY TO BUY MORE.: NOT ASKED

## 2021-01-01 SDOH — ECONOMIC STABILITY: INCOME INSECURITY: HOW HARD IS IT FOR YOU TO PAY FOR THE VERY BASICS LIKE FOOD, HOUSING, MEDICAL CARE, AND HEATING?: NOT ASKED

## 2021-01-01 SDOH — ECONOMIC STABILITY: TRANSPORTATION INSECURITY
IN THE PAST 12 MONTHS, HAS THE LACK OF TRANSPORTATION KEPT YOU FROM MEDICAL APPOINTMENTS OR FROM GETTING MEDICATIONS?: NOT ASKED

## 2021-01-01 ASSESSMENT — PULMONARY FUNCTION TESTS
PIF_VALUE: 1
PIF_VALUE: 0
PIF_VALUE: 0
PIF_VALUE: 1
PIF_VALUE: 0
PIF_VALUE: 0
PIF_VALUE: 1
PIF_VALUE: 0
PIF_VALUE: 1
PIF_VALUE: 0
PIF_VALUE: 1
PIF_VALUE: 0
PIF_VALUE: 1
PIF_VALUE: 0
PIF_VALUE: 1

## 2021-01-01 ASSESSMENT — ENCOUNTER SYMPTOMS
CONSTIPATION: 0
SHORTNESS OF BREATH: 0
TROUBLE SWALLOWING: 0
EYES NEGATIVE: 1
BLOOD IN STOOL: 0
COUGH: 1
NAUSEA: 0
ABDOMINAL PAIN: 0
BACK PAIN: 0
PHOTOPHOBIA: 0
NAUSEA: 0
VOMITING: 0
NAUSEA: 0
EYE DISCHARGE: 0
VOMITING: 0
ABDOMINAL PAIN: 0
GASTROINTESTINAL NEGATIVE: 1
ABDOMINAL PAIN: 0
PHOTOPHOBIA: 0
SHORTNESS OF BREATH: 0
EYE PAIN: 0
ALLERGIC/IMMUNOLOGIC NEGATIVE: 1
CHEST TIGHTNESS: 0
SHORTNESS OF BREATH: 0
DIARRHEA: 0
VOMITING: 0
EYE PAIN: 0
RESPIRATORY NEGATIVE: 1
SHORTNESS OF BREATH: 1

## 2021-01-01 ASSESSMENT — PAIN SCALES - GENERAL
PAINLEVEL_OUTOF10: 0

## 2021-01-01 ASSESSMENT — PAIN DESCRIPTION - PAIN TYPE: TYPE: CHRONIC PAIN

## 2021-01-01 ASSESSMENT — LIFESTYLE VARIABLES: SMOKING_STATUS: 0

## 2021-01-01 ASSESSMENT — PAIN DESCRIPTION - FREQUENCY: FREQUENCY: INTERMITTENT

## 2021-01-01 ASSESSMENT — PAIN - FUNCTIONAL ASSESSMENT: PAIN_FUNCTIONAL_ASSESSMENT: PREVENTS OR INTERFERES SOME ACTIVE ACTIVITIES AND ADLS

## 2021-01-16 PROBLEM — E86.0 DEHYDRATION: Status: RESOLVED | Noted: 2020-01-01 | Resolved: 2021-01-01

## 2021-02-02 NOTE — PROGRESS NOTES
Agustín Life  2/2/2021  Wt Readings from Last 10 Encounters:   02/02/21 173 lb 14.4 oz (78.9 kg)   01/06/21 162 lb (73.5 kg)   12/29/20 169 lb 8 oz (76.9 kg)   12/18/20 164 lb (74.4 kg)   12/15/20 162 lb 9.6 oz (73.8 kg)   12/08/20 168 lb (76.2 kg)   12/01/20 172 lb 9.6 oz (78.3 kg)   11/01/20 172 lb 9.6 oz (78.3 kg)   10/26/20 177 lb (80.3 kg)   10/19/20 175 lb (79.4 kg)     Ht Readings from Last 1 Encounters:   02/02/21 5' 7\" (1.702 m)     Body mass index is 27.24 kg/m². Met with patient today for follow up, he is doing well. He notes that he was in [place] for the past few weeks and was able to gain 15#. He reports a fair-good appetite, still eating a lot of yogurt, cottage cheese, eggs, and items high in protein. Denies n/v/d/c. He restarts treatment today. He was encouraged to continue the high protein food items, but adjust diet if diarrhea becomes an issue. Encouraged him to increase fluid intake as well. Continue to follow. Weight change:15# gain in 2mo   Appetite: fair-good  N/V/D/C: none  Calculated Needs if applicable: [id number] (79x30), 100-110gm PRO (79x1.30), 2400ml (79x32)    Pre-Hab Eligible?: no        Recommendations: Continue regular meals, 4-6 daily, choose high protein foods;  Watch for signs of diarrhea and need to adjust diet and increase fluids;            ASPEN GUIDELINES FOR CLINICAL CHARACTERISTICS OF MALNUTRITION IN CHRONIC ILLNESS     Moderate Malnutrition  Severe Malnutrition    Energy intake  <75% energy intake compared to estimated needs for >1month <75% energy intake compared to estimated needs for >1month   Weight changes  5% x 1 month  7.5% x 3 months   10% x 6 months   20% x 1 year  >5% x 1 month  >7.5% x 3 months   >10% x 6 months   >20% x 1 year    Physical findings  Mild   Decrease subcutaneous fat    Decrease muscle mass     Increase fluid/edema   Severe  Decrease subcutaneous fat    Decrease muscle mass     Increase fluid/edema Hx malnutrition, currently stable at this time.     Aníbal Tellez

## 2021-02-02 NOTE — PROGRESS NOTES
[address] [facility] ONCOLOGY  [address] [place] [id number]  Dept: [phone]  Attending Progress Note      Reason for Visit:   Pancreatic cancer present    Referring physician: Self-referred. 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 He was started on FOLFIRINOX chemotherapy on 6/29/2020, the patient has been under the great care of Dr. Adolfo santana, and Dr. Sumanth Santos at [facility] the patient had side effects from the chemotherapy including thrombocytopenia and diarrhea. The oxaliplatin dose has been decreased secondary to thrombocytopenia. The patient had CT scans of the chest and pancreas done in September 2020, revealing a 5.1 x 5.1 x 5.6 cm infiltrative mass centered superior to the pancreatic head/neck infiltrating the robert hepatis and invading the liver and second duodenal, had decreased in size compared to prior exam, partial abutment of the posterior stomach, medial right hepatic lobe and anterior IVC also noted, extensive vascular involvement of the robert hepatis and celiac, mild nonspecific infiltrative changes in the upper abdominal fat, developing carcinomatosis is not excluded, interval resolution of previously described right upper lobe nodular opacities, residual subcentimeter thick nodular opacity in the left lung measuring up to 5 mm, stable, new small left pleural effusion, nonspecific sclerotic focus in the left posterior fourth rib, stable. The patient had a virtual visit with Dr. Cheryl William on 10 3/13/2020,he  was recommended to continue FOLFIRINOX for 6 more cycles, which could be modified to make it tolerable, even evaluation of one of the drops to his oxaliplatin is warranted. He could begin it for SBRT if he continues to have a locally advanced disease. The patient has received 8 cycles, 8th cycle was on 10/23/2020. The patient was admitted to the hospital on 11/1/2020 with acute respiratory failure secondary to COVID-19 pneumonia, he was also diagnosed with C. difficile colitis. He is feeling much better at this time, he completed the course of Flagyl, 12/4/2020. Chemotherapy was restarted on 12/8/2020, he was admitted to the hospital with C. difficile colitis, he was treated with oral vancomycin. The patient was seen by ID, was recommended vancomycin 1 2 5 mg p.o. 3 times weekly, if worsening diarrhea to go back on a treatment dose 125 mg p.o. every 6 hours. Was cleared to restart the chemotherapy. The patient went to [place] this month. He returns for a follow-up visit and to resume treatment. He has worsening peripheral neuropathy in the legs and the hands. He was started on Lyrica. Review of Systems;  CONSTITUTIONAL: No fever, chills. Improved appetite and energy level. ENMT: Eyes: No diplopia; Nose: No epistaxis. Mouth: No sore throat. RESPIRATORY: No hemoptysis, shortness of breath, cough. CARDIOVASCULAR: No chest pain, palpitations. GASTROINTESTINAL: No nausea/vomiting, abdominal pain, no diarrhea. GENITOURINARY: No dysuria, urinary frequency, hematuria. NEURO: No syncope, presyncope, headache. Positive for peripheral neuropathy.   Remainder:  ROS NEGATIVE    Past Medical History:      Diagnosis Date    CAD (coronary artery disease)     Cancer ([facility] 05/2020    pancreas    Heart attack ([facility] 2008, 3-2015    Hypertension      Patient Active Problem List   Diagnosis  Atherosclerosis of native artery of both lower extremities with intermittent claudication ([facility]    Atheroscler of native artery of right leg with intermit claudication (HCC)    Carcinoma of head of pancreas ([address]    Agranulocytosis secondary to cancer chemotherapy (CODE) ([facility]     COVID-19    Diarrhea    History of 2019 novel coronavirus disease (COVID-19)    CAD (coronary artery disease)    Essential hypertension    Hypoxia    Hypomagnesemia    Lactic acidosis    Clostridioides difficile diarrhea        Past Surgical History:      Procedure Laterality Date    CARDIAC SURGERY      Quad 2014    CORONARY ANGIOPLASTY  2005    CABG TIMES 4 2015    CORONARY ANGIOPLASTY WITH STENT PLACEMENT      INGUINAL HERNIA REPAIR Right     [place] THROMBOENDARTECTMY FEMORAL COMMON Right 9/17/2018    FEMORAL ENDARTERECTOMY performed by Eloy Norwood MD at [address] [place] [address]  08/15/2018    abdominal aortogram with runoff by Dr. Chidi Pizarro       Family History:  Family History   Problem Relation Age of Onset    Breast Cancer Mother     Cancer Father         Colon    Cancer Maternal Aunt         Bladder    Cancer Maternal Grandfather         Bladder    Cancer Maternal Cousin         Leukemia    Breast Cancer Maternal Aunt     Cancer Maternal Cousin         Lung       Medications:  Reviewed and reconciled.     Social History:  Social History     Socioeconomic History    Marital status:      Spouse name: Not on file    Number of children: Not on file    Years of education: Not on file    Highest education level: Not on file   Occupational History    Not on file   Social Needs    Financial resource strain: Not on file    Food insecurity     Worry: Not on file     Inability: Not on file    Transportation needs     Medical: Not on file     Non-medical: Not on file   Tobacco Use    Smoking status: Former Smoker     Packs/day: 1.50     Years: 40.00     Pack years: 60.00 Types: Cigarettes     Quit date: 2008     Years since quittin.5    Smokeless tobacco: Never Used    Tobacco comment: occassional cigar   Substance and Sexual Activity    Alcohol use: Not Currently    Drug use: No    Sexual activity: Not Currently     Partners: Female   Lifestyle    Physical activity     Days per week: Not on file     Minutes per session: Not on file    Stress: Not on file   Relationships    Social connections     Talks on phone: Not on file     Gets together: Not on file     Attends Muslim service: Not on file     Active member of club or organization: Not on file     Attends meetings of clubs or organizations: Not on file     Relationship status: Not on file    Intimate partner violence     Fear of current or ex partner: Not on file     Emotionally abused: Not on file     Physically abused: Not on file     Forced sexual activity: Not on file   Other Topics Concern    Not on file   Social History Narrative    Not on file       Allergies:  No Known Allergies    Physical Exam:  /78   Pulse 95   Temp 98.1 °F (36.7 °C)   Ht 5' 7\" (1.702 m)   Wt 173 lb 14.4 oz (78.9 kg)   SpO2 96%   BMI 27.24 kg/m²     GENERAL: Alert, oriented x 3, not in acute distress. HEENT: PERRLA; EOMI. Oropharynx clear. NECK: Supple. No palpable cervical or supraclavicular lymphadenopathy. LUNGS: Good air entry bilaterally. No wheezing, crackles or rhonchi. CARDIOVASCULAR: Regular rate. No murmurs, rubs or gallops. ABDOMEN: Soft. Non-tender, non-distended. Positive bowel sounds. EXTREMITIES: Without clubbing, cyanosis, or edema. NEUROLOGIC: No focal deficits.    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 He was started on FOLFIRINOX chemotherapy on 6/29/2020, the patient has been under the great care of Dr. Agueda Gibbons locally, and Dr. Drake Castañeda at [facility] the patient had side effects from the chemotherapy including thrombocytopenia and diarrhea. The oxaliplatin dose has been decreased secondary to thrombocytopenia. The patient had CT scans of the chest and pancreas done in September 2020, revealing a 5.1 x 5.1 x 5.6 cm infiltrative mass centered superior to the pancreatic head/neck infiltrating the robert hepatis and invading the liver and second duodenal, had decreased in size compared to prior exam, partial abutment of the posterior stomach, medial right hepatic lobe and anterior IVC also noted, extensive vascular involvement of the robert hepatis and celiac, mild nonspecific infiltrative changes in the upper abdominal fat, developing carcinomatosis is not excluded, interval resolution of previously described right upper lobe nodular opacities, residual subcentimeter thick nodular opacity in the left lung measuring up to 5 mm, stable, new small left pleural effusion, nonspecific sclerotic focus in the left posterior fourth rib, stable. The patient had a virtual visit with Dr. Carlos Donovan on 10 3/13/2020,he  was recommended to continue FOLFIRINOX for 6 more cycles, which could be modified to make it tolerable, even evaluation of one of the drops to his oxaliplatin is warranted. He could begin it for SBRT if he continues to have a locally advanced disease. The patient has received 8 cycles, eighth cycle was on 10/23/2020. The patient was admitted to the hospital on 11/1/2020 with acute respiratory failure secondary to COVID-19 pneumonia, he was also diagnosed with C. difficile colitis. He is feeling much better at this time, he completed the course of Flagyl on 12/4/2020. He was restarted on chemotherapy with FOLFIRINOX on 12/18/2020, cycle #9. Chemotherapy was restarted on 12/8/2020, he was admitted to the hospital with C. difficile colitis, he was treated with oral vancomycin. The patient was seen by ID, was recommended vancomycin 125 mg p.o. 3 times weekly, if worsening diarrhea to go back on a treatment dose 125 mg p.o. every 6 hours. Was cleared to restart the chemotherapy. The patient went to [place] this month. He returns for a follow-up visit and to resume treatment. He has worsening peripheral neuropathy in the legs and the hands. He was started on Lyrica. Labs reviewed, the patient is doing well clinically except for worsening peripheral neuropathy despite dose reduction of the oxaliplatin by 50%. Will discontinue the oxaliplatin. Labs reviewed, proceed with chemotherapy, FOLFIRINOX (w/o oxaliplatin), restaging CT scans of the chest abdomen and pelvis were ordered. Will refer to home PT/OT. Oral vancomycin was prescribed per ID instructions. RTC in 2 weeks. Thank you for allowing us to participate in the care of Mr. Nam Last.     Nick Rosen MD   HEMATOLOGY/MEDICAL ONCOLOGY  [address] [place] [address] MED ONCOLOGY  [facility] [address] [facility] [id number]  Dept: [phone]

## 2021-02-03 NOTE — TELEPHONE ENCOUNTER
Patient wife called in and left message about Edvin's CADD pump. She states that when the pump get's \"bumped\" it had turned off. This has happened twice and she was able to re-start the pump. I reviewed the pump with her and it was functioning normally at this time. Admits to 128.2 cc of medication left in bag. I called [facility] and spoke with Parisa Stack and discussed with him possible issues. Parisa Stack stated that he would call the patient to further investigate.  Gave him patient's wife's number [phone]  Patient is to return tomorrow at 10:00 for pump removal. Ginger Mendez RN

## 2021-02-15 NOTE — TELEPHONE ENCOUNTER
[facility] called and stated that Tejinder Fitzpatrick does not wish to receive OT home care. OT stated that the patient wants to continue following with PT instead.

## 2021-02-16 PROBLEM — I63.9 ACUTE THROMBOEMBOLIC CEREBROVASCULAR ACCIDENT (HCC): Status: ACTIVE | Noted: 2021-01-01

## 2021-02-16 PROBLEM — Z86.19 HISTORY OF CLOSTRIDIOIDES DIFFICILE INFECTION: Status: ACTIVE | Noted: 2021-01-01

## 2021-02-16 PROBLEM — E78.5 DYSLIPIDEMIA: Status: ACTIVE | Noted: 2021-01-01

## 2021-02-16 NOTE — PROGRESS NOTES
Neurointerventional POST Procedure Note      Date of Service: 21      Patient Name: Aparna Boothe   : 1950  Medical record number:  [id number]        Procedure: Right ICA stent assisted angioplasty with distal embolic protection  Physician: Inez Morejon MD  Assistant: [facility]  Access:Right Groin  Vessels injected: Right CCA  Hemostasis: Achieved 8F angioseal  Anesthesia: Please see flowchart  Specimens: None  Blood loss: 60 ml  Contrast Material:  please see dictation in PACS  Fluoro time: Please see dictation in PACS      Diagnosis/Findings:   1.  75% high cervical stenosis due to dissected segment of calcified plaque of the right ICA  2.  successful stent assisted angioplasty of the right internal carotid artery with residual less than 5% stenosis    No M3 occlusion intracranially, this was low flow in the MCA due to the carotid stentosis, which has resolved with brisk intracranial filling    Plan:  1. q1 Neurological checks for 24 hours post procedure  2. Maintain SBP <160, MAP >60 if feasible  3. Neurovascular checks   4. MRI brain   5. STAT CT head for any neurological decline and contact me immediately  6.  Antiplatelet Recs ASA 81 mg Ticagrelor

## 2021-02-16 NOTE — PROGRESS NOTES
The patient had a virtual visit with Dr. Liam Rivera on 10 3/13/2020,he  was recommended to continue FOLFIRINOX for 6 more cycles, which could be modified to make it tolerable, even evaluation of one of the drops to his oxaliplatin is warranted. He could begin it for SBRT if he continues to have a locally advanced disease. The patient has received 8 cycles, 8th cycle was on 10/23/2020. The patient was admitted to the hospital on 11/1/2020 with acute respiratory failure secondary to COVID-19 pneumonia, he was also diagnosed with C. difficile colitis. He is feeling much better at this time, he completed the course of Flagyl, 12/4/2020. Chemotherapy was restarted on 12/8/2020, he was admitted to the hospital with C. difficile colitis, he was treated with oral vancomycin. The patient was seen by ID, was recommended vancomycin 1 2 5 mg p.o. 3 times weekly, if worsening diarrhea to go back on a treatment dose 125 mg p.o. every 6 hours. Was cleared to restart the chemotherapy. The patient went to [place], treatment was resumed on 2/2/2021. He had restaging CT scans done [facility]. The patient returns for a follow-up visit, this morning at 715, the patient started having slurred speech, and was noted to have left facial droop. He has chemotherapy-induced peripheral neuropathy. Review of Systems;  CONSTITUTIONAL: No fever, chills. Improved appetite and energy level. ENMT: Eyes: No diplopia; Nose: No epistaxis. Mouth: No sore throat. RESPIRATORY: No hemoptysis, shortness of breath, cough. CARDIOVASCULAR: No chest pain, palpitations. GASTROINTESTINAL: No nausea/vomiting, abdominal pain, no diarrhea. GENITOURINARY: No dysuria, urinary frequency, hematuria. NEURO: As per HPI.   Remainder:  ROS NEGATIVE    Past Medical History:      Diagnosis Date    CAD (coronary artery disease)     Cancer ([facility] 05/2020    pancreas    Heart attack ([facility] 2008, 3-2015    Hypertension Patient Active Problem List   Diagnosis    Atherosclerosis of native artery of both lower extremities with intermittent claudication ([facility]    Atheroscler of native artery of right leg with intermit claudication (HCC)    Carcinoma of head of pancreas ([facility]    Agranulocytosis secondary to cancer chemotherapy (CODE) ([facility]     COVID-19    Diarrhea    History of 2019 novel coronavirus disease (COVID-19)    CAD (coronary artery disease)    Essential hypertension    Hypoxia    Hypomagnesemia    Lactic acidosis    Clostridioides difficile diarrhea        Past Surgical History:      Procedure Laterality Date    CARDIAC SURGERY      Quad 2014    CORONARY ANGIOPLASTY  2005    CABG TIMES 4 2015    CORONARY ANGIOPLASTY WITH STENT PLACEMENT      INGUINAL HERNIA REPAIR Right     [place] THROMBOENDARTECTMY FEMORAL COMMON Right 9/17/2018    FEMORAL ENDARTERECTOMY performed by Patrick De MD at [address] [place] [address] VASCULAR SURGERY  08/15/2018    abdominal aortogram with runoff by Dr. Nena Adorno       Family History:  Family History   Problem Relation Age of Onset    Breast Cancer Mother     Cancer Father         Colon    Cancer Maternal Aunt         Bladder    Cancer Maternal Grandfather         Bladder    Cancer Maternal Cousin         Leukemia    Breast Cancer Maternal Aunt     Cancer Maternal Cousin         Lung       Medications:  Reviewed and reconciled.     Social History:  Social History     Socioeconomic History    Marital status:      Spouse name: Not on file    Number of children: Not on file    Years of education: Not on file    Highest education level: Not on file   Occupational History    Not on file   Social Needs    Financial resource strain: Not on file    Food insecurity     Worry: Not on file     Inability: Not on file    Transportation needs     Medical: Not on file     Non-medical: Not on file   Tobacco Use    Smoking status: Former Smoker     Packs/day: 1.50 Years: 40.00     Pack years: 60.00     Types: Cigarettes     Quit date: 2008     Years since quittin.5    Smokeless tobacco: Never Used    Tobacco comment: occassional cigar   Substance and Sexual Activity    Alcohol use: Not Currently    Drug use: No    Sexual activity: Not Currently     Partners: Female   Lifestyle    Physical activity     Days per week: Not on file     Minutes per session: Not on file    Stress: Not on file   Relationships    Social connections     Talks on phone: Not on file     Gets together: Not on file     Attends Caodaism service: Not on file     Active member of club or organization: Not on file     Attends meetings of clubs or organizations: Not on file     Relationship status: Not on file    Intimate partner violence     Fear of current or ex partner: Not on file     Emotionally abused: Not on file     Physically abused: Not on file     Forced sexual activity: Not on file   Other Topics Concern    Not on file   Social History Narrative    Not on file       Allergies:  No Known Allergies    Physical Exam:  /76   Pulse 80   Temp 98 °F (36.7 °C)   Ht 5' 7\" (1.702 m)   Wt 178 lb (80.7 kg)   SpO2 97%   BMI 27.88 kg/m²   GENERAL: Alert, oriented x 3, not in acute distress. HEENT: PERRLA; EOMI. left facial droop. NECK: Supple. No palpable cervical or supraclavicular lymphadenopathy. LUNGS: Good air entry bilaterally. No wheezing, crackles or rhonchi. CARDIOVASCULAR: Regular rate. No murmurs, rubs or gallops. ABDOMEN: Soft. Non-tender, non-distended. Positive bowel sounds. EXTREMITIES: Without clubbing, cyanosis, or edema. NEUROLOGIC: He has slurred speech. Normal motor power, sensation is intact.   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 He was started on FOLFIRINOX chemotherapy on 6/29/2020, the patient has been under the great care of Dr. Wilman Hicks locally, and Dr. Charlott Canavan at [facility] the patient had side effects from the chemotherapy including thrombocytopenia and diarrhea. The oxaliplatin dose has been decreased secondary to thrombocytopenia. The patient had CT scans of the chest and pancreas done in September 2020, revealing a 5.1 x 5.1 x 5.6 cm infiltrative mass centered superior to the pancreatic head/neck infiltrating the robert hepatis and invading the liver and second duodenal, had decreased in size compared to prior exam, partial abutment of the posterior stomach, medial right hepatic lobe and anterior IVC also noted, extensive vascular involvement of the robert hepatis and celiac, mild nonspecific infiltrative changes in the upper abdominal fat, developing carcinomatosis is not excluded, interval resolution of previously described right upper lobe nodular opacities, residual subcentimeter thick nodular opacity in the left lung measuring up to 5 mm, stable, new small left pleural effusion, nonspecific sclerotic focus in the left posterior fourth rib, stable. The patient had a virtual visit with Dr. Alicia Baeza on 10 3/13/2020,he  was recommended to continue FOLFIRINOX for 6 more cycles, which could be modified to make it tolerable, even evaluation of one of the drops to his oxaliplatin is warranted. He could begin it for SBRT if he continues to have a locally advanced disease. The patient has received 8 cycles, eighth cycle was on 10/23/2020. The patient was admitted to the hospital on 11/1/2020 with acute respiratory failure secondary to COVID-19 pneumonia, he was also diagnosed with C. difficile colitis. He is feeling much better at this time, he completed the course of Flagyl on 12/4/2020. He was restarted on chemotherapy with FOLFIRINOX on 12/18/2020, cycle #9. Chemotherapy was restarted on 12/8/2020, he was admitted to the hospital with C. difficile colitis, he was treated with oral vancomycin. The patient was seen by ID, was recommended vancomycin 125 mg p.o. 3 times weekly, if worsening diarrhea to go back on a treatment dose 125 mg p.o. every 6 hours. Was cleared to restart the chemotherapy. The patient went to [place], treatment was resumed on 2/2/2021. He had restaging CT scans done at [facility] on 2/11/2021, revealing new patchy groundglass opacities and reticular opacities in the bilateral lung fields, which I believe is secondary to the Covid pneumonia, interval resolution of the previously noted subcentimeter nodular opacities and left pleural effusion, nonspecific sclerotic focus in the left posterior fourth rib, stable, esophagitis, CT scan of the abdomen the pelvis had revealed increase in the size of the pancreatic head mass measuring now 6 x 5.5 cm, previously 5 x 4.5 cm, extending into the left hepatic lobe and caudate lobe, persistent encasement of the robert hepatis, mild hepatic biliary ductal dilatation, increased, mild splenomegaly, cholelithiasis, interval resolution of the previous described haziness of the mesenteric fat. The patient has disease progression, recommended changing systemic therapy to gemcitabine Abraxane, the patient will follow up with Dr. Mika Gonzalez. This morning at 715, the patient started having slurred speech, and was noted to have left facial droop, the patient was referred to the ED, report was called. RTC in 1 week.

## 2021-02-16 NOTE — H&P
[address] [facility]  History and Physical      CHIEF COMPLAINT:  Left side facial droop, slurred speech       Patient of Grabiel Cuevas MD presents with:  Facial droop, and slurred speech    History of Present Illness:   Patient is a 76-year-old male presenting to the emergency room with facial droop, slurred speech, and left-sided weakness. Patient has a significant past medical history including Covid positive in November 2020, CVA in 2017, C. difficile November 2020, January 2021,  hypertension, coronary artery disease, PVD, and advanced pancreatic adenocarcinoma, CAD, MI. The patient is currently receiving chemotherapy for his pancreatic cancer. Patient was eating breakfast this morning when his wife noticed that his speech started slurring and he had left-sided facial droop, she did not call 911 since he had an appointment with the oncologist this morning. Patient went to the oncologist and the oncologist sent him to the ER for the left side facial droop, and slurred speech. Patient last known well was 02/15/2021 10pm.  There were no aggravating factors and no relieving factors. Patient denies chest pain, shortness of breath, fevers, or chills. Patient has a right chest Mediport that used for chemotherapy. Patient is being admitted for an ischemic penumbra in the right posterior frontal lobe. REVIEW OF SYSTEMS:  Pertinent negatives are above in HPI. 10 point ROS otherwise negative.       Past Medical History:   Diagnosis Date    CAD (coronary artery disease)     Cancer ([facility] 05/2020    pancreas    Heart attack ([facility] 2008, 3-2015    Hypertension          Past Surgical History:   Procedure Laterality Date    CARDIAC SURGERY      Quad 2014    CORONARY ANGIOPLASTY  2005    CABG TIMES 4 2015    CORONARY ANGIOPLASTY WITH STENT PLACEMENT      INGUINAL HERNIA REPAIR Right     IR CAROTID STENT UNI W PROTECTION  2/16/2021 IR CAROTID STENT UNI W PROTECTION 2/16/2021 Sarthak Clarke MD [facility]    [place] THROMBOENDARTECTMY FEMORAL COMMON Right 9/17/2018    FEMORAL ENDARTERECTOMY performed by Shelly Nails MD at [address] [place] [address]  08/15/2018    abdominal aortogram with runoff by Dr. Hernandez Mason       Medications Prior to Admission:    Medications Prior to Admission: pregabalin (LYRICA) 100 MG capsule, Take 200 mg by mouth daily. vancomycin (VANCOCIN) 125 MG capsule, Take 1 capsule by mouth as needed (as instructed) 3 times weekly  b complex vitamins capsule, Take 1 capsule by mouth daily  Multiple Vitamins-Minerals (MULTIVITAMIN ADULT PO), Take by mouth  DULoxetine (CYMBALTA) 20 MG extended release capsule, Take 30 mg by mouth daily   vitamin B-6 (B-6) 50 MG tablet, Take 1 tablet by mouth daily  zinc gluconate 50 MG tablet, Take 1 tablet by mouth daily  carvedilol (COREG) 12.5 MG tablet, Take 12.5 mg by mouth 2 times daily (with meals)  aspirin EC 81 MG EC tablet, Take 81 mg by mouth nightly   ondansetron (ZOFRAN) 4 MG tablet, Take 4 mg by mouth every 8 hours as needed for Nausea or Vomiting  Probiotic Product (PROBIOTIC ADVANCED PO), Take by mouth  lipase-protease-amylase (CREON) 06033-85815 units delayed release capsule, Take 24,000 capsules by mouth 3 times daily (before meals)  triamcinolone (KENALOG) 0.1 % cream, Apply topically 2 times daily Apply topically 2 times daily. loperamide (IMODIUM A-D) 2 MG capsule, Take 1 capsule by mouth 4 times daily as needed for Diarrhea  Lomitapide Mesylate 5 MG CAPS, Take by mouth  mirtazapine (REMERON) 15 MG tablet, Take 1 tablet by mouth nightly (Patient taking differently: Take 15 mg by mouth nightly Pt is still taking)  oxyCODONE 5 MG capsule, Take 5 mg by mouth every 4 hours as needed for Pain.     Note that the patient's home medications were reviewed and the above list is accurate to the best of my knowledge at the time of the exam. Allergies:    Patient has no known allergies. Social History:    reports that he quit smoking about 12 years ago. His smoking use included cigarettes. He has a 60.00 pack-year smoking history. He has never used smokeless tobacco. He reports previous alcohol use. He reports that he does not use drugs. Family History:   family history includes Breast Cancer in his maternal aunt and mother; Cancer in his father, maternal aunt, maternal cousin, maternal cousin, and maternal grandfather. PHYSICAL EXAM:    Vitals:  /78   Pulse 84   Temp 98.1 °F (36.7 °C) (Temporal)   Resp 18   Wt 178 lb (80.7 kg)   SpO2 96%   BMI 27.88 kg/m²       General appearance: NAD, conversant, no evidence of slurred speech or aphasia. Left side facial droop. Eyes: Sclerae anicteric, PERRLA  HEENT: AT/NC, MMM   Neck: FROM, supple, no thyromegaly  Lymph: No cervical / supraclavicular lymphadenopathy  Lungs: Clear to auscultation, WOB normal  CV: RRR, no MRGs, no lower extremity edema  Abdomen: Soft, non-tender; no masses or HSM, +BS  Extremities: FROM without synovitis. hands. Skin: no rash, induration, lesions, or ulcers Mediport right chest  Psych: Calm and cooperative. Normal judgement and insight. Normal mood and affect. Neuro: Alert and interactive, face symmetric, speech fluent. No focal motor or sensory neurological deficits    LABS:  All labs reviewed.   Of note:  CBC with Differential:    Lab Results   Component Value Date    WBC 5.0 02/17/2021    RBC 3.51 02/17/2021    HGB 10.1 02/17/2021    HCT 32.2 02/17/2021     02/17/2021    MCV 91.7 02/17/2021    MCH 28.8 02/17/2021    MCHC 31.4 02/17/2021    RDW 15.2 02/17/2021    NRBC 0.9 11/03/2020    METASPCT 0.9 10/16/2020    LYMPHOPCT 26.8 02/17/2021    MONOPCT 5.8 02/17/2021    MYELOPCT 3.4 11/02/2020    BASOPCT 0.4 02/17/2021    MONOSABS 0.29 02/17/2021    LYMPHSABS 1.33 02/17/2021    EOSABS 0.05 02/17/2021    BASOSABS 0.02 02/17/2021     BMP: Lab Results   Component Value Date     02/17/2021    K 4.3 02/17/2021     02/17/2021    CO2 24 02/17/2021    BUN 8 02/17/2021    LABALBU 3.3 02/17/2021    CREATININE 0.8 02/17/2021    CALCIUM 8.5 02/17/2021    GFRAA >60 02/17/2021    LABGLOM >60 02/17/2021    GLUCOSE 101 02/17/2021       Imaging:  I've personally reviewed the patient's   CXR: Cardiomegaly    CT head: No acute intracranial abnormality    CTA neck with contrast: Severe atherosclerotic disease right M3 occlusion. Estimated stenosis of the proximal right and left internal carotid artery is greater than 70% on the right and is greater than 95% on the left. CT brain perfusion: Small region of the ischemic penumbra in the right posterior frontal lobe. EKG:  Normal sinus rhythm, left ventricular hypertrophy with QRS widening      Telemetry:  I've personally reviewed the patient's telemetry:  NSR    ASSESSMENT/PLAN:  Active problems:  Acute thrombombolic cerebrovascular accident   Essential hypertension  Carcinoma of head of pancreas  CAD  History COVID-19  Resolved problems  None    Patient is a 27-year-old male admitted with acute ischemic stroke in right ICA with right MCA distribution perfusion deficit. 1.  Stroke alert called in the emergency room  2. Stat CT head CTA head neck brain  3. Stroke management  4. Patient to undergo diagnostic angiogram with stenting in interventional radiology  5. Lipid panel and hemoglobin A1c needs tight control of risk factors  6. Monitor blood pressure adjust medications as needed. 7.  Patient to transfer to CVIC/SICU after procedure.      PT OT   Discharge planning    Code status: Full  Requires inpatient inpatient level of care  Rick Jones  12:25 PM  2/17/2021     Admitted for acute stroke symptoms  Out of Tpa window    symtpoms persistent on arrrival to ER   Taken for IR procedure given CTP with ischemic penumbra  MRI/ US carotids pending on 2.16   Risk factor modification I personally saw, examined and provided care for the patient. Radiographs, labs and medication list were reviewed by me independently. The case was discussed in detail and plans for care were established. Review of [address] [place] [address], documentation was conducted and revisions were made as appropriate directly by me. I agree with the above documented exam, problem list, and plan of care.      Svitlana Solorio MD  2:59 PM  2/17/2021\

## 2021-02-16 NOTE — ED NOTES
Bed: 19  Expected date:   Expected time:   Means of arrival:   Comments:  [address] [facility] [place]  02/16/21 [id number]

## 2021-02-16 NOTE — ANESTHESIA PRE PROCEDURE
Department of Anesthesiology  Preprocedure Note       Name:  Silvino Gr   Age:  72 y.o.  :  1950                                          MRN:  [id number]         Date:  2021      Surgeon: * Surgery not found *    Procedure:     Medications prior to admission:   Prior to Admission medications    Medication Sig Start Date End Date Taking? Authorizing Provider   pregabalin (LYRICA) 100 MG capsule Take 200 mg by mouth daily. 21  Yes Historical Provider, MD   vancomycin (VANCOCIN) 125 MG capsule Take 1 capsule by mouth as needed (as instructed) 3 times weekly 21 Yes Kenan Romano MD   b complex vitamins capsule Take 1 capsule by mouth daily   Yes Historical Provider, MD   Multiple Vitamins-Minerals (MULTIVITAMIN ADULT PO) Take by mouth   Yes Historical Provider, MD   DULoxetine (CYMBALTA) 20 MG extended release capsule Take 30 mg by mouth daily    Yes Historical Provider, MD   vitamin B-6 (B-6) 50 MG tablet Take 1 tablet by mouth daily 20  Yes Carissa Gift,    zinc gluconate 50 MG tablet Take 1 tablet by mouth daily 20  Yes Carissa Gift, DO   carvedilol (COREG) 12.5 MG tablet Take 12.5 mg by mouth 2 times daily (with meals) 19  Yes Historical Provider, MD   aspirin EC 81 MG EC tablet Take 81 mg by mouth nightly    Yes Historical Provider, MD   ondansetron (ZOFRAN) 4 MG tablet Take 4 mg by mouth every 8 hours as needed for Nausea or Vomiting    Historical Provider, MD   Probiotic Product (PROBIOTIC ADVANCED PO) Take by mouth    Historical Provider, MD   lipase-protease-amylase (CREON) 56527-48820 units delayed release capsule Take 24,000 capsules by mouth 3 times daily (before meals) 12/15/20 1/14/21  Kenan Romano MD   triamcinolone (KENALOG) 0.1 % cream Apply topically 2 times daily Apply topically 2 times daily.     Historical Provider, MD   loperamide (IMODIUM A-D) 2 MG capsule Take 1 capsule by mouth 4 times daily as needed for Diarrhea 20   Kenan Romano MD Lomitapide Mesylate 5 MG CAPS Take by mouth    Historical Provider, MD   mirtazapine (REMERON) 15 MG tablet Take 1 tablet by mouth nightly  Patient taking differently: Take 15 mg by mouth nightly Pt is still taking 9/8/20 1/6/21  MIYA Prater - CNP   oxyCODONE 5 MG capsule Take 5 mg by mouth every 4 hours as needed for Pain. Historical Provider, MD       Current medications:    Current Facility-Administered Medications   Medication Dose Route Frequency Provider Last Rate Last Admin    0.9 % sodium chloride infusion   Intravenous Continuous Sean Huertas  mL/hr at 02/16/21 1621 Rate Verify at 02/16/21 1621     Current Outpatient Medications   Medication Sig Dispense Refill    pregabalin (LYRICA) 100 MG capsule Take 200 mg by mouth daily.  vancomycin (VANCOCIN) 125 MG capsule Take 1 capsule by mouth as needed (as instructed) 3 times weekly 60 capsule 1    b complex vitamins capsule Take 1 capsule by mouth daily      Multiple Vitamins-Minerals (MULTIVITAMIN ADULT PO) Take by mouth      DULoxetine (CYMBALTA) 20 MG extended release capsule Take 30 mg by mouth daily       vitamin B-6 (B-6) 50 MG tablet Take 1 tablet by mouth daily 30 tablet 3    zinc gluconate 50 MG tablet Take 1 tablet by mouth daily 30 tablet 3    carvedilol (COREG) 12.5 MG tablet Take 12.5 mg by mouth 2 times daily (with meals)      aspirin EC 81 MG EC tablet Take 81 mg by mouth nightly       ondansetron (ZOFRAN) 4 MG tablet Take 4 mg by mouth every 8 hours as needed for Nausea or Vomiting      Probiotic Product (PROBIOTIC ADVANCED PO) Take by mouth      lipase-protease-amylase (CREON) 70979-91410 units delayed release capsule Take 24,000 capsules by mouth 3 times daily (before meals) 90 capsule 0    triamcinolone (KENALOG) 0.1 % cream Apply topically 2 times daily Apply topically 2 times daily.       loperamide (IMODIUM A-D) 2 MG capsule Take 1 capsule by mouth 4 times daily as needed for Diarrhea      Lomitapide Mesylate 5 MG CAPS Take by mouth      mirtazapine (REMERON) 15 MG tablet Take 1 tablet by mouth nightly (Patient taking differently: Take 15 mg by mouth nightly Pt is still taking) 30 tablet 2    oxyCODONE 5 MG capsule Take 5 mg by mouth every 4 hours as needed for Pain.          Allergies:  No Known Allergies    Problem List:    Patient Active Problem List   Diagnosis Code    Atherosclerosis of native artery of both lower extremities with intermittent claudication ([facility] I70.213    Atheroscler of native artery of right leg with intermit claudication (HCC) I70.211    Carcinoma of head of pancreas ([facility] C25.0    Agranulocytosis secondary to cancer chemotherapy (CODE) ([facility]  D70.1    COVID-19 U07.1    Diarrhea R19.7    History of 2019 novel coronavirus disease (COVID-19) Z86.16    CAD (coronary artery disease) I25.10    Essential hypertension I10    Hypoxia R09.02    Hypomagnesemia E83.42    Lactic acidosis E87.2    Clostridioides difficile diarrhea A04.72    Acute thromboembolic cerebrovascular accident ([facility] I63.9       Past Medical History:        Diagnosis Date    CAD (coronary artery disease)     Cancer ([facility] 2020    pancreas    Heart attack ([facility] 2008, 3-    Hypertension        Past Surgical History:        Procedure Laterality Date    CARDIAC SURGERY      Quad 2014    CORONARY ANGIOPLASTY  2005    CABG TIMES 4     CORONARY ANGIOPLASTY WITH STENT PLACEMENT      INGUINAL HERNIA REPAIR Right     [place] THROMBOENDARTECT[place] FEMORAL COMMON Right 2018    FEMORAL ENDARTERECTOMY performed by Inderjit Garcia MD at [address] [place] [address] VASCULAR SURGERY  08/15/2018    abdominal aortogram with runoff by Dr. Jeniffer Hidalgo       Social History:    Social History     Tobacco Use    Smoking status: Former Smoker     Packs/day: 1.50     Years: 40.00     Pack years: 60.00     Types: Cigarettes     Quit date: 2008     Years since quittin.5    Smokeless tobacco: Never Used    Tobacco comment: occassional cigar   Substance Use Topics    Alcohol use: Not Currently                                Counseling given: Not Answered  Comment: occassional cigar      Vital Signs (Current):   Vitals:    02/16/21 0907 02/16/21 1620   BP: 129/81 (!) 165/86   Pulse: 75 78   Resp: 18 17   Temp: 98.7 °F (37.1 °C) 98.2 °F (36.8 °C)   SpO2: 96% 98%   Weight:  178 lb (80.7 kg)                                              BP Readings from Last 3 Encounters:   02/16/21 (!) 165/86   02/16/21 129/76   02/04/21 138/75       NPO Status:                                                                                 BMI:   Wt Readings from Last 3 Encounters:   02/16/21 178 lb (80.7 kg)   02/16/21 178 lb (80.7 kg)   02/02/21 173 lb 14.4 oz (78.9 kg)     Body mass index is 27.88 kg/m². CBC:   Lab Results   Component Value Date    WBC 6.1 02/16/2021    RBC 3.96 02/16/2021    HGB 11.8 02/16/2021    HCT 36.6 02/16/2021    MCV 92.4 02/16/2021    RDW 14.9 02/16/2021     02/16/2021       CMP:   Lab Results   Component Value Date     02/16/2021    K 4.1 02/16/2021    K 3.0 12/18/2020     02/16/2021    CO2 23 02/16/2021    BUN 8 02/16/2021    CREATININE 0.8 02/16/2021    GFRAA >60 02/16/2021    LABGLOM >60 02/16/2021    GLUCOSE 119 02/16/2021    PROT 6.4 02/16/2021    CALCIUM 8.9 02/16/2021    BILITOT 0.2 02/16/2021    ALKPHOS 200 02/16/2021    AST 21 02/16/2021    ALT 18 02/16/2021       POC Tests: No results for input(s): POCGLU, POCNA, POCK, POCCL, POCBUN, POCHEMO, POCHCT in the last 72 hours.     Coags:   Lab Results   Component Value Date    PROTIME 12.3 02/16/2021    INR 1.1 02/16/2021    APTT 30.4 02/16/2021       HCG (If Applicable): No results found for: PREGTESTUR, PREGSERUM, HCG, HCGQUANT     ABGs: No results found for: PHART, PO2ART, PAE1SJO, UNQ6GXV, BEART, O2FWTPAP     Type & Screen (If Applicable):  No results found for: LABABO, LABRH    Drug/Infectious Status (If Applicable):  No results found for: HIV, HEPCAB    COVID-19 Screening (If Applicable):   Lab Results   Component Value Date    COVID19 Not Detected 02/16/2021         Anesthesia Evaluation    Airway: Mallampati: II  TM distance: >3 FB   Neck ROM: full  Mouth opening: > = 3 FB Dental: normal exam         Pulmonary: breath sounds clear to auscultation                             Cardiovascular:    (+) hypertension:, past MI:, CAD:,         Rhythm: regular                   ROS comment: S/P CABG and stents, denies CP and SOB     Neuro/Psych:   (+) CVA:,              ROS comment: S/P carotid stent GI/Hepatic/Renal:             Endo/Other:                     Abdominal:           Vascular:   + PVD, aortic or cerebral, . Anesthesia Plan      MAC     ASA 3 - emergent       Induction: intravenous. arterial line  MIPS: Postoperative opioids intended and Prophylactic antiemetics administered.                       Gregor Bell MD   2/16/2021

## 2021-02-16 NOTE — ED NOTES
[email or web address]   [email or web address]  Alex Rabago  02/16/21 [address]  02/16/21 [id number]

## 2021-02-16 NOTE — ED NOTES
Pt wife stated that patient had 1st dose of maderna shot 1 week ago today.      Debora Burgos, RN  02/16/21 6554

## 2021-02-16 NOTE — CONSULTS
Neuro interventional ,     STROKE NEUROLOGY CONSULT NOTE    Consult requested by: Attending: Bairon Torres DO   Reason for Consultation: [id number]    Patient:   Luna Tracy   : 1950   MRN: [id number]   Date of Service: 2021     -----------------------------------------------------------------------------------------------------------------  Stroke Team Contact numbers:  Karli Black MD : Please contact via [facility] [place] phone daytime [phone]  Balaji Courtney MD: Please contact via [address] [facility], Stroke Coordinator: [phone] [address], Stroke Navigator [phone]  -----------------------------------------------------------------------------------------------------------------  Impression  1  Acute ischemic stroke in Right ICA distribution with RIGHT MCA distribution perfusion deficit, Cresendo decresendo Ischemia        Recommendations    The patient did not receive tPA as his last known last night when he went to bed. Outside the time window    Screening for Neurointervention - Discussed EVT option with patient and wife who is a RN and the have agreed to diagnostic angiogram with stenting, If there is an M3 thrombus then thrombectomy will be attempted  Current NIHSS Is 3 so stable  Will load him with ASA + Ticagrelor and after 4 hours when DAPT kicks in will plan for EVT/SAPTA            History of Present Illness: This is a 61-year-old male gentleman with a history of pancreatic cancer who comes to the ER today with acute onset facial droop and slurred speech and some numbness and tingling in his left upper extremity he went to his oncologist for scheduled chemotherapy he has a history of pancreatic cancer and wished to maintain this appointment. Wife is a RN who is at bedside. After they noticed his strokelike symptoms facial droop they sent him to the ER for further stroke work-up his symptoms have improved at this time when I am evaluating him only has isolated facial droop possibly some slurred speech per wife. Due to the perfusion deficit seen on CT perfusion endovascular treatment was offered due to the presence of reversible ischemia. Extensive discussion about the risk and benefits of the procedure were discussed with the family. They wish to have a good quality of life and hence wished to proceed with the procedure. Review of Systems:   10 point review of system was negative except for neurological deficit as mentioned above.     Past Medical History:   Diagnosis Date    CAD (coronary artery disease)     Cancer ([facility] [facility] 05/2020    pancreas    Heart attack ([facility] [address] 2008, 3-2015    Hypertension        Past Surgical History:   Procedure Laterality Date    CARDIAC SURGERY      Quad 2014    CORONARY ANGIOPLASTY  2005    CABG TIMES 4 2015    CORONARY ANGIOPLASTY WITH STENT PLACEMENT      INGUINAL HERNIA REPAIR Right     CA THROMBOENDARTECTMY FEMORAL COMMON Right 9/17/2018    FEMORAL ENDARTERECTOMY performed by Aidan Gonzáles MD at [address] [place] [address] VASCULAR SURGERY  08/15/2018    abdominal aortogram with runoff by Dr. Mary Ashley       Current Medications       0.9 % sodium chloride infusion, Continuous        Allergies  No Known Allergies     Family History      Problem Relation Age of Onset    Breast Cancer Mother     Cancer Father         Colon    Cancer Maternal Aunt         Bladder Upper Extremity Right Left  Lower Extremity Right Left  Deltoid              5 5      Hip Flexion             5 5  Biceps              5 5   Hip Extension             5 5  Triceps                        5 5   Hip Adduction             5 5  Wrist Extension 5 5   Knee Extension 5 5  Wrist Flexion             5 5                Knee Flexion 5 5  Index Finger Flexion 5 5  Ankle Dorsiflexion 5 5  Finger Extension 5 5  Ankle Plantarflexion 5 5  APB                        5 5   Extensor Hallucis Longus 5 5           Sensory:  Left-sided numbness and tingling that comes and go currently not present          NIH Stroke Scale  NIH Stroke Scale/Score at time of initial evaluation:    1A: Level of Consciousness 0 - alert; keenly responsive   1B: Ask Month and Age 0 - answers both questions correctly   1C:  Tell Patient To Open and Close Eyes, then Hand  Squeeze 0 - performs both tasks correctly   2: Test Horizontal Extraocular Movements 0 - normal   3: Test Visual Fields 0 - no visual loss   4: Test Facial Palsy 2 - partial paralysis (total or near total paralysis of the lower face)   5A: Test Left Arm Motor Drift 0 - no drift, limb holds 90 (or 45) degrees for full 10 seconds   5B: Test Right Arm Motor Drift 0 - no drift, limb holds 90 (or 45) degrees for full 10 seconds   6A: Test Left Leg Motor Drift 0 - no drift; leg holds 30 degree position for full 5 seconds   6B: Test Right Leg Motor Drift 0 - no drift; leg holds 30 degree position for full 5 seconds   7: Test Limb Ataxia (FNF/Heel-Shin) 0 - absent   8: Test Sensation 0 - normal; no sensory loss   9: Test Language/Aphasia 0 - no aphasia, normal   10: Test Dysarthria 1 - mild to moderate, patient slurs at least some words and at worst, can be understood with some difficulty   11: Test Extinction/Inattention 0 - no abnormality   Total 3              Current Functional Status(Modified Radha Scale):  0=No symptoms at all     Imaging  Ct Head Wo Contrast Result Date: 2/16/2021  EXAMINATION: CT OF THE HEAD WITHOUT CONTRAST  2/16/2021 9:29 am TECHNIQUE: CT of the head was performed without the administration of intravenous contrast. Dose modulation, iterative reconstruction, and/or weight based adjustment of the mA/kV was utilized to reduce the radiation dose to as low as reasonably achievable. COMPARISON: None. HISTORY: ORDERING SYSTEM PROVIDED HISTORY: soniya alert TECHNOLOGIST PROVIDED HISTORY: Has a \"code stroke\" or \"stroke alert\" been called? ->Yes Reason for exam:->soniya alert What reading provider will be dictating this exam?->CRC FINDINGS: BRAIN/VENTRICLES: There is no acute intracranial hemorrhage, mass effect or midline shift. No abnormal extra-axial fluid collection. The gray-white differentiation is maintained without evidence of an acute infarct. There is no evidence of hydrocephalus. ORBITS: The visualized portion of the orbits demonstrate no acute abnormality. SINUSES: The visualized paranasal sinuses and mastoid air cells demonstrate no acute abnormality. SOFT TISSUES/SKULL:  No acute abnormality of the visualized skull or soft tissues. No acute intracranial abnormality. Specifically, there are no findings to suggest an acute intra intracranial hemorrhage. Please refer to the CT perfusion images regarding any early acute ischemia. Xr Chest Portable    Result Date: 2/16/2021  EXAMINATION: ONE XRAY VIEW OF THE CHEST 2/16/2021 9:41 am COMPARISON: 11/03/2020 HISTORY: ORDERING SYSTEM PROVIDED HISTORY: other TECHNOLOGIST PROVIDED HISTORY: Reason for exam:->other What reading provider will be dictating this exam?->CRC FINDINGS: The heart is enlarged. Postoperative sternotomy changes are noted. Lungs are clear. There is no focal infiltrate or effusion. Note is made of a right MediPort catheter. Cardiomegaly. There is no evidence of failure or pneumonia.     Cta Neck W Contrast    Result Date: 2/16/2021 Patient MRN:  [id number] : 1950 Age: 72 years Gender: Male Order Date:  2021 9:18 AM EXAM: CTA NECK W CONTRAST, CTA HEAD W CONTRAST NUMBER OF IMAGES:  [id number] INDICATION:  soniya alert soniya alert COMPARISON: None Technique: Low-dose CT  acquisition technique included one of following options; 1 . Automated exposure control, 2. Adjustment of MA and or KV according to patient's size or 3. Use of iterative reconstruction. Contiguous spiral images were obtained in the axial plane, following the administration of intravenous contrast using CT angiographic protocol. Sagittal and coronal images were reconstructed from the axial plane acquisition. Additional MIP reconstructions were presented to aid in the interpretation of this study. Images were obtained from the skull base cranially. There is Severe  calcified plaque identified in the vessels compatible with atherosclerotic disease. There is aortic arch and great vessels demonstrate Severe atherosclerotic disease. The right carotid is abnormal. There is  evidence for hemodynamically significant stenosis at the level the proximal internal carotid artery. By NASCET criteria estimated stenosis is 70% or greater  There is evidence for atherosclerotic disease of the distal internal carotid artery The left carotid is abnormal. There is  evidence for hemodynamically significant stenosis at the level the proximal internal carotid artery. By NASCET criteria estimated stenosis is 90% or greater There is evidence for atherosclerotic disease of the distal internal carotid artery The right vertebral artery is atherosclerotic. There is no evidence for hemodynamically significant stenosis The left vertebral artery is atherosclerotic. There is no evidence for hemodynamically significant stenosis The basilar artery is atherosclerotic.  There is no evidence for hemodynamically significant stenosis The middle cerebral arteries are abnormal there is a right M3 occlusion The anterior Patient MRN:  [id number] : 1950 Age: 72 years Gender: Male Order Date:  2021 9:18 AM EXAM: CTA NECK W CONTRAST, CTA HEAD W CONTRAST NUMBER OF IMAGES:  [id number] INDICATION:  soniya alert soniya alert COMPARISON: None Technique: Low-dose CT  acquisition technique included one of following options; 1 . Automated exposure control, 2. Adjustment of MA and or KV according to patient's size or 3. Use of iterative reconstruction. Contiguous spiral images were obtained in the axial plane, following the administration of intravenous contrast using CT angiographic protocol. Sagittal and coronal images were reconstructed from the axial plane acquisition. Additional MIP reconstructions were presented to aid in the interpretation of this study. Images were obtained from the skull base cranially. There is Severe  calcified plaque identified in the vessels compatible with atherosclerotic disease. There is aortic arch and great vessels demonstrate Severe atherosclerotic disease. The right carotid is abnormal. There is  evidence for hemodynamically significant stenosis at the level the proximal internal carotid artery. By NASCET criteria estimated stenosis is 70% or greater  There is evidence for atherosclerotic disease of the distal internal carotid artery The left carotid is abnormal. There is  evidence for hemodynamically significant stenosis at the level the proximal internal carotid artery. By NASCET criteria estimated stenosis is 90% or greater There is evidence for atherosclerotic disease of the distal internal carotid artery The right vertebral artery is atherosclerotic. There is no evidence for hemodynamically significant stenosis The left vertebral artery is atherosclerotic. There is no evidence for hemodynamically significant stenosis The basilar artery is atherosclerotic.  There is no evidence for hemodynamically significant stenosis The middle cerebral arteries are abnormal there is a right M3 occlusion The anterior This time was also spent reviewing the patient's case, discussing with other physicians, evaluating imaging, and direct patient care. Greater than 90 % of the time was spent on direct, face to face,  patient care and counseling.

## 2021-02-16 NOTE — ED PROVIDER NOTES
71-year-old male presenting with concern about strokelike symptoms. He began this morning when he woke up at 715. Patient has known pancreatic cancer, went to receive chemotherapy today. Found to have a left facial droop, slurred speech and was sent to the emergency department from the infusion center. He has a prior stroke and has some residual left hand weakness. Otherwise patient is awake, alert, oriented x4, moving arms and legs not any difficulties, no loss of strength or sensation. He does have slurred speech with minimal facial droop on the left-hand side. Wife reports a history and gives the context of why he is here at this time. This happened suddenly when he woke up it was first noticed, it is been persistent, mild to moderate severity, duration of almost 2 hours but this is a wake-up stroke. Associated with his known cerebrovascular disease. Family History   Problem Relation Age of Onset    Breast Cancer Mother     Cancer Father         Colon    Cancer Maternal Aunt         Bladder    Cancer Maternal Grandfather         Bladder    Cancer Maternal Cousin         Leukemia    Breast Cancer Maternal Aunt     Cancer Maternal Cousin         Lung     Past Surgical History:   Procedure Laterality Date    CARDIAC SURGERY      Quad 2014    CORONARY ANGIOPLASTY  2005    CABG TIMES 4 1    CORONARY ANGIOPLASTY WITH STENT PLACEMENT      INGUINAL HERNIA REPAIR Right     OR THROMBOENDARTECTMY FEMORAL COMMON Right 9/17/2018    FEMORAL ENDARTERECTOMY performed by Isaac Welch MD at [address] [place] [address] VASCULAR SURGERY  08/15/2018    abdominal aortogram with runoff by Dr. Chagn Nguyen       Review of Systems   Constitutional: Negative for chills and fever. Eyes: Negative for photophobia and pain. Respiratory: Negative for chest tightness and shortness of breath. Cardiovascular: Negative for chest pain and palpitations. 5B: Test Right Arm Motor Drift 0 - no drift, limb holds 90 (or 45) degrees for full 10 seconds   6A: Test Left Leg Motor Drift 0 - no drift; leg holds 30 degree position for full 5 seconds   6B: Test Right Leg Motor Drift 0 - no drift; leg holds 30 degree position for full 5 seconds   7: Test Limb Ataxia   (FNF/Heel-Shin) 0 - absent   8: Test Sensation 0 - normal; no sensory loss   9: Test Language/Aphasia 1 - mild to moderate aphasia; some obvious loss of fluency or facility of comprehension without significant limitation on ideas expressed or form of expression. Reduction of speech and/or comprehension, however, makes conversation about provided materials difficult or impossible. For example, in conversation about provided materials, examiner can identify picture or naming card content from patient's response. 10: Test Dysarthria 1 - mild to moderate, patient slurs at least some words and at worst, can be understood with some difficulty   11: Test Extinction/Inattention 0 - no abnormality   Total 3       ED Course as of Feb 16 1523   Tue Feb 16, 2021   0956 Spoke with neurointerventionalist  Dr. Galindo Mayo. He will come and see this patient. He recommends that the patient receive 180 mg of Brilinta and 81 mg of aspirin.    [SO]   1023 Dr. Galindo Mayo will take the patient to the interventional suite around noon. They will need an ICU bed following that.    [SO]   36 Spoke with Dr. Gustavo Davison who will admit the patient.    [SO]      ED Course User Index  [SO] Mara Lopez, DO       --------------------------------------------- PAST HISTORY ---------------------------------------------  Past Medical History:  has a past medical history of CAD (coronary artery disease), Cancer ([facility] [address] [facility] and Hypertension. Past Surgical History:  has a past surgical history that includes Stomach surgery; Inguinal hernia repair (Right); vascular surgery (08/15/2018); Coronary angioplasty (2005); pr thromboendartectmy femoral common (Right, 9/17/2018); Cardiac surgery; and Coronary angioplasty with stent. Social History:  reports that he quit smoking about 12 years ago. His smoking use included cigarettes. He has a 60.00 pack-year smoking history. He has never used smokeless tobacco. He reports previous alcohol use. He reports that he does not use drugs. Family History: family history includes Breast Cancer in his maternal aunt and mother; Cancer in his father, maternal aunt, maternal cousin, maternal cousin, and maternal grandfather. The patients home medications have been reviewed. Allergies: Patient has no known allergies.     -------------------------------------------------- RESULTS -------------------------------------------------    LABS:  Results for orders placed or performed during the hospital encounter of 02/16/21   CBC   Result Value Ref Range    WBC 6.1 4.5 - 11.5 E9/L    RBC 3.96 3.80 - 5.80 E12/L    Hemoglobin 11.8 (L) 12.5 - 16.5 g/dL    Hematocrit 36.6 (L) 37.0 - 54.0 %    MCV 92.4 80.0 - 99.9 fL    MCH 29.8 26.0 - 35.0 pg    MCHC 32.2 32.0 - 34.5 %    RDW 14.9 11.5 - 15.0 fL    Platelets 542 (L) 391 - 450 E9/L    MPV 11.8 7.0 - 12.0 fL   Comprehensive metabolic panel   Result Value Ref Range    Sodium 142 132 - 146 mmol/L    Potassium 4.1 3.5 - 5.0 mmol/L    Chloride 107 98 - 107 mmol/L    CO2 23 22 - 29 mmol/L    Anion Gap 12 7 - 16 mmol/L    Glucose 119 (H) 74 - 99 mg/dL    BUN 8 8 - 23 mg/dL    CREATININE 0.8 0.7 - 1.2 mg/dL    GFR Non-African American >60 >=60 mL/min/1.73    GFR African American >60     Calcium 8.9 8.6 - 10.2 mg/dL    Total Protein 6.4 6.4 - 8.3 g/dL    Albumin 3.5 3.5 - 5.2 g/dL    Total Bilirubin 0.2 0.0 - 1.2 mg/dL    Alkaline Phosphatase 200 (H) 40 - 129 U/L    ALT 18 0 - 40 U/L AST 21 0 - 39 U/L   Troponin   Result Value Ref Range    Troponin <0.01 0.00 - 0.03 ng/mL   Protime-INR   Result Value Ref Range    Protime 12.3 9.3 - 12.4 sec    INR 1.1    APTT   Result Value Ref Range    aPTT 30.4 24.5 - 35.1 sec   COVID-19, Rapid    Specimen: Blood   Result Value Ref Range    SARS-CoV-2, NAAT Not Detected Not Detected   EKG 12 Lead   Result Value Ref Range    Ventricular Rate 72 BPM    Atrial Rate 72 BPM    P-R Interval 144 ms    QRS Duration 130 ms    Q-T Interval 462 ms    QTc Calculation (Bazett) 505 ms    P Axis 24 degrees    R Axis -10 degrees    T Axis 61 degrees       RADIOLOGY:  XR CHEST PORTABLE   Final Result   Cardiomegaly. There is no evidence of failure or pneumonia. CTA HEAD W CONTRAST   Final Result   1. Severe atherosclerotic disease . Right M3 occlusion   2. Estimated stenosis of the proximal right and left internal carotid   artery by NASCET criteria is greater than 70% on the right and is   greater than 95% on the left    ALERT:  THIS IS AN ABNORMAL REPORT      CTA NECK W CONTRAST   Final Result   1. Severe atherosclerotic disease . Right M3 occlusion   2. Estimated stenosis of the proximal right and left internal carotid   artery by NASCET criteria is greater than 70% on the right and is   greater than 95% on the left    ALERT:  THIS IS AN ABNORMAL REPORT      CT BRAIN PERFUSION   Final Result   Small region of ischemic penumbra in the right posterior frontal lobe      CT HEAD WO CONTRAST   Final Result   No acute intracranial abnormality. Specifically, there are no findings to   suggest an acute intra intracranial hemorrhage. Please refer to the CT   perfusion images regarding any early acute ischemia.       IR CAROTID STENT W PROTECTION    (Results Pending)       ------------------------- NURSING NOTES AND VITALS REVIEWED ---------------------------  Date / Time Roomed:  2/16/2021  8:58 AM  ED Bed Assignment:  19/19

## 2021-02-16 NOTE — TELEPHONE ENCOUNTER
Patient into center today for follow up with Dr. Ramo Bah and possible chemotherapy treatment. On arrival, patient complained of stroke like symptoms that occurred around 0715 today, 2/16/21, such as: slurred speech, and mouth droop to left side. No vision changes were reported. Requested by Dr. Ramo Bah to send patient to ER. Called ER RN to give report of above information. Conner Sandhu, patient navigator, took patient to ER via wheelchair. Patient instructed to follow up post discharge to discuss next steps.

## 2021-02-17 PROBLEM — Z86.19 HISTORY OF CLOSTRIDIOIDES DIFFICILE INFECTION: Status: RESOLVED | Noted: 2021-01-01 | Resolved: 2021-01-01

## 2021-02-17 NOTE — PROGRESS NOTES
Patients glasses broke this am wife will bring new pain when able , also patients hearing aide in left ear battery dead wife informed and will bring battery when able , patients glasses placed in bio bag and given to patient

## 2021-02-17 NOTE — PROGRESS NOTES
STROKE NEUROLOGY CONSULT NOTE    Consult requested by: Attending: Ruchi Casarez MD   Reason for Consultation: [address] SYMPTOMS    Patient: Jamilah Pinto   : 1950   MRN: [id number]   Date of Service: 2021     -----------------------------------------------------------------------------------------------------------------  Stroke Team Contact numbers:  Asia Chairez MD : Please contact via [facility], [employer]bel[employer] phone daytime [phone]  Angelique Dunbar MD: Please contact via [address] [facility], Stroke Coordinator: [phone]   Shireen Mishra Stroke Navigator [phone]  -----------------------------------------------------------------------------------------------------------------  Impression:  #1  Acute ischemic stroke in the right MCA- right  ICA distribution etiology secondary to right ICA with dissection - complex plaque with critical stenosis leading to intracranial perfusion deficit  #2 S/p Successful stent assisted angioplasty of the Right ICA        Plan:    Can go to 8th floor neurounit, Anticipate D/c tomorrow     No need for ICU , can go to Neuro Unit    Interventions  TpA - outside the window  Endovascular - yes, brisk M3 filling post RSAPTA    Imaging  MRI reviewed independently by me- small acute stroke    Medications  Antiplatelet: ASA 81 mg + ticagrelor 90mg , SW-CMNeeds coupon for Ticagrelor prior to d/c  Anticoagulation:not indicated  Statin: Lipitor  Blood Pressure Goals-SBP <160  DVT prophylaxis: Lovenox       SUBJECTIVE  Interval History:    Patient was seen and examined this AM.   NIHSS of 0  Doing well  No need for ICU can go to neuro UNIT              Review of Systems:   Constitutional: Denies fever, weight loss, fatigue and night sweats. Neurological: Denies headache, confusion, sleep difficulties, lightheadedness, spinning sensation, vision loss, tremor, weakness, numbness or tingling, incoordination, imbalance, and incontinence of bowel and sexual dysfunction. Psychiatric: Denies anxiety, depression and hallucinations. Eyes: Denies blurred vision, double vision and eye pain. ENMT: Denies difficulty swallowing, dental pain, hearing loss, and tinnitus. Cardiovascular: Denies chest pain and palpitations. Respiratory: Denies shortness of breath. Genitourinary: Denies urinary incontinence and retention. Integumentary: Denies rashes. Endocrine: Denies polydipsia and polyuria.     Past Medical History:   Diagnosis Date    CAD (coronary artery disease)     Cancer ([facility] [address] 05/2020    pancreas    Heart attack ([facility] [address] 2008, 3-2015    Hypertension        Past Surgical History:   Procedure Laterality Date    CARDIAC SURGERY      Quad 2014    CORONARY ANGIOPLASTY  2005    CABG TIMES 4 2015    CORONARY ANGIOPLASTY WITH STENT PLACEMENT      INGUINAL HERNIA REPAIR Right     IR CAROTID STENT UNI W PROTECTION  2/16/2021    IR CAROTID STENT UNI W PROTECTION 2/16/2021 Gustavo Cabrera MD [facility]    [place] THROMBOENDARTECTMY FEMORAL COMMON Right 9/17/2018    FEMORAL ENDARTERECTOMY performed by Aidan Gonzáles MD at [address] [place] [address] VASCULAR SURGERY  08/15/2018    abdominal aortogram with runoff by Dr. Mary Ashley       Current Medications       0.9 % sodium chloride infusion, Continuous      acetaminophen (TYLENOL) tablet 650 mg, Q4H PRN      promethazine (PHENERGAN) tablet 12.5 mg, Q6H PRN    Or      ondansetron (ZOFRAN) injection 4 mg, Q6H PRN      perflutren lipid microspheres (DEFINITY) injection 1.65 mg, ONCE PRN      labetalol (NORMODYNE;TRANDATE) injection 5 mg, Q10 Min PRN      hydrALAZINE (APRESOLINE) injection 5 mg, Q10 Min PRN      aspirin EC tablet 81 mg, Daily   vitamin B and C (TOTAL B-C) 1 tablet, Daily      carvedilol (COREG) tablet 12.5 mg, BID WC      DULoxetine (CYMBALTA) extended release capsule 30 mg, Daily      lipase-protease-amylase (CREON) delayed release capsule 72,000 Units, TID AC      Lomitapide Mesylate CAPS 5 mg, Daily      mirtazapine (REMERON) tablet 15 mg, Nightly      multivitamin 1 tablet, Daily      pregabalin (LYRICA) capsule 200 mg, Daily      lactobacillus (CULTURELLE) capsule 1 capsule, Daily      triamcinolone (KENALOG) 0.1 % cream, BID      vancomycin (VANCOCIN) oral solution 125 mg, Once per day on Mon Wed Fri      vitamin B-6 (PYRIDOXINE) tablet 50 mg, Daily      zinc sulfate (ZINCATE) capsule 50 mg, Daily      sodium chloride flush 0.9 % injection 10 mL, 2 times per day      sodium chloride flush 0.9 % injection 10 mL, PRN      polyethylene glycol (GLYCOLAX) packet 17 g, Daily PRN      acetaminophen (TYLENOL) tablet 650 mg, Q6H PRN    Or      acetaminophen (TYLENOL) suppository 650 mg, Q6H PRN      famotidine (PEPCID) tablet 20 mg, Daily        Allergies  No Known Allergies     Family History      Problem Relation Age of Onset    Breast Cancer Mother     Cancer Father         Colon    Cancer Maternal Aunt         Bladder    Cancer Maternal Grandfather         Bladder    Cancer Maternal Cousin         Leukemia    Breast Cancer Maternal Aunt     Cancer Maternal Cousin         Lung       Social History     Tobacco History     Smoking Status  Former Smoker Quit date  7/31/2008 Smoking Frequency  1.5 packs/day for 40 years (60 pk yrs) Smoking Tobacco Type  Cigarettes    Smokeless Tobacco Use  Never Used    Tobacco Comment  occassional cigar          Alcohol History     Alcohol Use Status  Not Currently          Drug Use     Drug Use Status  No          Sexual Activity     Sexually Active  Not Currently Partners  Female                Physical Exam  Vitals:    02/17/21 1000 02/17/21 1012 02/17/21 1100 02/17/21 1200 BP: 126/78  120/76    Pulse: 82 84 82 72   Resp: 18  14    Temp:       TempSrc:       SpO2: 96%  96%    Weight:           I/O last 3 completed shifts: In: 800 [I.V.:800]  Out: 420 [Urine:400; Blood:20]  No intake/output data recorded. Constitutional: Well developed, well nourished and in no acute distress. Respiratory: Clear to auscultation bilaterally with no use of accessory muscles during respiration. Cardiovascular:  No murmurs auscultated. Carotid arteries without bruits. Pedal pulses and radial pulses 2+ bilaterally. No edema in all four extremities. Mental Status:    Alert and oriented to person, place, and time. Recent and remote memory: Intact  Attention and concentration: Intact  Speech and language : Intact  Fund of knowledge: Intact  Judgement and Insight: Intact    Cranial Nerves:     II: Visual Fields: Full to confrontation bilaterally   III, IV, VI: Pupils: equally round and reactive to light, 3  to 2  mm bilaterally. EOMs: full with no nystagmus. V: Sensation intact to light touch and pin prick sensation bilaterally  VII: symmetric and strong in the upper and lower face bilaterally  VIII: Hearing intact to finger rub bilaterally   IX,X: Palate elevates symmetrically. XI: head turn (sternocleidomastoid) and shoulder shrug (trapezius) 5/5 bilaterally   XII: Tongue is midline upon protrusion    Motor:   Normal tone and bulk. Normal fine finger movements. No pronator drift. No resting tremors, postural tremors or myoclonus.     Upper Extremity Right Left  Lower Extremity Right Left  Deltoid              5 5      Hip Flexion             5 5  Biceps              5 5   Hip Extension             5 5  Triceps                        5 5   Hip Adduction             5 5  Wrist Extension 5 5   Knee Extension 5 5  Wrist Flexion             5 5                Knee Flexion 5 5  Index Finger Flexion 5 5  Ankle Dorsiflexion 5 5  Finger Extension 5 5  Ankle Plantarflexion 5 5 APB                        5 5   Extensor Hallucis Longus 5 5       Deep Tendon Reflexes:             Right Left  triceps              2 2  biceps              2 2  brachioradialis  2 2  knee jerk  2 2  ankle jerk  2  2  ankle clonus none none  toes      down down    Sensory:  Intact light touch and pinprick in upper and lower extremities bilaterally. Intact proprioception and vibration sense in upper and lower extremities bilaterally. Coordination:   Alternating hand and foot movements intact in the UE and LE bilaterally  Finger-to-nose and Heel-to-shin with no ataxia or intention tremor bilaterally    Gait/Station:   Patient rises from a seated position without assistance. Romberg's test negative. Gait pattern is without hesitation, a wide-base, and of normal stride length. Heel, toe and tandem walking are intact. NIH Stroke Scale  NIH Stroke Scale/Score at time of initial evaluation:    1A: Level of Consciousness 0 - alert; keenly responsive   1B: Ask Month and Age 0 - answers both questions correctly   1C:  Tell Patient To Open and Close Eyes, then Hand  Squeeze 0 - performs both tasks correctly   2: Test Horizontal Extraocular Movements 0 - normal   3: Test Visual Fields 0 - no visual loss   4: Test Facial Palsy 0 - normal symmetric movement   5A: Test Left Arm Motor Drift 0 - no drift, limb holds 90 (or 45) degrees for full 10 seconds   5B: Test Right Arm Motor Drift 0 - no drift, limb holds 90 (or 45) degrees for full 10 seconds   6A: Test Left Leg Motor Drift 0 - no drift; leg holds 30 degree position for full 5 seconds   6B: Test Right Leg Motor Drift 0 - no drift; leg holds 30 degree position for full 5 seconds   7: Test Limb Ataxia (FNF/Heel-Shin) 0 - absent   8: Test Sensation 0 - normal; no sensory loss   9: Test Language/Aphasia 0 - no aphasia, normal   10: Test Dysarthria 0 - normal   11: Test Extinction/Inattention 0 - no abnormality   Total 0 Current Functional Status(Modified Onslow Scale):  0=No symptoms at all     Imaging  Ct Head Wo Contrast    Result Date: 2/16/2021  EXAMINATION: CT OF THE HEAD WITHOUT CONTRAST  2/16/2021 9:29 am TECHNIQUE: CT of the head was performed without the administration of intravenous contrast. Dose modulation, iterative reconstruction, and/or weight based adjustment of the mA/kV was utilized to reduce the radiation dose to as low as reasonably achievable. COMPARISON: None. HISTORY: ORDERING SYSTEM PROVIDED HISTORY: soniya alert TECHNOLOGIST PROVIDED HISTORY: Has a \"code stroke\" or \"stroke alert\" been called? ->Yes Reason for exam:->soniya alert What reading provider will be dictating this exam?->CRC FINDINGS: BRAIN/VENTRICLES: There is no acute intracranial hemorrhage, mass effect or midline shift. No abnormal extra-axial fluid collection. The gray-white differentiation is maintained without evidence of an acute infarct. There is no evidence of hydrocephalus. ORBITS: The visualized portion of the orbits demonstrate no acute abnormality. SINUSES: The visualized paranasal sinuses and mastoid air cells demonstrate no acute abnormality. SOFT TISSUES/SKULL:  No acute abnormality of the visualized skull or soft tissues. No acute intracranial abnormality. Specifically, there are no findings to suggest an acute intra intracranial hemorrhage. Please refer to the CT perfusion images regarding any early acute ischemia. Xr Chest Portable    Result Date: 2/16/2021  EXAMINATION: ONE XRAY VIEW OF THE CHEST 2/16/2021 9:41 am COMPARISON: 11/03/2020 HISTORY: ORDERING SYSTEM PROVIDED HISTORY: other TECHNOLOGIST PROVIDED HISTORY: Reason for exam:->other What reading provider will be dictating this exam?->CRC FINDINGS: The heart is enlarged. Postoperative sternotomy changes are noted. Lungs are clear. There is no focal infiltrate or effusion. Note is made of a right MediPort catheter. Cardiomegaly. There is no evidence of failure or pneumonia.     Cta Neck W Contrast    Result Date: 2/16/2021 Patient MRN:  [id number] : 1950 Age: 72 years Gender: Male Order Date:  2021 9:18 AM EXAM: CTA NECK W CONTRAST, CTA HEAD W CONTRAST NUMBER OF IMAGES:  [id number] INDICATION:  soniya alert soniya alert COMPARISON: None Technique: Low-dose CT  acquisition technique included one of following options; 1 . Automated exposure control, 2. Adjustment of MA and or KV according to patient's size or 3. Use of iterative reconstruction. Contiguous spiral images were obtained in the axial plane, following the administration of intravenous contrast using CT angiographic protocol. Sagittal and coronal images were reconstructed from the axial plane acquisition. Additional MIP reconstructions were presented to aid in the interpretation of this study. Images were obtained from the skull base cranially. There is Severe  calcified plaque identified in the vessels compatible with atherosclerotic disease. There is aortic arch and great vessels demonstrate Severe atherosclerotic disease. The right carotid is abnormal. There is  evidence for hemodynamically significant stenosis at the level the proximal internal carotid artery. By NASCET criteria estimated stenosis is 70% or greater  There is evidence for atherosclerotic disease of the distal internal carotid artery The left carotid is abnormal. There is  evidence for hemodynamically significant stenosis at the level the proximal internal carotid artery. By NASCET criteria estimated stenosis is 90% or greater There is evidence for atherosclerotic disease of the distal internal carotid artery The right vertebral artery is atherosclerotic. There is no evidence for hemodynamically significant stenosis The left vertebral artery is atherosclerotic. There is no evidence for hemodynamically significant stenosis The basilar artery is atherosclerotic.  There is no evidence for hemodynamically significant stenosis The middle cerebral arteries are abnormal there is a right M3 occlusion The anterior cerebral arteries are unremarkable. The posterior cerebral arteries are unremarkable. 1.  Severe atherosclerotic disease . Right M3 occlusion 2. Estimated stenosis of the proximal right and left internal carotid artery by NASCET criteria is greater than 70% on the right and is greater than 95% on the left ALERT:  THIS IS AN ABNORMAL REPORT    Ct Brain Perfusion    Result Date: 2021  Patient MRN: [id number] : 1950 Age:  72 years Gender: Male Order Date: 2021 9:18 AM Exam: CT BRAIN PERFUSION Number of Images: 040 views Indication:   soniya alert soniya alert Decision Support Exception->Emergency Medical Condition (MA) Comparison: None. Findings: Perfusion images demonstrate symmetric blood volume Blood flow images demonstrate asymmetric blood flow There is a small region of ischemic penumbra in the right posterior frontal lobe. There is no significant core infarct identified.     Small region of ischemic penumbra in the right posterior frontal lobe    Ir Carotid Stent W Protection    Result Date: 2021 IR CAROTID STENT W PROTECTION PROCEDURE PERFORMED: 1. Stent assisted angioplasty of the right internal carotid artery with distal embolic protection 2. Ultrasound-guided right femoral artery access PERFORMED BY : Sean Huertas MD COMPLETED DATE:  2/16/2021 6:44 PM CLINICAL HISTORY/PRE-PROCEDURE DIAGNOSIS: Acute ischemic stroke. CTA critical stenosis of the right ICA , CTP showing salvageable penumbra the right M 3 division of the MCA is not clearly visualized POST-PROCEDURE DIAGNOSIS: Residual 5% stenosis of the right ICA COMPARISON: CTA ANESTHESIA: Conscious sedation and local anesthesia VESSELS CATHETERIZED: 1. Right common carotid artery, right internal carotid artery RADIATION EXPOSURE DATA:  126.2 MGy TOTAL FLUOROSCOPY TIME: 7 minutes and 20 seconds CONTRAST USED: 21 mL of Isovue-300 PROCEDURE: Following informed consent with patient/family if available and explanation of risk and benefits of the procedure including but not limited to intracranial hemorrhage, Stroke , MI and death, the patient was brought to the angiography suite, both groins are prepped and draped in usual sterile manner. Vascular access was obtained in the right common femoral artery with a 9-Hungarian sheath which was connected to continuous heparinized saline flush. A 7 [employer] COOK shuttle catheter was prepped and with the support of a diagnostic catheter was brought into the aorta, double flushed and connected to continuous heparinized saline flush. The  catheter was then telescoped into the Right internal carotid artery. Fluoroscopic imaging performed at that time confirmed the presence dissected segment of right ICA with complex plaque. A 6mm spyder device was then taken up into the petrous segment of the right ICA and deployed . A [employer] Carotid Wallstent  10 mm x 24 cm device was then deployed into the The dissected segment of the right ICA.  Post stenting angiographic documentation revealed residual less than 5% stenosis of the right internal carotid artery. There is normal opacification of the M3 segments of both the superior and inferior division of the MCA indicating that there is a significant reversal of initially noticed perfusion deficit. The catheters were removed from the patient and the groin sheath was exchanged for a 8-Azeri Angio-Seal device. Hemostasis was achieved with the deployment of Angio-Seal device. The patient was transferred to the CT area with no immediately apparent complication from the procedure. TIME STAMPS: Preprocedure NIH stroke scale:3 Vascular access time:1758 Time of 1st angiographic assessment:1761 Preprocedure perfusion TICI grade:TICI 2C, perufsion deficit due to carotid stenosis Time stent deployed 1821 Postprocedure perfusion grade:TICI 3 Time of vascular closure:1830 Postprocedure NH stroke scale:NA INTERPRETATION OF IMAGES: 1. Right common/internal carotid artery injections: There is a complex plaque with a possible dissected segment arising off the mid to high cervical portion of the right internal carotid artery. Diminished flow is seen to the branches of the right MCA. 2.  Post Carotid Wallstent deployment right common carotid artery injections: Residual less than 5% stenosis. There is significant improvement in intracranial flow in the right MCA distribution. Normal opacification of all the M3 segments. IMPRESSION Successful stent assisted angioplasty of the right ICA with distal embolic protection device RECOMMENDATIONS MRI Brain ASA + Ticagrelor Q1 Neurochecks Neurovascular checks Patients: If you have questions regarding some of the verbiage in your report, please visit [email or web address] for a definition. If you have any other questions, please contact your physician.     Cta Head W Contrast    Result Date: 2/16/2021 Patient MRN:  [id number] : 1950 Age: 72 years Gender: Male Order Date:  2021 9:18 AM EXAM: CTA NECK W CONTRAST, CTA HEAD W CONTRAST NUMBER OF IMAGES:  [id number] INDICATION:  soniya alert soniya alert COMPARISON: None Technique: Low-dose CT  acquisition technique included one of following options; 1 . Automated exposure control, 2. Adjustment of MA and or KV according to patient's size or 3. Use of iterative reconstruction. Contiguous spiral images were obtained in the axial plane, following the administration of intravenous contrast using CT angiographic protocol. Sagittal and coronal images were reconstructed from the axial plane acquisition. Additional MIP reconstructions were presented to aid in the interpretation of this study. Images were obtained from the skull base cranially. There is Severe  calcified plaque identified in the vessels compatible with atherosclerotic disease. There is aortic arch and great vessels demonstrate Severe atherosclerotic disease. The right carotid is abnormal. There is  evidence for hemodynamically significant stenosis at the level the proximal internal carotid artery. By NASCET criteria estimated stenosis is 70% or greater  There is evidence for atherosclerotic disease of the distal internal carotid artery The left carotid is abnormal. There is  evidence for hemodynamically significant stenosis at the level the proximal internal carotid artery. By NASCET criteria estimated stenosis is 90% or greater There is evidence for atherosclerotic disease of the distal internal carotid artery The right vertebral artery is atherosclerotic. There is no evidence for hemodynamically significant stenosis The left vertebral artery is atherosclerotic. There is no evidence for hemodynamically significant stenosis The basilar artery is atherosclerotic.  There is no evidence for hemodynamically significant stenosis The middle cerebral arteries are abnormal there is a right M3 occlusion The anterior cerebral arteries are unremarkable. The posterior cerebral arteries are unremarkable. 1.  Severe atherosclerotic disease . Right M3 occlusion 2.  Estimated stenosis of the proximal right and left internal carotid artery by NASCET criteria is greater than 70% on the right and is greater than 95% on the left ALERT:  THIS IS AN ABNORMAL REPORT    Mri Brain Without Contrast    Result Date: 2/16/2021 EXAMINATION: MRI OF THE BRAIN WITHOUT CONTRAST  2/16/2021 9:53 pm TECHNIQUE: Multiplanar multisequence MRI of the brain was performed without the administration of intravenous contrast. COMPARISON: Same-day head CT HISTORY: ORDERING SYSTEM PROVIDED HISTORY: Acute Ischemic stroke, S.p Carotid stenting TECHNOLOGIST PROVIDED HISTORY: Reason for exam:->Acute Ischemic stroke, S.p Carotid stenting What reading provider will be dictating this exam?->CRC FINDINGS: There is a focus of restricted diffusion involving the right insular cortex and the superior aspect of right temporal gyrus. There is also punctate focus acute infarction involving the right middle frontal gyrus laterally. Findings are consistent which shower of emboli within the right MCA territory. There is no acute intracranial hemorrhage, or intracranial mass lesion. No mass effect, midline shift, or extra-axial collection is noted. There are mild nonspecific foci of periventricular and subcortical cerebral white matter T2/FLAIR hyperintensity, most likely representing chronic microangiopathic disease in this age group. Chronic infarction involving the right precentral gyrus gyrus. The brain parenchyma is otherwise normal. The pituitary gland is normal in appearance. The cerebellar tonsils are in normal position. The ventricles, sulci, and cisterns are prominent suggestive of generalized volume loss. The intracranial flow voids are preserved. The globes and orbits are within normal limits. The visualized extracranial structures including paranasal sinuses and mastoid air cells are unremarkable. There is a focus of restricted diffusion involving the right insular cortex and the superior aspect of right temporal gyrus. There is also punctate focus acute infarction involving the right middle frontal gyrus laterally. Findings are consistent which shower of emboli within the right MCA territory. There is no acute intracranial hemorrhage, or intracranial mass lesion. Chronic infarction involving the right precentral gyrus. Mild chronic microangiopathic ischemic disease. Mild generalized volume loss. The findings were sent to the Radiology Results [address] at 11:03 pm on 2/16/2021to be communicated to a licensed caregiver.       Labs:  CBC:   Lab Results   Component Value Date    WBC 5.0 02/17/2021    RBC 3.51 02/17/2021    HGB 10.1 02/17/2021    HCT 32.2 02/17/2021    MCV 91.7 02/17/2021    MCH 28.8 02/17/2021    MCHC 31.4 02/17/2021    RDW 15.2 02/17/2021     02/17/2021    MPV 11.6 02/17/2021     CMP:    Lab Results   Component Value Date     02/17/2021    K 4.3 02/17/2021     02/17/2021    CO2 24 02/17/2021    BUN 8 02/17/2021    CREATININE 0.8 02/17/2021    GFRAA >60 02/17/2021    LABGLOM >60 02/17/2021    GLUCOSE 101 02/17/2021    PROT 5.7 02/17/2021    LABALBU 3.3 02/17/2021    CALCIUM 8.5 02/17/2021    BILITOT 0.3 02/17/2021    ALKPHOS 172 02/17/2021    AST 17 02/17/2021    ALT 14 02/17/2021     HgBA1c:  No results found for: LABA1C  FLP:    Lab Results   Component Value Date    TRIG 80 11/02/2020    HDL 48 11/02/2020    LDLCALC 77 11/02/2020    LABVLDL 16 11/02/2020

## 2021-02-17 NOTE — PROGRESS NOTES
MRI called back and would like to speak to doctor about MRI Dr Fredrick Reyes not on Dr Daphnie Alvarez on in [facility] and messaged him to call MRI to speak with them

## 2021-02-17 NOTE — CARE COORDINATION
Met with patient at bedside-he lives with his wife Paulie Cabello in a ranch style home, current with [facility] for PT/OT iwill need ZAYNAB orders prior to discharge. Wife will transport home. Patient independent of all  ADL's prior to admission. PCP is Dr. Kanwal Brito, preferred pharmacy is [facility] in [place]. Patient anticipating discharge tomorrow-voiced no needs.     Brannon THOMASN, RN  [facility]   [phone]

## 2021-02-17 NOTE — HOME CARE
Current with [facility] for PT/OT. Will need ZAYNAB orders prior to discharge if appropriate. Kellie Clark LPN, [facility].

## 2021-02-17 NOTE — ANESTHESIA POSTPROCEDURE EVALUATION
Department of Anesthesiology  Postprocedure Note    Patient: Pricila Melgoza  MRN: [id number]  YOB: 1950  Date of evaluation: 2/16/2021  Time:  7:02 PM     Procedure Summary     Date: 02/16/21 Room / Location: [address] [place] [address]    Anesthesia Start: 1724 Anesthesia Stop: 1849    Procedure: IR CAROTID STENT UNI W PROTECTION Diagnosis: (Stroke, carotid ischemia)    Scheduled Providers: [facility] Responsible Provider: Karime Crum MD    Anesthesia Type: MAC ASA Status: 3 - Emergent          Anesthesia Type: MAC    Nicole Phase I: Nicole Score: 9    Nicole Phase II:      Last vitals: Reviewed and per EMR flowsheets.        Anesthesia Post Evaluation    Patient location during evaluation: PACU  Patient participation: complete - patient participated  Level of consciousness: awake  Pain score: 0  Airway patency: patent  Nausea & Vomiting: no nausea  Complications: no  Cardiovascular status: hemodynamically stable  Respiratory status: acceptable  Hydration status: stable

## 2021-02-17 NOTE — PROGRESS NOTES
Subjective: The patient is awake and alert. No acute events overnight.     Denies chest pain, angina, SOB     Objective:    /70   Pulse 71   Temp 98.1 °F (36.7 °C) (Temporal)   Resp 20   Wt 178 lb (80.7 kg)   SpO2 95%   BMI 27.88 kg/m²     In: 300 [I.V.:300]  Out: -   In: 300   Out: -     General appearance: NAD, conversant  HEENT: AT/NC, MMM  Neck: FROM, supple  Lungs: Clear to auscultation  CV: RRR, no MRGs  Vasc: Radial pulses 2+  Abdomen: Soft, non-tender; no masses or HSM  Extremities: No peripheral edema or digital cyanosis  Skin: no rash, lesions or ulcers  Psych: Alert and oriented to person, place and time  Neuro: Alert and interactive     Recent Labs     02/15/21  1012 02/16/21  0917 02/17/21  0520   WBC 5.5 6.1 5.0   HGB 11.4* 11.8* 10.1*   HCT 36.1* 36.6* 32.2*   PLT 89* 104* 101*       Recent Labs     02/15/21  1012 02/16/21  0917 02/17/21  0520    142 143   K 4.1 4.1 4.3    107 109*   CO2 25 23 24   BUN 10 8 8   CREATININE 0.7 0.8 0.8   CALCIUM 9.1 8.9 8.5*       Assessment:    Principal Problem:    Acute thromboembolic cerebrovascular accident [facility])  Active Problems:    Carcinoma of head of pancreas (HCC)    History of 2019 novel coronavirus disease (COVID-19)    CAD (coronary artery disease)    Essential hypertension    Dyslipidemia  Resolved Problems:    History of Clostridioides difficile infection      Plan:  Patient is a 81-year-old male admitted with acute ischemic stroke in right ICA with right MCA distribution perfusion deficit- found to have ELISEO stenosis/ dissection     S/p Stent deployment for   75% high cervical stenosis due to dissected segment of calcified plaque of the right ICA  MRI confirms showering emboli   DAPT at discretion of neurology   continue to monitor bp   a1c and lipid panel   Risk factor modification  \  monitor post procedure     DVT Prophylaxis   PT/OT  Discharge planning           Shyann Hannah MD  3:02 PM  2/17/2021

## 2021-02-17 NOTE — PROGRESS NOTES
02/16/21  11:46 PM    Received call from [facility] regarding the patient's MRI.   Spoke with neurology and updated them on results    Electronically signed by Alethia Saint on 2/16/2021 at 11:47 PM

## 2021-02-17 NOTE — PROGRESS NOTES
Keyana Marvin was ordered Lomitapide which is a nonformulary medication. This medication is not stocked in the pharmacy and must be brought from home. If the medication has not been administered by 1400 on the following day from the time the order was placed, a pharmacist will follow-up with the nurse of the patient to assess the capability of the patient to bring in the medication. If it is determined that the patient cannot supply the medication and it is not available to be dispensed from the pharmacy, a call will be placed to the ordering provider to discuss alternative options.       Sobeida Wayne RPh 2/17/2021 7:53 AM

## 2021-02-18 NOTE — PROGRESS NOTES
Discharge instructions reviewed with patient. Discussed when to call 911/making and keeping follow up appointments/and discharge medications. IV removed. Telemetry monitor removed.

## 2021-02-18 NOTE — PLAN OF CARE
Problem: Falls - Risk of:  Goal: Will remain free from falls  Description: Will remain free from falls  2/18/2021 1512 by Rupali Sherman RN  Outcome: Met This Shift  2/18/2021 0844 by Radhika Guadarrama RN  Outcome: Met This Shift  Goal: Absence of physical injury  Description: Absence of physical injury  2/18/2021 1512 by Rupali Sherman RN  Outcome: Met This Shift  2/18/2021 0844 by Radhika Guadarrama RN  Outcome: Met This Shift     Problem: HEMODYNAMIC STATUS  Goal: Patient has stable vital signs and fluid balance  2/18/2021 1512 by Rupali Sherman RN  Outcome: Met This Shift  2/18/2021 0844 by Radhika Guadarrama RN  Outcome: Met This Shift     Problem: ACTIVITY INTOLERANCE/IMPAIRED MOBILITY  Goal: Mobility/activity is maintained at optimum level for patient  Outcome: Met This Shift     Problem: COMMUNICATION IMPAIRMENT  Goal: Ability to express needs and understand communication  Outcome: Met This Shift

## 2021-02-18 NOTE — PLAN OF CARE
Problem: Falls - Risk of:  Goal: Will remain free from falls  Description: Will remain free from falls  2/18/2021 0844 by Thedore Snellen, RN  Outcome: Met This Shift  2/18/2021 0008 by Darrian Lam RN  Outcome: Met This Shift  Goal: Absence of physical injury  Description: Absence of physical injury  2/18/2021 0844 by Thedore Snellen, RN  Outcome: Met This Shift  2/18/2021 0008 by Darrian Lam RN  Outcome: Met This Shift     Problem: HEMODYNAMIC STATUS  Goal: Patient has stable vital signs and fluid balance  2/18/2021 0844 by Thedore Snellen, RN  Outcome: Met This Shift  2/18/2021 0008 by Darrian Lam RN  Outcome: Met This Shift     Problem: ACTIVITY INTOLERANCE/IMPAIRED MOBILITY  Goal: Mobility/activity is maintained at optimum level for patient  2/18/2021 0008 by Darrian Lam RN  Outcome: Met This Shift     Problem: COMMUNICATION IMPAIRMENT  Goal: Ability to express needs and understand communication  2/18/2021 0008 by Darrian Lam RN  Outcome: Met This Shift

## 2021-02-18 NOTE — PROGRESS NOTES
Pt was seated in chair upon room entry, agreeable to PT evaluation. Pt was irritable over discharge process and was frustrated that PT evaluation has held up discharge. Pt was eventually very pleasant and cooperative. Pt ambulates with normal gait speed with good steadiness. Pt ambulated back to chair and was seated. Pt was left seated with all needs met at conclusion of session. PLAN:    Based on pt's current level of functional independence for all mobility, this pt is not a candidate for continued skilled PT services. Will remove pt from PT caseload. Please re-consult if pt experiences functional decline. Thank you. Time in: 1500  Time out: 1510    Total Treatment Time 0 minutes     Evaluation Time includes thorough review of current medical information, gathering information on past medical history/social history and prior level of function, completion of standardized testing/informal observation of tasks, assessment of data and education on plan of care and goals.     CPT codes:  [x] Low Complexity PT evaluation 93829  [] Moderate Complexity PT evaluation 05050  [] High Complexity PT evaluation 00983  [] PT Re-evaluation 27470  [] Gait training 77288 0 minutes  [] Manual therapy 20958 0 minutes  [] Therapeutic activities 25592 0 minutes  [] Therapeutic exercises 13459 0 minutes  [] Neuromuscular reeducation 44904 0 minutes     Lucille Pina, PT, DPT  [id number]

## 2021-02-18 NOTE — PROGRESS NOTES
STROKE NEUROLOGY CONSULT NOTE    Consult requested by: Attending: Lucy Thornton MD   Reason for Consultation: [id number] [employer]    Patient: Edd Whitley   : 1950   MRN: [id number]   Date of Service: 2021     -----------------------------------------------------------------------------------------------------------------  Stroke Team Contact numbers:  Allan Welch MD : Please contact via [facility], Desk phone daytime [phone]  Hermelinda Oconnor MD: Please contact via [address] [address]573 [facility], Stroke Coordinator: [phone]   Gissell Santoro, Stroke Navigator [phone]  -----------------------------------------------------------------------------------------------------------------  Impression:  #1  Acute ischemic stroke in the right MCA- right  ICA distribution etiology secondary to right ICA with dissection - complex plaque with critical stenosis leading to intracranial perfusion deficit  #2 S/p Successful stent assisted angioplasty of the Right ICA        Plan:  Patient can be discharged, follow-up with me in 6 weeks. Interventions  TpA - outside the window  Endovascular - yes, brisk M3 filling post RSAPTA    Imaging  MRI reviewed independently by me- small acute stroke    Medications  Antiplatelet: ASA 81 mg + ticagrelor 90mg twice daily, SW-CMNeeds coupon for Ticagrelor prior to d/c  Anticoagulation:not indicated  Statin: Lipitor  Blood Pressure Goals-SBP <160  DVT prophylaxis: Lovenox     As patient's NIHSS is 0 and neurological deficits resolved almost immediately post procedure there is no need for physical therapy or occupational therapy or speech therapy evaluation. I have independently examined him including gait assessment and high functioning gait assessment and speech assessment which are all within baseline parameters.       SUBJECTIVE  Interval History:    Patient was seen and examined this AM.   NIHSS of 0  Doing well Review of Systems:   Constitutional: Denies fever, weight loss, fatigue and night sweats. Neurological: Denies headache, confusion, sleep difficulties, lightheadedness, spinning sensation, vision loss, tremor, weakness, numbness or tingling, incoordination, imbalance, and incontinence of bowel and sexual dysfunction. Psychiatric: Denies anxiety, depression and hallucinations. Eyes: Denies blurred vision, double vision and eye pain. ENMT: Denies difficulty swallowing, dental pain, hearing loss, and tinnitus. Cardiovascular: Denies chest pain and palpitations. Respiratory: Denies shortness of breath. Genitourinary: Denies urinary incontinence and retention. Integumentary: Denies rashes. Endocrine: Denies polydipsia and polyuria.     Past Medical History:   Diagnosis Date    CAD (coronary artery disease)     Cancer ([facility] [address] 05/2020    pancreas    Heart attack ([facility] [address] 2008, 3-2015    Hypertension        Past Surgical History:   Procedure Laterality Date    CARDIAC SURGERY      Quad 2014    CORONARY ANGIOPLASTY  2005    CABG TIMES 4 2015    CORONARY ANGIOPLASTY WITH STENT PLACEMENT      INGUINAL HERNIA REPAIR Right     IR CAROTID STENT UNI W PROTECTION  2/16/2021    IR CAROTID STENT UNI W PROTECTION 2/16/2021 Paul Bobby MD [facility]ED[facility]    IA THROMBOENDARTECTMY FEMORAL COMMON Right 9/17/2018    FEMORAL ENDARTERECTOMY performed by Jhonathan Moore MD at [address] [place] [address] VASCULAR SURGERY  08/15/2018    abdominal aortogram with runoff by Dr. Peter Duran       Current Medications       0.9 % sodium chloride infusion, Continuous      acetaminophen (TYLENOL) tablet 650 mg, Q4H PRN      promethazine (PHENERGAN) tablet 12.5 mg, Q6H PRN    Or      ondansetron (ZOFRAN) injection 4 mg, Q6H PRN      perflutren lipid microspheres (DEFINITY) injection 1.65 mg, ONCE PRN      labetalol (NORMODYNE;TRANDATE) injection 5 mg, Q10 Min PRN Wrist Flexion             5 5                Knee Flexion 5 5  Index Finger Flexion 5 5  Ankle Dorsiflexion 5 5  Finger Extension 5 5  Ankle Plantarflexion 5 5  APB                        5 5   Extensor Hallucis Longus 5 5       Deep Tendon Reflexes:             Right Left  triceps              2 2  biceps              2 2  brachioradialis  2 2  knee jerk  2 2  ankle jerk  2  2  ankle clonus none none  toes      down down    Sensory:  Intact light touch and pinprick in upper and lower extremities bilaterally. Intact proprioception and vibration sense in upper and lower extremities bilaterally. Coordination:   Alternating hand and foot movements intact in the UE and LE bilaterally  Finger-to-nose and Heel-to-shin with no ataxia or intention tremor bilaterally    Gait/Station:   Patient rises from a seated position without assistance. Romberg's test negative. Gait pattern is without hesitation, a wide-base, and of normal stride length. Heel, toe and tandem walking are intact. NIH Stroke Scale  NIH Stroke Scale/Score at time of initial evaluation:    1A: Level of Consciousness 0 - alert; keenly responsive   1B: Ask Month and Age 0 - answers both questions correctly   1C:  Tell Patient To Open and Close Eyes, then Hand  Squeeze 0 - performs both tasks correctly   2: Test Horizontal Extraocular Movements 0 - normal   3: Test Visual Fields 0 - no visual loss   4: Test Facial Palsy 0 - normal symmetric movement   5A: Test Left Arm Motor Drift 0 - no drift, limb holds 90 (or 45) degrees for full 10 seconds   5B: Test Right Arm Motor Drift 0 - no drift, limb holds 90 (or 45) degrees for full 10 seconds   6A: Test Left Leg Motor Drift 0 - no drift; leg holds 30 degree position for full 5 seconds   6B: Test Right Leg Motor Drift 0 - no drift; leg holds 30 degree position for full 5 seconds   7: Test Limb Ataxia (FNF/Heel-Shin) 0 - absent   8: Test Sensation 0 - normal; no sensory loss 9: Test Language/Aphasia 0 - no aphasia, normal   10: Test Dysarthria 0 - normal   11: Test Extinction/Inattention 0 - no abnormality   Total 0              Current Functional Status(Modified Harper Scale):  0=No symptoms at all     Imaging  Ct Head Wo Contrast    Result Date: 2/16/2021  EXAMINATION: CT OF THE HEAD WITHOUT CONTRAST  2/16/2021 9:29 am TECHNIQUE: CT of the head was performed without the administration of intravenous contrast. Dose modulation, iterative reconstruction, and/or weight based adjustment of the mA/kV was utilized to reduce the radiation dose to as low as reasonably achievable. COMPARISON: None. HISTORY: ORDERING SYSTEM PROVIDED HISTORY: soniya alert TECHNOLOGIST PROVIDED HISTORY: Has a \"code stroke\" or \"stroke alert\" been called? ->Yes Reason for exam:->[employer] alert What reading provider will be dictating this exam?->CRC FINDINGS: BRAIN/VENTRICLES: There is no acute intracranial hemorrhage, mass effect or midline shift. No abnormal extra-axial fluid collection. The gray-white differentiation is maintained without evidence of an acute infarct. There is no evidence of hydrocephalus. ORBITS: The visualized portion of the orbits demonstrate no acute abnormality. SINUSES: The visualized paranasal sinuses and mastoid air cells demonstrate no acute abnormality. SOFT TISSUES/SKULL:  No acute abnormality of the visualized skull or soft tissues. No acute intracranial abnormality. Specifically, there are no findings to suggest an acute intra intracranial hemorrhage. Please refer to the CT perfusion images regarding any early acute ischemia.     Xr Chest Portable    Result Date: 2/16/2021 EXAMINATION: ONE XRAY VIEW OF THE CHEST 2/16/2021 9:41 am COMPARISON: 11/03/2020 HISTORY: ORDERING SYSTEM PROVIDED HISTORY: other TECHNOLOGIST PROVIDED HISTORY: Reason for exam:->other What reading provider will be dictating this exam?->CRC FINDINGS: The heart is enlarged. Postoperative sternotomy changes are noted. Lungs are clear. There is no focal infiltrate or effusion. Note is made of a right MediPort catheter. Cardiomegaly. There is no evidence of failure or pneumonia.     Cta Neck W Contrast    Result Date: 2/16/2021 Patient MRN:  [id number] : 1950 Age: 72 years Gender: Male Order Date:  2021 9:18 AM EXAM: CTA NECK W CONTRAST, CTA HEAD W CONTRAST NUMBER OF IMAGES:  [id number] INDICATION:  soniya alert soniya alert COMPARISON: None Technique: Low-dose CT  acquisition technique included one of following options; 1 . Automated exposure control, 2. Adjustment of MA and or KV according to patient's size or 3. Use of iterative reconstruction. Contiguous spiral images were obtained in the axial plane, following the administration of intravenous contrast using CT angiographic protocol. Sagittal and coronal images were reconstructed from the axial plane acquisition. Additional MIP reconstructions were presented to aid in the interpretation of this study. Images were obtained from the skull base cranially. There is Severe  calcified plaque identified in the vessels compatible with atherosclerotic disease. There is aortic arch and great vessels demonstrate Severe atherosclerotic disease. The right carotid is abnormal. There is  evidence for hemodynamically significant stenosis at the level the proximal internal carotid artery. By NASCET criteria estimated stenosis is 70% or greater  There is evidence for atherosclerotic disease of the distal internal carotid artery The left carotid is abnormal. There is  evidence for hemodynamically significant stenosis at the level the proximal internal carotid artery. By NASCET criteria estimated stenosis is 90% or greater There is evidence for atherosclerotic disease of the distal internal carotid artery The right vertebral artery is atherosclerotic. There is no evidence for hemodynamically significant stenosis The left vertebral artery is atherosclerotic. There is no evidence for hemodynamically significant stenosis The basilar artery is atherosclerotic.  There is no evidence for hemodynamically significant stenosis The middle cerebral arteries are abnormal there is a right M3 occlusion The anterior cerebral arteries are unremarkable. The posterior cerebral arteries are unremarkable. 1.  Severe atherosclerotic disease . Right M3 occlusion 2. Estimated stenosis of the proximal right and left internal carotid artery by NASCET criteria is greater than 70% on the right and is greater than 95% on the left ALERT:  THIS IS AN ABNORMAL REPORT    Ct Brain Perfusion    Result Date: 2021  Patient MRN: [id number] : 1950 Age:  72 years Gender: Male Order Date: 2021 9:18 AM Exam: CT BRAIN PERFUSION Number of Images: 511 views Indication:   soniya alert soniya alert Decision Support Exception->Emergency Medical Condition (MA) Comparison: None. Findings: Perfusion images demonstrate symmetric blood volume Blood flow images demonstrate asymmetric blood flow There is a small region of ischemic penumbra in the right posterior frontal lobe. There is no significant core infarct identified.     Small region of ischemic penumbra in the right posterior frontal lobe    Ir Carotid Stent W Protection    Result Date: 2021 IR CAROTID STENT W PROTECTION PROCEDURE PERFORMED: 1. Stent assisted angioplasty of the right internal carotid artery with distal embolic protection 2. Ultrasound-guided right femoral artery access PERFORMED BY : Mendoza Pa MD COMPLETED DATE:  2/16/2021 6:44 PM CLINICAL HISTORY/PRE-PROCEDURE DIAGNOSIS: Acute ischemic stroke. CTA critical stenosis of the right ICA , CTP showing salvageable penumbra the right M 3 division of the MCA is not clearly visualized POST-PROCEDURE DIAGNOSIS: Residual 5% stenosis of the right ICA COMPARISON: CTA ANESTHESIA: Conscious sedation and local anesthesia VESSELS CATHETERIZED: 1. Right common carotid artery, right internal carotid artery RADIATION EXPOSURE DATA:  126.2 MGy TOTAL FLUOROSCOPY TIME: 7 minutes and 20 seconds CONTRAST USED: 21 mL of Isovue-300 PROCEDURE: Following informed consent with patient/family if available and explanation of risk and benefits of the procedure including but not limited to intracranial hemorrhage, Stroke , MI and death, the patient was brought to the angiography suite, both groins are prepped and draped in usual sterile manner. Vascular access was obtained in the right common femoral artery with a 9-Armenian sheath which was connected to continuous heparinized saline flush. A 7 [employer] shuttle catheter was prepped and with the support of a diagnostic catheter was brought into the aorta, double flushed and connected to continuous heparinized saline flush. The  catheter was then telescoped into the Right internal carotid artery. Fluoroscopic imaging performed at that time confirmed the presence dissected segment of right ICA with complex plaque. A 6mm spyder device was then taken up into the petrous segment of the right ICA and deployed . A [employer] Carotid Wallstent  10 mm x 24 cm device was then deployed into the The dissected segment of the right ICA.  Post stenting angiographic documentation revealed residual less than 5% stenosis of the right internal carotid artery. There is normal opacification of the M3 segments of both the superior and inferior division of the MCA indicating that there is a significant reversal of initially noticed perfusion deficit. The catheters were removed from the patient and the groin sheath was exchanged for a 8-I[place]ic Angio-Seal device. Hemostasis was achieved with the deployment of Angio-Seal device. The patient was transferred to the CT area with no immediately apparent complication from the procedure. TIME STAMPS: Preprocedure NIH stroke scale:3 Vascular access time:1758 Time of 1st angiographic assessment:1761 Preprocedure perfusion TICI grade:TICI 2C, perufsion deficit due to carotid stenosis Time stent deployed 1821 Postprocedure perfusion grade:TICI 3 Time of vascular closure:1830 Postprocedure NH stroke scale:NA INTERPRETATION OF IMAGES: 1. Right common/internal carotid artery injections: There is a complex plaque with a possible dissected segment arising off the mid to high cervical portion of the right internal carotid artery. Diminished flow is seen to the branches of the right MCA. 2.  Post Carotid Wallstent deployment right common carotid artery injections: Residual less than 5% stenosis. There is significant improvement in intracranial flow in the right MCA distribution. Normal opacification of all the M3 segments. IMPRESSION Successful stent assisted angioplasty of the right ICA with distal embolic protection device RECOMMENDATIONS MRI Brain ASA + Ticagrelor Q1 Neurochecks Neurovascular checks Patients: If you have questions regarding some of the verbiage in your report, please visit [email or web address] for a definition. If you have any other questions, please contact your physician.     Cta Head W Contrast    Result Date: 2/16/2021 Patient MRN:  [id number] : 1950 Age: 72 years Gender: Male Order Date:  2021 9:18 AM EXAM: CTA NECK W CONTRAST, CTA HEAD W CONTRAST NUMBER OF IMAGES:  [id number] INDICATION:  soniya alert soniya alert COMPARISON: None Technique: Low-dose CT  acquisition technique included one of following options; 1 . Automated exposure control, 2. Adjustment of MA and or KV according to patient's size or 3. Use of iterative reconstruction. Contiguous spiral images were obtained in the axial plane, following the administration of intravenous contrast using CT angiographic protocol. Sagittal and coronal images were reconstructed from the axial plane acquisition. Additional MIP reconstructions were presented to aid in the interpretation of this study. Images were obtained from the skull base cranially. There is Severe  calcified plaque identified in the vessels compatible with atherosclerotic disease. There is aortic arch and great vessels demonstrate Severe atherosclerotic disease. The right carotid is abnormal. There is  evidence for hemodynamically significant stenosis at the level the proximal internal carotid artery. By NASCET criteria estimated stenosis is 70% or greater  There is evidence for atherosclerotic disease of the distal internal carotid artery The left carotid is abnormal. There is  evidence for hemodynamically significant stenosis at the level the proximal internal carotid artery. By NASCET criteria estimated stenosis is 90% or greater There is evidence for atherosclerotic disease of the distal internal carotid artery The right vertebral artery is atherosclerotic. There is no evidence for hemodynamically significant stenosis The left vertebral artery is atherosclerotic. There is no evidence for hemodynamically significant stenosis The basilar artery is atherosclerotic.  There is no evidence for hemodynamically significant stenosis The middle cerebral arteries are abnormal there is a right M3 occlusion The anterior cerebral arteries are unremarkable. The posterior cerebral arteries are unremarkable. 1.  Severe atherosclerotic disease . Right M3 occlusion 2.  Estimated stenosis of the proximal right and left internal carotid artery by NASCET criteria is greater than 70% on the right and is greater than 95% on the left ALERT:  THIS IS AN ABNORMAL REPORT    Mri Brain Without Contrast    Result Date: 2/16/2021 EXAMINATION: MRI OF THE BRAIN WITHOUT CONTRAST  2/16/2021 9:53 pm TECHNIQUE: Multiplanar multisequence MRI of the brain was performed without the administration of intravenous contrast. COMPARISON: Same-day head CT HISTORY: ORDERING SYSTEM PROVIDED HISTORY: Acute Ischemic stroke, S.p Carotid stenting TECHNOLOGIST PROVIDED HISTORY: Reason for exam:->Acute Ischemic stroke, S.p Carotid stenting What reading provider will be dictating this exam?->CRC FINDINGS: There is a focus of restricted diffusion involving the right insular cortex and the superior aspect of right temporal gyrus. There is also punctate focus acute infarction involving the right middle frontal gyrus laterally. Findings are consistent which shower of emboli within the right MCA territory. There is no acute intracranial hemorrhage, or intracranial mass lesion. No mass effect, midline shift, or extra-axial collection is noted. There are mild nonspecific foci of periventricular and subcortical cerebral white matter T2/FLAIR hyperintensity, most likely representing chronic microangiopathic disease in this age group. Chronic infarction involving the right precentral gyrus gyrus. The brain parenchyma is otherwise normal. The pituitary gland is normal in appearance. The cerebellar tonsils are in normal position. The ventricles, sulci, and cisterns are prominent suggestive of generalized volume loss. The intracranial flow voids are preserved. The globes and orbits are within normal limits. The visualized extracranial structures including paranasal sinuses and mastoid air cells are unremarkable. There is a focus of restricted diffusion involving the right insular cortex and the superior aspect of right temporal gyrus. There is also punctate focus acute infarction involving the right middle frontal gyrus laterally. Findings are consistent which shower of emboli within the right MCA territory. There is no acute intracranial hemorrhage, or intracranial mass lesion. Chronic infarction involving the right precentral gyrus. Mild chronic microangiopathic ischemic disease. Mild generalized volume loss. The findings were sent to the Radiology Results [id number] at 11:03 pm on 2/16/2021to be communicated to a licensed caregiver.       Labs:  CBC:   Lab Results   Component Value Date    WBC 5.0 02/17/2021    RBC 3.51 02/17/2021    HGB 10.1 02/17/2021    HCT 32.2 02/17/2021    MCV 91.7 02/17/2021    MCH 28.8 02/17/2021    MCHC 31.4 02/17/2021    RDW 15.2 02/17/2021     02/17/2021    MPV 11.6 02/17/2021     CMP:    Lab Results   Component Value Date     02/17/2021    K 4.3 02/17/2021     02/17/2021    CO2 24 02/17/2021    BUN 8 02/17/2021    CREATININE 0.8 02/17/2021    GFRAA >60 02/17/2021    LABGLOM >60 02/17/2021    GLUCOSE 101 02/17/2021    PROT 5.7 02/17/2021    LABALBU 3.3 02/17/2021    CALCIUM 8.5 02/17/2021    BILITOT 0.3 02/17/2021    ALKPHOS 172 02/17/2021    AST 17 02/17/2021    ALT 14 02/17/2021     HgBA1c:  No results found for: LABA1C  FLP:    Lab Results   Component Value Date    TRIG 80 11/02/2020    HDL 48 11/02/2020    LDLCALC 77 11/02/2020    LABVLDL 16 11/02/2020

## 2021-02-18 NOTE — PROGRESS NOTES
OCCUPATIONAL THERAPY INITIAL EVALUATION      Date:2021  Patient Name: Dk Hollingsworth  MRN: [id number]  : 1950  Room: [address] [place] [address], OTR/L #[id number]    AM-PAC Daily Activity Raw Score:   Recommended Adaptive Equipment: none    Diagnosis: Acute thromboembolic cerebrovascular accident ([facility] [address] [I63.9]     Modified Radha Scale (MRS)  Score     Description  0             No symptoms  1             No significant disability despite symptoms  2             Slight disability; able to look after own affairs  3             Moderate disability; able to ambulate without assist/ requires assist with ADLs  4             Moderate/Severe disability;requires assist to ambulate/assist with ADLs  5             Severe disability;bedridden/incontinent   6               Score:  1    Referring physician: Marina Reyes MD  Procedure: Right ICA stent assisted angioplasty with distal embolic protection on 0/95/15     Pertinent Medical History: CAD, pancreatic CA, MI, HTN    Precautions:  Current chemo     Home Living: Pt lives with spouse in 1 floor home.  4 CHERYL, 1 handrail  Bathroom setup: walk in shower  Equipment owned: n/a    Prior Level of Function: independent with ADLs , shares IADLs; ambulated independently w/o AD  Driving: yes    Pain Level: Pt c/o no pain this session    Cognition: A&O: 4/4; Follows 1-2 step directions  Speech clear   Memory:  Fair+ (Immediate repetition of 3/3 words: good, pt able to recall 2/3 words after ~3 minutes)   Sequencing:  good    Problem solving:  good    Judgement/safety:  good      Functional Assessment:   Initial Eval Status  Date: 21 Treatment Status  Date: Short Term Goals/LTG  Treatment frequency: Evaluation Only   Feeding Independent      Grooming Independent      UB Dressing Independent      LB Dressing Modified [place]      Bathing Modified [place]     Toileting Independent      Bed Mobility  NT Pt seated in chair at room entry and departure     Functional Transfers Independent  Various surfaces     Functional Mobility Independent w/o AD  In room and bathrm     Balance Sitting: Independent  Standing: Independent, no AD     Activity Tolerance Good     Visual/  Perceptual Glasses: yes  Visual field test: WFL                Hand dominance: R   Strength ROM Additional Info:    RUE  5/5  WFL   good  and wfl FMC/dexterity noted during ADL tasks  Finger><nose: wfl  Finger opp: Fair+ (chronic per pt)       LUE 5/5  WFL   good  and wfl FMC/dexterity noted during ADL tasks  Finger opp: wfl  Finger><nose: wfl       Hearing: WFL   Sensation: c/o numbness/tingling B fingertips and toes (chronic, d/t chemo)  Tone: WFL  Edema: none noted                   Comments: Upon arrival patient seated in chair. Pt agreeable to OT session this date. At end of session, patient seated in chair with call light and phone within reach, all lines and tubes intact. Evaluation only - pt independent/mod I w/ self-care tasks and functional ambulation (has been ambulating in room/bathrm w/o assist). Pt demonstrated good safety awareness during all functional tasks. Additional OT not indicated at this time, no home going needs. Please re-consult if change. Treatment: OT treatment provided this date includes:  Facilitation of unsupported sitting balance, functional transfers (various surfaces), standing tolerance tasks (while incorporating light functional reaching impacting ADLs) and functional ambulation tasks (in room and to/from bathroom w/ education on safety). Therapist facilitated self-care retraining: LB self-care tasks, simulated toileting task and standing grooming tasks while educating pt on modified techniques, posture, safety and energy conservation techniques. Skilled monitoring of HR, O2 sats and pts response to treatment.      Eval Complexity: Low Evaluation Time includes thorough review of current medical information, gathering information on past medical history/social history and prior level of function, completion of standardized testing/informal observation of tasks, assessment of data and education on plan of care and goals. Assessment of current deficits  Functional mobility []  ADLs [] Strength []  Cognition []  Functional transfers  [] IADLs [] Safety Awareness []  Endurance []  Fine Motor Coordination [] Balance [] Vision/perception [] Sensation []   Gross Motor Coordination [] ROM [] Delirium []                  Motor Control []    Plan of Care: n/a    Patient and/or family were instructed on diagnosis, prognosis/goals and plan of care. Demonstrated good understanding. [] Malnutrition indicators have been identified and nursing has been notified to ensure a dietitian consult is ordered.        Low Evaluation completed              Time In: 10:57  Time Out: 11:15  Total Time: 18 minutes   Min Units   OT Eval Low 97165 X 1   OT Eval Medium 14457     OT Eval High [id number]     OT Re-Eval [id number]     Therapeutic Ex [id number]     Therapeutic Activities 82123     ADL/Self Care [id number]     Orthotic Management [id number]     Neuro Re-Ed [address] [place] [address], OTR/L #[id number]

## 2021-02-18 NOTE — PROGRESS NOTES
Subjective: The patient is awake and alert. No acute events overnight. Denies chest pain, angina, SOB     Objective:    BP (!) 149/86   Pulse 72   Temp 98.5 °F (36.9 °C) (Temporal)   Resp 20   Ht 5' 7\" (1.702 m)   Wt 178 lb (80.7 kg)   SpO2 98%   BMI 27.88 kg/m²     No intake/output data recorded. No intake/output data recorded.     General appearance: NAD, conversant  HEENT: AT/NC, MMM  Neck: FROM, supple  Lungs: Clear to auscultation  CV: RRR, no MRGs  Vasc: Radial pulses 2+  Abdomen: Soft, non-tender; no masses or HSM  Extremities: No peripheral edema or digital cyanosis  Skin: no rash, lesions or ulcers  Psych: Alert and oriented to person, place and time  Neuro: Alert and interactive     Recent Labs     02/15/21  1012 02/16/21  0917 02/17/21  0520   WBC 5.5 6.1 5.0   HGB 11.4* 11.8* 10.1*   HCT 36.1* 36.6* 32.2*   PLT 89* 104* 101*       Recent Labs     02/15/21  1012 02/16/21  0917 02/17/21  0520    142 143   K 4.1 4.1 4.3    107 109*   CO2 25 23 24   BUN 10 8 8   CREATININE 0.7 0.8 0.8   CALCIUM 9.1 8.9 8.5*       Assessment:    Principal Problem:    Acute thromboembolic cerebrovascular accident [facility])  Active Problems:    Carcinoma of head of pancreas ([address]    History of 2019 novel coronavirus disease (COVID-19)    CAD (coronary artery disease)    Essential hypertension    Dyslipidemia  Resolved Problems:    History of Clostridioides difficile infection      Plan:  Patient is a 41-year-old male admitted with acute ischemic stroke in right ICA with right MCA distribution perfusion deficit- found to have ELISEO stenosis/ dissection     S/p Stent deployment for   75% high cervical stenosis due to dissected segment of calcified plaque of the right ICA on 2/17/2021  MRI confirms showering emboli   DAPT at discretion of neurology   continue to monitor bp   a1c and lipid panelcheck today is not yet done  Risk factor modification discussed with patient  monitor post procedure     DVT Prophylaxis   PT/OT  Discharge planning           [facility], MD  8:45 AM  2/18/2021

## 2021-02-23 NOTE — PROGRESS NOTES
[address] [facility]  [address] [place] [id number]  Dept: [phone]  Attending Progress Note      Reason for Visit:   Pancreatic cancer present    Referring physician: Self-referred. PCP:  Daren Healy MD    History of Present Illness: The patient is a very pleasant 25-year-old gentleman, with a past medical history significant for hypertension, coronary artery disease, and peripheral vascular disease, he was diagnosed with locally advanced pancreatic head adenocarcinoma in May 2020, the pancreatic head mass encircles the hepatic artery 360 degrees, without any evidence of metastatic disease. He was started on FOLFIRINOX chemotherapy on 6/29/2020, the patient has been under the great care of Dr. Marlin Martell [facility], and Dr. Yuri Choudhayr at [address] [place] [address] the patient had side effects from the chemotherapy including thrombocytopenia and diarrhea. The oxaliplatin dose has been decreased secondary to thrombocytopenia. The patient had CT scans of the chest and pancreas done in September 2020, revealing a 5.1 x 5.1 x 5.6 cm infiltrative mass centered superior to the pancreatic head/neck infiltrating the robert hepatis and invading the liver and second duodenal, had decreased in size compared to prior exam, partial abutment of the posterior stomach, medial right hepatic lobe and anterior IVC also noted, extensive vascular involvement of the robert hepatis and celiac, mild nonspecific infiltrative changes in the upper abdominal fat, developing carcinomatosis is not excluded, interval resolution of previously described right upper lobe nodular opacities, residual subcentimeter thick nodular opacity in the left lung measuring up to 5 mm, stable, new small left pleural effusion, nonspecific sclerotic focus in the left posterior fourth rib, stable.      The patient had a virtual visit with Dr. Alyson Kenney on 10 3/13/2020,he  was recommended to continue FOLFIRINOX for 6 more cycles, which could be modified to make it tolerable, even evaluation of one of the drops to his oxaliplatin is warranted. He could begin it for SBRT if he continues to have a locally advanced disease. The patient has received 8 cycles, 8th cycle was on 10/23/2020. The patient was admitted to the hospital on 11/1/2020 with acute respiratory failure secondary to COVID-19 pneumonia, he was also diagnosed with C. difficile colitis. He is feeling much better at this time, he completed the course of Flagyl, 12/4/2020. Chemotherapy was restarted on 12/8/2020, he was admitted to the hospital with C. difficile colitis, he was treated with oral vancomycin. The patient was seen by ID, was recommended vancomycin 1 2 5 mg p.o. 3 times weekly, if worsening diarrhea to go back on a treatment dose 125 mg p.o. every 6 hours. Was cleared to restart the chemotherapy. The patient went to [place], treatment was resumed on 2/2/2021. He had restaging CT scans done at [facility] on 2/11/2021, revealing disease progression. The patient was admitted last week to the hospital with CVA, he had right ICA stenting done. His symptoms had resolved. He will be started today 2/23/2021 on systemic therapy with gemcitabine and Abraxane. Review of Systems;  CONSTITUTIONAL: No fever, chills. Improved appetite and energy level. ENMT: Eyes: No diplopia; Nose: No epistaxis. Mouth: No sore throat. RESPIRATORY: No hemoptysis, shortness of breath, cough. CARDIOVASCULAR: No chest pain, palpitations. GASTROINTESTINAL: No nausea/vomiting, abdominal pain, no diarrhea. GENITOURINARY: No dysuria, urinary frequency, hematuria. NEURO: As per HPI.   Remainder:  ROS NEGATIVE    Past Medical History:      Diagnosis Date    CAD (coronary artery disease)     Cancer ([facility] [address] 05/2020    pancreas    Heart attack ([facility] [address] 2008, 3-2015    Hypertension      Patient Active Problem List   Diagnosis    Atherosclerosis of native artery of both lower extremities with Not on file     Non-medical: Not on file   Tobacco Use    Smoking status: Former Smoker     Packs/day: 1.50     Years: 40.00     Pack years: 60.00     Types: Cigarettes     Quit date: 2008     Years since quittin.5    Smokeless tobacco: Never Used    Tobacco comment: occassional cigar   Substance and Sexual Activity    Alcohol use: Not Currently    Drug use: No    Sexual activity: Not Currently     Partners: Female   Lifestyle    Physical activity     Days per week: Not on file     Minutes per session: Not on file    Stress: Not on file   Relationships    Social connections     Talks on phone: Not on file     Gets together: Not on file     Attends Islam service: Not on file     Active member of club or organization: Not on file     Attends meetings of clubs or organizations: Not on file     Relationship status: Not on file    Intimate partner violence     Fear of current or ex partner: Not on file     Emotionally abused: Not on file     Physically abused: Not on file     Forced sexual activity: Not on file   Other Topics Concern    Not on file   Social History Narrative    Not on file       Allergies:  No Known Allergies    Physical Exam:  /83   Pulse 95   Temp 98.9 °F (37.2 °C)   Ht 5' 7\" (1.702 m)   Wt 175 lb 1.6 oz (79.4 kg)   SpO2 95%   BMI 27.42 kg/m²   GENERAL: Alert, oriented x 3, not in acute distress. HEENT: PERRLA; EOMI. left facial droop had resolved. NECK: Supple. No palpable cervical or supraclavicular lymphadenopathy. LUNGS: Good air entry bilaterally. No wheezing, crackles or rhonchi. CARDIOVASCULAR: Regular rate. No murmurs, rubs or gallops. ABDOMEN: Soft. Non-tender, non-distended. Positive bowel sounds. EXTREMITIES: Without clubbing, cyanosis, or edema.    NEUROLOGIC: Nonfocal.  ECOG PS 1    Impression/Plan:      The patient is a very pleasant 52-year-old gentleman, with a past medical history significant for hypertension, coronary artery disease, and peripheral vascular disease, he was diagnosed with locally advanced pancreatic head adenocarcinoma in May 2020, the pancreatic head mass encircles the hepatic artery 360 degrees, without any evidence of metastatic disease. He was started on FOLFIRINOX chemotherapy on 6/29/2020, the patient has been under the great care of Dr. Stephanie Godwin locally, and Dr. Morenita Bryant at [facility] the patient had side effects from the chemotherapy including thrombocytopenia and diarrhea. The oxaliplatin dose has been decreased secondary to thrombocytopenia. The patient had CT scans of the chest and pancreas done in September 2020, revealing a 5.1 x 5.1 x 5.6 cm infiltrative mass centered superior to the pancreatic head/neck infiltrating the robert hepatis and invading the liver and second duodenal, had decreased in size compared to prior exam, partial abutment of the posterior stomach, medial right hepatic lobe and anterior IVC also noted, extensive vascular involvement of the robert hepatis and celiac, mild nonspecific infiltrative changes in the upper abdominal fat, developing carcinomatosis is not excluded, interval resolution of previously described right upper lobe nodular opacities, residual subcentimeter thick nodular opacity in the left lung measuring up to 5 mm, stable, new small left pleural effusion, nonspecific sclerotic focus in the left posterior fourth rib, stable. The patient had a virtual visit with Dr. Lola Orr on 10 3/13/2020,he  was recommended to continue FOLFIRINOX for 6 more cycles, which could be modified to make it tolerable, even evaluation of one of the drops to his oxaliplatin is warranted. He could begin it for SBRT if he continues to have a locally advanced disease. The patient has received 8 cycles, eighth cycle was on 10/23/2020. The patient was admitted to the hospital on 11/1/2020 with acute respiratory failure secondary to COVID-19 pneumonia, he was also diagnosed with C. difficile colitis.   He is feeling much better at this time, he completed the course of Flagyl on 12/4/2020. He was restarted on chemotherapy with FOLFIRINOX on 12/18/2020, cycle #9. Chemotherapy was restarted on 12/8/2020, he was admitted to the hospital with C. difficile colitis, he was treated with oral vancomycin. The patient was seen by ID, was recommended vancomycin 125 mg p.o. 3 times weekly, if worsening diarrhea to go back on a treatment dose 125 mg p.o. every 6 hours. Was cleared to restart the chemotherapy. The patient went to [place], treatment was resumed on 2/2/2021. He had restaging CT scans done at [facility] on 2/11/2021, revealing new patchy groundglass opacities and reticular opacities in the bilateral lung fields, which I believe is secondary to the Covid pneumonia, interval resolution of the previously noted subcentimeter nodular opacities and left pleural effusion, nonspecific sclerotic focus in the left posterior fourth rib, stable, esophagitis, CT scan of the abdomen the pelvis had revealed increase in the size of the pancreatic head mass measuring now 6 x 5.5 cm, previously 5 x 4.5 cm, extending into the left hepatic lobe and caudate lobe, persistent encasement of the robert hepatis, mild hepatic biliary ductal dilatation, increased, mild splenomegaly, cholelithiasis, interval resolution of the previous described haziness of the mesenteric fat. The patient has disease progression, recommended changing systemic therapy to gemcitabine Abraxane, the patient will follow up with Dr. Beck Ospina. Today 2/23/2021, the patient will be started on gemcitabine and Abraxane, labs reviewed, the patient is doing well clinically, proceed with gemcitabine and Abraxane cycle #1, day #1. RTC in 2 weeks. Thank you for allowing us to participate in the care of Mr. Miriam Mcneil.     Milton Barton MD   HEMATOLOGY/MEDICAL [address] [facility], [address]  [facility]  [facility] [address]  [address] [facility] [id number]  Dept:

## 2021-02-25 PROBLEM — I51.7 CARDIOMEGALY: Status: ACTIVE | Noted: 2021-01-01

## 2021-02-25 PROBLEM — A41.9 SEPSIS (HCC): Status: ACTIVE | Noted: 2021-01-01

## 2021-02-25 PROBLEM — D72.829 LEUKOCYTOSIS: Status: ACTIVE | Noted: 2021-01-01

## 2021-02-25 PROBLEM — A04.72 C. DIFFICILE DIARRHEA: Status: ACTIVE | Noted: 2021-01-01

## 2021-02-25 PROBLEM — D64.9 ANEMIA: Status: ACTIVE | Noted: 2021-01-01

## 2021-02-25 PROBLEM — E87.1 HYPONATREMIA: Status: ACTIVE | Noted: 2021-01-01

## 2021-02-25 NOTE — ED NOTES
Blood cultures obtained from right forearm, per policy. Set two of two drawn at this time.       Praveen Ingram RN  02/25/21 1635

## 2021-02-25 NOTE — ED NOTES
Patient taken off bedpan per his request. Patient was immediately incontinent of stool on clean lines.       Manuela Lomas RN  02/25/21 2656

## 2021-02-25 NOTE — ED PROVIDER NOTES
Department of Emergency Medicine   ED  Provider Note  Admit Date/RoomTime: 2/25/2021 12:59 AM  ED Room: 05/05          History of Present Illness:  2/25/21, Time: 1:09 AM EST  Chief Complaint   Patient presents with    Side Effects of Chemotherapy     just had chemo yesterday, pt's wife states he has C-diff, she gave him 125 of Vanc earlier, fever @ home of 101.1, denies CP                Mariel Ing is a 72 y.o. male presenting to the ED for diarrhea and generalized weakness, beginning 3 days ago. The complaint has been persistent, moderate in severity, and worsened by nothing. Patient has history of pancreatic cancer, current chemotherapy. Over the past 3 days he has had decreased oral intake and a generalized weakness and fatigue. Patient then began to have diarrhea that is concerning for C. difficile according to wife. Patient had 2 other bouts of C. difficile, most recently in December 2020. Patient denies any abdominal pain or cramping. Patient is nauseous but has had no vomiting. He denies any cough. He has had fever over the past 24 hours. Patient's wife states that she gave him a leftover dose of oral vancomycin prior to arrival.    Review of Systems:   Pertinent positives and negatives are stated within HPI, all other systems reviewed and are negative.        --------------------------------------------- PAST HISTORY ---------------------------------------------  Past Medical History:  has a past medical history of CAD (coronary artery disease), Cancer ([facility] [address] Heart attack ([facility] [address] and Hypertension. Past Surgical History:  has a past surgical history that includes Stomach surgery; Inguinal hernia repair (Right); vascular surgery (08/15/2018); Coronary angioplasty (2005); pr thromboendartectmy femoral common (Right, 9/17/2018); Cardiac surgery; Coronary angioplasty with stent; and IR CAROTID STENT W PROTECTION (2/16/2021). Social History:  reports that he quit smoking about 12 years ago.  His during the hospital encounter of 02/25/21   COVID-19, Rapid    Specimen: Nasopharyngeal Swab   Result Value Ref Range    SARS-CoV-2, NAAT Not Detected Not Detected   CBC auto differential   Result Value Ref Range    WBC 24.9 (H) 4.5 - 11.5 E9/L    RBC 3.79 (L) 3.80 - 5.80 E12/L    Hemoglobin 11.1 (L) 12.5 - 16.5 g/dL    Hematocrit 33.5 (L) 37.0 - 54.0 %    MCV 88.4 80.0 - 99.9 fL    MCH 29.3 26.0 - 35.0 pg    MCHC 33.1 32.0 - 34.5 %    RDW 15.5 (H) 11.5 - 15.0 fL    Platelets 982 364 - 321 E9/L    MPV 12.7 (H) 7.0 - 12.0 fL    Neutrophils % 96.5 (H) 43.0 - 80.0 %    Lymphocytes % 1.7 (L) 20.0 - 42.0 %    Monocytes % 0.6 (L) 2.0 - 12.0 %    Eosinophils % 0.0 0.0 - 6.0 %    Basophils % 0.2 0.0 - 2.0 %    Neutrophils Absolute 24.40 (H) 1.80 - 7.30 E9/L    Lymphocytes Absolute 0.50 (L) 1.50 - 4.00 E9/L    Monocytes Absolute 0.00 (L) 0.10 - 0.95 E9/L    Eosinophils Absolute 0.00 (L) 0.05 - 0.50 E9/L    Basophils Absolute 0.00 0.00 - 0.20 E9/L    Metamyelocytes Relative 0.9 0.0 - 1.0 %    Myelocyte Percent 0.9 0 - 0 %    Polychromasia 3+     Poikilocytes 1+     Sulaiman Cells 1+     Ovalocytes 1+    Comprehensive Metabolic Panel w/ Reflex to MG   Result Value Ref Range    Sodium 130 (L) 132 - 146 mmol/L    Potassium reflex Magnesium 4.0 3.5 - 5.0 mmol/L    Chloride 100 98 - 107 mmol/L    CO2 17 (L) 22 - 29 mmol/L    Anion Gap 13 7 - 16 mmol/L    Glucose 111 (H) 74 - 99 mg/dL    BUN 12 8 - 23 mg/dL    CREATININE 0.8 0.7 - 1.2 mg/dL    GFR Non-African American >60 >=60 mL/min/1.73    GFR African American >60     Calcium 8.1 (L) 8.6 - 10.2 mg/dL    Total Protein 6.9 6.4 - 8.3 g/dL    Albumin 3.3 (L) 3.5 - 5.2 g/dL    Total Bilirubin 0.7 0.0 - 1.2 mg/dL    Alkaline Phosphatase 176 (H) 40 - 129 U/L    ALT 15 0 - 40 U/L    AST 24 0 - 39 U/L   Lactate, Sepsis   Result Value Ref Range    Lactic Acid, Sepsis 1.5 0.5 - 1.9 mmol/L   Lactate, Sepsis   Result Value Ref Range    Lactic Acid, Sepsis 1.8 0.5 - 1.9 mmol/L   Lactate, Sepsis Result Value Ref Range    Lactic Acid, Sepsis 2.8 (H) 0.5 - 1.9 mmol/L   EKG 12 Lead   Result Value Ref Range    Ventricular Rate 121 BPM    Atrial Rate 121 BPM    P-R Interval 128 ms    QRS Duration 126 ms    Q-T Interval 334 ms    QTc Calculation (Bazett) 474 ms    P Axis 40 degrees    R Axis 112 degrees    T Axis -26 degrees   ,       RADIOLOGY:  Interpreted by Radiologist unless otherwise specified  CT ABDOMEN PELVIS W IV CONTRAST Additional Contrast? None   Preliminary Result   5 cm ill-defined low-density mass in the region of the pancreatic head withextension into the robert hepatis. There is occlusion of the extrahepaticportal vein with cavernous transformation. Mild-to-moderate intrahepaticbiliary dilation is    present. Difficult to exclude invasion of theheterogeneous mass into the left hepatic lobe. Significant circumferential wall thickening throughout the colon, findingsconsistent with pancolitis which may be infectious or inflammatory. Noevidence of    pneumatosis or significant colonic dilatation. XR CHEST (2 VW)   Final Result   No acute process. Stable cardiomegaly. ------------------------- NURSING NOTES AND VITALS REVIEWED ---------------------------   The nursing notes within the ED encounter and vital signs as below have been reviewed by myself  BP (!) 144/80   Pulse 117   Temp 100.8 °F (38.2 °C) (Oral)   Resp 24   Ht 5' 7\" (1.702 m)   Wt 170 lb (77.1 kg)   SpO2 95%   BMI 26.63 kg/m²     Oxygen Saturation Interpretation: Normal    The patients available past medical records and past encounters were reviewed.         ------------------------------ ED COURSE/MEDICAL DECISION MAKING----------------------  Medications   lactated ringers infusion ( Intravenous New Bag 2/25/21 7212)   lactated ringers infusion 1,000 mL (0 mLs Intravenous Stopped 2/25/21 6794)   metronidazole (FLAGYL) 500 mg in NaCl 100 mL IVPB premix (0 mg Intravenous Stopped 2/25/21 5818)   iopamidol (ISOVUE-370) 76 % injection 90 mL (90 mLs Intravenous Given 2/25/21 8557)   acetaminophen (TYLENOL) tablet 1,000 mg (1,000 mg Oral Given 2/25/21 3393)           The cardiac monitor revealed sinus tachycardia with a heart rate in the 110s as interpreted by me. The cardiac monitor was ordered secondary to the patient's chest pain and to monitor for patient for dysrhythmia. CPT 93961           Medical Decision Making:     I, Dr. Dona Stafford, am the primary provider of record    67-year-old male with history of pancreatic cancer, active chemotherapy, presenting with diarrhea illness. Concern for C. difficile colitis. He arrives tachycardic, febrile, no increased work of breathing or hypoxia. Stool sample was collected, is suspicious for C. difficile. Patient was given Flagyl as well as a liter of IV fluids with maintenance after. Improvement in heart rate after IV hydration. Patient has elevated white blood cell count, no anemia. Metabolic panel is within acceptable limits. CT imaging is delayed, will be admitted for further management. Is resting comfortably during the remainder of his ED course. Elevation in lactic acid repeat, IV fluids were continued. Patient was given early antibiotics. CT imaging shows his malignancy but also suspicion of pancolitis. C. difficile specimen is pending. Re-Evaluations: This patient's ED course included: a personal history and physicial examination, re-evaluation prior to disposition, IV medications, cardiac monitoring and continuous pulse oximetry    This patient has remained hemodynamically stable, improved and been closely monitored during their ED course. Counseling: The emergency provider has spoken with the patient and spouse/SO and discussed todays results, in addition to providing specific details for the plan of care and counseling regarding the diagnosis and prognosis.   Questions are answered at this time and they are agreeable with the plan.       --------------------------------- IMPRESSION AND DISPOSITION ---------------------------------    IMPRESSION  1. Diarrhea of presumed infectious origin    2. Generalized weakness        DISPOSITION  Disposition: Admit to med/surg floor  Patient condition is stable        NOTE: This report was transcribed using voice recognition software.  Every effort was made to ensure accuracy; however, inadvertent computerized transcription errors may be present        Alejandra Gaspar DO  02/25/21 9689

## 2021-02-25 NOTE — ED NOTES
After cleaning patient and changing gown/linens patient stated he thought he needed to go again. Patient assisted onto bedpan.       Melisa Hardin RN  02/25/21 2080

## 2021-02-25 NOTE — ED NOTES
Bed: 05  Expected date:   Expected time:   Means of arrival:   Comments:  triage     Ariana Lazo RN  02/25/21 6275

## 2021-02-25 NOTE — ED NOTES
Patient incontinent of stool. Patient cleaned, complete linen change completed. Patient repositioned for comfort. ails up x 2, call light in reach.       Osmany Johnson RN  02/25/21 5488

## 2021-02-25 NOTE — ED NOTES
Blood cultures obtained from right AC, per policy. Set one of two drawn at this time.                Praveen Ingram, RN  02/25/21 0469 [facility], RN  02/25/21 2404

## 2021-02-25 NOTE — H&P
[address] [facility]  History and Physical      CHIEF COMPLAINT: Diarrhea      HISTORY OF PRESENT ILLNESS:      The patient is a 72 y.o. male patient of Dr. Juancarlos Chapa history of pancreatic CA on chemotherapy, CAD who presents with diarrhea. Patient recently had worsening diarrhea. Is associated with generalized weakness and fatigue. Patient notes fever to 101. Per wife patient has C. difficile and she gave him oral vancomycin. Patient had recent hospitalization for CVA with right ICA stent. Patient has had 2 previous episodes of C. difficile. Most recently in December. He was discharged on extended during course of vancomycin. No exacerbation relief. No vomiting. No abdominal pain. Past Medical History:    Past Medical History:   Diagnosis Date    CAD (coronary artery disease)     Cancer ([facility] [address] 05/2020    pancreas    Heart attack ([facility] [address] 2008, 3-2015    Hypertension        Past Surgical History:    Past Surgical History:   Procedure Laterality Date    CARDIAC SURGERY      Quad 2014    CORONARY ANGIOPLASTY  2005    CABG TIMES 4 2015    CORONARY ANGIOPLASTY WITH STENT PLACEMENT      INGUINAL HERNIA REPAIR Right     IR CAROTID STENT UNI W PROTECTION  2/16/2021    IR CAROTID STENT UNI W PROTECTION 2/16/2021 Maynor Vickers MD [facility]    SD THROMBOENDARTECTMY FEMORAL COMMON Right 9/17/2018    FEMORAL ENDARTERECTOMY performed by Liz Eddy MD at [address] [place] [address]  08/15/2018    abdominal aortogram with runoff by Dr. Meggan Ware       Medications Prior to Admission:    Not in a hospital admission. Allergies:    Patient has no known allergies. Social History:    reports that he quit smoking about 12 years ago. His smoking use included cigarettes. He has a 60.00 pack-year smoking history. He has never used smokeless tobacco. He reports previous alcohol use. He reports that he does not use drugs.     Family History:   family history includes Breast Cancer in his maternal aunt and mother; Cancer in his father, maternal aunt, maternal cousin, maternal cousin, and maternal grandfather. REVIEW OF SYSTEMS:  As above in the HPI, otherwise negative    PHYSICAL EXAM:    Vitals:  /77   Pulse 99   Temp 100.8 °F (38.2 °C) (Oral)   Resp 20   Ht 5' 7\" (1.702 m)   Wt 170 lb (77.1 kg)   SpO2 96%   BMI 26.63 kg/m²     General:  Awake, alert, oriented X 3. Well developed, well nourished, well groomed. No apparent distress. HEENT:  Normocephalic, atraumatic. Pupils equal, round, reactive to light. No scleral icterus. No conjunctival injection. Normal lips, teeth, and gums. No nasal discharge. Neck:  Supple  Heart:  RRR, no murmurs, gallops, rubs  Lungs:  CTA bilaterally, bilat symmetrical expansion, no wheeze, rales, or rhonchi  Abdomen:   Bowel sounds present, soft, nontender, no masses, no organomegaly, no peritoneal signs  Extremities:  No clubbing, cyanosis, or edema  Skin:  Warm and dry, no open lesions or rash  Neuro:  Cranial nerves 2-12 intact, no focal deficits  Breast: deferred  Rectal: deferred  Genitalia:  deferred    LABS:    CBC with Differential:    Lab Results   Component Value Date    WBC 24.9 02/24/2021    RBC 3.79 02/24/2021    HGB 11.1 02/24/2021    HCT 33.5 02/24/2021     02/24/2021    MCV 88.4 02/24/2021    MCH 29.3 02/24/2021    MCHC 33.1 02/24/2021    RDW 15.5 02/24/2021    NRBC 0.9 11/03/2020    METASPCT 0.9 02/24/2021    LYMPHOPCT 1.7 02/24/2021    MONOPCT 0.6 02/24/2021    MYELOPCT 0.9 02/24/2021    BASOPCT 0.2 02/24/2021    MONOSABS 0.00 02/24/2021    LYMPHSABS 0.50 02/24/2021    EOSABS 0.00 02/24/2021    BASOSABS 0.00 02/24/2021     CMP:    Lab Results   Component Value Date     02/24/2021    K 4.0 02/24/2021     02/24/2021    CO2 17 02/24/2021    BUN 12 02/24/2021    CREATININE 0.8 02/24/2021    GFRAA >60 02/24/2021    LABGLOM >60 02/24/2021    GLUCOSE 111 02/24/2021    PROT 6.9 02/24/2021 LABALBU 3.3 02/24/2021    CALCIUM 8.1 02/24/2021    BILITOT 0.7 02/24/2021    ALKPHOS 176 02/24/2021    AST 24 02/24/2021    ALT 15 02/24/2021     Magnesium:    Lab Results   Component Value Date    MG 2.2 02/22/2021     Phosphorus:    Lab Results   Component Value Date    PHOS 1.9 11/02/2020     PT/INR:    Lab Results   Component Value Date    PROTIME 13.2 02/17/2021    INR 1.2 02/17/2021     Last 3 Troponin:    Lab Results   Component Value Date    TROPONINI <0.01 02/16/2021    TROPONINI <0.01 11/01/2020     U/A:    Lab Results   Component Value Date    COLORU Yellow 11/01/2020    PROTEINU 30 11/01/2020    PHUR 6.0 11/01/2020    WBCUA 1-3 11/01/2020    RBCUA 0-1 11/01/2020    BACTERIA RARE 11/01/2020    CLARITYU Clear 11/01/2020    SPECGRAV 1.025 11/01/2020    LEUKOCYTESUR Negative 11/01/2020    UROBILINOGEN 0.2 11/01/2020    BILIRUBINUR Negative 11/01/2020    BLOODU TRACE 11/01/2020    GLUCOSEU Negative 11/01/2020     ABG:  No results found for: PH, PCO2, PO2, HCO3, BE, THGB, TCO2, O2SAT  HgBA1c:    Lab Results   Component Value Date    LABA1C 5.1 02/18/2021     FLP:    Lab Results   Component Value Date    TRIG 110 02/18/2021    HDL 44 02/18/2021    LDLCALC 95 02/18/2021    LABVLDL 22 02/18/2021     TSH:    Lab Results   Component Value Date    TSH 0.501 11/02/2020       CT ABDOMEN PELVIS W IV CONTRAST Additional Contrast? None   Final Result   5 cm ill-defined low-density mass in the region of the pancreatic head with   extension into the robert hepatis. This leads to occlusion of the   extrahepatic portal vein with cavernous transformation. Mild-to-moderate   intrahepatic biliary dilation is present. Difficult to exclude invasion of   the heterogeneous mass into the left hepatic lobe. Significant circumferential wall thickening throughout the colon, findings   consistent with pancolitis which may be infectious or inflammatory. No   evidence of pneumatosis or significant colonic dilatation.       XR CHEST (2 VW)   Final Result   No acute process. Stable cardiomegaly. ASSESSMENT:      Principal Problem:    Diarrhea  Active Problems:    Carcinoma of head of pancreas ([facility] [address]    History of 2019 novel coronavirus disease (COVID-19)    CAD (coronary artery disease)    Essential hypertension    Dyslipidemia    C. difficile diarrhea    Sepsis ([facility] [address]    Anemia    Hyponatremia    Leukocytosis    Cardiomegaly  Resolved Problems:    * No resolved hospital problems.  *      PLAN:    50-year-old male history of pancreatic CA on chemo, recent CVA with right ICA stent, history of C. difficile admitted with diarrhea, suspected recurrent C. difficile, and sepsis    IV fluid resuscitation  P.o. vancomycin  IV Flagyl in ED  Stool studies + C. difficile  Medications for other co morbidities cont as appropriate w dosage adjustments as necessary  PT/OT  DVT PPx  DC planning        Electronically signed by Laurel Schultz MD on 2/25/2021 at 11:18 AM

## 2021-02-26 NOTE — PROGRESS NOTES
consistent with pancolitis which may be infectious or inflammatory. No   evidence of pneumatosis or significant colonic dilatation. XR CHEST (2 VW)   Final Result   No acute process. Stable cardiomegaly. Assessment    Principal Problem:    Diarrhea  Active Problems:    Carcinoma of head of pancreas ([facility] [address]    History of 2019 novel coronavirus disease (COVID-19)    CAD (coronary artery disease)    Essential hypertension    Dyslipidemia    C. difficile diarrhea    Sepsis ([facility] [address]    Anemia    Hyponatremia    Leukocytosis    Cardiomegaly  Resolved Problems:    * No resolved hospital problems.  *      Plan:  27-year-old male history of pancreatic CA on chemo, recent CVA with right ICA stent, history of C. difficile admitted with diarrhea, suspected recurrent C. difficile, and sepsis     IV fluid resuscitation  P.o. vancomycin-- Rx for taper w QOD dose until off chemo  IV Flagyl in ED  Stool studies + C. difficile  Medications for other co morbidities cont as appropriate w dosage adjustments as necessary  PT/OT  DVT PPx  DC planning Home       Electronically signed by Brandon Huang MD on 2/26/2021 at 2:43 PM

## 2021-02-26 NOTE — CARE COORDINATION
Discussed transition of care with the pt. He lives with his wife and is independent. He was supposed to have [facility] but he was admitted to the hospital before they could come. He will return home when medically stable. He states he feels that he does not need HHC.  Silver Gabriel RN

## 2021-02-26 NOTE — PLAN OF CARE
Problem: Falls - Risk of:  Goal: Will remain free from falls  Description: Will remain free from falls  Outcome: Met This Shift     Problem: Falls - Risk of:  Goal: Absence of physical injury  Description: Absence of physical injury  Outcome: Met This Shift     Problem: Skin Integrity:  Goal: Absence of new skin breakdown  Description: Absence of new skin breakdown  Outcome: Met This Shift     Problem: Skin Integrity:  Goal: Will show no infection signs and symptoms  Description: Will show no infection signs and symptoms  Outcome: Not Met This Shift

## 2021-02-26 NOTE — PROGRESS NOTES
Occupational Therapy  OCCUPATIONAL THERAPY INITIAL EVALUATION      Date:2021  Patient Name: Aniyah Wick  MRN: [id number]  : 1950  Room: 0/0-A    Evaluating OT: Enrike Bautista OTR/LOUANN #[id number]    Referring physician: MIYA Dorman - CNP    AM-PAC Daily Activity Raw Score:   Recommended Adaptive Equipment: TBD     Reason for Admission/Diagnosis: Diarrhea   Pertinent Medical History/Surgery: CAD, pancreas cancer (2020) on chemo, heart attack (, ), HTN, s/p CABG X4 ()    Patient with recent hospitalization 2021 due to acute thromboembolic cerebrovascular accident s/p Right ICA stent assisted angioplasty with distal embolic protection on 13          Precautions:  Falls, on chemo, contact (c.diff)     Home Living: Pt lives with his wife in a one story house with 4 steps to enter with railing on one side. Bedroom and bathroom are located on the first floor. Bathroom setup: Walk-in shower   Equipment owned: none  Prior Level of Function:   I with ADLs ,  Shares IADLs with spouse. Patient was using no device for functional mobility. Patient's wife is a retired nurse and is available to assist as needed.   Driving: yes                             Occupation: Retired    Pain Level: No pain reported  Cognition: A&O: self:yes, place:yes, time: yes, situation:yes  Communication: WFL  Follows multi step directions   Memory:  good    Sequencing:  good    Problem solving:  good    Judgement/safety:  good      Functional Assessment:   Initial Eval Status  Date: 21 Treatment Status  Date: Short Term Goals=LTG  Treatment frequency: PRN 1-2 x/week   Feeding Modified [place]       Grooming Stand by Assist   Independent     UB Dressing Minimal Assist   Doff/ don hospital gown, assistance with positioning R UE sleeve due to IV placement  Independent    LB Dressing Stand by Assist   Donned undergarments  Independent    Bathing Stand by Assist   Independent    Toileting to pull up undergarments, patient with LOB with patient able to self-correct with SBA. SBA to complete toileting using standard commode. SBA to complete hand hygiene while standing at sink. SBA to [facility] gown, including management of snaps for R sleeve with min A to don to snap R sleeve due to IV placement.   Mobility training-  Instruction/training on safety and improved independence. SBA supine>sit. SBA sit<>stand transfers, including toileting transfer, with patient completing functional mobility in room/bathroom without device with SBA. Patient would  benefit from continued skilled OT during hospitalization to maximize functional outcomes as they relate to I/ADLs and functional mobility.      Eval Complexity: Low     Assessment of current deficits   Functional mobility [x]  ADLs [x] Strength [x]  Cognition []  Functional transfers  [x] IADLs [x] Safety Awareness []  Endurance/Activity tolerance [x]  Fine Motor Coordination [] Balance [x] Vision/perception [] Sensation []   Gross Motor Coordination [] ROM [] Delirium []                  Motor Control []    Plan of Care:  OT 1-2x/week for 1-2 weeks PRN   [x] ADL retraining/modified techniques, along with assistive device recommendations to maximize patient safety and performance with self-care while maintaining precautions   [x]Balance retraining/tolerance tasks for facilitation of postural control with dynamic challenges during ADLs and functional activities   [x] Delirium Prevention/treatment to maximize functional outcomes   [] Cognitive Re-Training/ executive function skills for safe participation in ADLs/IADLs, along with functional activities   [x] Energy conservation techniques/Strategies to improve activity tolerance      [x] Environmental modifications for safe mobility and completion of ADLs    [x] Functional mobility training/DME recommendations for increased performance, safety and fall prevention  while maintaining precautions     [x] Functional transfer/mobility training/DME recommendations for increased performance, safety and fall prevention while maintaining precautions           []Neuromuscular re-education: facilitation of righting/equilibrium reactions, midline orientation, scapular stability/mobility, normalization muscle tone and facilitation active functional movement/Attention   [x] Patient/Family education to increase safety and functional independence   [x] Positioning to improve functional independence, safety, and skin integrity   []Sensory re-education techniques to improve extremity awareness, maintain skin integrity and improve hand function         []Splinting/positioning needs to maintain joint/skin integrity and prevent contractures       [x] Therapeutic Activity to improve activity tolerance for functional activities and ADLs    [x]Therapeutic Exercise to improve UE strength and activity tolerance for functional transfers and ADLs   [] Visual/Perceptual retraining to improve body awareness and safety during functional activities and ADLs   []      Rehab Potential:  Good for established goals    Patient / Family Goal: To return home     Evaluation Time includes thorough review of current medical information, gathering information on past medical history/social history and prior level of function, completion of standardized testing/informal observation of tasks, assessment of data and education on plan of care and goals. Patient and/or family were instructed on functional diagnosis, prognosis/goals and OT plan of care. Demonstrated good understanding.     Time In: 11:37  Time Out: 12:00  Total Session Time: 23 minutes  Total Treatment Time: 8 minutes    Min Units   OT Eval Low 97165  X  1   OT Eval Medium 15906      OT Eval High 38016       OT Re-Eval [id number]       Therapeutic Ex 09402       Therapeutic Activities 43864  3  0   ADL/Self Care 87176  5  1   Orthotic Management 32676       Neuro Re-Ed 60767       Non-Billable Time           [] Malnutrition indicators have been identified and nursing has been notified to ensure a dietitian consult is ordered.       Low OT Evaluation + 8  treatment minutes    Jillian PETEY DormanR/L #[id number]

## 2021-02-26 NOTE — PROGRESS NOTES
Physical Therapy order received and chart reviewed. Per conversation with patient and family, Pt is independent with all functional mobility. Pt with no skilled acute PT needs at this time. Pt instructed to notify any medical team member if functional status were to change. Will discontinue PT services.      Carmen Plasencia PT, DPT  [id number]

## 2021-02-26 NOTE — DISCHARGE SUMMARY
Physician Discharge Summary     Patient ID:  Rae Mcardle  [id number]  63 y.o.  1950    Admit date: 2/25/2021    Discharge date and time:  2/26/2021    Discharge Diagnoses: Principal Problem:    Diarrhea  Active Problems:    Carcinoma of head of pancreas ([facility] [address]    History of 2019 novel coronavirus disease (COVID-19)    CAD (coronary artery disease)    Essential hypertension    Dyslipidemia    C. difficile diarrhea    Sepsis ([facility] [address]    Anemia    Hyponatremia    Leukocytosis    Cardiomegaly  Resolved Problems:    * No resolved hospital problems.  *      Consults: IP CONSULT TO INTERNAL MEDICINE  IP CONSULT TO SOCIAL WORK    Procedures: See below    Hospital Course: 55-year-old male history of pancreatic CA on chemo, recent CVA with right ICA stent, history of C. difficile admitted with diarrhea, suspected recurrent C. difficile, and sepsis     IV fluid resuscitation  P.o. vancomycin-- Rx for taper w QOD dose until off chemo  IV Flagyl in ED  Stool studies + C. difficile  Medications for other co morbidities cont as appropriate w dosage adjustments as necessary  PT/OT  DVT PPx  DC planning Home         Discharge Exam:  See progress note from today    Condition:  Stable    Disposition: home    Patient Instructions:   Current Discharge Medication List      CONTINUE these medications which have CHANGED    Details   vancomycin (VANCOCIN) 125 MG capsule 4 times daily for 2 weeks, 3 times daily for 1 week, 2 times daily for 1 week , then daily for 1 week, then every other day until chemotherapy is completed  Qty: 120 capsule, Refills: 1         CONTINUE these medications which have NOT CHANGED    Details   lipase-protease-amylase (CREON) 27137-18894 units delayed release capsule Take 1 capsule by mouth 3 times daily as needed      mirtazapine (REMERON) 15 MG tablet Take 15 mg by mouth nightly      Multiple Vitamins-Minerals (THERAPEUTIC MULTIVITAMIN-MINERALS) tablet Take 1 tablet by mouth daily      ticagrelor (BRILINTA) 90 MG TABS tablet Take 1 tablet by mouth 2 times daily  Qty: 60 tablet, Refills: 0      pregabalin (LYRICA) 100 MG capsule Take 200 mg by mouth nightly. b complex vitamins capsule Take 1 capsule by mouth daily      DULoxetine (CYMBALTA) 30 MG extended release capsule Take 30 mg by mouth daily       zinc gluconate 50 MG tablet Take 1 tablet by mouth daily  Qty: 30 tablet, Refills: 3      carvedilol (COREG) 12.5 MG tablet Take 12.5 mg by mouth 2 times daily (with meals)      oxyCODONE 5 MG capsule Take 5 mg by mouth every 4 hours as needed for Pain.       aspirin EC 81 MG EC tablet Take 81 mg by mouth nightly          STOP taking these medications       atorvastatin (LIPITOR) 40 MG tablet Comments:   Reason for Stopping:         ondansetron (ZOFRAN) 4 MG tablet Comments:   Reason for Stopping:         Multiple Vitamins-Minerals (MULTIVITAMIN ADULT PO) Comments:   Reason for Stopping:         Probiotic Product (PROBIOTIC ADVANCED PO) Comments:   Reason for Stopping:         triamcinolone (KENALOG) 0.1 % cream Comments:   Reason for Stopping:         loperamide (IMODIUM A-D) 2 MG capsule Comments:   Reason for Stopping:         Lomitapide Mesylate 5 MG CAPS Comments:   Reason for Stopping:         vitamin B-6 (B-6) 50 MG tablet Comments:   Reason for Stopping:             Activity: activity as tolerated  Diet: regular diet    Follow-up with 1wk PCP        Signed:  Tonya Hays MD  2/26/2021  2:46 PM

## 2021-02-26 NOTE — PLAN OF CARE
Problem: Falls - Risk of:  Goal: Will remain free from falls  Description: Will remain free from falls  2/26/2021 0924 by Stacy Arreola  Outcome: Met This Shift  2/26/2021 0202 by Pollo Keys RN  Outcome: Met This Shift  Goal: Absence of physical injury  Description: Absence of physical injury  2/26/2021 0924 by Stacy Arreola  Outcome: Met This Shift  2/26/2021 0202 by Pollo Keys RN  Outcome: Met This Shift     Problem: Skin Integrity:  Goal: Will show no infection signs and symptoms  Description: Will show no infection signs and symptoms  2/26/2021 0924 by Stacy Arreola  Outcome: Met This Shift  2/26/2021 0202 by Pollo Keys RN  Outcome: Not Met This Shift  Goal: Absence of new skin breakdown  Description: Absence of new skin breakdown  2/26/2021 0924 by Stacy Arreola  Outcome: Met This Shift  2/26/2021 0202 by Pollo Keys RN  Outcome: Met This Shift

## 2021-03-09 NOTE — PROGRESS NOTES
[address] [facility]  [address] [place] [id number]  Dept: [phone]  Attending Progress Note      Reason for Visit:   Pancreatic cancer. Referring physician: Self-referred. PCP:  Junie Martinez MD    History of Present Illness: The patient is a very pleasant 79-year-old gentleman, with a past medical history significant for hypertension, coronary artery disease, and peripheral vascular disease, he was diagnosed with locally advanced pancreatic head adenocarcinoma in May 2020, the pancreatic head mass encircles the hepatic artery 360 degrees, without any evidence of metastatic disease. He was started on FOLFIRINOX chemotherapy on 6/29/2020, the patient has been under the great care of Dr. Bam Grey locally, and Dr. Cristobal Gage at [facility] the patient had side effects from the chemotherapy including thrombocytopenia and diarrhea. The oxaliplatin dose has been decreased secondary to thrombocytopenia. The patient had CT scans of the chest and pancreas done in September 2020, revealing a 5.1 x 5.1 x 5.6 cm infiltrative mass centered superior to the pancreatic head/neck infiltrating the robert hepatis and invading the liver and second duodenal, had decreased in size compared to prior exam, partial abutment of the posterior stomach, medial right hepatic lobe and anterior IVC also noted, extensive vascular involvement of the robert hepatis and celiac, mild nonspecific infiltrative changes in the upper abdominal fat, developing carcinomatosis is not excluded, interval resolution of previously described right upper lobe nodular opacities, residual subcentimeter thick nodular opacity in the left lung measuring up to 5 mm, stable, new small left pleural effusion, nonspecific sclerotic focus in the left posterior fourth rib, stable.      The patient had a virtual visit with Dr. David Hyman on 10 3/13/2020,he  was recommended to continue FOLFIRINOX for 6 more cycles, which could be modified to make it tolerable, even evaluation of one of the drops to his oxaliplatin is warranted. He could begin it for SBRT if he continues to have a locally advanced disease. The patient has received 8 cycles, 8th cycle was on 10/23/2020. The patient was admitted to the hospital on 11/1/2020 with acute respiratory failure secondary to COVID-19 pneumonia, he was also diagnosed with C. difficile colitis. He is feeling much better at this time, he completed the course of Flagyl, 12/4/2020. Chemotherapy was restarted on 12/8/2020, he was admitted to the hospital with C. difficile colitis, he was treated with oral vancomycin. The patient was seen by ID, was recommended vancomycin 1 2 5 mg p.o. 3 times weekly, if worsening diarrhea to go back on a treatment dose 125 mg p.o. every 6 hours. Was cleared to restart the chemotherapy. The patient went to [place], treatment was resumed on 2/2/2021. He had restaging CT scans done at [facility] on 2/11/2021, revealing disease progression. The patient was admitted to the hospital in February with CVA, he had right ICA stenting done. His symptoms had resolved. He was started on 2/23/2021 on systemic therapy with gemcitabine and Abraxane. Patient was admitted to the hospital with C. difficile colitis, he was noted taking the vancomycin 3 times weekly as recommended by ID. He is on vancomycin taper, he is feeling well, the diarrhea had resolved. Review of Systems;  CONSTITUTIONAL: No fever, chills. Improved appetite and energy level. ENMT: Eyes: No diplopia; Nose: No epistaxis. Mouth: No sore throat. RESPIRATORY: No hemoptysis, shortness of breath, cough. CARDIOVASCULAR: No chest pain, palpitations. GASTROINTESTINAL: No nausea/vomiting, abdominal pain, no diarrhea. GENITOURINARY: No dysuria, urinary frequency, hematuria. NEURO: As per HPI.   Remainder:  ROS NEGATIVE    Past Medical History:      Diagnosis Date    CAD (coronary artery disease)     Cancer ([facility] [address] 05/2020    pancreas    Heart attack ([facility] [address] 2008, 3-2015    Hypertension      Patient Active Problem List   Diagnosis    Atherosclerosis of native artery of both lower extremities with intermittent claudication ([address]    Atheroscler of native artery of right leg with intermit claudication (HCC)    Carcinoma of head of pancreas ([facility] [facility]    Agranulocytosis secondary to cancer chemotherapy (CODE) ([facility] [address] [facility]     COVID-19    Diarrhea    History of 2019 novel coronavirus disease (COVID-19)    CAD (coronary artery disease)    Essential hypertension    Hypoxia    Hypomagnesemia    Lactic acidosis    Clostridioides difficile diarrhea    Acute thromboembolic cerebrovascular accident ([address]    Dyslipidemia    C. difficile diarrhea    Sepsis ([facility] [address] [facility]    Anemia    Hyponatremia    Leukocytosis    Cardiomegaly        Past Surgical History:      Procedure Laterality Date    CARDIAC SURGERY      Quad 2014    CORONARY ANGIOPLASTY  2005    CABG TIMES 4 2015    CORONARY ANGIOPLASTY WITH STENT PLACEMENT      INGUINAL HERNIA REPAIR Right     IR CAROTID STENT UNI W PROTECTION  2/16/2021    IR CAROTID STENT UNI W PROTECTION 2/16/2021 Joann Mccarty MD [facility] PROCEDURES    CO THROMBOENDARTECTMY FEMORAL COMMON Right 9/17/2018    FEMORAL ENDARTERECTOMY performed by Jhoan Penn MD at [address] [place] [address] VASCULAR SURGERY  08/15/2018    abdominal aortogram with runoff by Dr. Osei Alejo       Family History:  Family History   Problem Relation Age of Onset    Breast Cancer Mother     Cancer Father         Colon    Cancer Maternal Aunt         Bladder    Cancer Maternal Grandfather         Bladder    Cancer Maternal Cousin         Leukemia    Breast Cancer Maternal Aunt     Cancer Maternal Cousin         Lung       Medications:  Reviewed and reconciled.     Social History:  Social History     Socioeconomic History    Marital status:      Spouse name: Not on file    Number of children: Not on file    Years of education: Not on file    Highest education level: Not on file   Occupational History    Not on file   Social Needs    Financial resource strain: Not on file    Food insecurity     Worry: Not on file     Inability: Not on file    Transportation needs     Medical: Not on file     Non-medical: Not on file   Tobacco Use    Smoking status: Former Smoker     Packs/day: 1.50     Years: 40.00     Pack years: 60.00     Types: Cigarettes     Quit date: 2008     Years since quittin.6    Smokeless tobacco: Never Used    Tobacco comment: occassional cigar   Substance and Sexual Activity    Alcohol use: Not Currently    Drug use: No    Sexual activity: Not Currently     Partners: Female   Lifestyle    Physical activity     Days per week: Not on file     Minutes per session: Not on file    Stress: Not on file   Relationships    Social connections     Talks on phone: Not on file     Gets together: Not on file     Attends Yazidism service: Not on file     Active member of club or organization: Not on file     Attends meetings of clubs or organizations: Not on file     Relationship status: Not on file    Intimate partner violence     Fear of current or ex partner: Not on file     Emotionally abused: Not on file     Physically abused: Not on file     Forced sexual activity: Not on file   Other Topics Concern    Not on file   Social History Narrative    Not on file       Allergies:  No Known Allergies    Physical Exam:  /80 (Site: Right Upper Arm, Position: Sitting, Cuff Size: Medium Adult)   Pulse 93   Temp 97.6 °F (36.4 °C) (Temporal)   Ht 5' 7\" (1.702 m)   Wt 172 lb 12.8 oz (78.4 kg)   SpO2 94%   BMI 27.06 kg/m²   GENERAL: Alert, oriented x 3, not in acute distress. HEENT: PERRLA; EOMI. left facial droop had resolved. NECK: Supple. No palpable cervical or supraclavicular lymphadenopathy. LUNGS: Good air entry bilaterally.  No wheezing, crackles or rhonchi. CARDIOVASCULAR: Regular rate. No murmurs, rubs or gallops. ABDOMEN: Soft. Non-tender, non-distended. Positive bowel sounds. EXTREMITIES: Without clubbing, cyanosis, or edema. NEUROLOGIC: Nonfocal.  ECOG PS 1    Impression/Plan:      The patient is a very pleasant 80-year-old gentleman, with a past medical history significant for hypertension, coronary artery disease, and peripheral vascular disease, he was diagnosed with locally advanced pancreatic head adenocarcinoma in May 2020, the pancreatic head mass encircles the hepatic artery 360 degrees, without any evidence of metastatic disease. He was started on FOLFIRINOX chemotherapy on 6/29/2020, the patient has been under the great care of Dr. Adolfo santana, and Dr. Sumanth Santos at [facility] the patient had side effects from the chemotherapy including thrombocytopenia and diarrhea. The oxaliplatin dose has been decreased secondary to thrombocytopenia. The patient had CT scans of the chest and pancreas done in September 2020, revealing a 5.1 x 5.1 x 5.6 cm infiltrative mass centered superior to the pancreatic head/neck infiltrating the robert hepatis and invading the liver and second duodenal, had decreased in size compared to prior exam, partial abutment of the posterior stomach, medial right hepatic lobe and anterior IVC also noted, extensive vascular involvement of the robert hepatis and celiac, mild nonspecific infiltrative changes in the upper abdominal fat, developing carcinomatosis is not excluded, interval resolution of previously described right upper lobe nodular opacities, residual subcentimeter thick nodular opacity in the left lung measuring up to 5 mm, stable, new small left pleural effusion, nonspecific sclerotic focus in the left posterior fourth rib, stable.      The patient had a virtual visit with Dr. Hadley Corona on 10 3/13/2020,he  was recommended to continue FOLFIRINOX for 6 more cycles, which could be modified to make it tolerable, even evaluation of one of the drops to his oxaliplatin is warranted. He could begin it for SBRT if he continues to have a locally advanced disease. The patient has received 8 cycles, eighth cycle was on 10/23/2020. The patient was admitted to the hospital on 11/1/2020 with acute respiratory failure secondary to COVID-19 pneumonia, he was also diagnosed with C. difficile colitis. He is feeling much better at this time, he completed the course of Flagyl on 12/4/2020. He was restarted on chemotherapy with FOLFIRINOX on 12/18/2020, cycle #9. Chemotherapy was restarted on 12/8/2020, he was admitted to the hospital with C. difficile colitis, he was treated with oral vancomycin. The patient was seen by ID, was recommended vancomycin 125 mg p.o. 3 times weekly, if worsening diarrhea to go back on a treatment dose 125 mg p.o. every 6 hours. Was cleared to restart the chemotherapy. The patient went to [place], treatment was resumed on 2/2/2021. He had restaging CT [facility] done [facility] on 2/11/2021, revealing new patchy groundglass opacities and reticular opacities in the bilateral lung fields, which I believe is secondary to the Covid pneumonia, interval resolution of the previously noted subcentimeter nodular opacities and left pleural effusion, nonspecific sclerotic focus in the left posterior fourth rib, stable, esophagitis, CT scan of the abdomen the pelvis had revealed increase in the size of the pancreatic head mass measuring now 6 x 5.5 cm, previously 5 x 4.5 cm, extending into the left hepatic lobe and caudate lobe, persistent encasement of the robert hepatis, mild hepatic biliary ductal dilatation, increased, mild splenomegaly, cholelithiasis, interval resolution of the previous described haziness of the mesenteric fat. The patient has disease progression, recommended changing systemic therapy to gemcitabine Abraxane.     The patient was admitted to the hospital in February with CVA, he had right ICA stenting done. His symptoms had resolved. He was started on 2/23/2021 on systemic therapy with gemcitabine and Abraxane. Patient was admitted to the hospital with C. difficile colitis, he was not taking the vancomycin 3 times weekly as recommended by ID. He is on vancomycin taper, he is feeling well, the diarrhea had resolved. He is scheduled to receive gemcitabine and Abraxane, cycle #1, day #15 today 3/9/2021, the patient had a virtual visit with Dr. Ruchi Hodge, radiation therapy may be an option for him, his case will be discussed in tumor board tomorrow, will hold the chemotherapy today, he will continue the vancomycin taper. Await further recommendations from the [facility] tumor board. RTC in 1 week. Thank you for allowing us to participate in the care of Mr. Anamaria Price.     Mary Ellen Conroy MD   HEMATOLOGY/MEDICAL ONCOLOGY  [address] [place] [address] ONCOLOGY  [facility] [address] [facility] [id number]  Dept: [phone]

## 2021-03-11 NOTE — DISCHARGE SUMMARY
Physician Discharge Summary     Patient ID:  Mehul Asencio  [id number]  72 y.o.  1950    Admit date: 2/16/2021    Discharge date and time: 2/18/2021  Admission Diagnoses:   Patient Active Problem List   Diagnosis    Atherosclerosis of native artery of both lower extremities with intermittent claudication ([facility]    Atheroscler of native artery of right leg with intermit claudication (HCC)    Carcinoma of head of pancreas ([facility]    Agranulocytosis secondary to cancer chemotherapy (CODE) ([facility]     COVID-19    Diarrhea    History of 2019 novel coronavirus disease (COVID-19)    CAD (coronary artery disease)    Essential hypertension    Hypoxia    Hypomagnesemia    Lactic acidosis    Clostridioides difficile diarrhea    Acute thromboembolic cerebrovascular accident ([facility]    Dyslipidemia    C. difficile diarrhea    Sepsis ([facility]    Anemia    Hyponatremia    Leukocytosis    Cardiomegaly       Discharge Diagnoses : TIA/stroke concern    Consults: Neurology    Procedures: IV TPA , IR intervention for ischemic penumbra on CT PE    Hospital Course: 69-year-old male history of pancreatic CA on chemo, recent CVA with right ICA stent, history of C. difficile admitted with diarrhea, suspected recurrent C. difficile, and sepsis     1. Stroke alert called in the emergency room  2. Stat CT head CTA head neck brain  3. Stroke management  4. Patient to undergo diagnostic angiogram with stenting in interventional radiology  5. Lipid panel and hemoglobin A1c needs tight control of risk factors  6. Monitor blood pressure adjust medications as needed. 7.  Patient to transfer to CVIC/SICU after procedure. Admitted for acute stroke symptoms  Out of Tpa window    symtpoms persistent on arrrival to ER   Taken for IR procedure given CTP with ischemic penumbra  MRI/ US carotids pending on 2.16   Risk factor modification   No results for input(s): WBC, HGB, HCT, PLT in the last 72 hours.     No results for input(s): NA, K, CL, CO2, BUN, CREATININE, CALCIUM in the last 72 hours. Invalid input(s): GLU    No results found. Discharge Exam:    HEENT: NCAT,  PERRLA, No JVD  Heart:  RRR, no murmurs, gallops, or rubs. Lungs:  CTA bilaterally, no wheeze, rales or rhonchi  Abd: bowel sounds present, nontender, nondistended, no masses  Extrem:  No clubbing, cyanosis, or edema    Disposition: home     Patient Condition at Discharge: Stable    Patient Instructions:      Medication List      START taking these medications    ticagrelor 90 MG Tabs tablet  Commonly known as: Brilinta  Take 1 tablet by mouth 2 times daily        CONTINUE taking these medications    aspirin EC 81 MG EC tablet     b complex vitamins capsule     carvedilol 12.5 MG tablet  Commonly known as: COREG     DULoxetine 30 MG extended release capsule  Commonly known as: CYMBALTA     oxyCODONE 5 MG capsule     pregabalin 100 MG capsule  Commonly known as: LYRICA     zinc gluconate 50 MG tablet  Take 1 tablet by mouth daily           Where to Get Your Medications      These medications were sent to [address] [place] [id number]    Phone: [phone]   · ticagrelor 90 MG Tabs tablet       Activity: activity as tolerated  Diet: low fat, low cholesterol diet    Pt has been advised to: Follow-up with Jamilah Dobbs MD in 1 week.   Follow-up with consultants as recommended by them    Note that over 30 minutes was spent in preparing discharge papers, discussing discharge with patient, medication review, etc.    Signed:  Darian De La Cruz MD  3/11/2021  11:12 AM

## 2021-03-16 NOTE — PROGRESS NOTES
modified to make it tolerable, even evaluation of one of the drops to his oxaliplatin is warranted. He could begin it for SBRT if he continues to have a locally advanced disease. The patient has received 8 cycles, 8th cycle was on 10/23/2020. The patient was admitted to the hospital on 11/1/2020 with acute respiratory failure secondary to COVID-19 pneumonia, he was also diagnosed with C. difficile colitis. He is feeling much better at this time, he completed the course of Flagyl, 12/4/2020. Chemotherapy was restarted on 12/8/2020, he was admitted to the hospital with C. difficile colitis, he was treated with oral vancomycin. The patient was seen by ID, was recommended vancomycin 1 2 5 mg p.o. 3 times weekly, if worsening diarrhea to go back on a treatment dose 125 mg p.o. every 6 hours. Was cleared to restart the chemotherapy. The patient went to [place], treatment was resumed on 2/2/2021. He had restaging CT scans done at [facility] on 2/11/2021, revealing disease progression. The patient was admitted to the hospital in February with CVA, he had right ICA stenting done. His symptoms had resolved. He was started on 2/23/2021 on systemic therapy with gemcitabine and Abraxane. Patient was admitted to the hospital with C. difficile colitis, he was noted taking the vancomycin 3 times weekly as recommended by ID. He is on vancomycin taper. The patient is feeling well at this time, he denies any abdominal pain, no nausea vomiting or diarrhea. Peripheral neuropathy is stable. Review of Systems;  CONSTITUTIONAL: No fever, chills. Improved appetite and energy level. ENMT: Eyes: No diplopia; Nose: No epistaxis. Mouth: No sore throat. RESPIRATORY: No hemoptysis, shortness of breath, cough. CARDIOVASCULAR: No chest pain, palpitations. GASTROINTESTINAL: No nausea/vomiting, abdominal pain, no diarrhea. GENITOURINARY: No dysuria, urinary frequency, hematuria. NEURO: As per HPI.   Remainder:  ROS NEGATIVE    Past Medical History:      Diagnosis Date    CAD (coronary artery disease)     Cancer [facility] 05/2020    pancreas    Heart attack ([address] 2008, 3-2015    Hypertension      Patient Active Problem List   Diagnosis    Atherosclerosis of native artery of both lower extremities with intermittent claudication ([address]    Atheroscler of native artery of right leg with intermit claudication (HCC)    Carcinoma of head of pancreas ([address]    Agranulocytosis secondary to cancer chemotherapy (CODE) ([facility]     COVID-19    Diarrhea    History of 2019 novel coronavirus disease (COVID-19)    CAD (coronary artery disease)    Essential hypertension    Hypoxia    Hypomagnesemia    Lactic acidosis    Clostridioides difficile diarrhea    Acute thromboembolic cerebrovascular accident ([facility]    Dyslipidemia    C. difficile diarrhea    Sepsis ([facility]    Anemia    Hyponatremia    Leukocytosis    Cardiomegaly        Past Surgical History:      Procedure Laterality Date    CARDIAC SURGERY      Quad 2014    CORONARY ANGIOPLASTY  2005    CABG TIMES 4 2015    CORONARY ANGIOPLASTY WITH STENT PLACEMENT      INGUINAL HERNIA REPAIR Right     IR CAROTID STENT UNI W PROTECTION  2/16/2021    IR CAROTID STENT UNI W PROTECTION 2/16/2021 Joann Mccarty MD [facility]    CO THROMBOENDARTECTMY FEMORAL COMMON Right 9/17/2018    FEMORAL ENDARTERECTOMY performed by Jhoan Penn MD at [address] [place] [address] VASCULAR SURGERY  08/15/2018    abdominal aortogram with runoff by Dr. Osei Alejo       Family History:  Family History   Problem Relation Age of Onset    Breast Cancer Mother     Cancer Father         Colon    Cancer Maternal Aunt         Bladder    Cancer Maternal Grandfather         Bladder    Cancer Maternal Cousin         Leukemia    Breast Cancer Maternal Aunt     Cancer Maternal Cousin         Lung       Medications:  Reviewed and reconciled.     Social History:  Social History Socioeconomic History    Marital status:      Spouse name: Not on file    Number of children: Not on file    Years of education: Not on file    Highest education level: Not on file   Occupational History    Not on file   Social Needs    Financial resource strain: Not on file    Food insecurity     Worry: Not on file     Inability: Not on file    Transportation needs     Medical: Not on file     Non-medical: Not on file   Tobacco Use    Smoking status: Former Smoker     Packs/day: 1.50     Years: 40.00     Pack years: 60.00     Types: Cigarettes     Quit date: 2008     Years since quittin.6    Smokeless tobacco: Never Used    Tobacco comment: occassional cigar   Substance and Sexual Activity    Alcohol use: Not Currently    Drug use: No    Sexual activity: Not Currently     Partners: Female   Lifestyle    Physical activity     Days per week: Not on file     Minutes per session: Not on file    Stress: Not on file   Relationships    Social connections     Talks on phone: Not on file     Gets together: Not on file     Attends Alevism service: Not on file     Active member of club or organization: Not on file     Attends meetings of clubs or organizations: Not on file     Relationship status: Not on file    Intimate partner violence     Fear of current or ex partner: Not on file     Emotionally abused: Not on file     Physically abused: Not on file     Forced sexual activity: Not on file   Other Topics Concern    Not on file   Social History Narrative    Not on file       Allergies:  No Known Allergies    Physical Exam:  BP (!) 162/77 (Site: Right Upper Arm, Position: Sitting, Cuff Size: Medium Adult)   Pulse 93   Temp 97.8 °F (36.6 °C) (Temporal)   Ht 5' 7\" (1.702 m)   Wt 172 lb (78 kg)   SpO2 96%   BMI 26.94 kg/m²   GENERAL: Alert, oriented x 3, not in acute distress. HEENT: PERRLA; EOMI. left facial droop had resolved. NECK: Supple.  No palpable cervical or supraclavicular lymphadenopathy. LUNGS: Good air entry bilaterally. No wheezing, crackles or rhonchi. CARDIOVASCULAR: Regular rate. No murmurs, rubs or gallops. ABDOMEN: Soft. Non-tender, non-distended. Positive bowel sounds. EXTREMITIES: Without clubbing, cyanosis, or edema. NEUROLOGIC: Nonfocal.  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 He was started on FOLFIRINOX chemotherapy on 6/29/2020, the patient has been under the great care of Dr. Kennedy Rivera locally, and Dr. Belkis Santo at [facility] the patient had side effects from the chemotherapy including thrombocytopenia and diarrhea. The oxaliplatin dose has been decreased secondary to thrombocytopenia. The patient had CT scans of the chest and pancreas done in September 2020, revealing a 5.1 x 5.1 x 5.6 cm infiltrative mass centered superior to the pancreatic head/neck infiltrating the robert hepatis and invading the liver and second duodenal, had decreased in size compared to prior exam, partial abutment of the posterior stomach, medial right hepatic lobe and anterior IVC also noted, extensive vascular involvement of the robert hepatis and celiac, mild nonspecific infiltrative changes in the upper abdominal fat, developing carcinomatosis is not excluded, interval resolution of previously described right upper lobe nodular opacities, residual subcentimeter thick nodular opacity in the left lung measuring up to 5 mm, stable, new small left pleural effusion, nonspecific sclerotic focus in the left posterior fourth rib, stable.      The patient had a virtual visit with Dr. Arelis Dickinson on 10 3/13/2020,he  was recommended to continue FOLFIRINOX for 6 more cycles, which could be modified to make it tolerable, even evaluation of one of the drops to his oxaliplatin is warranted. He could begin it for SBRT if he continues to have a locally advanced disease. The patient has received 8 cycles, eighth cycle was on 10/23/2020. The patient was admitted to the hospital on 11/1/2020 with acute respiratory failure secondary to COVID-19 pneumonia, he was also diagnosed with C. difficile colitis. He is feeling much better at this time, he completed the course of Flagyl on 12/4/2020. He was restarted on chemotherapy with FOLFIRINOX on 12/18/2020, cycle #9. Chemotherapy was restarted on 12/8/2020, he was admitted to the hospital with C. difficile colitis, he was treated with oral vancomycin. The patient was seen by ID, was recommended vancomycin 125 mg p.o. 3 times weekly, if worsening diarrhea to go back on a treatment dose 125 mg p.o. every 6 hours. Was cleared to restart the chemotherapy. The patient went to [place], treatment was resumed on 2/2/2021. He had restaging CT scans done at [facility] on 2/11/2021, revealing new patchy groundglass opacities and reticular opacities in the bilateral lung fields, which I believe is secondary to the Covid pneumonia, interval resolution of the previously noted subcentimeter nodular opacities and left pleural effusion, nonspecific sclerotic focus in the left posterior fourth rib, stable, esophagitis, CT scan of the abdomen the pelvis had revealed increase in the size of the pancreatic head mass measuring now 6 x 5.5 cm, previously 5 x 4.5 cm, extending into the left hepatic lobe and caudate lobe, persistent encasement of the robert hepatis, mild hepatic biliary ductal dilatation, increased, mild splenomegaly, cholelithiasis, interval resolution of the previous described haziness of the mesenteric fat. The patient has disease progression, recommended changing systemic therapy to gemcitabine Abraxane.     The patient was admitted to the hospital in February with CVA, he had right ICA stenting done. His symptoms had resolved. He was started on 2/23/2021 on systemic therapy with gemcitabine and Abraxane. Patient was admitted to the hospital with C. difficile colitis, he was not taking the vancomycin 3 times weekly as recommended by ID. He is on vancomycin taper, he is feeling well, the diarrhea had resolved. He was scheduled to receive gemcitabine and Abraxane, cycle #1, day #15 on 3/9/2021, the patient had a virtual visit with Dr. Drake Castañeda, radiation therapy may be an option for him, chemotherapy was held, his case was discussed in tumor board on 3/10/2021, radiation therapy and break from chemotherapy were recommended. Referral was placed to [address] [place] [address]. The patient has worsening transaminitis, elevated alk phos, bilirubin is 1.2, case discussed with Dr. Sanket Praada, he might be starting to have biliary obstruction, an MRI will be ordered, as well as referred to Dr. Sanket Parada ad Dr. Eugenie Perez. RTC in 1 week. Thank you for allowing us to participate in the care of Mr. Judith Ye.     Bruce Garcia MD   HEMATOLOGY/MEDICAL ONCOLOGY  [address] [place] [address] MED ONCOLOGY  [facility] [address] [facility] [id number]  Dept: [phone]

## 2021-03-19 NOTE — PROGRESS NOTES
Hepatobiliary and Pancreatic Surgery Attending History and Physical    Patient's Name/Date of Birth: Jose Maria Shine /1950 (16 y.o.)    Date: March 19, 2021     CC: elevated bilirubin    HPI:  Patient is a very pleasant 72year old male whom was diagnosed with locally advanced unresectable pancreatic adenocarcinoma. He subsequently has become more jaundiced.       Past Medical History:   Diagnosis Date    CAD (coronary artery disease)     Cancer ([facility] 05/2020    pancreas    Heart attack ([facility] 2008, 3-2015    Hypertension        Past Surgical History:   Procedure Laterality Date    CARDIAC SURGERY      Quad 2014    CORONARY ANGIOPLASTY  2005    CABG TIMES 4 2015    CORONARY ANGIOPLASTY WITH STENT PLACEMENT      INGUINAL HERNIA REPAIR Right     IR CAROTID STENT UNI W PROTECTION  2/16/2021    IR CAROTID STENT UNI W PROTECTION 2/16/2021 Tonny Solis MD [facility]    CA THROMBOENDARTECTMY FEMORAL COMMON Right 9/17/2018    FEMORAL ENDARTERECTOMY performed by Basilio Bergeron MD at [address] [place] [address]  08/15/2018    abdominal aortogram with runoff by Dr. Aydee Rea       Current Outpatient Medications   Medication Sig Dispense Refill    atorvastatin (LIPITOR) 80 MG tablet Take 80 mg by mouth daily      ticagrelor (BRILINTA) 90 MG TABS tablet Take 1 tablet by mouth 2 times daily 60 tablet 0    atorvastatin (LIPITOR) 40 MG tablet Take 40 mg by mouth nightly      vancomycin (VANCOCIN) 125 MG capsule 4 times daily for 2 weeks, 3 times daily for 1 week, 2 times daily for 1 week , then daily for 1 week, then every other day until chemotherapy is completed 120 capsule 1    lipase-protease-amylase (CREON) 67[address]-30022 units delayed release capsule Take 1 capsule by mouth 3 times daily as needed      mirtazapine (REMERON) 15 MG tablet Take 15 mg by mouth nightly      Multiple Vitamins-Minerals (THERAPEUTIC MULTIVITAMIN-MINERALS) tablet Take 1 tablet by mouth daily      ticagrelor (BRILINTA) 90 MG TABS tablet Take 1 tablet by mouth 2 times daily 60 tablet 0    pregabalin (LYRICA) 100 MG capsule Take 200 mg by mouth 2 times daily.  b complex vitamins capsule Take 1 capsule by mouth daily      DULoxetine (CYMBALTA) 30 MG extended release capsule Take 30 mg by mouth 2 times daily       zinc gluconate 50 MG tablet Take 1 tablet by mouth daily 30 tablet 3    carvedilol (COREG) 12.5 MG tablet Take 12.5 mg by mouth 2 times daily (with meals)      oxyCODONE 5 MG capsule Take 5 mg by mouth every 4 hours as needed for Pain.  aspirin EC 81 MG EC tablet Take 81 mg by mouth nightly        No current facility-administered medications for this visit.       Facility-Administered Medications Ordered in Other Visits   Medication Dose Route Frequency Provider Last Rate Last Admin    sodium chloride flush 0.9 % injection 10 mL  10 mL Intravenous PRN Analilia Alpha, APRN - CNP   10 mL at 03/19/21 1015    heparin flush 100 UNIT/ML injection 500 Units  500 Units Intracatheter PRN Analilia Alpha, APRN - CNP   500 Units at 03/19/21 1015       No Known Allergies    Family History   Problem Relation Age of Onset    Breast Cancer Mother     Cancer Father         Colon    Cancer Maternal Aunt         Bladder    Cancer Maternal Grandfather         Bladder    Cancer Maternal Cousin         Leukemia    Breast Cancer Maternal Aunt     Cancer Maternal Cousin         Lung       Social History     Socioeconomic History    Marital status:      Spouse name: Not on file    Number of children: Not on file    Years of education: Not on file    Highest education level: Not on file   Occupational History    Not on file   Social Needs    Financial resource strain: Not on file    Food insecurity     Worry: Not on file     Inability: Not on file    Transportation needs     Medical: Not on file     Non-medical: Not on file   Tobacco Use    Smoking status: Former Smoker Packs/day: 1.50     Years: 40.00     Pack years: 60.00     Types: Cigarettes     Quit date: 2008     Years since quittin.6    Smokeless tobacco: Never Used    Tobacco comment: occassional cigar   Substance and Sexual Activity    Alcohol use: Not Currently    Drug use: No    Sexual activity: Not Currently     Partners: Female   Lifestyle    Physical activity     Days per week: Not on file     Minutes per session: Not on file    Stress: Not on file   Relationships    Social connections     Talks on phone: Not on file     Gets together: Not on file     Attends Shinto service: Not on file     Active member of club or organization: Not on file     Attends meetings of clubs or organizations: Not on file     Relationship status: Not on file    Intimate partner violence     Fear of current or ex partner: Not on file     Emotionally abused: Not on file     Physically abused: Not on file     Forced sexual activity: Not on file   Other Topics Concern    Not on file   Social History Narrative    Not on file       ROS:   Review of Systems   Constitutional: Negative for chills, diaphoresis and fever. HENT: Negative for congestion, ear discharge, ear pain, hearing loss, nosebleeds and tinnitus. Eyes: Negative for photophobia, pain and discharge. Respiratory: Negative for shortness of breath. Cardiovascular: Negative for palpitations and leg swelling. Gastrointestinal: Negative for abdominal pain, blood in stool, constipation, diarrhea, nausea and vomiting. Endocrine: Negative for polydipsia. Genitourinary: Negative for frequency, hematuria and urgency. Musculoskeletal: Negative for back pain and neck pain. Skin: Negative for rash. Allergic/Immunologic: Negative for environmental allergies. Neurological: Negative for tremors and seizures. Psychiatric/Behavioral: Negative for hallucinations and suicidal ideas. The patient is not nervous/anxious.         Physical Exam:  There were no vitals taken for this visit. PSYCH: mood and affect normal, alert and oriented x 3: No apparent distress, comfortable  EYES: Sclera white, pupils equal round and reactive to light  ENMT:  Hearing normal, trachea midline, ears externally intact  LYMPH: no obvious lympadenopathy in neck. RESP: Respiratory effort was normal with no retractions or use of accessory muscles. CV:  No pedal edema  GI/ Abdomen: Soft, nondistended, nontender, no guarding, no peritoneal signs  MSK: no clubbing/ no cyanosis/ gaitnormal       Assessment/Plan:  Obstructive jaundice secondary to pancreatic had neoplasm  - admit Sunday  - MRI Sunday  - ERCP         Thank you for the consultation allowing me to take part in Mr. Nupur nair.      Em Monteiro M.D.  3/19/2021  11:49 AM

## 2021-03-19 NOTE — TELEPHONE ENCOUNTER
Dr. Jack Camp is admitting patient on 3/21/21 to [facility] for obstructive jaundice, patient needs stent placement. I called access line and spoke to Mauri Tucker and gave her all the information needed for the admission on Sunday. I requested a medical floor at [facility] and she stated someone will call patient with his room assignment and what time to arrive prior to 3/21/21. Patient was given this information at his appt today and he verbalized understanding.       Electronically signed by Bolivar Ashley RN on 3/19/2021 at 11:58 AM

## 2021-03-19 NOTE — PROGRESS NOTES
[address] [facility]  [address] [place] [id number]  Dept: [phone]  Attending Progress Note      Reason for Visit:   Pancreatic cancer. Referring physician: Self-referred. 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 He was started on FOLFIRINOX chemotherapy on 6/29/2020, the patient has been under the great care of Dr. Sami Duckworth locally, and Dr. Ene Sanchez at [facility] the patient had side effects from the chemotherapy including thrombocytopenia and diarrhea. The oxaliplatin dose has been decreased secondary to thrombocytopenia. The patient had CT scans of the chest and pancreas done in September 2020, revealing a 5.1 x 5.1 x 5.6 cm infiltrative mass centered superior to the pancreatic head/neck infiltrating the robert hepatis and invading the liver and second duodenal, had decreased in size compared to prior exam, partial abutment of the posterior stomach, medial right hepatic lobe and anterior IVC also noted, extensive vascular involvement of the robert hepatis and celiac, mild nonspecific infiltrative changes in the upper abdominal fat, developing carcinomatosis is not excluded, interval resolution of previously described right upper lobe nodular opacities, residual subcentimeter thick nodular opacity in the left lung measuring up to 5 mm, stable, new small left pleural effusion, nonspecific sclerotic focus in the left posterior fourth rib, stable.      The patient had a virtual visit with Dr. Shantel Irene on 10 3/13/2020,he  was recommended to continue FOLFIRINOX for 6 more cycles, which could be modified to make it tolerable, even evaluation of one of the drops to his oxaliplatin is warranted. He could begin it for SBRT if he continues to have a locally advanced disease. The patient has received 8 cycles, 8th cycle was on 10/23/2020. The patient was admitted to the hospital on 11/1/2020 with acute respiratory failure secondary to COVID-19 pneumonia, he was also diagnosed with C. difficile colitis. He is feeling much better at this time, he completed the course of Flagyl, 12/4/2020. Chemotherapy was restarted on 12/8/2020, he was admitted to the hospital with C. difficile colitis, he was treated with oral vancomycin. The patient was seen by ID, was recommended vancomycin 1 2 5 mg p.o. 3 times weekly, if worsening diarrhea to go back on a treatment dose 125 mg p.o. every 6 hours. Was cleared to restart the chemotherapy. The patient went to [place], treatment was resumed on 2/2/2021. He had restaging CT scans done at [facility] on 2/11/2021, revealing disease progression. The patient was admitted to the hospital in February with CVA, he had right ICA stenting done. His symptoms had resolved. He was started on 2/23/2021 on systemic therapy with gemcitabine and Abraxane. Patient was admitted to the hospital with C. difficile colitis, he was noted taking the vancomycin 3 times weekly as recommended by ID. He was discharged on vancomycin taper. The patient denies any abdominal pain, no nausea or vomiting, he has dark urine intermittently. Review of Systems;  CONSTITUTIONAL: No fever, chills. Improved appetite and energy level. ENMT: Eyes: No diplopia; Nose: No epistaxis. Mouth: No sore throat. RESPIRATORY: No hemoptysis, shortness of breath, cough. CARDIOVASCULAR: No chest pain, palpitations. GASTROINTESTINAL: No nausea/vomiting, abdominal pain, no diarrhea. GENITOURINARY: No dysuria, urinary frequency, hematuria. Positive for dark urine. NEURO: As per HPI.   Remainder:  ROS NEGATIVE    Past Medical History:      Diagnosis Date    CAD (coronary artery disease)     Cancer ([facility] 05/2020    pancreas    Heart attack ([address] 2008, 3-2015    Hypertension      Patient Active Problem List   Diagnosis    Atherosclerosis of native artery of both lower extremities with intermittent claudication ([address]    Atheroscler of native artery of right leg with intermit claudication (HCC)    Carcinoma of head of pancreas ([address]    Agranulocytosis secondary to cancer chemotherapy (CODE) ([facility]     COVID-19    Diarrhea    History of 2019 novel coronavirus disease (COVID-19)    CAD (coronary artery disease)    Essential hypertension    Hypoxia    Hypomagnesemia    Lactic acidosis    Clostridioides difficile diarrhea    Acute thromboembolic cerebrovascular accident ([facility]    Dyslipidemia    C. difficile diarrhea    Sepsis ([facility]    Anemia    Hyponatremia    Leukocytosis    Cardiomegaly        Past Surgical History:      Procedure Laterality Date    CARDIAC SURGERY      Quad 2014    CORONARY ANGIOPLASTY  2005    CABG TIMES 4 2015    CORONARY ANGIOPLASTY WITH STENT PLACEMENT      INGUINAL HERNIA REPAIR Right     IR CAROTID STENT UNI W PROTECTION  2/16/2021    IR CAROTID STENT UNI W PROTECTION 2/16/2021 Anuj Shaffer MD [facility] PROCEDURES    NJ THROMBOENDARTECTMY FEMORAL COMMON Right 9/17/2018    FEMORAL ENDARTERECTOMY performed by Demetrio Pinon MD at [address] [place] [address] VASCULAR SURGERY  08/15/2018    abdominal aortogram with runoff by Dr. Viktor Noland       Family History:  Family History   Problem Relation Age of Onset    Breast Cancer Mother     Cancer Father         Colon    Cancer Maternal Aunt         Bladder    Cancer Maternal Grandfather         Bladder    Cancer Maternal Cousin         Leukemia    Breast Cancer Maternal Aunt     Cancer Maternal Cousin         Lung       Medications:  Reviewed and reconciled.     Social History:  Social History Socioeconomic History    Marital status:      Spouse name: Not on file    Number of children: Not on file    Years of education: Not on file    Highest education level: Not on file   Occupational History    Not on file   Social Needs    Financial resource strain: Not on file    Food insecurity     Worry: Not on file     Inability: Not on file    Transportation needs     Medical: Not on file     Non-medical: Not on file   Tobacco Use    Smoking status: Former Smoker     Packs/day: 1.50     Years: 40.00     Pack years: 60.00     Types: Cigarettes     Quit date: 2008     Years since quittin.6    Smokeless tobacco: Never Used    Tobacco comment: occassional cigar   Substance and Sexual Activity    Alcohol use: Not Currently    Drug use: No    Sexual activity: Not Currently     Partners: Female   Lifestyle    Physical activity     Days per week: Not on file     Minutes per session: Not on file    Stress: Not on file   Relationships    Social connections     Talks on phone: Not on file     Gets together: Not on file     Attends Yarsani service: Not on file     Active member of club or organization: Not on file     Attends meetings of clubs or organizations: Not on file     Relationship status: Not on file    Intimate partner violence     Fear of current or ex partner: Not on file     Emotionally abused: Not on file     Physically abused: Not on file     Forced sexual activity: Not on file   Other Topics Concern    Not on file   Social History Narrative    Not on file       Allergies:  No Known Allergies    Physical Exam:  /67   Pulse 77   Temp 98.4 °F (36.9 °C)   Ht 5' 7\" (1.702 m)   Wt 177 lb (80.3 kg)   SpO2 95%   BMI 27.72 kg/m²   GENERAL: Alert, oriented x 3, not in acute distress. HEENT: PERRLA; EOMI. left facial droop had resolved. NECK: Supple. No palpable cervical or supraclavicular lymphadenopathy. LUNGS: Good air entry bilaterally.  No wheezing, crackles or rhonchi. CARDIOVASCULAR: Regular rate. No murmurs, rubs or gallops. ABDOMEN: Soft. Non-tender, non-distended. Positive bowel sounds. EXTREMITIES: Without clubbing, cyanosis, or edema. NEUROLOGIC: Nonfocal.  ECOG PS 1    Impression/Plan:      The patient is a very pleasant 23-year-old gentleman, with a past medical history significant for hypertension, coronary artery disease, and peripheral vascular disease, he was diagnosed with locally advanced pancreatic head adenocarcinoma in May 2020, the pancreatic head mass encircles the hepatic artery 360 degrees, without any evidence of metastatic disease. He was started on FOLFIRINOX chemotherapy on 6/29/2020, the patient has been under the great care of Dr. Raz Whiteside locally, and Dr. Norbert Garza at [facility] the patient had side effects from the chemotherapy including thrombocytopenia and diarrhea. The oxaliplatin dose has been decreased secondary to thrombocytopenia. The patient had CT scans of the chest and pancreas done in September 2020, revealing a 5.1 x 5.1 x 5.6 cm infiltrative mass centered superior to the pancreatic head/neck infiltrating the robert hepatis and invading the liver and second duodenal, had decreased in size compared to prior exam, partial abutment of the posterior stomach, medial right hepatic lobe and anterior IVC also noted, extensive vascular involvement of the robert hepatis and celiac, mild nonspecific infiltrative changes in the upper abdominal fat, developing carcinomatosis is not excluded, interval resolution of previously described right upper lobe nodular opacities, residual subcentimeter thick nodular opacity in the left lung measuring up to 5 mm, stable, new small left pleural effusion, nonspecific sclerotic focus in the left posterior fourth rib, stable.      The patient had a virtual visit with Dr. Sulma Valdez on 10 3/13/2020,he  was recommended to continue FOLFIRINOX for 6 more cycles, which could be modified to make it tolerable, even evaluation of one of the drops to his oxaliplatin is warranted. He could begin it for SBRT if he continues to have a locally advanced disease. The patient has received 8 cycles, eighth cycle was on 10/23/2020. The patient was admitted to the hospital on 11/1/2020 with acute respiratory failure secondary to COVID-19 pneumonia, he was also diagnosed with C. difficile colitis. He is feeling much better at this time, he completed the course of Flagyl on 12/4/2020. He was restarted on chemotherapy with FOLFIRINOX on 12/18/2020, cycle #9. Chemotherapy was restarted on 12/8/2020, he was admitted to the hospital with C. difficile colitis, he was treated with oral vancomycin. The patient was seen by ID, was recommended vancomycin 125 mg p.o. 3 times weekly, if worsening diarrhea to go back on a treatment dose 125 mg p.o. every 6 hours. Was cleared to restart the chemotherapy. The patient went to [place], treatment was resumed on 2/2/2021. He had restaging CT scans done at [facility] on 2/11/2021, revealing new patchy groundglass opacities and reticular opacities in the bilateral lung fields, which I believe is secondary to the Covid pneumonia, interval resolution of the previously noted subcentimeter nodular opacities and left pleural effusion, nonspecific sclerotic focus in the left posterior fourth rib, stable, esophagitis, CT scan of the abdomen the pelvis had revealed increase in the size of the pancreatic head mass measuring now 6 x 5.5 cm, previously 5 x 4.5 cm, extending into the left hepatic lobe and caudate lobe, persistent encasement of the robert hepatis, mild hepatic biliary ductal dilatation, increased, mild splenomegaly, cholelithiasis, interval resolution of the previous described haziness of the mesenteric fat. The patient has disease progression, recommended changing systemic therapy to gemcitabine Abraxane.     The patient was admitted to the hospital in February with CVA, he had right ICA

## 2021-03-20 NOTE — ED PROVIDER NOTES
Department of Emergency Medicine   ED  Provider Note  Admit Date/RoomTime: 3/20/2021 11:14 AM  ED Room: [facility]          History of Present Illness:  3/20/21, Time: 12:37 PM EDT  Chief Complaint   Patient presents with   Tatiana Render     rt chest mediprot accessed 1000 yesterday, has been a leaking since; states \"steady slow stream\"        Zahira Rosas is a 72 y.o. male presenting to the ED for mediport bleeding, beginning yesterday. The complaint has been constant, mild in severity, and worsened by nothing. The patient is a 61-year-old male with a history of pancreatic cancer, CAD, hypertension, hyperlipidemia who presents to the emergency department complaining that he has had bleeding from his port since yesterday. The patient states that he had his Mediport accessed at 10:00 AM yesterday to have blood work drawn at his hematologist office. He states that since then it has been leaking and a steady slow stream.  The patient is not on any anticoagulation. He tried holding pressure on it at home without any relief. Denies any chest pain, shortness of breath, lightheadedness, dizziness, syncope, blood thinner use, history of blood clots or bleeding disorders, or other acute symptoms or concerns. Review of Systems:   Pertinent positives and negatives are stated within HPI, all other systems reviewed and are negative.        --------------------------------------------- PAST HISTORY ---------------------------------------------  Past Medical History:  has a past medical history of CAD (coronary artery disease), Cancer ([address] Heart attack ([facility] and Hypertension. Past Surgical History:  has a past surgical history that includes Stomach surgery; Inguinal hernia repair (Right); vascular surgery (08/15/2018); Coronary angioplasty (2005); pr thromboendartectmy femoral common (Right, 9/17/2018); Cardiac surgery; Coronary angioplasty with stent; and IR CAROTID STENT W PROTECTION (2/16/2021).     Social History: reports that he quit smoking about 12 years ago. His smoking use included cigarettes. He has a 60.00 pack-year smoking history. He has never used smokeless tobacco. He reports previous alcohol use. He reports that he does not use drugs. Family History: family history includes Breast Cancer in his maternal aunt and mother; Cancer in his father, maternal aunt, maternal cousin, maternal cousin, and maternal grandfather. . Unless otherwise noted, family history is non contributory    The patients home medications have been reviewed. Allergies: Patient has no known allergies. I have reviewed the past medical history, past surgical history, social history, and family history    ---------------------------------------------------PHYSICAL EXAM--------------------------------------    Constitutional/General: Alert and oriented x3  Head: Normocephalic and atraumatic  Eyes:  EOMI, sclera non icteric  Mouth: Oropharynx clear, handling secretions, no trismus, no asymmetry of the posterior oropharynx or uvular edema  Neck: Supple, full ROM, no stridor, no meningeal signs  Respiratory: Lungs clear to auscultation bilaterally, no wheezes, rales, or rhonchi. Not in respiratory distress  Cardiovascular:  Regular rate. Regular rhythm. No murmurs, no aortic murmurs, no gallops, or rubs  Chest: No chest wall tenderness. Mediport present in the right anterior chest wall with a slow trickle of blood from a pinpoint region. The surrounding area around the port is clean/dry/intact. Gastrointestinal:  Abdomen Soft, Non tender, Non distended. No rebound, guarding, or rigidity. No pulsatile masses. Musculoskeletal: Moves all extremities x 4. Warm and well perfused, no clubbing, no cyanosis, no edema. Capillary refill <3 seconds  Skin: skin warm and dry. No rashes.    Neurologic: GCS 15, no focal deficits, symmetric strength 5/5 in the upper and lower extremities bilaterally  Psychiatric: Normal Affect          -------------------------------------------------- RESULTS -------------------------------------------------  I have personally reviewed all laboratory and imaging results for this patient. Results are listed below. LABS: (Lab results interpreted by me)  No results found for this visit on 03/20/21.,       RADIOLOGY:  Interpreted by Radiologist unless otherwise specified  No orders to display         ------------------------- NURSING NOTES AND VITALS REVIEWED ---------------------------   The nursing notes within the ED encounter and vital signs as below have been reviewed by myself  /88   Pulse 84   Temp 98 °F (36.7 °C) (Oral)   Resp 18   Ht 5' 7\" (1.702 m)   Wt 170 lb (77.1 kg)   SpO2 97%   BMI 26.63 kg/m²     Oxygen Saturation Interpretation: 97 % on room air. Normal    The patients available past medical records and past encounters were reviewed. ------------------------------ ED COURSE/MEDICAL DECISION MAKING----------------------  Medications   lidocaine-EPINEPHrine 1 %-1:001270 injection (  Given by Other 3/20/21 1335)         Medical Decision Making:     The patient was seen and evaluated by the Attending Emergency Medicine Physician Dr. Elvira Bundy. The patient is a 71-year-old male presents to the emergency department complaining of a bleeding Mediport. The patient is hemodynamically stable, nontoxic in appearance, in no acute distress. Initially tried using quick clot and pressure over the Mediport for hemorrhage control. However, it continued to drain. Did provide lidocaine with epinephrine, and this did stop the bleeding. The patient was then observed for prolonged period of time and the bleeding had subsided after the lidocaine with epinephrine. Recommended patient to follow-up with family doctor. They are to return for any worsening symptoms or other acute symptoms or concerns.   Patient wife at bedside verbalized understanding agreement to treatment plan discharge instructions. Re-Evaluations:       Patient is feeling better, bleeding has subsided. This patient's ED course included: a personal history and physicial examination, re-evaluation prior to disposition, multiple bedside re-evaluations, IV medications, cardiac monitoring, continuous pulse oximetry and complex medical decision making and emergency management    This patient has remained hemodynamically stable during their ED course. Counseling: The emergency provider has spoken with the patient and discussed todays results, in addition to providing specific details for the plan of care and counseling regarding the diagnosis and prognosis. Questions are answered at this time and they are agreeable with the plan.       --------------------------------- IMPRESSION AND DISPOSITION ---------------------------------    IMPRESSION  1. Vascular port complication, initial encounter        DISPOSITION  Disposition: Discharge to home  Patient condition is stable        NOTE: This report was transcribed using voice recognition software.  Every effort was made to ensure accuracy; however, inadvertent computerized transcription errors may be present       Bryan Deal DO  Resident  03/20/21 [id number]

## 2021-03-21 PROBLEM — C80.1 OBSTRUCTIVE JAUNDICE DUE TO CANCER (HCC): Status: ACTIVE | Noted: 2021-01-01

## 2021-03-21 PROBLEM — K83.1 OBSTRUCTIVE JAUNDICE DUE TO CANCER (HCC): Status: ACTIVE | Noted: 2021-01-01

## 2021-03-21 PROBLEM — K83.1 OBSTRUCTIVE JAUNDICE: Status: ACTIVE | Noted: 2021-01-01

## 2021-03-21 NOTE — CONSULTS
Endoscopic Surgery Resident History and Physical    Patient's Name/Date of Birth: Hiram Kaur /1950 (40 y.o.)    Date: March 21, 2021     CC: Mediport Bleeding    HPI:  Hiram Kaur is a pleasant 58-year-old male with locally advanced pancreatic head adenocarcinoma diagnosed in May 2020 that encircles the hepatic artery 360 degrees without any evidence of metastatic disease who is followed by Dr. Bonnie Gardiner. He was started on FOLFIRINOX on 6/29/2020 and has been followed by Dr. Ellen Tobin locally. CT scan of the chest and pancreas in September showed an infiltrative mass centered superior to the pancreatic head/neck infiltrating the robert hepatis and invading the liver and second part of the duodenum with partial abutment of the posterior stomach. He was continued on chemotherapy 10/23/2020 and was admitted to the hospital 11/1/2020 with Covid pneumonia and respiratory failure. At this time he was diagnosed with C. difficile colitis and was treated with Flagyl. Chemotherapy was resumed on 12/8/20 and he was then admitted again with C. difficile colitis and was treated with oral vancomycin. He then went to [place] in February 2021 where he resumed treatment. He had restaging CT scans which revealed disease progression. He then had a right-sided CVA and underwent internal carotid artery stenting and has been on aspirin and Brilinta since. He presented to [facility], operated by [facility] yesterday for Mediport bleeding after he had the port accessed for routine blood work. The bleeding has since resolved. But he was also sent in for elevated LFTs, specifically a direct bili of 1.4. It is presumed that he may have obstruction of his biliary system due to the mass.     Past Medical History:   Diagnosis Date    CAD (coronary artery disease)     Cancer ([facility] [address] 05/2020    pancreas    Heart attack ([facility] [address] 2008, 3-2015    Hypertension        Past Surgical History:   Procedure Laterality Date   Alfredo Goncalves CARDIAC SURGERY      Quad 2014    CORONARY ANGIOPLASTY  2005    CABG TIMES 4 2015    CORONARY ANGIOPLASTY WITH STENT PLACEMENT      INGUINAL HERNIA REPAIR Right     IR CAROTID STENT UNI W PROTECTION  2/16/2021    IR CAROTID STENT UNI W PROTECTION 2/16/2021 Gustavo Cabrera MD [facility]    DC THROMBOENDARTECTMY FEMORAL COMMON Right 9/17/2018    FEMORAL ENDARTERECTOMY performed by Aidan Gonzáles MD at [facility] VASCULAR SURGERY  08/15/2018    abdominal aortogram with runoff by Dr. Dahl Her Facility-Administered Medications   Medication Dose Route Frequency Provider Last Rate Last Admin    sodium chloride flush 0.9 % injection 10 mL  10 mL Intravenous 2 times per day Sam Perez III, MD   10 mL at 03/21/21 0958    sodium chloride flush 0.9 % injection 10 mL  10 mL Intravenous PRN Sam Perez III, MD        ondansetron (ZOFRAN-ODT) disintegrating tablet 4 mg  4 mg Oral Q8H PRN Sam Perez III, MD        Or    ondansetron [facility]) injection 4 mg  4 mg Intravenous Q6H PRN Sam Perez III, MD        enoxaparin (LOVENOX) injection 40 mg  40 mg Subcutaneous Daily Sam Perez III, MD   40 mg at 03/21/21 5996    lactated ringers infusion   Intravenous Continuous Sam Perez III,  mL/hr at 03/21/21 0950 New Bag at 03/21/21 0950    cefTRIAXone (ROCEPHIN) 2,000 mg in sterile water 20 mL IV syringe  2,000 mg Intravenous Daily Sam Perez III, MD        metronidazole (FLAGYL) 500 mg in NaCl 100 mL IVPB premix  500 mg Intravenous Q8H Sam Perez III,  mL/hr at 03/21/21 0954 500 mg at 03/21/21 0954    pantoprazole (PROTONIX) tablet 40 mg  40 mg Oral Daily Sam Perez III, MD   40 mg at 03/21/21 0954    lipase-protease-amylase (CREON) delayed release capsule 36,000 Units  36,000 Units Oral TID  Sam Perez III, MD        carvedilol (COREG) connections     Talks on phone: Not on file     Gets together: Not on file     Attends Taoism service: Not on file     Active member of club or organization: Not on file     Attends meetings of clubs or organizations: Not on file     Relationship status: Not on file    Intimate partner violence     Fear of current or ex partner: Not on file     Emotionally abused: Not on file     Physically abused: Not on file     Forced sexual activity: Not on file   Other Topics Concern    Not on file   Social History Narrative    Not on file       ROS:   Review of Systems   Constitutional: Negative. HENT: Negative. Eyes: Negative. Respiratory: Negative. Cardiovascular: Negative. Gastrointestinal: Negative. Endocrine: Negative. Genitourinary: Negative. Musculoskeletal: Negative. Skin: Negative. Allergic/Immunologic: Negative. Neurological: Negative. Hematological: Negative. Psychiatric/Behavioral: Negative. Physical Exam:  Vitals:    03/21/21 0845   BP: 112/73   Pulse: 78   Resp: 16   Temp: 98 °F (36.7 °C)   SpO2: 96%       PSYCH: mood and affect normal, alert and oriented x 3: No apparent distress, comfortable  EYES: Sclera white, pupils equal round and reactive to light  ENMT:  Hearing normal, trachea midline, ears externally intact  LYMPH: no obvious lympadenopathy in neck. RESP: Mediport in right chest wall with no active bleeding or signs of infection.  No increased work of breathing on roomo air  CV:  No pedal edema  GI/ Abdomen: Soft, nondistended, nontender, no guarding, no peritoneal signs  MSK: no clubbing/ no cyanosis/ gaitnormal       Assessment/Plan: Keyana Marvin is a 52-year-old male with pancreatic head adenocarcinoma that seems to be progressing, now with direct hyperbilirubinemia    - Okay for diet today  - MRCP today  - Plan for ERCP outpatient on Friday  - Continue abx  - Creon with meals TID  - Will discuss with Dr. Tona Sarmiento    Electronically signed by Fuad Alcazar Unknown MD Yuly on 3/21/2021 at 10:25 AM    General Surgery Progress Note  [facility]    Patient's Name/Date of Birth: Veto Perez / 1950    Date: March 22, 2021     Surgeon: Prabhjot Ryder MD    Chief Complaint: hyperbilirubinemia    Patient Active Problem List   Diagnosis    Atherosclerosis of native artery of both lower extremities with intermittent claudication ([address]    Atheroscler of native artery of right leg with intermit claudication ([address]    Carcinoma of head of pancreas ([address]    Agranulocytosis secondary to cancer chemotherapy (CODE) ([address]     COVID-19    Diarrhea    History of 2019 novel coronavirus disease (COVID-19)    CAD (coronary artery disease)    Essential hypertension    Hypoxia    Hypomagnesemia    Lactic acidosis    Clostridioides difficile diarrhea    Acute thromboembolic cerebrovascular accident ([address]    Dyslipidemia    C. difficile diarrhea    Sepsis ([address]    Anemia    Hyponatremia    Leukocytosis    Cardiomegaly    Obstructive jaundice    Obstructive jaundice due to cancer ([address]       Subjective: No acute complaint. Waiting for MRI abdomen    Objective:  BP (!) 143/76   Pulse 76   Temp 97.9 °F (36.6 °C) (Oral)   Resp 17   Ht 5' 7\" (1.702 m)   Wt 170 lb (77.1 kg)   SpO2 96%   BMI 26.63 kg/m²   Labs:  Recent Labs     03/21/21  1155 03/22/21  0834   WBC 3.6* 2.7*   HGB 9.5* 9.8*   HCT 29.3* 29.5*     Lab Results   Component Value Date    CREATININE 0.6 (L) 03/22/2021    BUN 8 03/22/2021     03/22/2021    K 3.7 03/22/2021    K 3.7 03/22/2021     03/22/2021    CO2 22 03/22/2021     No results for input(s): LIPASE, AMYLASE in the last 72 hours.   CBC with Differential:    Lab Results   Component Value Date    WBC 2.7 03/22/2021    RBC 3.40 03/22/2021    HGB 9.8 03/22/2021    HCT 29.5 03/22/2021    PLT 88 03/22/2021    MCV 86.8 03/22/2021    MCH 28.8 03/22/2021    MCHC 33.2 03/22/2021    RDW 16.4 03/22/2021    NRBC 0.9 11/03/2020    METASPCT 0.9 02/24/2021    LYMPHOPCT 17.7 03/22/2021    MONOPCT 11.3 03/22/2021    MYELOPCT 0.9 02/24/2021    BASOPCT 0.4 03/22/2021    MONOSABS 0.30 03/22/2021    LYMPHSABS 0.47 03/22/2021    EOSABS 0.08 03/22/2021    BASOSABS 0.01 03/22/2021     CMP:    Lab Results   Component Value Date     03/22/2021    K 3.7 03/22/2021    K 3.7 03/22/2021     03/22/2021    CO2 22 03/22/2021    BUN 8 03/22/2021    CREATININE 0.6 03/22/2021    GFRAA >60 03/22/2021    LABGLOM >60 03/22/2021    GLUCOSE 114 03/22/2021    PROT 6.0 03/22/2021    LABALBU 2.9 03/22/2021    CALCIUM 8.4 03/22/2021    BILITOT 3.0 03/22/2021    ALKPHOS 1,471 03/22/2021    AST 72 03/22/2021    ALT 57 03/22/2021       General appearance:  NAD  Head: NCAT, PERRLA, EOMI, red conjunctiva  Neck: supple, no masses  Lungs: CTAB, equal chest rise bilateral  Heart: Reg rate  Abdomen: soft, nondistended, nontender  Skin; no lesions  Gu: no cva tenderness  Extremities: extremities normal, atraumatic, no cyanosis or edema      Assessment/Plan:  Esdras Mishra is a 72 y.o. male with pancreatic head adenocarcinoma that seems to be progressing, now with direct hyperbilirubinemia     - Okay to discharge after MRI abdomen  - Hold Brilinta  - Plan for ERCP outpatient on Friday  - Continue abx  - Creon with meals TID    Manisha Barbosa MD  3/22/2021  3:08 PM

## 2021-03-21 NOTE — PROCEDURES
Need MRI screening form filled out to clear patient before the MRI can be scheduled. We can scheduled for Monday Morning if cleared. , please keep pt NPO past midnight

## 2021-03-21 NOTE — H&P
Hepatobiliary and Pancreatic Surgery Resident History and Physical    Patient's Name/Date of Birth: Mookie Wolff /1950 (56 y.o.)    Date: March 21, 2021     CC: Mediport Bleeding    HPI:  Mookie Wolff is a pleasant 71-year-old male with locally advanced pancreatic head adenocarcinoma diagnosed in May 2020 that encircles the hepatic artery 360 degrees without any evidence of metastatic disease who is followed by Dr. Donaldo Garza. He was started on FOLFIRINOX on 6/29/2020 and has been followed by Dr. Megan Chou locally. CT scan of the chest and pancreas in September showed an infiltrative mass centered superior to the pancreatic head/neck infiltrating the robert hepatis and invading the liver and second part of the duodenum with partial abutment of the posterior stomach. He was continued on chemotherapy 10/23/2020 and was admitted to the hospital 11/1/2020 with Covid pneumonia and respiratory failure. At this time he was diagnosed with C. difficile colitis and was treated with Flagyl. Chemotherapy was resumed on 12/8/20 and he was then admitted again with C. difficile colitis and was treated with oral vancomycin. He then went to [place] in February 2021 where he resumed treatment. He had restaging CT scans which revealed disease progression. He then had a right-sided CVA and underwent internal carotid artery stenting and has been on aspirin and Brilinta since. He presented to [facility] yesterday for Mediport bleeding after he had the port accessed for routine blood work. The bleeding has since resolved. But he was also sent in for elevated LFTs, specifically a direct bili of 1.4. It is presumed that he may have obstruction of his biliary system due to the mass.     Past Medical History:   Diagnosis Date    CAD (coronary artery disease)     Cancer ([facility] [address] 05/2020    pancreas    Heart attack ([facility] [address] 2008, 3-2015    Hypertension        Past Surgical History:   Procedure Laterality Date    CARDIAC SURGERY Quad 2014    CORONARY ANGIOPLASTY  2005    CABG TIMES 4 2015    CORONARY ANGIOPLASTY WITH STENT PLACEMENT      INGUINAL HERNIA REPAIR Right     IR CAROTID STENT UNI W PROTECTION  2/16/2021    IR CAROTID STENT UNI W PROTECTION 2/16/2021 Sarthak Clarke MD [facility]    UT THROMBOENDARTECTMY FEMORAL COMMON Right 9/17/2018    FEMORAL ENDARTERECTOMY performed by Shelly Nails MD at [facility] VASCULAR SURGERY  08/15/2018    abdominal aortogram with runoff by Dr. Vazquez Never Facility-Administered Medications   Medication Dose Route Frequency Provider Last Rate Last Admin    sodium chloride flush 0.9 % injection 10 mL  10 mL Intravenous 2 times per day Denzel Toney III, MD   10 mL at 03/21/21 0958    sodium chloride flush 0.9 % injection 10 mL  10 mL Intravenous PRN Denzel Toney III, MD        ondansetron (ZOFRAN-ODT) disintegrating tablet 4 mg  4 mg Oral Q8H PRN Denzel Toney III, MD        Or    ondansetron [facility]) injection 4 mg  4 mg Intravenous Q6H PRN Denzel Toeny III, MD        enoxaparin (LOVENOX) injection 40 mg  40 mg Subcutaneous Daily Denzel Toney III, MD   40 mg at 03/21/21 8221    lactated ringers infusion   Intravenous Continuous Denzel Toney III,  mL/hr at 03/21/21 0950 New Bag at 03/21/21 0950    cefTRIAXone (ROCEPHIN) 2,000 mg in sterile water 20 mL IV syringe  2,000 mg Intravenous Daily Denzel Toney III, MD        metronidazole (FLAGYL) 500 mg in NaCl 100 mL IVPB premix  500 mg Intravenous Q8H Denzel Toney III,  mL/hr at 03/21/21 0954 500 mg at 03/21/21 0954    pantoprazole (PROTONIX) tablet 40 mg  40 mg Oral Daily Denzel Toney III, MD   40 mg at 03/21/21 0954    lipase-protease-amylase (CREON) delayed release capsule 36,000 Units  36,000 Units Oral TID  MD Faisal Sutton carvedilol (COREG) tablet 12.5 mg  12.5 mg Oral BID WC Elizabeth Mederos III, MD        DULoxetine (CYMBALTA) extended release capsule 30 mg  30 mg Oral BID Elizabeth Mederos III, MD        mirtazapine (REMERON) tablet 15 mg  15 mg Oral Nightly Elizabeth Mederos III, MD        oxyCODONE (ROXICODONE) immediate release tablet 5 mg  5 mg Oral Q4H PRN Elizabeth Mederos III, MD        pregabalin (LYRICA) capsule 200 mg  200 mg Oral BID Elizabeth Mederos III, MD        vancomycin [facility]) oral solution 125 mg  125 mg Oral Daily Elizabeth Mederos III, MD           No Known Allergies    Family History   Problem Relation Age of Onset    Breast Cancer Mother     Cancer Father         Colon    Cancer Maternal Aunt         Bladder    Cancer Maternal Grandfather         Bladder    Cancer Maternal Cousin         Leukemia    Breast Cancer Maternal Aunt     Cancer Maternal Cousin         Lung       Social History     Socioeconomic History    Marital status:      Spouse name: Not on file    Number of children: Not on file    Years of education: Not on file    Highest education level: Not on file   Occupational History    Not on file   Social Needs    Financial resource strain: Not on file    Food insecurity     Worry: Not on file     Inability: Not on file   [employer] needs     Medical: Not on file     Non-medical: Not on file   Tobacco Use    Smoking status: Former Smoker     Packs/day: 1.50     Years: 40.00     Pack years: 60.00     Types: Cigarettes     Quit date: 2008     Years since quittin.6    Smokeless tobacco: Never Used    Tobacco comment: occassional cigar   Substance and Sexual Activity    Alcohol use: Not Currently    Drug use: No    Sexual activity: Not Currently     Partners: Female   Lifestyle    Physical activity     Days per week: Not on file     Minutes per session: Not on file    Stress: Not on file Relationships    Social connections     Talks on phone: Not on file     Gets together: Not on file     Attends Sabianist service: Not on file     Active member of club or organization: Not on file     Attends meetings of clubs or organizations: Not on file     Relationship status: Not on file    Intimate partner violence     Fear of current or ex partner: Not on file     Emotionally abused: Not on file     Physically abused: Not on file     Forced sexual activity: Not on file   Other Topics Concern    Not on file   Social History Narrative    Not on file       ROS:   Review of Systems   Constitutional: Negative. HENT: Negative. Eyes: Negative. Respiratory: Negative. Cardiovascular: Negative. Gastrointestinal: Negative. Endocrine: Negative. Genitourinary: Negative. Musculoskeletal: Negative. Skin: Negative. Allergic/Immunologic: Negative. Neurological: Negative. Hematological: Negative. Psychiatric/Behavioral: Negative. Physical Exam:  Vitals:    03/21/21 0845   BP: 112/73   Pulse: 78   Resp: 16   Temp: 98 °F (36.7 °C)   SpO2: 96%       PSYCH: mood and affect normal, alert and oriented x 3: No apparent distress, comfortable  EYES: Sclera white, pupils equal round and reactive to light  ENMT:  Hearing normal, trachea midline, ears externally intact  LYMPH: no obvious lympadenopathy in neck. RESP: Mediport in right chest wall with no active bleeding or signs of infection.  No increased work of breathing on roomo air  CV:  No pedal edema  GI/ Abdomen: Soft, nondistended, nontender, no guarding, no peritoneal signs  MSK: no clubbing/ no cyanosis/ gaitnormal       Assessment/Plan: Pricila Melgoza is a 44-year-old male with pancreatic head adenocarcinoma that seems to be progressing, now with direct hyperbilirubinemia    - Okay for diet today, but NPO at midnight for ERCP  - MRCP today  - Plan for ERCP tomorrow 3/22  - Continue abx  - Creon with meals TID  - Will discuss with Dr. Warren Coy    Electronically signed by Rick Sung MD on 3/21/2021 at 10:24 AM

## 2021-03-21 NOTE — H&P
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 Wilber Esteves. He was started on FOLFIRINOX on 6/29/2020 and has been followed by Dr. Nicolle Acosta locally. CT scan of the chest and pancreas in September showed an infiltrative mass centered superior to the pancreatic head/neck infiltrating the robert hepatis and invading the liver and second part of the duodenum with partial abutment of the posterior stomach. He was continued on chemotherapy 10/23/2020 and was admitted to the hospital 11/1/2020 with Covid pneumonia and respiratory failure. At this time he was diagnosed with C. difficile colitis and was treated with Flagyl. Chemotherapy was resumed on 12/8/20 and he was then admitted again with C. difficile colitis and was treated with oral vancomycin. He then went to [place] in February 2021 where he resumed treatment. He had restaging CT scans which revealed disease progression. He then had a right-sided CVA and underwent internal carotid artery stenting and has been on aspirin and Brilinta since. He presented to [facility] yesterday for Mediport bleeding after he had the port accessed for routine blood work. The bleeding has since resolved. But he was also sent in for elevated LFTs, specifically a direct bili of 1.4. It is presumed that he may have obstruction of his biliary system due to the mass.     Past Medical History:   Diagnosis Date    CAD (coronary artery disease)     Cancer ([facility] 05/2020    pancreas    Heart attack ([facility] 2008, 3-2015    Hypertension        Past Surgical History:   Procedure Laterality Date    CARDIAC SURGERY Quad 2014    CORONARY ANGIOPLASTY  2005    CABG TIMES 4 2015    CORONARY ANGIOPLASTY WITH STENT PLACEMENT      INGUINAL HERNIA REPAIR Right     IR CAROTID STENT UNI W PROTECTION  2/16/2021    IR CAROTID STENT UNI W PROTECTION 2/16/2021 Yuriy Hicks MD [facility]    VT THROMBOENDARTECTMY FEMORAL COMMON Right 9/17/2018    FEMORAL ENDARTERECTOMY performed by Shazia Sandy MD at [facility] VASCULAR SURGERY  08/15/2018    abdominal aortogram with runoff by Dr. Shan Balderas Facility-Administered Medications   Medication Dose Route Frequency Provider Last Rate Last Admin    sodium chloride flush 0.9 % injection 10 mL  10 mL Intravenous 2 times per day Gerber Oconnor III, MD   10 mL at 03/21/21 0958    sodium chloride flush 0.9 % injection 10 mL  10 mL Intravenous PRN Gerber Oconnor III, MD        ondansetron (ZOFRAN-ODT) disintegrating tablet 4 mg  4 mg Oral Q8H PRN Gerber Oconnor III, MD        Or    ondansetron [facility]) injection 4 mg  4 mg Intravenous Q6H PRN Gerber Oconnor III, MD        enoxaparin (LOVENOX) injection 40 mg  40 mg Subcutaneous Daily Gerber Oconnor III, MD   40 mg at 03/21/21 7758    lactated ringers infusion   Intravenous Continuous Gerber Oconnor III,  mL/hr at 03/21/21 0950 New Bag at 03/21/21 0950    cefTRIAXone (ROCEPHIN) 2,000 mg in sterile water 20 mL IV syringe  2,000 mg Intravenous Daily Gerber Oconnor III, MD        metronidazole (FLAGYL) 500 mg in NaCl 100 mL IVPB premix  500 mg Intravenous Q8H Gerber Oconnor III,  mL/hr at 03/21/21 0954 500 mg at 03/21/21 0954    pantoprazole (PROTONIX) tablet 40 mg  40 mg Oral Daily Gerber Oconnor III, MD   40 mg at 03/21/21 0954    lipase-protease-amylase (CREON) delayed release capsule 36,000 Units  36,000 Units Oral TID  Artem Greco MD       [address] [place] [address] carvedilol (COREG) tablet 12.5 mg  12.5 mg Oral BID WC Anthony Davies III, MD        DULoxetine (CYMBALTA) extended release capsule 30 mg  30 mg Oral BID Anthony Davies III, MD        mirtazapine (REMERON) tablet 15 mg  15 mg Oral Nightly Anthony Davies III, MD        oxyCODONE (ROXICODONE) immediate release tablet 5 mg  5 mg Oral Q4H PRN Anthony Davies III, MD        pregabalin (LYRICA) capsule 200 mg  200 mg Oral BID Anthony Davies III, MD        vancomycin [facility]) oral solution 125 mg  125 mg Oral Daily Anthony Davies III, MD           No Known Allergies    Family History   Problem Relation Age of Onset    Breast Cancer Mother     Cancer Father         Colon    Cancer Maternal Aunt         Bladder    Cancer Maternal Grandfather         Bladder    Cancer Maternal Cousin         Leukemia    Breast Cancer Maternal Aunt     Cancer Maternal Cousin         Lung       Social History     Socioeconomic History    Marital status:      Spouse name: Not on file    Number of children: Not on file    Years of education: Not on file    Highest education level: Not on file   Occupational History    Not on file   Social Needs    Financial resource strain: Not on file    Food insecurity     Worry: Not on file     Inability: Not on file   [employer] needs     Medical: Not on file     Non-medical: Not on file   Tobacco Use    Smoking status: Former Smoker     Packs/day: 1.50     Years: 40.00     Pack years: 60.00     Types: Cigarettes     Quit date: 2008     Years since quittin.6    Smokeless tobacco: Never Used    Tobacco comment: occassional cigar   Substance and Sexual Activity    Alcohol use: Not Currently    Drug use: No    Sexual activity: Not Currently     Partners: Female   Lifestyle    Physical activity     Days per week: Not on file     Minutes per session: Not on file    Stress: Not on file Relationships    Social connections     Talks on phone: Not on file     Gets together: Not on file     Attends Congregation service: Not on file     Active member of club or organization: Not on file     Attends meetings of clubs or organizations: Not on file     Relationship status: Not on file    Intimate partner violence     Fear of current or ex partner: Not on file     Emotionally abused: Not on file     Physically abused: Not on file     Forced sexual activity: Not on file   Other Topics Concern    Not on file   Social History Narrative    Not on file       ROS:   Review of Systems   Constitutional: Negative. HENT: Negative. Eyes: Negative. Respiratory: Negative. Cardiovascular: Negative. Gastrointestinal: Negative. Endocrine: Negative. Genitourinary: Negative. Musculoskeletal: Negative. Skin: Negative. Allergic/Immunologic: Negative. Neurological: Negative. Hematological: Negative. Psychiatric/Behavioral: Negative. Physical Exam:  Vitals:    03/21/21 0845   BP: 112/73   Pulse: 78   Resp: 16   Temp: 98 °F (36.7 °C)   SpO2: 96%       PSYCH: mood and affect normal, alert and oriented x 3: No apparent distress, comfortable  EYES: Sclera white, pupils equal round and reactive to light  ENMT:  Hearing normal, trachea midline, ears externally intact  LYMPH: no obvious lympadenopathy in neck. RESP: Mediport in right chest wall with no active bleeding or signs of infection.  No increased work of breathing on roomo air  CV:  No pedal edema  GI/ Abdomen: Soft, nondistended, nontender, no guarding, no peritoneal signs  MSK: no clubbing/ no cyanosis/ gaitnormal       Assessment/Plan: Jamilah Pinto is a 52-year-old male with pancreatic head adenocarcinoma that seems to be progressing, now with direct hyperbilirubinemia    - Okay for diet  - MRCP today  - Plan for ERCP outpatient on Friday as he is on Brilinta  - Hold anticoagulation  - Continue abx  - Creon with meals TID  - Consult Dr. Georgina Courtney for ERCP, likely Friday  - Discussed with Dr. Marek Morel    Electronically signed by Noemí Herr MD on 3/21/2021 at 10:22 AM

## 2021-03-22 PROBLEM — E80.6 BILIRUBINEMIA: Status: ACTIVE | Noted: 2021-01-01

## 2021-03-22 NOTE — PLAN OF CARE
Problem: Sensory:  Goal: Ability to identify factors that increase the pain will improve  Description: Ability to identify factors that increase the pain will improve  Outcome: Met This Shift  Goal: Ability to demonstrate ways to enhance comfort will improve  Description: Ability to demonstrate ways to enhance comfort will improve  Outcome: Met This Shift  Goal: General experience of comfort will improve  Description: General experience of comfort will improve  Outcome: Met This Shift

## 2021-03-22 NOTE — PROGRESS NOTES
[facility] SURGERY  DAILY PROGRESS NOTE  3/22/2021    CC: Mediport bleeding    Subjective:  Patient denies nausea or vomiting. No bowel movement.      Objective:  BP (!) 143/83   Pulse 71   Temp 98.2 °F (36.8 °C) (Temporal)   Resp 16   Ht 5' 7\" (1.702 m)   Wt 170 lb (77.1 kg)   SpO2 100%   BMI 26.63 kg/m²     GENERAL:  Laying in bed, awake, alert, cooperative, no apparent distress  HEAD: Normocephalic, atraumatic  EYES: No sclera icterus, pupils equal  LUNGS:  No increased work of breathing  CARDIOVASCULAR:  RR and hypertensive  ABDOMEN:  Soft, non-tender, non-distended  EXTREMITIES: No edema or swelling  SKIN: Warm and dry    Assessment/Plan:  72 y.o. male with pancreatic head adenocarcinoma that seems to be progressing, now with direct hyperbilirubinemia    - Okay for diet  - MRCP still pending  - Plan for ERCP outpatient on Friday as he is on Brilinta  - Hold anticoagulation  - Continue abx  - Creon with meals TID  - Alright for discharge after MRI on Omnicef and Flagyl    Electronically signed by Vidhya Hernandez DO on 3/22/2021 at 6:50 AM    MRI today, once obtained okay to go home  ERCP Friday with stenting    Electronically signed by Amira Briscoe MD on 3/22/2021 at 10:49 AM

## 2021-03-22 NOTE — TELEPHONE ENCOUNTER
Prior Authorization Form:      DEMOGRAPHICS:                     Patient Name:  Alirio Farias  Patient :  1950            Insurance:  Payor: MEDICARE / Plan: MEDICARE PART A AND B / Product Type: *No Product type* /   Insurance ID Number:    Payor/Plan Subscr  Sex Relation Sub. Ins. ID Effective Group Num   1. [address] [facility] 1950 Male Self [id number]                                    [address] [address]   2.  [address] [address] 1950 Male Self [id number]                                   [address]         DIAGNOSIS & PROCEDURE:                       Procedure/Operation: ERCP with stent placement           CPT Code: 58389    Diagnosis:  Pancreatic Cancer    ICD10 Code: C25.9    Location:  [address] [place] [address]    Surgeon:  Franci Oreilly    [facility] INFORMATION:                          Date: 3.26.2021    Time: TDB              Anesthesia:  LMAC                                                       Status:  Outpatient        Special Comments:         Electronically signed by Marbin Andrade on 3/22/2021 at 4:00 PM

## 2021-03-22 NOTE — PROGRESS NOTES
ENDOSCOPIC SURGERY  DAILY PROGRESS NOTE  3/22/2021    CC: Mediport bleeding    Subjective:  Patient denies nausea or vomiting. No bowel movement.      Objective:  BP (!) 143/83   Pulse 71   Temp 98.2 °F (36.8 °C) (Temporal)   Resp 16   Ht 5' 7\" (1.702 m)   Wt 170 lb (77.1 kg)   SpO2 100%   BMI 26.63 kg/m²     GENERAL:  Laying in bed, awake, alert, cooperative, no apparent distress  HEAD: Normocephalic, atraumatic  EYES: No sclera icterus, pupils equal  LUNGS:  No increased work of breathing  CARDIOVASCULAR:  RR and hypertensive  ABDOMEN:  Soft, non-tender, non-distended  EXTREMITIES: No edema or swelling  SKIN: Warm and dry    Assessment/Plan:  72 y.o. male with pancreatic head adenocarcinoma that seems to be progressing, now with direct hyperbilirubinemia    - Okay for diet  - MRCP still pending  - Plan for ERCP outpatient on Friday as he is on Brilinta  - Hold anticoagulation  - Continue abx  - Creon with meals TID  - Alright for DC after MRCP with Omnicef and Flagyl      Electronically signed by Christoph Scott DO on 3/22/2021 at 6:52 AM

## 2021-03-22 NOTE — TELEPHONE ENCOUNTER
Per the order of Dr. Ingrid Quan, patient has been scheduled for ERCP with stent placement on 3.26.2021. Patients wife provided with procedure information over the phone. Patient informed that if he is still on lovenox, he will need to hold 24 hours prior to procedure and if they discharge patient on Brilinta, he should not start the Brilinta until after his procedure. Patients wife verbalized understanding. Patient instructed to please contact our office with any questions. Patient scheduled for follow up with Dr. Ingrid Quan to review results. Surgery scheduling form faxed to [address] [place] [address] surgery scheduling and fax confirmation received. Dr. Ingrid Quan to enter orders.     Electronically signed by Trent Cesar on 3/22/21 at 3:59 PM EDT

## 2021-03-24 NOTE — PROGRESS NOTES
Patient came as an out patient on 3/23/21 for MRI abdomen. Patient was found to have a [employer] Carotid Wallstent endoprosthesis- that was put in here- and is found to be contradicted for MRI. Maximum MR system reported, for whole body averaged SANJU of <1W/kg for patient landmarks above the umbilicus is required and cannot be preformed on our scanner. Patient could only have a exam done in Normal operating mode and 2 W/kg for landmarks below the umbilicus. Due to this patients MRI was not preformed.

## 2021-03-24 NOTE — TELEPHONE ENCOUNTER
I called wife Saskia Kolb and I made her aware that the plan will be to just move forward with the ERCP this Friday since the MRI was not able to be done due to his carotid stent. She verbalized understanding.     Electronically signed by Carlyle Madden RN on 3/24/2021 at 9:09 AM

## 2021-03-25 NOTE — ANESTHESIA PRE PROCEDURE
Department of Anesthesiology  Preprocedure Note       Name:  Maru Bazzi   Age:  72 y.o.  :  1950                                          MRN:  [id number]         Date:  3/26/2021      Surgeon: Mel Darden):  Chandu Javier MD    Procedure: Procedure(s):  ERCP ENDOSCOPIC RETROGRADE CHOLANGIOPANCREATOGRAPHY WITH STENT PLACEMENT (CPT 55871)    Medications prior to admission:   Prior to Admission medications    Medication Sig Start Date End Date Taking? Authorizing Provider   ticagrelor (BRILINTA) 90 MG TABS tablet Take 90 mg by mouth 2 times daily Ld 3/21/2021   Yes Historical Provider, MD   cefdinir (OMNICEF) 300 MG capsule Take 1 capsule by mouth 2 times daily for 10 days 3/22/21 4/1/21 Yes Mendoza Peterson DO   metroNIDAZOLE (FLAGYL) 500 MG tablet Take 1 tablet by mouth 3 times daily for 10 days 3/22/21 4/1/21 Yes Mendoza Peterson DO   pantoprazole (PROTONIX) 40 MG tablet Take 1 tablet by mouth daily 3/23/21  Yes Mendoza Peterson DO   atorvastatin (LIPITOR) 80 MG tablet Take 80 mg by mouth nightly  3/11/21  Yes Historical Provider, MD   vancomycin (VANCOCIN) 125 MG capsule 4 times daily for 2 weeks, 3 times daily for 1 week, 2 times daily for 1 week , then daily for 1 week, then every other day until chemotherapy is completed 21  Yes James Harden MD   lipase-protease-amylase (CREON) 06851-14261 units delayed release capsule Take 1 capsule by mouth 3 times daily as needed   Yes Historical Provider, MD   Multiple Vitamins-Minerals (THERAPEUTIC MULTIVITAMIN-MINERALS) tablet Take 1 tablet by mouth daily   Yes Historical Provider, MD   pregabalin (LYRICA) 100 MG capsule Take 200 mg by mouth 2 times daily.   21  Yes Historical Provider, MD   b complex vitamins capsule Take 1 capsule by mouth daily   Yes Historical Provider, MD   DULoxetine (CYMBALTA) 30 MG extended release capsule Take 30 mg by mouth 2 times daily    Yes Historical Provider, MD   zinc gluconate 50 MG tablet Take 1 tablet by mouth daily 11/4/20  Yes Jeff Dove,    carvedilol (COREG) 12.5 MG tablet Take 12.5 mg by mouth 2 times daily (with meals) 9/25/19  Yes Historical Provider, MD   oxyCODONE 5 MG capsule Take 5 mg by mouth every 4 hours as needed for Pain.    Yes Historical Provider, MD   aspirin EC 81 MG EC tablet Take 81 mg by mouth nightly Last dose 3/23/2`1   Yes Historical Provider, MD       Current medications:    Current Facility-Administered Medications   Medication Dose Route Frequency Provider Last Rate Last Admin    sodium chloride flush 0.9 % injection 10 mL  10 mL Intravenous 2 times per day Ijeoma Sousa MD        sodium chloride flush 0.9 % injection 10 mL  10 mL Intravenous PRN Ijeoma Sousa MD        lactated ringers infusion   Intravenous Continuous Veronique Aldrich MD 42 mL/hr at 03/26/21 0945 New Bag at 03/26/21 0945    sodium chloride flush 0.9 % injection 10 mL  10 mL Intravenous PRN Sheron Ocampo MD           Allergies:  No Known Allergies    Problem List:    Patient Active Problem List   Diagnosis Code    Atherosclerosis of native artery of both lower extremities with intermittent claudication ([facility] I70.213    Atheroscler of native artery of right leg with intermit claudication (HCC) I70.211    Carcinoma of head of pancreas (HCC) C25.0    Agranulocytosis secondary to cancer chemotherapy (CODE) ([facility]  D70.1    COVID-19 U07.1    Diarrhea R19.7    History of 2019 novel coronavirus disease (COVID-19) Z86.16    CAD (coronary artery disease) I25.10    Essential hypertension I10    Hypoxia R09.02    Hypomagnesemia E83.42    Lactic acidosis E87.2    Clostridioides difficile diarrhea A04.72    Acute thromboembolic cerebrovascular accident ([facility] I63.9    Dyslipidemia E78.5    C. difficile diarrhea A04.72    Sepsis ([facility] A41.9    Anemia D64.9    Hyponatremia E87.1    Leukocytosis D72.829    Cardiomegaly I51.7    Obstructive jaundice K83.1    Obstructive jaundice due to cancer ([facility] K83.1, C80.1    Bilirubinemia E80.6       Past Medical History:        Diagnosis Date    C. difficile diarrhea     X3 nov dec 2020 2021    CAD (coronary artery disease)     lt sided weakness     Cancer ([facility] 2020    pancreas    Cerebral artery occlusion with cerebral infarction [facility])     mini  stroke 2021 no residual 2018 stroke lt hand weakness     Heart attack ([facility] 2008, 3-    Hypertension     Jaundice     SOB (shortness of breath) on exertion        Past Surgical History:        Procedure Laterality Date    CARDIAC SURGERY      Quad 2014    CORONARY ANGIOPLASTY  2005    CABG TIMES 4     CORONARY ANGIOPLASTY WITH STENT PLACEMENT      INGUINAL HERNIA REPAIR Right     IR CAROTID STENT UNI W PROTECTION  2021    IR CAROTID STENT UNI W PROTECTION 2021 Mendoza Pa MD [facility]    ID THROMBOENDARTECTMY FEMORAL COMMON Right 2018    FEMORAL ENDARTERECTOMY performed by Antonio Harris MD at [address] [place] [address]  08/15/2018    abdominal aortogram with runoff by Dr. Kourtney Bauman       Social History:    Social History     Tobacco Use    Smoking status: Former Smoker     Packs/day: 1.50     Years: 40.00     Pack years: 60.00     Types: Cigarettes     Quit date: 2008     Years since quittin.6    Smokeless tobacco: Never Used    Tobacco comment: occassional cigar   Substance Use Topics    Alcohol use: Not Currently     Comment: soc                                Counseling given: Not Answered  Comment: occassional cigar      Vital Signs (Current):   Vitals:    21 0932   Weight: 170 lb (77.1 kg)   Height: 5' 7\" (1.702 m)                                              BP Readings from Last 3 Encounters:   21 (!) 143/76   21 129/88   21 128/67       NPO Status: Time of last liquid consumption: 1800                        Time of last solid consumption: 1800                        Date of last liquid consumption: 03/25/21                        Date of last solid food consumption: 03/25/21    BMI:   Wt Readings from Last 3 Encounters:   03/25/21 170 lb (77.1 kg)   03/21/21 170 lb (77.1 kg)   03/20/21 170 lb (77.1 kg)     Body mass index is 26.63 kg/m². CBC:   Lab Results   Component Value Date    WBC 2.7 03/22/2021    RBC 3.40 03/22/2021    HGB 9.8 03/22/2021    HCT 29.5 03/22/2021    MCV 86.8 03/22/2021    RDW 16.4 03/22/2021    PLT 88 03/22/2021       CMP:   Lab Results   Component Value Date     03/22/2021    K 3.7 03/22/2021    K 3.7 03/22/2021     03/22/2021    CO2 22 03/22/2021    BUN 8 03/22/2021    CREATININE 0.6 03/22/2021    GFRAA >60 03/22/2021    LABGLOM >60 03/22/2021    GLUCOSE 114 03/22/2021    PROT 6.0 03/22/2021    CALCIUM 8.4 03/22/2021    BILITOT 3.0 03/22/2021    ALKPHOS 1,471 03/22/2021    AST 72 03/22/2021    ALT 57 03/22/2021       POC Tests: No results for input(s): POCGLU, POCNA, POCK, POCCL, POCBUN, POCHEMO, POCHCT in the last 72 hours.     Coags:   Lab Results   Component Value Date    PROTIME 13.2 02/17/2021    INR 1.2 02/17/2021    APTT 29.4 02/17/2021       HCG (If Applicable): No results found for: PREGTESTUR, PREGSERUM, HCG, HCGQUANT     ABGs: No results found for: PHART, PO2ART, NHB8XNL, QUW0LLJ, BEART, V1FTLVBM     Type & Screen (If Applicable):  No results found for: LABABO, LABRH    Drug/Infectious Status (If Applicable):  No results found for: HIV, HEPCAB    COVID-19 Screening (If Applicable):   Lab Results   Component Value Date    COVID19 Not Detected 03/24/2021         Anesthesia Evaluation  Patient summary reviewed no history of anesthetic complications:   Airway: Mallampati: II  TM distance: >3 FB   Neck ROM: full  Mouth opening: > = 3 FB Dental: normal exam         Pulmonary: breath sounds clear to auscultation  (+) COPD:  shortness of breath:      (-) not a current smoker                           Cardiovascular:    (+) hypertension:, past MI:, CAD:, FUCHS:, hyperlipidemia        Rhythm: regular             Beta Blocker:  Not on Beta Blocker      ROS comment: S/P CABG and stents, denies CP and SOB     Neuro/Psych:   (+) CVA:,              ROS comment: S/P carotid stent GI/Hepatic/Renal:   (+) bowel prep,      (-) no morbid obesity       Endo/Other:    (+) blood dyscrasia: anemia and thrombocytopenia:., malignancy/cancer (pancreatic cancer). Abdominal:         (-) obese     Vascular:   + PVD, aortic or cerebral, . Anesthesia Plan      MAC     ASA 3           MIPS: Postoperative opioids intended and Prophylactic antiemetics administered. Plan discussed with CRNA. DOS STAFF ADDENDUM:    Pt seen and examined, chart reviewed (including anesthesia, drug and allergy history). Anesthetic plan, risks, benefits, alternatives, and personnel involved discussed with patient. Patient verbalized an understanding and agrees to proceed. Plan discussed with care team members and agreed upon.     Justin Obando MD  Staff Anesthesiologist  9:55 AM    Justin Obando MD   3/26/2021

## 2021-03-25 NOTE — PROGRESS NOTES
[address] [facility]                                                                                                                    PRE OP INSTRUCTIONS FOR  Lolita Sandhu        Date: 3/25/2021    Date of surgery:   3/26/2021    Arrival Time: Hospital will call you between 5pm and 7pm with your final arrival time for surgery    1. Do not eat or drink anything after    Midnight  prior to surgery. This includes no water, chewing gum, mints or ice chips. 2. Take the following medications with a small sip of water on the morning of Surgery:  Take coreg lyrica and cymbalta dos with sip water also may have pain med up to 4 hours preop    3. Diabetics may take evening dose of insulin but none after midnight. If you feel symptomatic or low blood sugar morning of surgery drink 1-2 ounces of apple juice only. 4. Aspirin, Ibuprofen, Advil, Naproxen, Vitamin E and other Anti-inflammatory products should be stopped  before surgery  as directed by your physician. Take Tylenol only unless instructed otherwise by your surgeon. 5. Check with your Doctor regarding stopping Plavix, Coumadin, Lovenox, Eliquis, Effient, or other blood thinners. 6. Do not smoke,use illicit drugs and do not drink any alcoholic beverages 24 hours prior to surgery. 7. You may brush your teeth the morning of surgery. DO NOT SWALLOW WATER    8. You MUST make arrangements for a responsible adult to take you home after your surgery. You will not be allowed to leave alone or drive yourself home. It is strongly suggested someone stay with you the first 24 hrs. Your surgery will be cancelled if you do not have a ride home. 9. PEDIATRIC PATIENTS ONLY:  A parent/legal guardian must accompany a child scheduled for surgery and plan to stay at the hospital until the child is discharged. Please do not bring other children with you.     10. Please wear simple, loose fitting clothing to the hospital.  Do not bring valuables (money, credit cards, checkbooks, etc.) Do not wear any makeup (including no eye makeup) or nail polish on your fingers or toes. 11. DO NOT wear any jewelry or piercings on day of surgery. All body piercing jewelry must be removed. 12. Shower the night before surgery with __x_Antibacterial soap /CAYAL WIPES________    13. TOTAL JOINT REPLACEMENT/HYSTERECTOMY PATIENTS ONLY---Remember to bring Blood Bank bracelet to the hospital on the day of surgery. 14. If you have a Living Will and Durable Power of  for Healthcare, please bring in a copy. 15. If appropriate bring crutches, inspirex, WALKER, CANE etc... 12. Notify your Surgeon if you develop any illness between now and surgery time, cough, cold, fever, sore throat, nausea, vomiting, etc.  Please notify your surgeon if you experience dizziness, shortness of breath or blurred vision between now & the time of your surgery. 17. If you have ___dentures, they will be removed before going to the OR; we will provide you a container. If you wear ___contact lenses or ___glasses, they will be removed; please bring a case for them. 18. To provide excellent care visitors will be limited to 2 in the room at any given time. 19. Please bring picture ID and insurance card. 20. Sleep apnea patients need to bring CPAP AND SETTINGS to hospital on day of surgery. 21. During flu season no children under the age of 15 are permitted in the hospital for the safety of all patients. 22. Other                  Please call AMBULATORY CARE if you have any further questions.    [address] [facility]     [address]

## 2021-03-26 PROBLEM — K83.1 BILIARY OBSTRUCTION: Status: ACTIVE | Noted: 2021-01-01

## 2021-03-26 PROBLEM — K80.50 CHOLEDOCHOLITHIASIS: Status: ACTIVE | Noted: 2021-01-01

## 2021-03-26 NOTE — H&P
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 Gladys Avelar. He was started on FOLFIRINOX on 6/29/2020 and has been followed by Dr. Kennedy Rivera locally. CT scan of the chest and pancreas in September showed an infiltrative mass centered superior to the pancreatic head/neck infiltrating the robert hepatis and invading the liver and second part of the duodenum with partial abutment of the posterior stomach. He was continued on chemotherapy 10/23/2020 and was admitted to the hospital 11/1/2020 with Covid pneumonia and respiratory failure. At this time he was diagnosed with C. difficile colitis and was treated with Flagyl. Chemotherapy was resumed on 12/8/20 and he was then admitted again with C. difficile colitis and was treated with oral vancomycin. He then went to [place] in February 2021 where he resumed treatment. He had restaging CT scans which revealed disease progression. He then had a right-sided CVA and underwent internal carotid artery stenting and has been on aspirin and Brilinta since. He presented to [facility] last week with jaundice and Mediport bleeding after he had the port accessed for routine blood work. The bleeding has since resolved. He was found to have elevated LFTs and is presumed that he may have obstruction of his biliary system due to the mass. He was seen for ERCP and his Brilinta was held for 1 week.     Past Medical History:   Diagnosis Date    CAD (coronary artery disease)     Cancer ([facility] [address] 05/2020    pancreas    Heart attack ([facility] [address] 2008, 3-2015    Hypertension        Past Surgical History:   Procedure Laterality Date    CARDIAC SURGERY      Quad 2014    CORONARY ANGIOPLASTY  2005    CABG TIMES 4 2015    CORONARY ANGIOPLASTY WITH STENT PLACEMENT      INGUINAL HERNIA REPAIR Right     IR CAROTID STENT UNI W PROTECTION  2/16/2021    IR CAROTID STENT UNI W PROTECTION 2/16/2021 MD NAVYA Chavarria SPECIAL PROCEDURES    [place] THROMBOENDARTECTMY FEMORAL COMMON Right 9/17/2018    FEMORAL ENDARTERECTOMY performed by Jhonathan Moore MD at [facility] VASCULAR SURGERY  08/15/2018    abdominal aortogram with runoff by Dr. Mary Martinez Facility-Administered Medications   Medication Dose Route Frequency Provider Last Rate Last Admin    sodium chloride flush 0.9 % injection 10 mL  10 mL Intravenous 2 times per day Yusra South III, MD   10 mL at 03/21/21 0958    sodium chloride flush 0.9 % injection 10 mL  10 mL Intravenous PRN Yusra South III, MD        ondansetron (ZOFRAN-ODT) disintegrating tablet 4 mg  4 mg Oral Q8H PRN Yusra South III, MD        Or    ondansetron [facility]) injection 4 mg  4 mg Intravenous Q6H PRN Yusra South III, MD        enoxaparin (LOVENOX) injection 40 mg  40 mg Subcutaneous Daily Yusra South III, MD   40 mg at 03/21/21 9698    lactated ringers infusion   Intravenous Continuous Yusra South III,  mL/hr at 03/21/21 0950 New Bag at 03/21/21 0950    cefTRIAXone (ROCEPHIN) 2,000 mg in sterile water 20 mL IV syringe  2,000 mg Intravenous Daily Yusra South III, MD        metronidazole (FLAGYL) 500 mg in NaCl 100 mL IVPB premix  500 mg Intravenous Q8H Yusra South III,  mL/hr at 03/21/21 0954 500 mg at 03/21/21 0954    pantoprazole (PROTONIX) tablet 40 mg  40 mg Oral Daily Yusra South III, MD   40 mg at 03/21/21 0954    lipase-protease-amylase (CREON) delayed release capsule 36,000 Units  36,000 Units Oral TID  Darrick Hdez MD  carvedilol (COREG) tablet 12.5 mg  12.5 mg Oral BID WC Estephanie Elise III, MD        DULoxetine (CYMBALTA) extended release capsule 30 mg  30 mg Oral BID Endy Harper III, MD        mirtazapine (REMERON) tablet 15 mg  15 mg Oral Nightly Endy Harper III, MD        oxyCODONE (ROXICODONE) immediate release tablet 5 mg  5 mg Oral Q4H PRN Endy Harper III, MD        pregabalin (LYRICA) capsule 200 mg  200 mg Oral BID Endy Harper III, MD        vancomycin Barbara Groves) oral solution 125 mg  125 mg Oral Daily Endy Harper III, MD           No Known Allergies    Family History   Problem Relation Age of Onset    Breast Cancer Mother     Cancer Father         Colon    Cancer Maternal Aunt         Bladder    Cancer Maternal Grandfather         Bladder    Cancer Maternal Cousin         Leukemia    Breast Cancer Maternal Aunt     Cancer Maternal Cousin         Lung       Social History     Socioeconomic History    Marital status:      Spouse name: Not on file    Number of children: Not on file    Years of education: Not on file    Highest education level: Not on file   Occupational History    Not on file   Social Needs    Financial resource strain: Not on file    Food insecurity     Worry: Not on file     Inability: Not on file   [employer] needs     Medical: Not on file     Non-medical: Not on file   Tobacco Use    Smoking status: Former Smoker     Packs/day: 1.50     Years: 40.00     Pack years: 60.00     Types: Cigarettes     Quit date: 2008     Years since quittin.6    Smokeless tobacco: Never Used    Tobacco comment: occassional cigar   Substance and Sexual Activity    Alcohol use: Not Currently    Drug use: No    Sexual activity: Not Currently     Partners: Female   Lifestyle    Physical activity     Days per week: Not on file     Minutes per session: Not on file    Stress: Not on file Relationships    Social connections     Talks on phone: Not on file     Gets together: Not on file     Attends [employer] service: Not on file     Active member of club or organization: Not on file     Attends meetings of clubs or organizations: Not on file     Relationship status: Not on file    Intimate partner violence     Fear of current or ex partner: Not on file     Emotionally abused: Not on file     Physically abused: Not on file     Forced sexual activity: Not on file   Other Topics Concern    Not on file   Social History Narrative    Not on file       ROS:   Review of Systems   Constitutional: Negative. HENT: Negative. Eyes: Negative. Respiratory: Negative. Cardiovascular: Negative. Gastrointestinal: Negative. Endocrine: Negative. Genitourinary: Negative. Musculoskeletal: Negative. Skin: Negative. Allergic/Immunologic: Negative. Neurological: Negative. Hematological: Negative. Psychiatric/Behavioral: Negative. Physical Exam:  Vitals:    03/21/21 0845   BP: 112/73   Pulse: 78   Resp: 16   Temp: 98 °F (36.7 °C)   SpO2: 96%       PSYCH: mood and affect normal, alert and oriented x 3: No apparent distress, comfortable  EYES: Sclera white, pupils equal round and reactive to light  ENMT:  Hearing normal, trachea midline, ears externally intact  LYMPH: no obvious lympadenopathy in neck. RESP: Mediport in right chest wall with no active bleeding or signs of infection. No increased work of breathing on roomo air  CV:  No pedal edema  GI/ Abdomen: Soft, nondistended, nontender, no guarding, no peritoneal signs  MSK: no clubbing/ no cyanosis/ gaitnormal       Assessment/Plan: Keyana Marvin is a 43-year-old male with pancreatic head adenocarcinoma that seems to be progressing, now with direct hyperbilirubinemia    - Proceed with ERCP with stent placement  - The procedure, risks, benefits and alternatives were discussed with patient. he   agrees to proceed.     Emma Ashlyn Diaz MD  3/26/2021  9:43 AM

## 2021-03-26 NOTE — PROGRESS NOTES
1200 Procedure complete, Dr Richards gave verbal orders to admit patient to med/surg for monitoring. Pacu MAC Gonzalez updated on plan. 1208 pt awakens to voice, vitals stable. Transfer to pacu.

## 2021-03-26 NOTE — CONSULTS
Department of Internal Medicine  Internal Medicine Consultation Note    Primary Care Physician: Kiki Sim MD   Admitting Physician:  Paul Treadwell MD  Admission date and time: 3/26/2021  9:09 AM    Room:  [id number]  Admitting diagnosis: Biliary obstruction [K83.1]    Patient Name: Dk Hollingsworth  MRN: [id number]    Date of Service: 3/26/2021     Reason for consultation: Medical management    History of present illness: The patient is a 60-year-old male who is being seen status post ERCP sphincterotomy, stone extraction, pancreatic stent placement, and common bile duct stent placement. The patient states that he has been having health issues since approximately last May. As the patient is being seen status post surgery and is still has some of the effects of anesthesia HPI is obtained from both the patient as well as chart reviewed. The patient patient was diagnosed with pancreatic head adenocarcinoma in May 2020. The cancer was found to encircle hepatic artery 3 and 60 degrees without evidence of metastatic disease. Patient is followed by Dr. Franki Hammans, oncologist.  Patient was started on Folfirinox in June 2020. In September a CT scan of the chest revealed infiltrative mass centered superior to the pancreatic head/neck infiltrating the robert hepatis and invading the liver and second part of the duodenum with partial abutment of the posterior stomach. Patient states that he was admitted to the hospital November for Covid pneumonia respiratory failure. States he also developed C. difficile at that time. States he was admitted admitted in the hospital a second time also for C. difficile infection. Notes indicate that the patient had restaging CT scans which revealed disease progression. The patient also reports he has had 2 strokes since being diagnosed with cancer. Most recent one being 4 weeks ago. States he did undergo internal carotid artery stenting.   Notes indicate that during his last hospitalization he was found to have elevated LFTs and it was presumed that he may have obstruction of his biliary system due to the mass therefore his Brilinta was placed on hold for 1 week with plan for admission to the hospital today for ERCP. At the time of examination the patient does state that he still feels groggy secondary to anesthesia. He denies chest pain or shortness of breath. Denies any pain.     PAST MEDICAL Hx:  Past Medical History:   Diagnosis Date    C. difficile diarrhea     X3 nov dec 2020 feb 2021    CAD (coronary artery disease)     lt sided weakness     Cancer ([facility] [address] 05/2020    pancreas    Cerebral artery occlusion with cerebral infarction [facility])     mini  stroke feb 2021 no residual 2018 stroke lt hand weakness     GI bleed     Heart attack ([facility] [address] 2008, 3-2015    Hypertension     Jaundice     Peripheral vascular disease (HCC)     SOB (shortness of breath) on exertion        PAST SURGICAL Hx:   Past Surgical History:   Procedure Laterality Date    CARDIAC SURGERY      Quad 2014    COLONOSCOPY      CORONARY ANGIOPLASTY  2005    CABG TIMES 4 2015    CORONARY ANGIOPLASTY WITH STENT PLACEMENT      ENDOSCOPY, COLON, DIAGNOSTIC      INGUINAL HERNIA REPAIR Right     IR CAROTID STENT UNI W PROTECTION  2/16/2021    IR CAROTID STENT UNI W PROTECTION 2/16/2021 Gus Josue MD [facility] SPECIAL PROCEDURES    [place] THROMBOENDARTECTMY FEMORAL COMMON Right 9/17/2018    FEMORAL ENDARTERECTOMY performed by Inderjit Garcia MD at [address] [place] [address]  08/15/2018    abdominal aortogram with runoff by Dr. Jw Barrett Hx:  Family History   Problem Relation Age of Onset    Breast Cancer Mother     Cancer Father         Colon    Cancer Maternal Aunt         Bladder    Cancer Maternal Grandfather         Bladder    Cancer Maternal Cousin         Leukemia    Breast Cancer Maternal Aunt     Cancer Maternal Cousin         Lung       HOME MEDICATIONS: Prior to Admission medications    Medication Sig Start Date End Date Taking? Authorizing Provider   ticagrelor (BRILINTA) 90 MG TABS tablet Take 90 mg by mouth 2 times daily Ld 3/21/2021   Yes Historical Provider, MD   cefdinir (OMNICEF) 300 MG capsule Take 1 capsule by mouth 2 times daily for 10 days 3/22/21 4/1/21 Yes Mendoza Peterson DO   metroNIDAZOLE (FLAGYL) 500 MG tablet Take 1 tablet by mouth 3 times daily for 10 days 3/22/21 4/1/21 Yes Mendoza Peterson DO   pantoprazole (PROTONIX) 40 MG tablet Take 1 tablet by mouth daily 3/23/21  Yes Mendoza Peterson DO   atorvastatin (LIPITOR) 80 MG tablet Take 80 mg by mouth nightly  3/11/21  Yes Historical Provider, MD   vancomycin (VANCOCIN) 125 MG capsule 4 times daily for 2 weeks, 3 times daily for 1 week, 2 times daily for 1 week , then daily for 1 week, then every other day until chemotherapy is completed 2/26/21  Yes Marko Smiley MD   lipase-protease-amylase (CREON) 19638-03195 units delayed release capsule Take 1 capsule by mouth 3 times daily as needed   Yes Historical Provider, MD   Multiple Vitamins-Minerals (THERAPEUTIC MULTIVITAMIN-MINERALS) tablet Take 1 tablet by mouth daily   Yes Historical Provider, MD   pregabalin (LYRICA) 100 MG capsule Take 200 mg by mouth 2 times daily. 2/1/21  Yes Historical Provider, MD   b complex vitamins capsule Take 1 capsule by mouth daily   Yes Historical Provider, MD   DULoxetine (CYMBALTA) 30 MG extended release capsule Take 30 mg by mouth 2 times daily    Yes Historical Provider, MD   zinc gluconate 50 MG tablet Take 1 tablet by mouth daily 11/4/20  Yes Zainab Leavitt,    carvedilol (COREG) 12.5 MG tablet Take 12.5 mg by mouth 2 times daily (with meals) 9/25/19  Yes Historical Provider, MD   oxyCODONE 5 MG capsule Take 5 mg by mouth every 4 hours as needed for Pain.    Yes Historical Provider, MD   aspirin EC 81 MG EC tablet Take 81 mg by mouth nightly Last dose 3/23/2`1   Yes Historical Provider, MD       ALLERGIES: Patient has no known allergies.     SOCIAL Hx:  Social History     Socioeconomic History    Marital status:      Spouse name: Not on file    Number of children: 2    Years of education: Not on file    Highest education level: Not on file   Occupational History    Not on file   Social Needs    Financial resource strain: Not on file    Food insecurity     Worry: Not on file     Inability: Not on file   [employer] needs     Medical: Not on file     Non-medical: Not on file   Tobacco Use    Smoking status: Former Smoker     Packs/day: 1.50     Years: 40.00     Pack years: 60.00     Types: Cigarettes     Quit date: 2008     Years since quittin.6    Smokeless tobacco: Never Used    Tobacco comment: occassional cigar   Substance and Sexual Activity    Alcohol use: Not Currently     Comment: soc    Drug use: No    Sexual activity: Not Currently     Partners: Female   Lifestyle    Physical activity     Days per week: Not on file     Minutes per session: Not on file    Stress: Not on file   Relationships    Social connections     Talks on phone: Not on file     Gets together: Not on file     Attends Denominational service: Not on file     Active member of club or organization: Not on file     Attends meetings of clubs or organizations: Not on file     Relationship status: Not on file    Intimate partner violence     Fear of current or ex partner: Not on file     Emotionally abused: Not on file     Physically abused: Not on file     Forced sexual activity: Not on file   Other Topics Concern    Not on file   Social History Narrative    Has a son and a daughter and 2 geandchildren       ROS:  General:   Denies chills, positive for fatigue, denies fever, malaise, night sweats or weight loss    Psychological:   Denies anxiety, disorientation or hallucinations    ENT:    Denies epistaxis, headaches, vertigo or visual changes    Cardiovascular:   Denies any chest pain, irregular heartbeats, or palpitations. No paroxysmal nocturnal dyspnea. Respiratory:   Denies shortness of breath, coughing, sputum production, hemoptysis, or wheezing. No orthopnea. Gastrointestinal:   Denies nausea, vomiting, diarrhea, or constipation. Denies any abdominal pain. Denies change in bowel habits or stools. Genito-Urinary:    Denies any urgency, frequency, hematuria. Voiding without difficulty. Musculoskeletal:   Denies joint pain, joint stiffness, joint swelling or muscle pain    Neurology:    Denies any headache or focal neurological deficits. No weakness or paresthesia. Admits to mild left-sided deficits status post most recent CVA. Derm:    Denies any rashes, ulcers, or excoriations. Denies bruising. Admits to jaundice. Extremities:   Denies any lower extremity swelling or edema. PHYSICAL EXAM:  VITALS:  Blood pressure 127/76, pulse 63, temperature 96.2 °F (35.7 °C), temperature source Axillary, resp. rate 16, height 5' 7\" (1.702 m), weight 170 lb (77.1 kg), SpO2 93 %. CONSTITUTIONAL:    Awake, alert, cooperative, no apparent distress, and appears stated age. He is somewhat drowsy status post procedure. EYES:    PERRL, EOMI, scleral icterus present, conjunctiva normal.  Impaired vision. ENT:    Normocephalic, atraumatic, sinuses nontender on palpation. External ears without lesions. Oral pharynx with moist mucus membranes. Impaired hearing. Mild left facial droop. NECK:    Supple, symmetrical, trachea midline, no adenopathy, thyroid symmetric, not enlarged and no tenderness, skin normal, no bruits, no JVD    HEMATOLOGIC/LYMPHATICS:    No cervical lymphadenopathy and no supraclavicular lymphadenopathy    LUNGS:    Symmetric.  No increased work of breathing, good air exchange, clear to auscultation bilaterally, no wheezes, rhonchi, or rales,     CARDIOVASCULAR:    Normal apical impulse, regular rate and rhythm, normal S1 and S2, no S3 or S4, and no murmur noted    ABDOMEN:

## 2021-03-26 NOTE — ANESTHESIA POSTPROCEDURE EVALUATION
Department of Anesthesiology  Postprocedure Note    Patient: Dk Hollingsworth  MRN: [id number]  YOB: 1950  Date of evaluation: 3/26/2021  Time:  3:21 PM     Procedure Summary     Date: 03/26/21 Room / Location: [address] [place] [address] 644  [facility]    Anesthesia Start: 4194 Anesthesia Stop: 4634    Procedures:       ERCP SPHINCTER/PAPILLOTOMY (N/A )      ERCP STENT INSERTION (N/A )      ERCP STONE REMOVAL (N/A ) Diagnosis: (OBSTRUCTIVE JAUNDICE, PANCREATIC CANCER)    Surgeons: Paul Treadwell MD Responsible Provider: Aaron Manzo MD    Anesthesia Type: MAC ASA Status: 3          Anesthesia Type: MAC    Nicole Phase I: Nicole Score: 10    Nicole Phase II:      Last vitals: Reviewed and per EMR flowsheets.        Anesthesia Post Evaluation    Patient location during evaluation: PACU  Patient participation: complete - patient participated  Level of consciousness: awake  Airway patency: patent  Nausea & Vomiting: no nausea and no vomiting  Complications: no  Cardiovascular status: hemodynamically stable  Respiratory status: acceptable  Hydration status: euvolemic

## 2021-03-26 NOTE — OP NOTE
SURGEON: Fran Cervantes MD        PREOPERATIVE DIAGNOSIS:  Obstructive jaundice      POSTOPERATIVE DIAGNOSES:  1.  Obstructive jaundice  2. Choledocholithiasis      OPERATION:  1. ERCP sphincterotomy  2. ERCP stone extraction  3. Pancreatic stent placement (5 [place] by 7 cm)  4. Common bile duct stent placement (10 mm x 60 mm fully covered wall flex)      ANESTHESIA: LMAC       ESTIMATED BLOOD LOSS: 20ml. INDICATION: This is a 72 y.o. male with history of pancreatic cancer and obstructive jaundice. The patient agreed to undergo the ERCP. The risks, benefits, and alternatives were explained to the patient for the procedure including bleeding, infection, perforation, and pancreatitis. The patient understood and agreed to proceed. DESCRIPTION OF PROCEDURE: The patient was brought to the operating room and  he underwent  [facility] anesthesia by the anesthesia team.   He was then placed in the  left lateral decubitus position. The flexible duodenoscope was inserted down  the oropharynx and passed down the esophagus into the stomach and into the  duodenum. The papilla was identified. The patient had a large periampullary diverticulum which made cannulation difficult. Initial attempts at cannulation resulted in cannulation of the pancreatic duct. After multiple attempts at double wire technique, I was still not able to cannulate the common bile duct. I then decided to place a stent in the pancreatic duct. A 5 [place] by 7 cm stent was placed without difficulty. Next, using the papillotome, I was able to gain access into the common bile duct. Contrast injection revealed adequate cannulation of the common bile duct. There was a 4 mm filling defect in the distal common bile duct. Next, a sphincterotomy was performed with the papillotome. This was hemostatic. A 9 to 12 mm balloon was then used to make multiple sweeps of the common bile duct. A 4 mm stone was removed.   Upon stone removal, the patient had a moderate to large amount of bleeding from the ampulla. This was difficult to visualize and/or clip. I therefore decided to place a metal stent in the common bile duct to provide some tamponade. A 10 mm x 60 mm fully covered wall flex stent was advanced into the common bile duct without difficulty. There was a clot formed on the end of the stent with no ongoing bleeding. I irrigated the area and watched it for several minutes without any evidence of any ongoing bleeding. There was good bile flow through the stent and the pancreatic duct stent remained in place. The scope was then withdrawn. The patient tolerated the procedure well.       Pippa Michael MD

## 2021-03-27 NOTE — PROGRESS NOTES
filed at 3/27/2021 3609  Gross per 24 hour   Intake 2314 ml   Output 275 ml   Net 2039 ml   I/O last 3 completed shifts: In: 2314 [P.O.:300; I.V.:2014]  Out: 275 [Urine:275]  Patient Vitals for the past 96 hrs (Last 3 readings):   Weight   03/26/21 1400 170 lb (77.1 kg)   03/25/21 0932 170 lb (77.1 kg)     Vital Signs:   Blood pressure 126/82, pulse 92, temperature 97.7 °F (36.5 °C), temperature source Oral, resp. rate 20, height 5' 7\" (1.702 m), weight 170 lb (77.1 kg), SpO2 93 %. General appearance:  Alert, responsive, oriented to person, place, and time. Well preserved, alert, no distress. Head:  Normocephalic. No masses, lesions or tenderness. Eyes:  PERRLA. EOMI. Sclera clear. Buccal mucosa moist.  ENT:  Ears normal. Mucosa normal.  Neck:    Supple. Trachea midline. No thyromegaly. No JVD. No bruits. Heart:    Rhythm regular. Rate controlled. No murmurs. Lungs:    Symmetrical. Clear to auscultation bilaterally. No wheezes. No rhonchi. No rales. Abdomen:   Soft. Mildly tender to palpation diffusely with voluntary guarding elicited. Bowel sounds are active. Extremities:    Peripheral pulses present. No peripheral edema. No ulcers. No cyanosis. No clubbing. Neurologic:    Alert x 3. No focal deficit. Cranial nerves grossly intact. No focal weakness. Psych:   Behavior is normal. Mood appears normal. Speech is not rapid and/or pressured. Musculoskeletal:   Spine ROM normal. Muscular strength intact. Gait not assessed. Integumentary:  No rashes  Skin normal color and texture.   Genitalia/Breast:  Deferred    Medication:  Scheduled Meds:   potassium chloride  40 mEq Oral Once    sodium chloride flush  10 mL Intravenous 2 times per day    atorvastatin  80 mg Oral Nightly    carvedilol  12.5 mg Oral BID WC    DULoxetine  30 mg Oral BID    lipase-protease-amylase  24,000 Units Oral TID     therapeutic multivitamin-minerals  1 tablet Oral Daily    pantoprazole  40 mg Oral Daily    pregabalin  200 mg Oral BID    zinc sulfate  50 mg Oral Daily    sodium chloride flush  10 mL Intravenous 2 times per day     Continuous Infusions:   0.9% NaCl with KCl 40 mEq      sodium chloride         Objective Data:  CBC with Differential:    Lab Results   Component Value Date    WBC 4.4 03/27/2021    RBC 3.34 03/27/2021    HGB 9.8 03/27/2021    HCT 28.5 03/27/2021    PLT 80 03/27/2021    MCV 85.3 03/27/2021    MCH 29.3 03/27/2021    MCHC 34.4 03/27/2021    RDW 18.4 03/27/2021    NRBC 0.9 11/03/2020    METASPCT 0.9 02/24/2021    LYMPHOPCT 15.0 03/27/2021    MONOPCT 7.0 03/27/2021    MYELOPCT 0.9 02/24/2021    BASOPCT 0.0 03/27/2021    MONOSABS 0.31 03/27/2021    LYMPHSABS 0.66 03/27/2021    EOSABS 0.09 03/27/2021    BASOSABS 0.00 03/27/2021     CMP:    Lab Results   Component Value Date     03/27/2021    K 3.4 03/27/2021     03/27/2021    CO2 21 03/27/2021    BUN 14 03/27/2021    CREATININE 0.6 03/27/2021    GFRAA >60 03/27/2021    LABGLOM >60 03/27/2021    GLUCOSE 93 03/27/2021    PROT 5.7 03/27/2021    LABALBU 2.7 03/27/2021    CALCIUM 8.1 03/27/2021    BILITOT 3.4 03/27/2021    ALKPHOS 1,333 03/27/2021    AST 82 03/27/2021    ALT 37 03/27/2021       Assessment:  1. Obstructive jaundice and choledocholithiasis status post ERCP sphincterotomy, stone extraction, pancreatic stent placement and common bile duct stent placement performed on March 26 by Dr. Eugenie Perez  2. Pancreatic head adenocarcinoma  3. Hyperbilirubinemia with improvement  4. Recent CVA  5. Coronary artery disease with history of CABG and stenting  6. Essential hypertension  7. Peripheral vascular disease  8. Recent carotid stenting-right performed on 2/16/2021  9. Mild to moderate protein calorie malnutrition  10. Anemia of chronic disease    Plan:   Clinically, the patient appears stable and is extremely anxious for discharge home. His pain is otherwise well controlled. Laboratory values and vital signs are being monitored. Chronic comorbidities are being monitored. Pending surgical recommendations, the patient will need resumption of antiplatelet therapy in the setting of recent CVA. I will discus the case with the surgical team today. Continue current therapy. See orders for further plan of care. More than 50% of my time was spent at the bedside counseling/coordinating care with the patient and/or family with face to face contact. This time was spent reviewing notes and laboratory data as well as instructing and counseling the patient. Time I spent with the family or surrogate(s) is included only if the patient was incapable of providing the necessary information or participating in medical decisions. I also discussed the differential diagnosis and all of the proposed management plans with the patient and individuals accompanying the patient. I am readily available for any further decision-making and intervention.        Saira Fish DO, F.A.C.O.I.  3/27/2021  7:07 AM

## 2021-03-27 NOTE — PLAN OF CARE
Problem: Activity:  Goal: Risk for activity intolerance will decrease  Outcome: Met This Shift     Problem: Bowel/Gastric:  Goal: Bowel function will improve  Outcome: Met This Shift     Problem: Bowel/Gastric:  Goal: Diagnostic test results will improve  Outcome: Met This Shift     Problem: Bowel/Gastric:  Goal: Occurrences of nausea will decrease  Outcome: Met This Shift     Problem:  Bowel/Gastric:  Goal: Occurrences of vomiting will decrease  Outcome: Met This Shift     Problem: Fluid Volume:  Goal: Maintenance of adequate hydration will improve  Outcome: Met This Shift     Problem: Health Behavior:  Goal: Ability to state signs and symptoms to report to health care provider will improve  Outcome: Met This Shift     Problem: Physical Regulation:  Goal: Complications related to the disease process, condition or treatment will be avoided or minimized  Outcome: Met This Shift     Problem: Physical Regulation:  Goal: Ability to maintain clinical measurements within normal limits will improve  Outcome: Met This Shift     Problem: Sensory:  Goal: Ability to identify factors that increase the pain will improve  Outcome: Met This Shift     Problem: Sensory:  Goal: Ability to notify healthcare provider of pain before it becomes unmanageable or unbearable will improve  Outcome: Met This Shift     Problem: Sensory:  Goal: Pain level will decrease  Outcome: Met This Shift

## 2021-03-27 NOTE — DISCHARGE SUMMARY
Physician Discharge Summary     Patient ID:  Blaire Staley  [id number]  72 y.o.  1950    Admit date: 3/26/2021    Discharge date and time: 3/27/2021    Admitting Physician: Jhon Varner MD     Admission Diagnoses:   Patient Active Problem List   Diagnosis    Atherosclerosis of native artery of both lower extremities with intermittent claudication ([address]    Atheroscler of native artery of right leg with intermit claudication ([address]    Carcinoma of head of pancreas ([address]    Agranulocytosis secondary to cancer chemotherapy (CODE) ([address]     COVID-19    Diarrhea    History of 2019 novel coronavirus disease (COVID-19)    CAD (coronary artery disease)    Essential hypertension    Hypoxia    Hypomagnesemia    Lactic acidosis    Clostridioides difficile diarrhea    Acute thromboembolic cerebrovascular accident ([address]    Dyslipidemia    C. difficile diarrhea    Sepsis ([address]    Anemia    Hyponatremia    Leukocytosis    Cardiomegaly    Obstructive jaundice    Obstructive jaundice due to cancer ([address]    Bilirubinemia    Biliary obstruction    Choledocholithiasis       Discharge Diagnoses:   Patient Active Problem List   Diagnosis    Atherosclerosis of native artery of both lower extremities with intermittent claudication ([address]    Atheroscler of native artery of right leg with intermit claudication (HCC)    Carcinoma of head of pancreas ([address]    Agranulocytosis secondary to cancer chemotherapy (CODE) ([address]     COVID-19    Diarrhea    History of 2019 novel coronavirus disease (COVID-19)    CAD (coronary artery disease)    Essential hypertension    Hypoxia    Hypomagnesemia    Lactic acidosis    Clostridioides difficile diarrhea    Acute thromboembolic cerebrovascular accident ([address]    Dyslipidemia    C. difficile diarrhea    Sepsis ([address]    Anemia    Hyponatremia    Leukocytosis    Cardiomegaly    Obstructive jaundice    Obstructive jaundice due to cancer ([address]    Bilirubinemia  Biliary obstruction    Choledocholithiasis       Admission Condition: fair    Discharged Condition: good    Indication for Admission: Postop monitoring    Hospital Course: Fernanda Guerra is a 72 y.o. male who presented with jaundice. He had ERCP with stone extraction and stent placement. During the procedure, he had significant small vessel bleeding from the ampulla that was controlled with placement of metal biliary stent. He was monitored postop and has remained stable. Diet was advanced, they were ambulating independently and pain was controlled. They were discharged with appropriate medication, instructions and follow up. Consults: IM    Significant Diagnostic Studies: as above.      Treatments: as above    In process/preliminary results:  Outstanding Order Results     Date and Time Order Name Status Description    3/27/2021 0856 EKG 12 Lead Preliminary           Patient Instructions:   Current Discharge Medication List      CONTINUE these medications which have CHANGED    Details   aspirin EC 81 MG EC tablet Take 1 tablet by mouth nightly Restart 3/30/21  Qty: 30 tablet, Refills: 3      ticagrelor (BRILINTA) 90 MG TABS tablet Take 1 tablet by mouth 2 times daily RESTART 3/30/21  Qty: 60 tablet, Refills: 0         CONTINUE these medications which have NOT CHANGED    Details   cefdinir (OMNICEF) 300 MG capsule Take 1 capsule by mouth 2 times daily for 10 days  Qty: 20 capsule, Refills: 0      metroNIDAZOLE (FLAGYL) 500 MG tablet Take 1 tablet by mouth 3 times daily for 10 days  Qty: 30 tablet, Refills: 0      pantoprazole (PROTONIX) 40 MG tablet Take 1 tablet by mouth daily  Qty: 30 tablet, Refills: 3      atorvastatin (LIPITOR) 80 MG tablet Take 80 mg by mouth nightly       vancomycin (VANCOCIN) 125 MG capsule 4 times daily for 2 weeks, 3 times daily for 1 week, 2 times daily for 1 week , then daily for 1 week, then every other day until chemotherapy is completed  Qty: 120 capsule, Refills: 1 lipase-protease-amylase (CREON) 08818-30129 units delayed release capsule Take 1 capsule by mouth 3 times daily as needed      Multiple Vitamins-Minerals (THERAPEUTIC MULTIVITAMIN-MINERALS) tablet Take 1 tablet by mouth daily      pregabalin (LYRICA) 100 MG capsule Take 200 mg by mouth 2 times daily. b complex vitamins capsule Take 1 capsule by mouth daily      DULoxetine (CYMBALTA) 30 MG extended release capsule Take 30 mg by mouth 2 times daily       zinc gluconate 50 MG tablet Take 1 tablet by mouth daily  Qty: 30 tablet, Refills: 3      carvedilol (COREG) 12.5 MG tablet Take 12.5 mg by mouth 2 times daily (with meals)      oxyCODONE 5 MG capsule Take 5 mg by mouth every 4 hours as needed for Pain. Discharge Exam:  General appearance: AAOx3, NAD  Head: NCAT, PERRLA, EOMI, red conjunctiva  Neck: supple, no masses  Lungs: Equal chest rise bilateral  Heart: Reg rate  Abdomen: soft, nondistended, tender appropriately, normotympanic, no guarding, no peritoneal signs  Extremities: extremities normal, atraumatic, no cyanosis or edema    Disposition: home    Patient Instructions: Activity: activity as tolerated  Diet: regular diet  Wound Care: none needed    Follow-up with Dr Jhon Buerger in 6 weeks.     Connor Dugan  3/27/2021  12:04 PM

## 2021-03-27 NOTE — PLAN OF CARE
Problem: Bleeding:  Goal: Will show no signs and symptoms of excessive bleeding  3/27/2021 0023 by Bernie Mishra RN  Outcome: Met This Shift

## 2021-03-29 PROBLEM — R74.01 ELEVATED AST (SGOT): Status: ACTIVE | Noted: 2021-01-01

## 2021-03-29 PROBLEM — Z86.73 HISTORY OF CVA (CEREBROVASCULAR ACCIDENT): Status: ACTIVE | Noted: 2021-01-01

## 2021-03-29 PROBLEM — R74.8 ELEVATED ALKALINE PHOSPHATASE LEVEL: Status: ACTIVE | Noted: 2021-01-01

## 2021-03-29 PROBLEM — D64.9 CHRONIC ANEMIA: Status: ACTIVE | Noted: 2021-01-01

## 2021-03-29 PROBLEM — C25.9 PANCREATIC CANCER (HCC): Status: ACTIVE | Noted: 2021-01-01

## 2021-03-29 PROBLEM — R41.82 ALTERED MENTAL STATE: Status: ACTIVE | Noted: 2021-01-01

## 2021-03-29 PROBLEM — E87.6 HYPOKALEMIA: Status: ACTIVE | Noted: 2021-01-01

## 2021-03-29 PROBLEM — I73.9 PVD (PERIPHERAL VASCULAR DISEASE) (HCC): Status: ACTIVE | Noted: 2021-01-01

## 2021-03-29 NOTE — ED NOTES
Pt refusing COVID test at this time, states he got one on Thursday and it was negative, ED resident notified     Muna Wild, MAC  03/29/21 [id number]

## 2021-03-29 NOTE — ED PROVIDER NOTES
Movements: Extraocular movements intact. Neck:      Musculoskeletal: Normal range of motion. Cardiovascular:      Rate and Rhythm: Normal rate. Pulses: Normal pulses. Heart sounds: Normal heart sounds. Pulmonary:      Effort: Pulmonary effort is normal.      Breath sounds: Normal breath sounds. Abdominal:      General: There is distension. Tenderness: There is no abdominal tenderness. There is no guarding or rebound. Musculoskeletal:      Right lower leg: No edema. Left lower leg: No edema. Skin:     Capillary Refill: Capillary refill takes less than 2 seconds. Findings: No bruising. Neurological:      Cranial Nerves: No cranial nerve deficit. Motor: Weakness present. Procedures       MDM     The patient was seen and evaluated for AMS and a cough. The patient's AMS workup however was unrevealing. UA was negative. Ammonia wnl. Troponin wnl. CT head unremarkable. His other labwork seems to be close to his baseline; Hb 9.3 with thrombocytopenia at 93; the patient does have a history of pancreatic cancer and has received chemotherapy. He is s/p ERCP three days ago with Dr. Ozzie Dunaway. Since then he has had depressed mentation . AMS workup thus far unrevealing and given the patient's complex medical history will be admitted for further workup of this AMS. He does respond to questions and commands. He is A&O x3. He will be admitted to tele obs.     ED Course as of Mar 29 2303   Mon Mar 29, 2021   1629 Dr. Enrique Alexander called ED and aware of labwork; checked in on patient and aware of patient's current workup    [TK]      ED Course User Index  [TK] Mary Ellen Noel DO      --------------------------------------------- PAST HISTORY ---------------------------------------------  Past Medical History:  has a past medical history of C. difficile diarrhea, CAD (coronary artery disease), Cancer ([facility] [address] Cerebral artery occlusion with cerebral infarction [facility]), GI bleed, Heart Eosinophils % 1.0 0.0 - 6.0 %    Basophils % 0.1 0.0 - 2.0 %    Neutrophils Absolute 5.20 1.80 - 7.30 E9/L    Immature Granulocytes # 0.03 E9/L    Lymphocytes Absolute 1.08 (L) 1.50 - 4.00 E9/L    Monocytes Absolute 0.76 0.10 - 0.95 E9/L    Eosinophils Absolute 0.07 0.05 - 0.50 E9/L    Basophils Absolute 0.01 0.00 - 0.20 E9/L   Comprehensive Metabolic Panel w/ Reflex to MG   Result Value Ref Range    Sodium 131 (L) 132 - 146 mmol/L    Potassium reflex Magnesium 3.3 (L) 3.5 - 5.0 mmol/L    Chloride 99 98 - 107 mmol/L    CO2 24 22 - 29 mmol/L    Anion Gap 8 7 - 16 mmol/L    Glucose 164 (H) 74 - 99 mg/dL    BUN 9 8 - 23 mg/dL    CREATININE 0.7 0.7 - 1.2 mg/dL    GFR Non-African American >60 >=60 mL/min/1.73    GFR African American >60     Calcium 8.4 (L) 8.6 - 10.2 mg/dL    Total Protein 6.0 (L) 6.4 - 8.3 g/dL    Albumin 2.6 (L) 3.5 - 5.2 g/dL    Total Bilirubin 1.9 (H) 0.0 - 1.2 mg/dL    Alkaline Phosphatase 824 (H) 40 - 129 U/L    ALT 33 0 - 40 U/L    AST 66 (H) 0 - 39 U/L   Ammonia   Result Value Ref Range    Ammonia 33.0 16.0 - 60.0 umol/L   Urinalysis   Result Value Ref Range    Color, UA Yellow Straw/Yellow    Clarity, UA Clear Clear    Glucose, Ur Negative Negative mg/dL    Bilirubin Urine SMALL (A) Negative    Ketones, Urine Negative Negative mg/dL    Specific Gravity, UA >=1.030 1.005 - 1.030    Blood, Urine Negative Negative    pH, UA 5.5 5.0 - 9.0    Protein,  (A) Negative mg/dL    Urobilinogen, Urine 0.2 <2.0 E.U./dL    Nitrite, Urine Negative Negative    Leukocyte Esterase, Urine Negative Negative   Troponin   Result Value Ref Range    Troponin <0.01 0.00 - 0.03 ng/mL   Platelet Confirmation   Result Value Ref Range    Platelet Confirmation CONFIRMED    Magnesium   Result Value Ref Range    Magnesium 1.9 1.6 - 2.6 mg/dL   Microscopic Urinalysis   Result Value Ref Range    WBC, UA 2-5 0 - 5 /HPF    RBC, UA 1-3 0 - 2 /HPF    Bacteria, UA RARE (A) None Seen /HPF    Amorphous, UA MODERATE    EKG 12 Lead status, unspecified altered mental status type        Disposition:  Patient's disposition: Admit to telemetry  Patient's condition is stable. Patient was seen and evaluated by myself and my attending Odilia Castañeda DO. Assessment and Plan discussed with attending provider, please see attestation for final plan of care.      Dannette Spurling, DO Jen Ruts, DO  Resident  03/29/21 2268

## 2021-03-29 NOTE — PROGRESS NOTES
Jamilahjenaro Pinto wife, Karen Mcmullen, called stating that since D/C from hospital for stent placement with Dr. Ingrid Quan on Saturday the patient has been extremely fatigued with increased weakness and worsening neuropathy with higher dose lyrica. Denies fever, changes in abdominal pattern or bleeding. Appointment with Dr. Jared Dewey today is rescheduled to Monday d/t inability to get out of bed. I will reach out to our  Mc Hughes to discuss resources with her because the patient is becoming increasingly dependant on his wife who will likely need home health care for assistance. Advised her to take him in to ER with these new neuro symptoms to r/o an infectious process or a reversible cause of these symptoms. She was understandable and agreeable.

## 2021-03-30 NOTE — PROGRESS NOTES
Messaged Neurology consult to  via perfect serve  Also messaged  to notify of new general surgery consult.

## 2021-03-30 NOTE — CARE COORDINATION
Therapy recommending FWW, choiced pt's wife for DME, chose [facility], notified Jose hadleynon, order obtained. The Plan for Transition of Care is related to the following treatment goals: medical stability     The Patient and/or patient representative wife Libra Romero was provided with a choice of provider and agrees   with the discharge plan. [x] Yes [] No    Freedom of choice list was provided with basic dialogue that supports the patient's individualized plan of care/goals, treatment preferences and shares the quality data associated with the providers.  [x] Yes [] No

## 2021-03-30 NOTE — PROGRESS NOTES
OCCUPATIONAL THERAPY INITIAL EVALUATION      Date:3/30/2021  Patient Name: Apolinar Pérez  MRN: [id number]  : 1950  Room: [facility]      Evaluating [address], OTR/L #[id number]    AM-PAC Daily Activity Raw Score:   Recommended Adaptive Equipment: TBD     Diagnosis: Altered mental state [R41.82]     Referring physician: MIYA Dorman - TIM    Pertinent Medical History: C. Diff, CAD, pancreas CA, stroke (2021 no residuals), heart attach, HTN, PVD, SOB on exertion    Precautions:  Falls, SOB w/ activity, cognition, bed alarm     Home Living: Pt lives with spouse in 1 floor home. 3 CHERYL, 1 handrail  Bathroom setup: step in shower   Equipment owned: n/a    Prior Level of Function: independent with ADLs , shares with IADLs; ambulated independently w/o AD  Driving: yes    Pain Level: Pt c/o no pain this session     Cognition: A&O: 3/4; Follows 1 step directions  Noted confusion during functional tasks and PLOF discussion - cues provided to sequence and attend to tasks.  Decreased insight to deficits   Memory:  fair -   Sequencing:  fair    Problem solving:  fair -   Judgement/safety:  fair -     Functional Assessment:   Initial Eval Status  Date: 3/30/21 Treatment Status  Date: Short Term Goals/LTG  Treatment frequency: 1-4x/wk   Feeding Stand by Assist   Modified [place]    Grooming Minimal Assist   Modified [place]    UB Dressing Minimal Assist   Modified [place]    LB Dressing Moderate Assist   B socks  LOB posteriorly 2x  Stand by Assist    Bathing Moderate Assist  Stand by Assist    Toileting Minimal Assist   Supervision    Bed Mobility  Rolling: Stand by Assist   Supine to sit: Minimal Assist   Sit to supine: Stand by Assist   Rolling: Independent   Supine to sit: Modified [place]   Sit to supine: Modified [place]    Functional Transfers Min A  Supervision   Functional Mobility Min A w/ w/w  In room to prep for bathroom mobility  LOB x1 w/ 1-2 steps backwards Supervision   Balance Sitting: CGA (static)  Min A (dynamic)  Frequent posterior lean   Standing: Min A w/ w/w     Activity Tolerance Fair-  Noted SOB/audible wheezing w/ moderate activity. O2 sat=^93%. Reinforced rest and pursed lip breathing. RN notified/aware  Fair+   Visual/  Perceptual Glasses: yes                Hand dominance: R   Strength ROM Additional Info:    RUE  4-/5  WFL   good  and wfl FMC/dexterity noted during ADL tasks       LUE Distal: 4-/5  Proximal: 2+/5  Distal AROM: WFL  Proximal AROM: limited (chronic)   good  and fair- FMC/dexterity noted during ADL tasks       Hearing: WFL  Sensation: c/o numbness/tingling B hands (secondary to chemo)  Tone: WFL  Edema: none noted                   Comments: Upon arrival patient lying in bed. Pt agreeable to OT session this date, spouse present. At end of session, patient lying in bed (bed alarm on) with call light and phone within reach, all lines and tubes intact. Overall patient demonstrated decreased independence and safety during completion of ADL/functional transfer/mobility tasks. Pt would benefit from continued skilled OT to increase safety and independence with completion of ADL/IADL tasks for functional independence and quality of life. Treatment: OT treatment provided this date includes:  Facilitation of bed mobility, unsupported sitting balance (addressing posture, weight shifting and light dynamic reaching. Posterior lean noted w/ majority of dynamic sitting tasks - assist and cues provided to correct), functional transfers (education/mod cues for safety/hand placement), standing tolerance tasks (addressing posture, balance and activity tolerance while incorporating light functional reaching impacting ADLs) and functional ambulation tasks with w/w (in preparation to/from bathroom w/ education/cuing on posture, w/w management, attention, sequencing and safety.  Impulsive at times especially w/ turns and stepping backwards - LOB x1 w/ assist, cues and education to correct). Therapist facilitated self-care retraining: LB self-care tasks and simulated toileting task while educating/cuing pt on modified techniques, posture, safety and energy conservation techniques. Skilled monitoring of HR, O2 sats and pts response to treatment. Eval Complexity: Low    Evaluation Time includes thorough review of current medical information, gathering information on past medical history/social history and prior level of function, completion of standardized testing/informal observation of tasks, assessment of data and education on plan of care and goals.     Assessment of current deficits   Functional mobility [x]  ADLs [x] Strength [x]  Cognition [x]  Functional transfers  [x] IADLs [x] Safety Awareness [x]  Endurance [x]  Fine Motor Coordination [] Balance [x] Vision/perception [] Sensation []   Gross Motor Coordination [] ROM [] Delirium []                  Motor Control []    Plan of Care: 1-3 days/week for 1-2 weeks PRN   Instruction/training on adapted ADL techniques and AE recommendations to increase functional independence within precautions  Training on energy conservation strategies/techniques to improve independence/tolerance for self-care routine  Functional transfer/mobility training/DME recommendations for increased independence, safety, and fall prevention  Patient/Family education to increase follow through with safety techniques and functional independence  Recommendation of environmental modifications for increased safety with functional transfers/mobility and ADLs  Cognitive retraining/development of therapeutic activities to improve problem solving, judgement, memory, and attention for increased safety/participation in ADL/IADL tasks  Therapeutic exercise to improve motor endurance, ROM, and functional strength for ADLs/functional transfers  Therapeutic activities to facilitate/challenge dynamic balance, stand tolerance, fine motor dexterity/in-hand manipulation for increased independence with ADLs    Rehab Potential: Good for established goals    Patient / Family Goal: Not stated     Patient and/or family were instructed on diagnosis, prognosis/goals and plan of care. Spouse demonstrated good understanding. [] Malnutrition indicators have been identified and nursing has been notified to ensure a dietitian consult is ordered.        Low Evaluation completed +              Time In: 14:04  Time Out: 14:30  Total Treatment Time: 10 minutes   Min Units   OT Eval Low 97165 X 1   OT Eval Medium 73550     OT Eval High 08029     OT Re-Eval [id number]     Therapeutic Ex 68497     Therapeutic Activities 63271 10 1   ADL/Self Care 41402     Orthotic Management 08573     Neuro Re-Ed [address], OTR/L #[id number]

## 2021-03-30 NOTE — PROGRESS NOTES
Physical Therapy  Physical Therapy Initial Assessment   Name: Nai Macdonald  : 1950  MRN: [id number]    Referring Provider:  MIYA Dorman CNP    Date of Service: 3/30/2021    Evaluating PT:  Amee Romero, PT, DPT [id number]    Room #:  2877/1358-U  Diagnosis:  Altered mental state  PMHx/PSHx:  Heart attack  and , HTN, pancreas CA, CVA  with residual L hand deficits, CAD, SOB on exertion, Jaundice, PVD, GI bleed, ERCP 3/26/2021 spincter papillotomy, stent insertion, and stone removal  Precautions:  Falls cognition  Equipment Needs:  Front [employer]    SUBJECTIVE:    Pt lives with wife in a 1 story home with 3 stairs to enter and 1 rail. Bed is on first floor and bath is on first floor. Pt ambulated with no AD independently PTA. Pt is confused and having difficulty answering questions. Wife present at bedside and reports that since discharge from hospital pt has had increased confusion and BLE weakness/impaired balance. Pt's wife reports that pt is functioning well below his baseline cognitively and physically at this time. OBJECTIVE:   Initial Evaluation  Date: 3/30/2021 Treatment Short Term/ Long Term   Goals   AM-PAC 6 Clicks      Was pt agreeable to Eval/treatment? yes     Does pt have pain? No c/o pain     Bed Mobility  Rolling: SBA  Supine to sit: SBA  Sit to supine: SBA  Scooting: SBA  Rolling: Independent  Supine to sit: Independent  Sit to supine: Independent  Scooting: Independent   Transfers Sit to stand: SBA  Stand to sit: SBA  Stand pivot: Monique no AD  SBA front [employer]  Sit to stand: Independent  Stand to sit:  Independent  Stand pivot: Herrera front [employer]   Ambulation    150 feet with no AD Monique  150 feet front [employer] SBA  300 feet with front [employer] Herrera   Stair negotiation: ascended and descended  3 steps with 1 rail Monique  3 steps with 1 rail Herrera   ROM BUE:  Per OT note  BLE:  WNL     Strength BUE:  Per OT note  BLE:  WNL     Balance Sitting EOB:  SBA  Dynamic Standing:  Monique no AD SBA [facility]  Sitting EOB:  Independent  Dynamic Standing:  Herrera [facility]     Pt is A & O x 3 (self, place, time, not oriented to situation). Difficulty with casual conversation. Pleasantly confused. Sensation:  Pt denies numbness and tingling to extremities  Edema:  unremarkable    Patient education  Pt educated on role of PT intervention. Pt educated on safety in room with utilization of call light for assistance with mobility. Pt educated on importance of maximizing OOB time by transferring to bedside chair for meals and ambulating to bathroom/transferring to bedside commode with assistance from nursing and therapy staff to increase functional activity tolerance and overall functional independence. Educated pt and pt's spouse on benefits of use of [facility] for safety. Patient response to education:   Pt verbalized understanding Pt demonstrated skill Pt requires further education in this area   yes yes yes     ASSESSMENT:    Comments:  RN cleared pt for activity prior to session. Pt received supine in bed and agreeable to PT intervention at this time. Pt performed all functional mobility as noted above. Per pt's spouse, pt is functioning well below baseline at this time. Some unsteadiness noted with ambulation that improved with introduction of AD. Pt returned to supine at end of session and left with all needs met and call light in reach. Pt requires continued skilled PT intervention for the purposes of maximizing functional mobility and independence by addressing deficits described above. Recommending  PT intervention at this time. Treatment:  Patient practiced and was instructed in the following treatment:     Therapeutic Activities Completed:  o Functional mobility as noted above:   - Bed mobility: SBA. Mod VC and hand over hand guidance to facilitate efficient use of BUE on EOB to promote more independent completion of task. - Transfer training: STS SBA.   Stand pivot Monique without AD (LOB when turning to sit) and SBA with front [employer].  Cues for proper sequencing and hand placement when using front [employer] for basic transfers. - Ambulation: 150 feet no AD Monique. Multiple minor LOBs noted. 150 feet front [employer] SBA. Improved balance noted with AD. Cues for proper sequencing and utilization of AD.    o Skilled repositioning in supine with HOB elevated for comfort.  o Pt education as noted above. Pt's/ family goals   1. Return home. Spouse is concerned about pt's confusion. Patient and or family understand(s) diagnosis, prognosis, and plan of care. yes    PLAN OF CARE:    Current Treatment Recommendations     [x] Strengthening     [x] ROM   [x] Balance Training   [x] Endurance Training   [x] Transfer Training   [x] Gait Training   [x] Stair Training   [x] Positioning   [x] Safety and Education Training   [x] Patient/Caregiver Education   [] HEP  [] Other     PT care will be provided in accordance with the objectives noted above. The above treatment recommendations will be utilized to address deficits described above in order to restore pt's prior level of function and/or achieve modified functional independence with adaptive strategies. Frequency of treatments: 2-5x/week x 1-2 weeks. Time in  0912  Time out  0938    Total Treatment Time  15 minutes     Evaluation Time includes thorough review of current medical information, gathering information on past medical history/social history and prior level of function, completion of standardized testing/informal observation of tasks, assessment of data and education on plan of care and goals.     CPT codes:  [] Low Complexity PT evaluation 94445  [x] Moderate Complexity PT evaluation 58016  [] High Complexity PT evaluation 12313  [] PT Re-evaluation 71634  [] Gait training 15727 0 minutes  [] Manual therapy 02479 0 minutes  [x] Therapeutic activities 72914 20 minutes  [] Therapeutic exercises 68734 0 minutes  [] Neuromuscular reeducation

## 2021-03-30 NOTE — H&P
Callie Clark M.D. History and Physical      CHIEF COMPLAINT:  Altered mental status    Reason for Admission:  AMS    History Obtained From:  patient, spouse, electronic medical record    HISTORY OF PRESENT ILLNESS:      The patient is a 72 y.o. male of Bre Jeffries MD with significant past medical history of Pancreatic cancer (unoperable), biliary stent 3/27/2021, CVA x 2 in February 2021, requiring carotid stenting who presents with altered mental status. Patient's wife states yesterday morning her  all of a sudden was confused, hallucinating, having an angry demeanor and was not making any sense. She reports he is usually a very calm, professional gentleman. He underwent an ERCP Friday with Dr. Judge Mary and therefore has been off [address] and aspirin for one week now and just resumed it today. Patient is unable to provide much information while in the emergency department as he  was not making much sense. According to wife, patient has been intermittently confused at home since being discharged after the procedure Friday. When he got to the hospital he was consistently confused. However on my evaluation he is able to answer all questions appropriately. There are no signs of Sirs/sepsis. Blood pressure (!) 151/75, pulse 99, temperature 97.8 °F (36.6 °C), temperature source Temporal, resp. rate 20, height 5' 7\" (1.702 m), weight 170 lb (77.1 kg), SpO2 95 %.     All labs personally reviewed   All imaging personally reviewed     Past Medical History:        Diagnosis Date    C. difficile diarrhea     X3 nov dec 2020 feb 2021    CAD (coronary artery disease)     lt sided weakness     Cancer ([facility] [address] 05/2020    pancreas    Cerebral artery occlusion with cerebral infarction [facility])     mini  stroke feb 2021 no residual 2018 stroke lt hand weakness     GI bleed     Heart attack ([facility] [address] 2008, 3-2015    Hypertension     Jaundice     Peripheral vascular disease ([facility] [address]  SOB (shortness of breath) on exertion      Past Surgical History:        Procedure Laterality Date    CARDIAC SURGERY      Quad 2014    COLONOSCOPY      CORONARY ANGIOPLASTY  2005    CABG TIMES 4 2015    CORONARY ANGIOPLASTY WITH STENT PLACEMENT      ENDOSCOPY, COLON, DIAGNOSTIC      ERCP N/A 3/26/2021    ERCP SPHINCTER/PAPILLOTOMY performed by Christiano Mata MD at [address] [facility] ERCP N/A 3/26/2021    ERCP STENT INSERTION performed by Christiano Mata MD at [address] [facility] ERCP N/A 3/26/2021    ERCP STONE REMOVAL performed by Christiano Mata MD at [address]     IR CAROTID STENT UNI W PROTECTION  2/16/2021    IR CAROTID STENT UNI W PROTECTION 2/16/2021 Taras Concepcion MD [facility]    MO THROMBOENDARTECTMY FEMORAL COMMON Right 9/17/2018    FEMORAL ENDARTERECTOMY performed by Karly Caballero MD at [address]  08/15/2018    abdominal aortogram with runoff by Dr. Delmy Pino         Medications Prior to Admission:    Medications Prior to Admission: aspirin EC 81 MG EC tablet, Take 1 tablet by mouth nightly Restart 3/30/21  ticagrelor (BRILINTA) 90 MG TABS tablet, Take 1 tablet by mouth 2 times daily RESTART 3/30/21  cefdinir (OMNICEF) 300 MG capsule, Take 1 capsule by mouth 2 times daily for 10 days  metroNIDAZOLE (FLAGYL) 500 MG tablet, Take 1 tablet by mouth 3 times daily for 10 days  pantoprazole (PROTONIX) 40 MG tablet, Take 1 tablet by mouth daily  atorvastatin (LIPITOR) 80 MG tablet, Take 80 mg by mouth nightly   vancomycin (VANCOCIN) 125 MG capsule, 4 times daily for 2 weeks, 3 times daily for 1 week, 2 times daily for 1 week , then daily for 1 week, then every other day until chemotherapy is completed  lipase-protease-amylase (CREON) 89539-07316 units delayed release capsule, Take 1 capsule by mouth 3 times daily as needed  Multiple Vitamins-Minerals (THERAPEUTIC MULTIVITAMIN-MINERALS) tablet, Take 1 tablet by mouth daily  pregabalin (LYRICA) 100 MG capsule, Take 200 mg by mouth 2 times daily. b complex vitamins capsule, Take 1 capsule by mouth daily  DULoxetine (CYMBALTA) 30 MG extended release capsule, Take 30 mg by mouth 2 times daily   zinc gluconate 50 MG tablet, Take 1 tablet by mouth daily  carvedilol (COREG) 12.5 MG tablet, Take 12.5 mg by mouth 2 times daily (with meals)  oxyCODONE 5 MG capsule, Take 5 mg by mouth every 4 hours as needed for Pain. Allergies:  Patient has no known allergies. Social History:   TOBACCO:   reports that he quit smoking about 12 years ago. His smoking use included cigarettes. He has a 60.00 pack-year smoking history. He has never used smokeless tobacco.  ETOH:   reports previous alcohol use. MARITAL STATUS:    OCCUPATION:      Family History:       Problem Relation Age of Onset    Breast Cancer Mother     Cancer Father         Colon    Cancer Maternal Aunt         Bladder    Cancer Maternal Grandfather         Bladder    Cancer Maternal Cousin         Leukemia    Breast Cancer Maternal Aunt     Cancer Maternal Cousin         Lung       REVIEW OF SYSTEMS:    General ROS: negative for - chills or fever  Hematological and Lymphatic ROS: negative  Endocrine ROS: negative  Respiratory ROS: no cough, shortness of breath, or wheezing  Cardiovascular ROS: no chest pain or dyspnea on exertion  Gastrointestinal ROS: no abdominal pain, change in bowel habits, or black or bloody stools  Genito-Urinary ROS: no dysuria, trouble voiding, or hematuria  Neurological ROS: confusion present, weak left hand grasp (residual effect from previous CVA), unsteady gait    Vitals:  BP (!) 151/75   Pulse 99   Temp 97.8 °F (36.6 °C) (Temporal)   Resp 20   Ht 5' 7\" (1.702 m)   Wt 170 lb (77.1 kg)   SpO2 95%   BMI 26.63 kg/m²     PHYSICAL EXAM:  General:  Awake, alert, oriented X 3 on my evaluation well developed, well nourished, well groomed. No apparent distress.   HEENT:  Normocephalic, atraumatic. Pupils equal, round, reactive to light. No scleral icterus. No conjunctival injection. Normal lips, teeth, and gums. No nasal discharge. Neck:  Supple, FROM  Heart:  RRR, no murmurs, gallops, rubs, carotid upstroke normal, no carotid bruits  Lungs:  CTA bilaterally, bilat symmetrical expansion, no wheeze, rales, or rhonchi  Abdomen: Bowel sounds present, soft, nontender, no masses, no organomegaly, no peritoneal signs  Extremities:  No clubbing, cyanosis, or edema  Skin:  Warm and dry, no open lesions or rash  Neuro:  Cranial nerves 2-12 intact, no focal deficits? Slight weakness of left upper extremity  Vascular: Radial and pedal Pulses 2+  Breast: deferred  Rectal: deferred  Genitalia:  deferred      DATA:     Recent Labs     03/29/21  1509   WBC 7.2   HGB 9.3*   PLT 93*     Recent Labs     03/29/21  1509   *   K 3.3*   BUN 9   CREATININE 0.7     Recent Labs     03/29/21  1509   PROT 6.0*     Recent Labs     03/29/21  1509   AST 66*   ALT 33   ALKPHOS 824*   BILITOT 1.9*     No results for input(s): BNP in the last 72 hours. Recent Labs     03/29/21  1509   TROPONINI <0.01       ASSESSMENT:      Principal Problem:    Altered mental state  Active Problems:    CAD (coronary artery disease)    Essential hypertension    Hyponatremia    Chronic anemia    History of CVA (cerebrovascular accident)    PVD (peripheral vascular disease) (HCC)    Elevated alkaline phosphatase level    Hypokalemia    Elevated AST (SGOT)    Pancreatic cancer ([facility] [address]  Resolved Problems:    * No resolved hospital problems. *        PLAN:    72year old male presenting with altered mental status after being off aspirin and Brillinta for one week post ERCP with stent placement for obstructing pancreatic mass 3/27/2021. Known history of previous CVA February 2021.   Head CT unremarkable patient unable to get MRI secondary to [address] stent in place  CT perfusion scan,   continue aspirin, Brillinta and lipitor

## 2021-03-30 NOTE — ED NOTES
Pt with diarrhea. Ariana  area cleansed, clean pull up on, new linen on.       Maris Huynh RN  03/30/21 3122

## 2021-03-30 NOTE — CARE COORDINATION
Met with pt and spouse Allyn Hopkins at bedside to discuss discharge / transition of care plan. Pt reports from home with Allyn Hopkins; denies DME or needs; independent of all ADL; verified PCP and pharmacy ([facility] [place]); discharge plan is to return home with Allyn Hopkins to transport. Pt was returning from the bathroom independently however, upon return he appeared SOB, and speech was shortened d/t FUCHS. Pt reports no home oxygen, C-PAP, Bi-PAP, or nebulizer. Per Allyn Hopkins pt is normally A&Ox4 now, with intermittent confusion. Spoke with therapy they will see today.

## 2021-03-30 NOTE — CONSULTS
May[facility]  Neurology Consult    Date:  3/30/2021  Patient Name:  Pam Sarmiento  YOB: 1950  MRN: [id number]     PCP:  Bard Angy MD   Referring:  No ref. provider found      Chief Complaint: confusion    History obtained from: patient and spouse    Assessment  Pam Sarmiento is a 72 y.o. 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 Given the timing of his confusion I suspect post-procedural delirium as the most likely etiology given the relatively rapid waxing and waning of symptoms as described by his spouse. He does not display any new focal findings that are suspicious for a new infarct, though, does remain a possibility even if less likely. Plan  · Will discuss with radiology regarding compatibility of carotid stent with MRI brain - awaiting call back at time of signing this note  · If unable to obtain MRI brain will plan on repeat CT head w/o contrast  · If patient becomes agitated overnight would consider low dose Depakote 125 mg TID PRN  · Will follow        History of Present Illness:  Pam Sarmiento is a 72 y.o. right handed male presenting for evaluation of confusion. Patient's wife reports that he had not been seeing things and hearing things that weren't there. He knew who he and his wife were, but thought he had to sign papers. He does have bilateral hearing aids. He has been seeing things like a bumblebee on the ceiling. In his sleep he has been talking in his sleep and acting out his dreams somewhat. He dreamt the other night that he was tying down lines on a boat, and told his wife he nearly fell out of bed. He had a stroke in February 2021 and ELISEO stent placed. He does have some residual left hand weakness after this stroke. He is also getting chemotherapy for pancreatic cancer. His alkaline phosphatase was going up and biliary stents were placed on Friday this past week.  He went home Saturday, and that's when his wife noted his extreme fatigue. 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 He has been getting some trouble with neuropathy with prior regimen which included a platin. He has started a new chemo regimen that his wife cannot recall the name of.       Review of Systems:  Constitutional  · Weight loss: no  · Fever: no    Eyes  · Double Vision: no  · Visions loss: no    Ears, Nose, Mouth, and Throat  · Difficulty swallowing: no    Cardiovascular  · Chest Pain: no    Respiratory  · Shortness of Breath: some    Gastrointestinal  · Abdominal Pain: mild    Neurological  · Headaches: no  · Weakness: as per HPI  · Difficulty with Memory: as per HPI and exam    Psychiatric  · Anxiety: no      Medical History:   Past Medical History:   Diagnosis Date    C. difficile diarrhea     X3 nov dec 2020 feb 2021    CAD (coronary artery disease)     lt sided weakness     Cancer ([address] 05/2020    pancreas    Cerebral artery occlusion with cerebral infarction ([facility]     mini  stroke feb 2021 no residual 2018 stroke lt hand weakness     GI bleed     Heart attack ([facility] 2008, 3-2015    Hypertension     Jaundice     Peripheral vascular disease (HCC)     SOB (shortness of breath) on exertion         Surgical History:   Past Surgical History:   Procedure Laterality Date    CARDIAC SURGERY      Quad 2014    COLONOSCOPY      CORONARY ANGIOPLASTY  2005    CABG TIMES 4 2015    CORONARY ANGIOPLASTY WITH STENT PLACEMENT      ENDOSCOPY, COLON, DIAGNOSTIC      ERCP N/A 3/26/2021    ERCP SPHINCTER/PAPILLOTOMY performed by Oksana Martinez MD at [address] [place] [address] ERCP N/A 3/26/2021    ERCP STENT INSERTION performed by Oksana Martinez MD at [address] [place] [address] ERCP N/A 3/26/2021    ERCP STONE REMOVAL performed by Oksana Martinez MD at [address] Right     IR CAROTID STENT UNI W PROTECTION  2/16/2021    IR CAROTID STENT UNI W PROTECTION 2/16/2021 Tho Swenson MD [facility] SPECIAL PROCEDURES    MS THROMBOENDARTECTMY FEMORAL COMMON Right 2018    FEMORAL ENDARTERECTOMY performed by Sita Sood MD at [address]  08/15/2018    abdominal aortogram with runoff by Dr. Pk Mcelroy        Family History:   Family History   Problem Relation Age of Onset    Breast Cancer Mother     Cancer Father         Colon    Cancer Maternal Aunt         Bladder    Cancer Maternal Grandfather         Bladder    Cancer Maternal Cousin         Leukemia    Breast Cancer Maternal Aunt     Cancer Maternal Cousin         Lung       Social History:  Social History     Tobacco Use    Smoking status: Former Smoker     Packs/day: 1.50     Years: 40.00     Pack years: 60.00     Types: Cigarettes     Quit date: 2008     Years since quittin.6    Smokeless tobacco: Never Used    Tobacco comment: occassional cigar   Substance Use Topics    Alcohol use: Not Currently     Comment: soc    Drug use: No        Current Medications:      Current Facility-Administered Medications   Medication Dose Route Frequency Provider Last Rate Last Admin    sodium chloride flush 0.9 % injection 10 mL  10 mL Intravenous 2 times per day April MIYA Goode - CNP   10 mL at 21 1100    sodium chloride flush 0.9 % injection 10 mL  10 mL Intravenous PRN April MIYA Goode - TIM        polyethylene glycol (GLYCOLAX) packet 17 g  17 g Oral Daily PRN April MIYA Goode - CNP        trimethobenzamide Alonna [place]) injection 200 mg  200 mg Intramuscular Q6H PRN April MIYA Goode - CNP        aspirin chewable tablet 81 mg  81 mg Oral Nightly April MIYA Goode - TIM        atorvastatin (LIPITOR) tablet 80 mg  80 mg Oral Nightly April MIYA Goode CNP        b complex-C-folic acid (NEPHROCAPS) capsule 1 mg  1 mg Oral Daily April MIYA Goode CNP   1 mg at 21 0953    carvedilol (COREG) tablet 12.5 mg 12.5 mg Oral BID WC [facility], APRN - CNP   12.5 mg at 03/30/21 0954    DULoxetine (CYMBALTA) extended release capsule 30 mg  30 mg Oral BID April CiscoAaronDwight, APRN - CNP   30 mg at 03/30/21 5033    lipase-protease-amylase (CREON) delayed release capsule 24,000 Units  24,000 Units Oral TID [facility] April [facility], APRN - CNP   24,000 Units at 03/30/21 1209    therapeutic multivitamin-minerals 1 tablet  1 tablet Oral Daily April [facility], APRN - CNP   1 tablet at 03/30/21 4013    oxyCODONE (ROXICODONE) immediate release tablet 5 mg  5 mg Oral Q4H PRN April AntoinetteDwight APRN - CNP        pantoprazole (PROTONIX) tablet 40 mg  40 mg Oral Daily April [facility] APRN - CNP   40 mg at 03/30/21 0919    ticagrelor (BRILINTA) tablet 90 mg  90 mg Oral BID April Cisco-Dwight, APRN - CNP   90 mg at 03/30/21 0919    cefdinir (OMNICEF) capsule 300 mg  300 mg Oral BID April Cisco-Revillo, APRN - CNP   300 mg at 03/30/21 0762    metroNIDAZOLE (FLAGYL) tablet 500 mg  500 mg Oral TID [facility], APRN - CNP   500 mg at 03/30/21 1429    zinc sulfate (ZINCATE) capsule 50 mg  50 mg Oral Daily April CiscoDwight APRN - CNP   50 mg at 03/30/21 0919    0.9 % sodium chloride infusion   Intravenous Continuous April [facility] APR[facility] - CNP 75 mL/hr at 03/30/21 0657 New Bag at 03/30/21 0657    pregabalin (LYRICA) capsule 200 mg  200 mg Oral BID April [facility], APRN - CNP   200 mg at 03/30/21 0919    potassium chloride (KLOR-CON M) extended release tablet 40 mEq  40 mEq Oral Daily Brigette Maza MD   40 mEq at 03/30/21 0954        Allergies:      No Known Allergies     Physical Examination  Vitals   Vitals:    03/29/21 2200 03/30/21 0500 03/30/21 0645 03/30/21 0915   BP: 137/68 (!) 170/77 (!) 144/86 (!) 151/75   Pulse: 84 88 97 99   Resp: 20 20 19 20   Temp:  97.6 °F (36.4 °C) 97.6 °F (36.4 °C) 97.8 °F (36.6 °C)   TempSrc:  Oral Oral Temporal   SpO2: 94% 96% 96% 95%   Weight:       Height:            General: Patient appears in no acute distress with an overweight body habitus  HEENT: Normocephalic, atraumatic  Chest: mild wheezing noted    Neurologic Examination    Mental Status  Alert, and oriented to person, place and time. Spells WORLD backwards as \"DLORW. \" Registration 3/3, recall 0/3. Able to state how ruler and clock are similar. No evidence of aphasia noted, naming and repetition intact. Cranial Nerves  II. Visual fields full to confrontation bilaterally. III, IV, VI: Pupils equally round and reactive to light, 5 to 4 mm bilaterally. EOMs: full, no nystagmus. V. Facial sensation intact to light touch bilaterally  VII: Facial movements: mild left nasolabial fold flattening  VIII: Hearing intact to voice  IX,X: Palate elevates symmetrically. No dysarthria  XI: Sternocleidomastoid and trapezius 5/5 bilaterally   XII: Tongue is midline    Motor     Right Left   Right Left   Deltoid 5 5  Hip Flexion 5 5   Biceps      5  5  Knee Extension 5 5   Triceps 5 5  Knee Flexion 5 5   Handgrip 5 4+  Ankle Dorsiflexion 5 5       Ankle Plantarflexion 5 5     Mild left pronator drift    Sensation  · Light Touch: Intact distally in BL UEs, diminished up to knees in bilateral lower extremities    Reflexes     Right Left   Biceps 2 2   Brachioradialis 2 2   Triceps 2 2   Patellar 2 2   Achilles 0 0   ankle clonus none none     Toes down going bilaterally. Coordination  Rapid alternating movements normal in bilateral upper extremities  Finger to nose testing normal on right, mild dysmetria on left    Gait  Slowed gait, no ataxia noted    Labs  Results for Shaylee Witt (MRN [id number]) as of 3/30/2021 16:24   Ref.  Range 3/29/2021 15:09   Sodium Latest Ref Range: 132 - 146 mmol/L 131 (L)   Potassium Latest Ref Range: 3.5 - 5.0 mmol/L 3.3 (L)   Chloride Latest Ref Range: 98 - 107 mmol/L 99   CO2 Latest Ref Range: 22 - 29 mmol/L 24   BUN Latest Ref Range: 8 - 23 mg/dL 9 Creatinine Latest Ref Range: 0.7 - 1.2 mg/dL 0.7   Anion Gap Latest Ref Range: 7 - 16 mmol/L 8   GFR Non- Latest Ref Range: >=60 mL/min/1.73 >60   GFR African American Unknown >60   Magnesium Latest Ref Range: 1.6 - 2.6 mg/dL 1.9   Glucose Latest Ref Range: 74 - 99 mg/dL 164 (H)   Calcium Latest Ref Range: 8.6 - 10.2 mg/dL 8.4 (L)   Total Protein Latest Ref Range: 6.4 - 8.3 g/dL 6.0 (L)   Troponin Latest Ref Range: 0.00 - 0.03 ng/mL <0.01   Albumin Latest Ref Range: 3.5 - 5.2 g/dL 2.6 (L)   Alk Phos Latest Ref Range: 40 - 129 U/L 824 (H)   ALT Latest Ref Range: 0 - 40 U/L 33   Ammonia Latest Ref Range: 16.0 - 60.0 umol/L 33.0   AST Latest Ref Range: 0 - 39 U/L 66 (H)   Bilirubin Latest Ref Range: 0.0 - 1.2 mg/dL 1.9 (H)   WBC Latest Ref Range: 4.5 - 11.5 E9/L 7.2   RBC Latest Ref Range: 3.80 - 5.80 E12/L 3.27 (L)   Hemoglobin Quant Latest Ref Range: 12.5 - 16.5 g/dL 9.3 (L)   Hematocrit Latest Ref Range: 37.0 - 54.0 % 28.0 (L)   MCV Latest Ref Range: 80.0 - 99.9 fL 85.6   MCH Latest Ref Range: 26.0 - 35.0 pg 28.4   MCHC Latest Ref Range: 32.0 - 34.5 % 33.2   MPV Latest Ref Range: 7.0 - 12.0 fL NOT CALC   RDW Latest Ref Range: 11.5 - 15.0 fL 19.0 (H)   Platelet Count Latest Ref Range: 130 - 450 E9/L 93 (L)   Platelet Confirmation Unknown CONFIRMED   Neutrophils % Latest Ref Range: 43.0 - 80.0 % 72.8   Immature Granulocytes % Latest Ref Range: 0.0 - 5.0 % 0.4   Lymphocyte % Latest Ref Range: 20.0 - 42.0 % 15.1 (L)   Monocytes % Latest Ref Range: 2.0 - 12.0 % 10.6   Eosinophils % Latest Ref Range: 0.0 - 6.0 % 1.0   Basophils % Latest Ref Range: 0.0 - 2.0 % 0.1   Neutrophils Absolute Latest Ref Range: 1.80 - 7.30 E9/L 5.20   Immature Granulocytes # Latest Units: E9/L 0.03   Lymphocytes Absolute Latest Ref Range: 1.50 - 4.00 E9/L 1.08 (L)   Monocytes Absolute Latest Ref Range: 0.10 - 0.95 E9/L 0.76   Eosinophils Absolute Latest Ref Range: 0.05 - 0.50 E9/L 0.07   Basophils Absolute Latest Ref Range: 0.00 - 0.20 E9/L 0.01   SARS-CoV-2, NAAT Latest Ref Range: Not Detected  Not Detected       Imaging  CT head w/o contrast 3/29/21     Impression   No acute intracranial abnormality.            Electronically signed by: Bunny Grgigs DO, 3/30/2021 3:12 PM

## 2021-03-31 NOTE — PROGRESS NOTES
Pt's CT exam on hold per RN at [phone], neurology consult. Question of needing CTA head/neck also.

## 2021-03-31 NOTE — PROGRESS NOTES
Subjective: The patient is more alert and awake today  Overnight had event of acute shortness of breath with wheezing likely from fluids  Today he feels better    Objective:    /71   Pulse 76   Temp 97.1 °F (36.2 °C) (Temporal)   Resp 16   Ht 5' 7\" (1.702 m)   Wt 184 lb 1.6 oz (83.5 kg)   SpO2 95%   BMI 28.83 kg/m²     In: 1101.3 [P.O.:360; I.V.:741.3]  Out: 2100   In: 1101.3   Out: 2100 [Urine:2100]    General appearance: NAD, conversant  HEENT: AT/NC, MMM  Neck: FROM, supple  Lungs: Clear to auscultationno evidence of wheeze today, I do not appreciate rales  CV: RRR, no MRGsright chest wall Mediport in place  Vasc: Radial pulses 2+  Abdomen: Soft, non-tender; no masses or HSM  Extremities: No peripheral edema or digital cyanosis mild 1+ pitting edema  Skin: no rash, lesions or ulcers  Psych: Alert and oriented to person, place and time  Neuro: Alert and interactive     Recent Labs     03/29/21  1509   WBC 7.2   HGB 9.3*   HCT 28.0*   PLT 93*       Recent Labs     03/29/21  1509 03/30/21  1634 03/31/21  0553   * 136 133   K 3.3* 3.8 3.3*   CL 99 101 97*   CO2 24 22 24   BUN 9 7* 10   CREATININE 0.7 0.6* 0.7   CALCIUM 8.4* 8.8 8.3*       Assessment:    Principal Problem:    Altered mental state  Active Problems:    CAD (coronary artery disease)    Essential hypertension    Hyponatremia    Chronic anemia    History of CVA (cerebrovascular accident)    PVD (peripheral vascular disease) (HCC)    Elevated alkaline phosphatase level    Hypokalemia    Elevated AST (SGOT)    Pancreatic cancer (HCC)  Resolved Problems:    * No resolved hospital problems. *      Plan:    72year old male presenting with altered mental status after being off aspirin and Brillinta for one week post ERCP with stent placement for obstructing pancreatic mass 3/27/2021. Known history of previous CVA February 2021.   Head CT unremarkable patient unable to get MRI secondary to [address] stent in place  CT head to be repeated today if unable to do MRIPer neurology note  continue aspirin, Brillinta and lipitor  neuro checks q4h  Neurology evaluation secondary to intermittent confusion which is markedinput appreciated     Sed rate and CRP are increased  Although these are nonspecific inflammatory markers, I question an abdominal infection post procedure  Will follow for fevers or WBC elevation possibly causing altered mental status     hyponatremia  Improved today  Discontinue IVF NSS @ 75/hr if patient tolerating p.o. intake well  Doubt this is contributing to altered mental status  Supplement potassium    Shortness of breath overnight  Status post breathing treatment, placed on BiPAP  Chest x-ray essentially unremarkable-no diuresis warranted right now, continue to monitor  98% on 2 L    Resume vancomycin 125 p.o. 3 times daily through 4/1/2021 as patient was on this medication for C. difficile at home  Dropped down to daily on 402 2021  Continue Flagyl and cefdinir which she was placed on by surgical team post stent placement  Sed rate elevated/procalcitonin elevated at 0.32likely from above issues      DC plan from medical standpoint once mentation back to baseline  DVT Prophylaxis   PT/OT  Discharge Valentin Reid MD  11:40 AM  3/31/2021

## 2021-03-31 NOTE — PLAN OF CARE
Problem: Falls - Risk of:  Goal: Will remain free from falls  Description: Will remain free from falls  3/31/2021 1214 by Elisa Varghese RN  Outcome: Met This Shift  3/31/2021 1000 by Matthew Santillan RN  Outcome: Met This Shift  3/31/2021 0257 by [place] Nick  Outcome: Met This Shift  Goal: Absence of physical injury  Description: Absence of physical injury  3/31/2021 1214 by Elisa Varghese RN  Outcome: Met This Shift  3/31/2021 1000 by Matthew Santillan RN  Outcome: Met This Shift  3/31/2021 0257 by [place] Nick  Outcome: Met This Shift     Problem: Skin Integrity:  Goal: Will show no infection signs and symptoms  Description: Will show no infection signs and symptoms  3/31/2021 1214 by Elisa Varghese RN  Outcome: Met This Shift  3/31/2021 1000 by Matthew Santillan RN  Outcome: Met This Shift  3/31/2021 0257 by [place] Nick  Outcome: Met This Shift  Goal: Absence of new skin breakdown  Description: Absence of new skin breakdown  3/31/2021 1214 by Elisa Varghese RN  Outcome: Met This Shift  3/31/2021 1000 by Matthew Santillan RN  Outcome: Met This Shift  3/31/2021 0257 by [place] Nick  Outcome: Met This Shift

## 2021-03-31 NOTE — PLAN OF CARE
Problem: Falls - Risk of:  Goal: Will remain free from falls  Description: Will remain free from falls  3/31/2021 0257 by Brigitte Sorto  Outcome: Met This Shift  3/30/2021 1613 by Yvette Melendez RN  Outcome: Met This Shift  Goal: Absence of physical injury  Description: Absence of physical injury  3/31/2021 0257 by Brigitte Sorto  Outcome: Met This Shift  3/30/2021 1613 by Yvette Melendez RN  Outcome: Met This Shift     Problem: Skin Integrity:  Goal: Will show no infection signs and symptoms  Description: Will show no infection signs and symptoms  3/31/2021 0257 by Brigitte Sorto  Outcome: Met This Shift  3/30/2021 1613 by Yvette Melendez RN  Outcome: Met This Shift  Goal: Absence of new skin breakdown  Description: Absence of new skin breakdown  3/31/2021 0257 by Brigitte Sorto  Outcome: Met This Shift  3/30/2021 1613 by Yvette Melendez RN  Outcome: Met This Shift

## 2021-03-31 NOTE — PROGRESS NOTES
Betty Mckenzie is a 72 y.o.  male     Neurology is following for an AMS    PMH significant for R MCA stroke s/p carotid stent in 2/2021 and pancreatic CA s/p biliary stenting, history of neuropathy from chemotherapy. He presented due to confusion and new onset hallucinations following recent biliary stenting this past Friday. His wife reported that he was seeing and hearing things that weren't there. He also thought he had to sign papers and has been acting out his dreams somewhat. There does not appear to be any new focal findings on exam. Some LUE weakness from prior stroke. MRI brain was ordered, however due to carotid stent, at this time unable to be performed. Initial CTH without acute process. Wife at bedside and she is upset that the MRI may not be able to be completed and mentions transferring to [place] if testing cannot be completed here. Wife at bedside-- she reports he had some confusion last night and he is still not at baseline. She does feel his responses today are quicker than previous. No chest pain or palpitations  No SOB  No vertigo, lightheadedness or loss of consciousness  No falls, tripping or stumbling  No incontinence of bowels or bladder  No itching or bruising appreciated  No numbness, tingling or focal arm/leg weakness    ROS otherwise negative     No Known Allergies      Objective:       /71   Pulse 76   Temp 97.1 °F (36.2 °C) (Temporal)   Resp 16   Ht 5' 7\" (1.702 m)   Wt 184 lb 1.6 oz (83.5 kg)   SpO2 95%   BMI 28.83 kg/m²        General appearance: alert, appears stated age and cooperative  Head: Normocephalic, without obvious abnormality, atraumatic  Eyes: conjunctivae/corneas clear.    Neck: no adenopathy, no carotid bruit, no JVD, supple, symmetrical, trachea midline and thyroid not enlarged, symmetric, no tenderness/mass/nodules  Lungs: clear to auscultation bilaterally  Heart: regular rate and rhythm, S1, S2 normal, no murmur, click, rub or gallop

## 2021-03-31 NOTE — PROGRESS NOTES
OT BEDSIDE TREATMENT NOTE      Date:3/31/2021  Patient Name: Apolinar Pérez  MRN: [id number]  : 1950  Room: [address] [place], [place] [address]     Evaluating [address] [place] [address], OTR/L #[id number]     AM-PAC Daily Activity Raw Score:   Recommended Adaptive Equipment: TBD      Diagnosis: Altered mental state [R41.82]     Referring physician: Re Goode, MIYA - CNP     Pertinent Medical History: C. Diff, CAD, pancreas CA, stroke (2021 no residuals), heart attach, HTN, PVD, SOB on exertion     Precautions:  Falls, SOB w/ activity, cognition, bed alarm     Home Living: Pt lives with spouse in 1 floor home. 3 CHERYL, 1 handrail  Bathroom setup: step in shower   Equipment owned: n/a     Prior Level of Function: independent with ADLs , shares with IADLs; ambulated independently w/o AD  Driving: yes     Pain Level: Pt c/o no pain this session      Cognition: A&O: 3/4; Follows 1 step directions  Noted confusion during functional tasks and PLOF discussion - cues provided to sequence and attend to tasks.  Decreased insight to deficits              Memory:  fair -              Sequencing:  fair               Problem solving:  fair -              Judgement/safety:  fair -                Functional Assessment:    Initial Eval Status  Date: 3/30/21 Treatment Status  Date: 3/31/21 Short Term Goals/LTG  Treatment frequency: 1-4x/wk   Feeding Stand by Assist  n/t  Modified [place]    Grooming Minimal Assist  SBA seated EOB simulated  Modified [place]    UB Dressing Minimal Assist  n/t Modified [place]    LB Dressing Moderate Assist   B socks  LOB posteriorly 2x MIN A to german pants seated EOB v/c's for technique  Stand by Assist    Bathing Moderate Assist N/t; pt educated with regards to DME, bathing AE, ECT's  Stand by Assist    Toileting Minimal Assist  n/t Supervision    Bed Mobility  Rolling: Stand by Assist   Supine to sit: Minimal Assist   Sit to supine: Stand by Assist  Supine>sit SBA   Sit>supine SBA  V/c's for hand placement and fall prevention  Rolling: Independent   Supine to sit: Modified [place]   Sit to supine: Modified [place]    Functional Transfers Min A MIN A from EOB no AD requiring assist for safety  Supervision   Functional Mobility Min A w/ w/w  In room to prep for bathroom mobility  LOB x1 w/ 1-2 steps backwards  n/t Supervision   Balance Sitting: CGA (static)  Min A (dynamic)  Frequent posterior lean   Standing: Min A w/ w/w Sitting:  Static: SBA   Dynamic: MIN A   Standing : MIN A      Activity Tolerance Fair-  Noted SOB/audible wheezing w/ moderate activity. O2 sat=^93%. Reinforced rest and pursed lip breathing. RN notified/aware Fair completing light ADL and functional transfer tasks  Fair+   Visual/  Perceptual Glasses: yes                        Comments: Upon arrival pt supine in bed, agreeable to therapy session. Pt educated with regards to bed mobility, functional transfers, hand placement, safety awareness, DME, energy conservation techniques, LE dressing, bathing AE. At end of session pt supine in bed per nursing request,  all lines and tubes intact, call light within reach. · Pt has made good  progress towards set goals.    · Continue with current plan of care      Treatment Time In:1535        Treatment Time Out: 1600                Treatment Charges: Mins Units   Ther Ex  [id number]     Manual Therapy [id number]     Thera Activities 05164 15 1   ADL/Home Mgt 79204 10 1   Neuro Re-ed [id number]     Group Therapy      Orthotic manage/training  90804     Non-Billable Time     Total Timed Treatment 25 1611 [facility] [address]

## 2021-03-31 NOTE — PROGRESS NOTES
Spoke with Dr. Balwinder Lake, who placed the carotid wall stent in the patient and he gave a verbal order to scan the patient for his mri. Rn notified the patient will be scanned later this afternoon.

## 2021-04-01 PROBLEM — R41.82 ALTERED MENTAL STATUS: Status: ACTIVE | Noted: 2021-01-01

## 2021-04-01 NOTE — PROGRESS NOTES
Subjective: The patient is more alert and awake today  Overnight had event of acute shortness of breath with wheezing likely from fluids  Today he feels better    Objective:    /85   Pulse 80   Temp 97.1 °F (36.2 °C) (Temporal)   Resp 20   Ht 5' 7\" (1.702 m)   Wt 182 lb (82.6 kg)   SpO2 98%   BMI 28.51 kg/m²     In: 480 [P.O.:480]  Out: -   In: 480   Out: -     General appearance: NAD, conversant  HEENT: AT/NC, MMM  Neck: FROM, supple  Lungs: Clear to auscultationno evidence of wheeze today, I do not appreciate rales  CV: RRR, no MRGsright chest wall Mediport in place  Vasc: Radial pulses 2+  Abdomen: Soft, non-tender; no masses or HSM  Extremities: No peripheral edema or digital cyanosis mild 1+ pitting edema  Skin: no rash, lesions or ulcers  Psych: Alert and oriented to person, place and time  Neuro: Alert and interactive     Recent Labs     03/29/21  1509   WBC 7.2   HGB 9.3*   HCT 28.0*   PLT 93*       Recent Labs     03/29/21  1509 03/30/21  1634 03/31/21  0553   * 136 133   K 3.3* 3.8 3.3*   CL 99 101 97*   CO2 24 22 24   BUN 9 7* 10   CREATININE 0.7 0.6* 0.7   CALCIUM 8.4* 8.8 8.3*       Assessment:    Principal Problem:    Altered mental state  Active Problems:    CAD (coronary artery disease)    Essential hypertension    Hyponatremia    Chronic anemia    History of CVA (cerebrovascular accident)    PVD (peripheral vascular disease) (HCC)    Elevated alkaline phosphatase level    Hypokalemia    Elevated AST (SGOT)    Pancreatic cancer (HCC)  Resolved Problems:    * No resolved hospital problems. *      Plan:    72year old male presenting with altered mental status after being off aspirin and Brillinta for one week post ERCP with stent placement for obstructing pancreatic mass 3/27/2021. Known history of previous CVA February 2021.   Head CT unremarkable   MRI completed and unremarkable  continue aspirin, Brillinta and lipitor  Neurology evaluation secondary to intermittent confusion

## 2021-04-01 NOTE — PROGRESS NOTES
Date: 3/31/2021    Time: 9:13 PM    Patient Placed On BIPAP/CPAP/ Non-Invasive Ventilation? Yes    If no must comment. Facial area red/color change? No           If YES are Blister/Lesion present? No   If yes must notify nursing staff  BIPAP/CPAP skin barrier? Yes    Skin barrier type:mepilexlite       Comments:  Pt placed on BiPAP for the night as he stated he was going to sleep.        Yasmin Frye

## 2021-04-01 NOTE — DISCHARGE SUMMARY
Physician Discharge Summary     Patient ID:  Torrie Mcrae  [id number]  72 y.o.  1950    Admit date: 3/29/2021    Discharge date and time: 4/1/2021    Admission Diagnoses:   Patient Active Problem List   Diagnosis    Atherosclerosis of native artery of both lower extremities with intermittent claudication ([facility]    Atheroscler of native artery of right leg with intermit claudication ([address]    Carcinoma of head of pancreas ([facility] [address] [facility]    Agranulocytosis secondary to cancer chemotherapy (CODE) ([address]     COVID-19    Diarrhea    History of 2019 novel coronavirus disease (COVID-19)    CAD (coronary artery disease)    Essential hypertension    Hypoxia    Hypomagnesemia    Lactic acidosis    Clostridioides difficile diarrhea    Acute thromboembolic cerebrovascular accident ([address]    Dyslipidemia    C. difficile diarrhea    Sepsis ([facility] [address] [facility]    Anemia    Hyponatremia    Leukocytosis    Cardiomegaly    Obstructive jaundice    Obstructive jaundice due to cancer ([address]    Bilirubinemia    Biliary obstruction    Choledocholithiasis    Altered mental state    Chronic anemia    History of CVA (cerebrovascular accident)    PVD (peripheral vascular disease) (HCC)    Elevated alkaline phosphatase level    Hypokalemia    Elevated AST (SGOT)    Pancreatic cancer (HCC)    Altered mental status       Discharge Diagnoses: confusion     Consults: neuro , surgery   Procedures: mri - negative     Hospital Course:   72year old male presenting with altered mental status after being off aspirin and Brillinta for one week post ERCP with stent placement for obstructing pancreatic mass 3/27/2021. Known history of previous CVA February 2021.   Head CT unremarkable   MRI completed and unremarkable  continue aspirin, Brillinta and lipitor  Neurology evaluation secondary to intermittent confusion which is markedinput appreciated  Patient likely had a TIA would continue his antiplatelet therapy now the procedures are completed    Sed rate and CRP are increasedlikely from recent C. difficile infection  Will follow for fevers or WBC elevation possibly causing altered mental statusno fevers no leukocytosis    hyponatremia  Improved   Discontinue IVF NSS @ 75/hr if patient tolerating p.o. intake well  Doubt this is contributing to altered mental status  Supplement potassium    Shortness of breath resolved  Status post breathing treatment, placed on BiPAP  Chest x-ray essentially unremarkable-no diuresis warranted right now, continue to monitor  98% on 2 L    Resume vancomycin 125 p.o. 3 times daily through 4/1/2021 as patient was on this medication for C. difficile at home  Dropped down to daily on 4/02/ 2021  Continue Flagyl and cefdinir which she was placed on by surgical team post stent placement  Sed rate elevated/procalcitonin elevated at 0.32likely from above issues      DC plan today if labs and blood work are within normal limits  DVT Prophylaxis   PT/OT  Discharge planning         Recent Labs     03/29/21  1509 04/01/21  1206   WBC 7.2 5.7   HGB 9.3* 10.5*   HCT 28.0* 32.0*   PLT 93* 108*       Recent Labs     03/30/21  1634 03/31/21  0553 04/01/21  1206    133 136   K 3.8 3.3* 3.7    97* 100   CO2 22 24 25   BUN 7* 10 13   CREATININE 0.6* 0.7 0.6*   CALCIUM 8.8 8.3* 8.4*       Ct Head Wo Contrast    Result Date: 3/29/2021  EXAMINATION: CT OF THE HEAD WITHOUT CONTRAST  3/29/2021 3:47 pm TECHNIQUE: CT of the head was performed without the administration of intravenous contrast. Dose modulation, iterative reconstruction, and/or weight based adjustment of the mA/kV was utilized to reduce the radiation dose to as low as reasonably achievable. COMPARISON: 02/16/2021 HISTORY: ORDERING SYSTEM PROVIDED HISTORY: altered mental status TECHNOLOGIST PROVIDED HISTORY: Has a \"code stroke\" or \"stroke alert\" been called? ->No Reason for exam:->altered mental status Decision Support Exception->Emergency Medical Condition (MA) What reading provider will be dictating this exam?->CRC FINDINGS: BRAIN/VENTRICLES: There is no acute intracranial hemorrhage, mass effect or midline shift. No abnormal extra-axial fluid collection. The gray-white differentiation is maintained without evidence of an acute infarct. Sequelae of the previous small embolic infarcts in the right MCA territory present on comparison MRI from 2 months prior are not well appreciated by CT. Volume loss with commensurate ventricular and sulcal prominence. There is no evidence of hydrocephalus. ORBITS: The visualized portion of the orbits demonstrate no acute abnormality. SINUSES: The visualized paranasal sinuses and mastoid air cells demonstrate no acute abnormality with chronic mild mucosal thickening predominating in the ethmoid air cells and right maxillary sinus. SOFT TISSUES/SKULL:  No acute abnormality of the visualized skull or soft tissues. No acute intracranial abnormality. Xr Chest Portable    Result Date: 3/30/2021  EXAMINATION: ONE XRAY VIEW OF THE CHEST 3/30/2021 10:21 pm COMPARISON: Chest series from March 29, 2021 HISTORY: ORDERING SYSTEM PROVIDED HISTORY: Wheezing TECHNOLOGIST PROVIDED HISTORY: Reason for exam:->Wheezing What reading provider will be dictating this exam?->CRC FINDINGS: Right chest wall MediPort with distal tip projecting in the vicinity of the right atrium. Midline sternotomy hardware and postsurgical changes consistent with prior CABG. Atherosclerotic disease within a mildly ectatic thoracic aorta. Cardiac silhouette is enlarged. There is mild central pulmonary vascular congestion. Low lung volumes. Interval slight increase in peripheral left mid lung opacity. Possible small left pleural effusion. Right lung appears clear. No pneumothorax. Partially imaged tips stent in the right upper quadrant. Remaining imaged upper abdomen is unremarkable. 1.  Interval slight increase in the peripheral opacity in the left mid lung.  There appears to be a small left pleural effusion. (Broad differential which includes infection, edema, atelectasis, and scarring.) 2. Atherosclerotic disease, cardiomegaly, postsurgical changes consistent with prior CABG, and mild central pulmonary vascular congestion. 3.  Right chest wall MediPort. Xr Chest Portable    Result Date: 3/29/2021  EXAMINATION: ONE XRAY VIEW OF THE CHEST 3/29/2021 2:52 pm COMPARISON: February 24, 2021 HISTORY: ORDERING SYSTEM PROVIDED HISTORY: altered mental status TECHNOLOGIST PROVIDED HISTORY: Reason for exam:->altered mental status What reading provider will be dictating this exam?->CRC FINDINGS: The right internal jugular vein port catheter tip overlies the high right atrium. Heart size is unable to be accurately assessed on this single portable view of the chest, but appears to be stable. Postoperative changes related to sternotomy are again noted. Faint linear airspace opacities in the left mid lung zone are similar appearance to the prior examination. There is partial obscuration of the medial aspect of the left hemidiaphragm, suggesting atelectasis or pneumonia in the left lower lobe. Question pneumonia or atelectasis in the left lower lobe. Consider correlation with two view chest radiograph or CT of the chest, if clinically appropriate. Discharge Exam:    HEENT: NCAT,  PERRLA, No JVD  Heart:  RRR, no murmurs, gallops, or rubs.   Lungs:  CTA bilaterally, no wheeze, rales or rhonchi  Abd: bowel sounds present, nontender, nondistended, no masses  Extrem:  No clubbing, cyanosis, or edema    Disposition: home     Patient Condition at Discharge: stable     Patient Instructions:      Medication List      START taking these medications    furosemide 20 MG tablet  Commonly known as: Lasix  Take 1 tablet by mouth every other day     potassium chloride 20 MEQ extended release tablet  Commonly known as: KLOR-CON M  Take 1 tablet by mouth every other day Take the same day as the lasix is taken        CHANGE how you take these medications    Creon 77824 units delayed release capsule  Generic drug: lipase-protease-amylase  Take 2 capsules by mouth 3 times daily (with meals)  What changed:   · medication strength  · how much to take  · when to take this  · reasons to take this     vancomycin 125 MG capsule  Commonly known as: VANCOCIN  What changed: Another medication with the same name was removed. Continue taking this medication, and follow the directions you see here.         CONTINUE taking these medications    aluminum & magnesium hydroxide-simethicone 200-200-20 MG/5ML Susp suspension  Commonly known as: MAALOX     aspirin EC 81 MG EC tablet  Take 1 tablet by mouth nightly Restart 3/30/21     atorvastatin 80 MG tablet  Commonly known as: LIPITOR     b complex vitamins capsule     carvedilol 12.5 MG tablet  Commonly known as: COREG     cefdinir 300 MG capsule  Commonly known as: OMNICEF  Take 1 capsule by mouth 2 times daily for 10 days     DULoxetine 30 MG extended release capsule  Commonly known as: CYMBALTA     metroNIDAZOLE 500 MG tablet  Commonly known as: FLAGYL  Take 1 tablet by mouth 3 times daily for 10 days     oxyCODONE 5 MG capsule     pantoprazole 40 MG tablet  Commonly known as: PROTONIX  Take 1 tablet by mouth daily  Start taking on: April 2, 2021     pregabalin 200 MG capsule  Commonly known as: LYRICA     therapeutic multivitamin-minerals tablet     ticagrelor 90 MG Tabs tablet  Commonly known as: Brilinta  Take 1 tablet by mouth 2 times daily RESTART 3/30/21     zinc gluconate 50 MG tablet  Take 1 tablet by mouth daily           Where to Get Your Medications      These medications were sent to [facility] [address] [address]    Phone: [phone]   · Creon 45520 units delayed release capsule  · furosemide 20 MG tablet  · pantoprazole 40 MG tablet  · potassium chloride 20 MEQ extended release tablet       Activity: activity as tolerated  Diet: regular diet    Pt has been advised to: Follow-up with Dong Finn MD in 1 week.   Follow-up with consultants as recommended by them    Note that over 30 minutes was spent in preparing discharge papers, discussing discharge with patient, medication review, etc.    Signed:  Georgina Walker MD  4/1/2021  2:26 PM

## 2021-04-01 NOTE — PROGRESS NOTES
CLINICAL PHARMACY NOTE: MEDS TO [address] Select Patient?: No  Total # of Prescriptions Filled: 4   The following medications were delivered to the patient:  · Creon 86624 unit  · Lasix 20 mg  · Potassium chloride er 20 mcg  · Pantoprazole 40 mg  Total # of Interventions Completed: 3  Time Spent (min): 15    Additional Documentation:

## 2021-04-01 NOTE — CARE COORDINATION
Met with pt and spouse Gavin Sesay at bedside, discharge plan remains unchanged, home with Gavin Sesay, wheeled walker ordered, [place] [facility] from [facility] DME aware (I did relay likely discharge today), pt continues to wear oxygen and may need upon discharge, Kelly Partida bedside RN aware and will check walking pulse oximetry.

## 2021-04-01 NOTE — PROGRESS NOTES
[place] is a 72 y.o. right-handed male     Neurology is following for an AMS    PMH significant for R MCA stroke s/p carotid stent in 2/2021, pancreatic CA s/p biliary stenting, history of neuropathy from chemotherapy, CAD w stents, GIB, HTN, PVD, former smoking    He presented on 3/29 with confusion and new onset visual and auditory hallucinations following recent biliary stenting this past Friday. Off ASA and Brillinta for 7 days prior. On vanc, Flagyl, and Omnicef post procedure    MRI of the brain showed no evidence of acute stroke or other abnormalities. His wife is at the bedside, and states the patient continues to slowly improve every day. He is awake and has no complaints for me today. Eating and ambulating fairly well. Home atb have been resumed here--primary questions an abdominal infection post procedure due to elevated sed and CRP. Hyponatremia has improved. Stable LFTs. No leukocytosis per 3/29 labs and no fevers--repeat CBCD pending.     No sedating meds in 24 hours    No chest pain or palpitations  No SOB  No vertigo, lightheadedness or loss of consciousness  No falls, tripping or stumbling  No incontinence of bowels or bladder  No itching or bruising appreciated  + residual L hand weakness    ROS otherwise negative     Current Facility-Administered Medications   Medication Dose Route Frequency Provider Last Rate Last Admin    vancomycin (VANCOCIN) oral solution 125 mg  125 mg Oral 3 times per day Stevenson Cruz MD   125 mg at 04/01/21 0540    sodium chloride flush 0.9 % injection 10 mL  10 mL Intravenous 2 times per day April MIYA Goode CNP   10 mL at 04/01/21 3042    sodium chloride flush 0.9 % injection 10 mL  10 mL Intravenous PRN April MIYA Goode CNP        polyethylene glycol (GLYCOLAX) packet 17 g  17 g Oral Daily PRN April MIYA Goode CNP        trimethobenzamide Gatmaya Sarmiento) injection 200 mg  200 mg Intramuscular Q6H PRN April Russel APRN - CNP        aspirin chewable tablet 81 mg  81 mg Oral Nightly April Storey-Dwight, APRN - CNP   81 mg at 03/31/21 2156    atorvastatin (LIPITOR) tablet 80 mg  80 mg Oral Nightly April Storey-Cornelius, APRN - CNP   80 mg at 03/31/21 2156    b complex-C-folic acid (NEPHROCAPS) capsule 1 mg  1 mg Oral Daily April Storey-Dwight, APRN - CNP   1 mg at 04/01/21 0915    carvedilol (COREG) tablet 12.5 mg  12.5 mg Oral BID WC April Storey-Cornelius, APRN - CNP   12.5 mg at 04/01/21 0805    DULoxetine (CYMBALTA) extended release capsule 30 mg  30 mg Oral BID April Storey-Cornelius, APRN - CNP   30 mg at 04/01/21 0915    lipase-protease-amylase (CREON) delayed release capsule 24,000 Units  24,000 Units Oral TID AKSHAT Kamara 23 April Storey-Cornelius, APRN - CNP   24,000 Units at 04/01/21 0805    therapeutic multivitamin-minerals 1 tablet  1 tablet Oral Daily April Storey-Dwight APRN - CNP   1 tablet at 04/01/21 0915    oxyCODONE (ROXICODONE) immediate release tablet 5 mg  5 mg Oral Q4H PRN April Storey-Cornelius, APRN - CNP   5 mg at 03/30/21 2126    pantoprazole (PROTONIX) tablet 40 mg  40 mg Oral Daily April Storey-Elmora, APRN - CNP   40 mg at 04/01/21 0916    ticagrelor (BRILINTA) tablet 90 mg  90 mg Oral BID April Storey-Elmora, APRN - CNP   90 mg at 04/01/21 0915    cefdinir (OMNICEF) capsule 300 mg  300 mg Oral BID April Storey-Elmora, APRN - CNP   300 mg at 04/01/21 0915    metroNIDAZOLE (FLAGYL) tablet 500 mg  500 mg Oral TID April Storey-Elmora, APRN - CNP   500 mg at 04/01/21 0915    zinc sulfate (ZINCATE) capsule 50 mg  50 mg Oral Daily April Storey-Elmora, APRN - CNP   50 mg at 04/01/21 0916    pregabalin (LYRICA) capsule 200 mg  200 mg Oral BID April MIYA Goode CNP   200 mg at 04/01/21 2812    potassium chloride (KLOR-CON M) extended release tablet 40 mEq  40 mEq Oral Daily Ling Briggs MD   40 mEq at 04/01/21 0916    hydrALAZINE (APRESOLINE) injection 5 mg  5 mg Intravenous Q6H PRN Dustin Flores MD   5 mg at 03/30/21 2018    ipratropium-albuterol (DUONEB) nebulizer solution 1 ampule  1 ampule Inhalation Q6H WA MIYA Dorman - CNP   1 ampule at 03/31/21 2057     Objective:     /85   Pulse 80   Temp 97.1 °F (36.2 °C) (Temporal)   Resp 18   Ht 5' 7\" (1.702 m)   Wt 182 lb (82.6 kg)   SpO2 97%   BMI 28.51 kg/m²      General appearance: alert, appears stated age and cooperative--in no acute distress lying in bed  Head: Normocephalic, without obvious abnormality, atraumatic  Eyes: conjunctivae/corneas clear. Neck: Full range of motion without cervicalgia  Lungs: Diminished in the bases right greater than left; rasps nonlabored on NC O2  Heart:RRR--faint murmur appreciated  Extremities: extremities normal, atraumatic, no cyanosis or edema   Pulses: 2+ throughout  Skin: Skin color, texture, turgor normal. No rashes or lesions     Mental Status: Alert.  Oriented x4--called his granddaughter Geo Mackenzie instead of Lindsey    Good attention/concentration  Intact fundus of knowledge    Speech: No dysarthria   Language: No aphasia     Cranial Nerves:  I: smell    II: visual acuity     II: visual fields Full to confrontation   II: pupils OLIVIA   III,VII: ptosis None   III,IV,VI: extraocular muscles  Full ROM   V: mastication Normal   V: facial light touch sensation  Normal   V,VII: corneal reflex     VII: facial muscle function - upper  Normal   VII: facial muscle function - lower Normal   VIII: hearing Normal   IX: soft palate elevation  Normal   IX,X: gag reflex    XI: trapezius strength  5/5   XI: sternocleidomastoid strength 5/5   XI: neck extension strength  5/5   XII: tongue strength  Normal     Motor:  5/5 throughout except 4/5 L hand   Normal tone and bulk   No drift  No abnormal movements     Sensory:  LT normal in all extremities     Coordination:   FFM and FN slower on L relative to weakness (residual)    DTR:   No Babinskis  No Jordan's    No pathological reflexes    Laboratory/Radiology:     CBC with Differential:    Lab Results   Component Value Date    WBC 7.2 03/29/2021    RBC 3.27 03/29/2021    HGB 9.3 03/29/2021    HCT 28.0 03/29/2021    PLT 93 03/29/2021    MCV 85.6 03/29/2021    MCH 28.4 03/29/2021    MCHC 33.2 03/29/2021    RDW 19.0 03/29/2021    NRBC 0.9 11/03/2020    METASPCT 0.9 02/24/2021    LYMPHOPCT 15.1 03/29/2021    MONOPCT 10.6 03/29/2021    MYELOPCT 0.9 02/24/2021    BASOPCT 0.1 03/29/2021    MONOSABS 0.76 03/29/2021    LYMPHSABS 1.08 03/29/2021    EOSABS 0.07 03/29/2021    BASOSABS 0.01 03/29/2021     CMP:    Lab Results   Component Value Date     03/31/2021    K 3.3 03/31/2021    CL 97 03/31/2021    CO2 24 03/31/2021    BUN 10 03/31/2021    CREATININE 0.7 03/31/2021    GFRAA >60 03/31/2021    LABGLOM >60 03/31/2021    GLUCOSE 119 03/31/2021    PROT 6.4 03/31/2021    LABALBU 2.3 03/31/2021    CALCIUM 8.3 03/31/2021    BILITOT 2.2 03/31/2021    ALKPHOS 684 03/31/2021    AST 52 03/31/2021    ALT 30 03/31/2021     MRI brain: no acute stroke or other acute abnormality; encephalomalacia R frontal lobe    I personally reviewed all labs and images today     Assessment:     Altered mental status: Suspect postprocedural delirium following ERCP. No acute stroke or other acute abnormalities on MRI of the brain, and clinically his mentation has improved. No evidence to suggest CNS infection or seizure at this time. History right frontal lobe stroke: secondary to R ICA stenosis. Mild, residual L arm weakness    Hx R ICA stenosis s/p stent. On DAPT    Pancreatic cancer: s/p ERCP w stent and chemo.  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

## 2021-04-05 NOTE — PROGRESS NOTES
taking differently: Take 20 mEq by mouth every 48 hours as needed Take the same day as the lasix is taken) 60 tablet 3    furosemide (LASIX) 20 MG tablet Take 1 tablet by mouth every other day (Patient taking differently: Take 20 mg by mouth every 48 hours as needed ) 60 tablet 3    vancomycin (VANCOCIN) 125 MG capsule Take 125 mg by mouth daily Indications: mainteance dose three times each week 125 mg daily until 4/1/21      aspirin EC 81 MG EC tablet Take 1 tablet by mouth nightly Restart 3/30/21 30 tablet 3    atorvastatin (LIPITOR) 80 MG tablet Take 80 mg by mouth nightly       Multiple Vitamins-Minerals (THERAPEUTIC MULTIVITAMIN-MINERALS) tablet Take 1 tablet by mouth daily      pregabalin (LYRICA) 200 MG capsule Take 200 mg by mouth daily. Indications: titrating down-currently 4/5/21, patient is taking 75 mg for next 4 days, decreasing over the next 3 weeks       b complex vitamins capsule Take 1 capsule by mouth daily      DULoxetine (CYMBALTA) 30 MG extended release capsule Take 30 mg by mouth 2 times daily Indications: currently tapering down-completing course in 3 weeks4/26/21       zinc gluconate 50 MG tablet Take 1 tablet by mouth daily 30 tablet 3    carvedilol (COREG) 12.5 MG tablet Take 12.5 mg by mouth 2 times daily (with meals)      oxyCODONE 5 MG capsule Take by mouth See Admin Instructions. 5 mg q3h prn or 10 mg q6h prn. Uses very rarely. No current facility-administered medications for this encounter.         Past Medical History:   Diagnosis Date    C. difficile diarrhea     X3 nov dec 2020 feb 2021    CAD (coronary artery disease)     lt sided weakness     Cancer ([facility] [address] 05/2020    pancreas    Cerebral artery occlusion with cerebral infarction [facility])     mini  stroke feb 2021 no residual 2018 stroke lt hand weakness     GI bleed     Heart attack ([facility] [address] 2008, 3-2015    Hypertension     Jaundice     Peripheral vascular disease (HCC)     SOB (shortness of breath) on exertion Past Surgical History:   Procedure Laterality Date    CARDIAC SURGERY      Quad 2014    COLONOSCOPY      CORONARY ANGIOPLASTY  2005    CABG TIMES 4     CORONARY ANGIOPLASTY WITH STENT PLACEMENT      ENDOSCOPY, COLON, DIAGNOSTIC      ERCP N/A 3/26/2021    ERCP SPHINCTER/PAPILLOTOMY performed by Malia Brunner MD at [address] [facility] ERCP N/A 3/26/2021    ERCP STENT INSERTION performed by Malia Brunner MD at [address] [facility] ERCP N/A 3/26/2021    ERCP STONE REMOVAL performed by Malia Brunner MD at [address] [facility] Right     IR CAROTID STENT UNI W PROTECTION  2021    IR CAROTID STENT UNI W PROTECTION 2021 Jeremy Javier MD [facility]    IL THROMBOENDARTECTMY FEMORAL COMMON Right 2018    FEMORAL ENDARTERECTOMY performed by Jessica Guidry MD at [address]  08/15/2018    abdominal aortogram with runoff by Dr. Mrau An       Family History   Problem Relation Age of Onset    Breast Cancer Mother         lung cancer also    Cancer Father         Colon    Cancer Maternal Aunt         Bladder    Cancer Maternal Grandfather         Bladder    Cancer Maternal Cousin         Leukemia    Breast Cancer Maternal Aunt     Cancer Maternal Cousin         Lung    Cancer Maternal Uncle        Social History     Socioeconomic History    Marital status:      Spouse name: Not on file    Number of children: 2    Years of education: Not on file    Highest education level: Not on file   Occupational History    Not on file   Social Needs    Financial resource strain: Not on file    Food insecurity     Worry: Not on file     Inability: Not on file    Transportation needs     Medical: Not on file     Non-medical: Not on file   Tobacco Use    Smoking status: Former Smoker     Packs/day: 1.50     Years: 40.00     Pack years: 60.00     Types: Cigarettes     Quit date: 2008     Years since quittin.6    Smokeless tobacco: Never Used    Tobacco comment: occassional cigar   Substance and Sexual Activity    Alcohol use: Not Currently     Comment: soc    Drug use: No    Sexual activity: Not Currently     Partners: Female   Lifestyle    Physical activity     Days per week: Not on file     Minutes per session: Not on file    Stress: Not on file   Relationships    Social connections     Talks on phone: Not on file     Gets together: Not on file     Attends Congregation service: Not on file     Active member of club or organization: Not on file     Attends meetings of clubs or organizations: Not on file     Relationship status: Not on file    Intimate partner violence     Fear of current or ex partner: Not on file     Emotionally abused: Not on file     Physically abused: Not on file     Forced sexual activity: Not on file   Other Topics Concern    Not on file   Social History Narrative    Has a son and a daughter and 2 geandchildren           Occupation: retired  Retired:  YES: Patient is retired from [employer]. REVIEW OF SYSTEMS: <<For Level 5, 10 or more systems>> Approximately 20 minutes was spent with patient and his wife, utilizing slides and handouts, regarding receiving radiation therapy to local recurrence of the diagnosed (May 2020) locally advanced pancreatic head adenocarcinoma. Patient has received neoadjuvant chemotherapy as noted above with Dr Don Warner initially and then with Dr Marquez Tai here at [facility] ([facility] Most recent imaging completed at [address] on 2/11/2021-please see under Media Tab- that did show progression.  A second course of chemotherapy was initiated, but at the recommendations of Tumor Board at [facility], (as noted from phone conversation documented on 3/16/2021), states,    \"Consensus of TB on 3/10/21 - Radiation to local recurrence, with a break in  chemotherapy is the recommendation\"   All patient's questions regarding receiving radiation were answered from a nursing perspective, with patient and his wife expressing understanding. Pacemaker/Defibulator/ICD:  No    Mediport: Yes-power port to right chest        FALLS RISK SCREENING ASSESSMENT    Instructions:  Assess the patient and enter the appropriate indicators that are present for fall risk identification. Total the numbers entered and assign a fall risk score from Table 2.  Reassess patient at a minimum every 12 weeks or with status change. Assessment   Date  4/5/2021     1. Mental Ability: confusion/cognitively impaired Yes-3       2. Elimination Issues: incontinence, frequency Yes - 3       3. Ambulatory: use of assistive devices (walker, cane, off-loading devices), attached to equipment (IV pole, oxygen) Yes - 2     4. Sensory Limitations: dizziness, vertigo, impaired vision No - 0       5. Age 72 years or greater - 1       10. Medication: diuretics, strong analgesics, hypnotics, sedatives, antihypertensive agents   Yes - 3   7. Falls:  recent history of falls within the last 3 months (not to include slipping or tripping)   Yes - 7   Fall from bed to knees   TOTAL 19    If score of 4 or greater was education given? Yes       TABLE 2   Risk Score Risk Level Plan of Care   0-3 Little or  No Risk 1. Provide assistance as indicated for ambulation activities  2. Reorient confused/cognitively impaired patient  3. Call-light/bell within patient's reach  4. Chair/bed in low position, stretcher/bed with siderails up except when performing patient care activities  5. Educate patient/family/caregiver on falls prevention  6.  Reassess in 12 weeks or with any noted change in patient condition which places them at a risk for a fall   4-6 Moderate Risk 1. Provide assistance as indicated for ambulation activities  2. Reorient confused/cognitively impaired patient  3. Call-light/bell within patient's reach  4.   Chair/bed in low position, stretcher/bed with siderails up except when performing patient care activities  5. Educate patient/family/caregiver on falls prevention  6. Falls risk precaution (Yellow sticker Level II) placed on patient chart   7 or   Higher High Risk 1. Place patient in easily observable treatment room  2. Patient attended at all times by family member or staff  3. Provide assistance as indicated for ambulation activities  4. Reorient confused/cognitively impaired patient  5. Call-light/bell within patient's reach  6. Chair/bed in low position, stretcher/bed with siderails up except when performing patient care activities  7. Educate patient/family/caregiver on falls prevention  8. Falls risk precaution (Yellow sticker Level III) placed on patient chart           MALNUTRITION RISK SCREENING ASSESSMENT    Instructions:  Assess the patient and enter the appropriate indicators that are present for nutrition risk identification. Total the numbers entered and assign a risk score. Follow the appropriate action for total score listed below. Assessment   Date  4/5/2021     1. Have you lost weight without trying? 0- No     2. Have you been eating poorly because of a decreased appetite? 2- Yes, slight    3. Do you have a diagnosis of head and neck cancer? 0- No                                                                                    TOTAL 2          Score of 0-1: No action  Score 2 or greater:  · For Non-Diabetic Patient: Recommend adding Ensure Complete 2 x daily and provide patient with Ensure wellness bag with coupons  · For Diabetic Patient: Recommend adding Glucerna Shake 2 x daily and provide patient with Glucerna Wellness bag with coupons  · Route to the dietitian via [id number]    · Are you having  difficulty performing daily routine tasks  due to fatigue or weakness (ie: bathing/showering, dressing, housework, meal prep, work, child Tamela Marx):  Yes     · Do you have any arm flexibility/ROM restrictions, swelling or pain that limit activity: No     · Any changes in memory, attention/focus that impact daily activities: Yes     · Do you avoid participation in leisure/social activity due weakness, fatigue or pain: No     ARE ANY OF THE ABOVE ARE ANSWERED YES: Yes - but NO OT referral request sent due to patient refusal.          PT ASSESSMENT FOR REFERRAL    · Have you had any recent falls in past 2 months: Yes     · Do you have difficulty  going up/down stairs: No     · Are you having difficulty walking: No     · Do you often hold onto furniture/environmental supports or feel off balance when you are walking: Yes     · Do you need to take rest breaks when you are walking: Yes     · Any pain on scale of 1-10 that limits your mobility: No 0/10    ARE ANY OF THE ABOVE ARE ANSWERED YES: Yes - but NO PT referral request sent due to patient refusal.           LYMPHEDEMA SCREENING ASSESSMENT FOR PATIENTS WITH BREAST CANCER    The patient reports the following signs/symptoms of lymphedema: None    Please ask the provider to assess patient for lymphedema for any reported signs or symptoms so a referral to Lymphedema Therapy can be considered. PREHAB AUDIOLOGY REFERRAL    - Is patient planned to receive Cisplatin? No. This patient is not planned to start Cisplatin. - Is patient planned to receive radiation therapy that may be directed toward auditory canals or nerves? No. Patient is not planned to start radiation therapy to auditory canals or nerves. - Is patient complaining of new onset hearing loss? No. Patient is not complaining of new onset hearing loss. Patient education given on radiation therapy to the abdomen/paancreas. The patient expresses understanding and acceptance of instructions.  Kali Flanagan 4/5/2021 1:59 PM           Kali Flanagan

## 2021-04-05 NOTE — TELEPHONE ENCOUNTER
Met with pt and pt's wife at request of oncology NP re: need for in-home supports. Pt is 72year-old male who appeared appropriately dressed/groomed and was seated in a wheelchair. He reported high level of fatigue and appeared to have difficulty staying awake during meeting. Spoke with pt and pt's wife re: recent hospitalization and need for in-home services. Wife reports that pt has in-home palliative care but no additional services. Stated that ADLs are very taxing for him and that he is very fatigued today due to getting a shower earlier. Wife inquired about obtaining DME to assist pt; noted that she will discuss recommendations with palliative care provider and alert SW if additional resources are necessary. Provided information on [facility] for further assessment for additional in-home supports. No additional needs identified at this time. Discussed role of oncology SW and encouraged pt/pt's wife to notify this provider if additional needs arise.     Sirena Figueroa, SOLITARIO, LAWRENCE-S  Oncology Social Worker

## 2021-04-05 NOTE — PROGRESS NOTES
Radiation Oncology      Miquel Heath 51      Referring Physician: Dr. Miguel Martines      Primary Care Darius Lui MD   Primary Oncologist: Dr. Miguel Martines      Diagnosis: unresectable pancreatic cancer, cT4 cN0 cM0      Service:  Radiation Oncology consultation performed on 4/5/21        HPI:      Kamar Zhao is a pleasant 72year old with unresectable pancreatic cancer on a CHEMO break per [facility] for the local recurrence of the diagnosed (May 2020) locally advanced pancreatic head adenocarcinoma. Patient has received neoadjuvant chemotherapy as noted above with Dr Sabina Dennis initially and then with Dr Jimmie Rothman here at [facility] ([facility] Most recent imaging completed at [employer] on 2/11/2021-please see under Media Tab- that did show progression. A second course of chemotherapy was initiated, but at the recommendations of Tumor Board at [facility], (as noted from phone conversation documented on 3/16/2021), states there is a local recurrence and a break in [address] is indicted. Care coordinated with [address]. The patient presents today to discuss fractionated external beam radiation therapy as a component of multidisciplinary, definative management. We reviewed the available medical records including the complete medical history of this pt today prior to consultation. Epic -CE and available scanned documents per the Epic Media tab were reviewed PRN. A complete ROS was also performed today and is noted below. During consultation today I personally discussed the pts workup to date; including but not limited to applicable imaging studies, Pathology reports, and interventions. The NCCN guidelines, as pertaining to the above diagnosis were also recapped for the pt today in brief. Today, Rehana Thomson  notes Sx that include FTT, fatigue.    KPS 70.          -----  SARS-CoV-2:    Pt asymptomatic and afebrile      CDC mm, stable, new small left pleural effusion, nonspecific sclerotic focus in the left posterior fourth rib, stable.      The patient had a virtual visit with Dr. Maureen Marlow on 10 3/13/2020,he  was recommended to continue FOLFIRINOX for 6 more cycles, which could be modified to make it tolerable, even evaluation of one of the drops to his oxaliplatin is warranted. He could begin it for SBRT if he continues to have a locally advanced disease.     The patient has received 8 cycles, 8th cycle was on 10/23/2020. The patient was admitted to the hospital on 11/1/2020 with acute respiratory failure secondary to COVID-19 pneumonia, he was also diagnosed with C. difficile colitis. He is feeling much better at this time, he completed the course of Flagyl, 12/4/2020.      Chemotherapy was restarted on 12/8/2020, he was admitted to the hospital with C. difficile colitis, he was treated with oral vancomycin. The patient was seen by ID, was recommended vancomycin 1 2 5 mg p.o. 3 times weekly, if worsening diarrhea to go back on a treatment dose 125 mg p.o. every 6 hours. Was cleared to restart the chemotherapy.      The patient went to [place], treatment was resumed on 2/2/2021. He had restaging CT scans done at [facility] on 2/11/2021, revealing disease progression. The patient was admitted to the hospital in February with CVA, he had right ICA stenting done. His symptoms had resolved. He was started on 2/23/2021 on systemic therapy with gemcitabine and Abraxane. Patient was admitted to the hospital with C. difficile colitis, he was noted taking the vancomycin 3 times weekly as recommended by ID.   He was discharged on vancomycin taper.     The patient denies any abdominal pain, no nausea or vomiting, he has dark urine intermittently.     Impression/Plan:       The patient is a very pleasant 71-year-old gentleman, with a past medical history significant for hypertension, coronary artery disease, and peripheral vascular disease, he was diagnosed with locally advanced pancreatic head adenocarcinoma in May 2020, the pancreatic head mass encircles the hepatic artery 360 degrees, without any evidence of metastatic disease. He was started on FOLFIRINOX chemotherapy on 6/29/2020, the patient has been under the great care of Dr. Gigi Art locally, and Dr. Ramonita Warner at [facility] the patient had side effects from the chemotherapy including thrombocytopenia and diarrhea. The oxaliplatin dose has been decreased secondary to thrombocytopenia. The patient had CT scans of the chest and pancreas done in September 2020, revealing a 5.1 x 5.1 x 5.6 cm infiltrative mass centered superior to the pancreatic head/neck infiltrating the robert hepatis and invading the liver and second duodenal, had decreased in size compared to prior exam, partial abutment of the posterior stomach, medial right hepatic lobe and anterior IVC also noted, extensive vascular involvement of the robert hepatis and celiac, mild nonspecific infiltrative changes in the upper abdominal fat, developing carcinomatosis is not excluded, interval resolution of previously described right upper lobe nodular opacities, residual subcentimeter thick nodular opacity in the left lung measuring up to 5 mm, stable, new small left pleural effusion, nonspecific sclerotic focus in the left posterior fourth rib, stable.      The patient had a virtual visit with Dr. Maureen Marlow on 10 3/13/2020,he  was recommended to continue FOLFIRINOX for 6 more cycles, which could be modified to make it tolerable, even evaluation of one of the drops to his oxaliplatin is warranted. He could begin it for SBRT if he continues to have a locally advanced disease.     The patient has received 8 cycles, eighth cycle was on 10/23/2020. The patient was admitted to the hospital on 11/1/2020 with acute respiratory failure secondary to COVID-19 pneumonia, he was also diagnosed with C. difficile colitis.   He is feeling much better at this the patient had a virtual visit with Dr. Jonathan Lam, radiation therapy may be an option for him, chemotherapy was held, his case was discussed in tumor board on 3/10/2021, radiation therapy and break from chemotherapy were recommended.   Referral was placed to Buzz Zavala, he is scheduled to be seen on 329.       The patient has hyperbilirubinemia secondary to biliary obstruction from pancreatic tumor, total bilirubin is 2, alk phos, AST and ALT had improved, case d/w Dr. Brigitte Tan, the patient will be admitted to the hospital on Sunday, he will have the MRI done as IP as well as ERCP with stenting by .     -----      Past Medical History:   Diagnosis Date    C. difficile diarrhea     X3 nov dec 2020 feb 2021    CAD (coronary artery disease)     lt sided weakness     Cancer ([address] 05/2020    pancreas    Cerebral artery occlusion with cerebral infarction ([address]     mini  stroke feb 2021 no residual 2018 stroke lt hand weakness     GI bleed     Heart attack ([address] 2008, 3-2015    Hypertension     Jaundice     Peripheral vascular disease ([address]     SOB (shortness of breath) on exertion        Past Surgical History:   Procedure Laterality Date    CARDIAC SURGERY      [facility] 2014    COLONOSCOPY      CORONARY ANGIOPLASTY  2005    CABG TIMES 4 2015    CORONARY ANGIOPLASTY WITH STENT PLACEMENT      ENDOSCOPY, COLON, DIAGNOSTIC      ERCP N/A 3/26/2021    ERCP SPHINCTER/PAPILLOTOMY performed by Margarett Cheadle, MD at [address] [place] [address] ERCP N/A 3/26/2021    ERCP STENT INSERTION performed by Margarett Cheadle, MD at [address] [place] [address] ERCP N/A 3/26/2021    ERCP STONE REMOVAL performed by Margarett Cheadle, MD at [address] [place] [address] Right     IR CAROTID STENT UNI W PROTECTION  2/16/2021    IR CAROTID STENT UNI W PROTECTION 2/16/2021 Lorene Burch MD [facility]ED[facility]    MI THROMBOENDARTECTMY FEMORAL COMMON Right 9/17/2018    FEMORAL ENDARTERECTOMY performed by Mary Lou Lambert MD at [facility] STOMACH SURGERY      VASCULAR SURGERY  08/15/2018    abdominal aortogram with runoff by Dr. Jennefer Sacks       Family History   Problem Relation Age of Onset    Breast Cancer Mother         lung cancer also    Cancer Father         Colon    Cancer Maternal Aunt         Bladder    Cancer Maternal Grandfather         Bladder    Cancer Maternal Cousin         Leukemia    Breast Cancer Maternal Aunt     Cancer Maternal Cousin         Lung    Cancer Maternal Uncle        Current Outpatient Medications   Medication Sig Dispense Refill    ticagrelor (BRILINTA) 90 MG TABS tablet Take 1 tablet by mouth 2 times daily 60 tablet 1    lipase-protease-amylase (CREON) 86756 units delayed release capsule Take 2 capsules by mouth 3 times daily (with meals) (Patient taking differently: Take 24,000 Units by mouth 3 times daily as needed ) 270 capsule 0    potassium chloride (KLOR-CON M) 20 MEQ extended release tablet Take 1 tablet by mouth every other day Take the same day as the lasix is taken (Patient taking differently: Take 20 mEq by mouth every 48 hours as needed Take the same day as the lasix is taken) 60 tablet 3    furosemide (LASIX) 20 MG tablet Take 1 tablet by mouth every other day (Patient taking differently: Take 20 mg by mouth every 48 hours as needed ) 60 tablet 3    vancomycin (VANCOCIN) 125 MG capsule Take 125 mg by mouth daily Indications: mainteance dose three times each week 125 mg daily until 4/1/21      aspirin EC 81 MG EC tablet Take 1 tablet by mouth nightly Restart 3/30/21 30 tablet 3    atorvastatin (LIPITOR) 80 MG tablet Take 80 mg by mouth nightly       Multiple Vitamins-Minerals (THERAPEUTIC MULTIVITAMIN-MINERALS) tablet Take 1 tablet by mouth daily      pregabalin (LYRICA) 200 MG capsule Take 200 mg by mouth daily.  Indications: titrating down-currently 4/5/21, patient is taking 75 mg for next 4 days, decreasing over the next 3 weeks       b complex vitamins capsule Take 1 capsule by mouth Physically abused: Not on file     Forced sexual activity: Not on file   Other Topics Concern    Not on file   Social History Narrative    Has a son and a daughter and 2 geandchildren         Review of Systems - History obtained from chart review and the patient  General ROS: positive for  - fatigue  Psychological ROS: negative  Ophthalmic ROS: negative  ENT ROS: negative  Allergy and Immunology ROS: negative  Hematological and Lymphatic ROS: negative  Endocrine ROS: negative  Respiratory ROS: periodic SOB  Cardiovascular ROS: no chest pain or dyspnea on exertion  Gastrointestinal ROS: poor appetite  Genito-Urinary ROS: no dysuria, trouble voiding, or hematuria  Musculoskeletal ROS: negative  Neurological ROS: no TIA or stroke symptoms  Dermatological ROS: negative      Physical Exam  HENT:      Head: Normocephalic and atraumatic. Right Ear: External ear normal.      Nose: Nose normal.      Mouth/Throat:      Mouth: Mucous membranes are moist.   Eyes:      Extraocular Movements: Extraocular movements intact. Pupils: Pupils are equal, round, and reactive to light. Neck:      Musculoskeletal: Normal range of motion. Cardiovascular:      Rate and Rhythm: Normal rate. Pulses: Normal pulses. Heart sounds: Normal heart sounds. Pulmonary:      Effort: Pulmonary effort is normal.   Abdominal:      General: Abdomen is flat. Palpations: Abdomen is soft. Musculoskeletal: Normal range of motion. Skin:     General: Skin is warm and dry. Coloration: Skin is not jaundiced. Neurological:      General: No focal deficit present. Mental Status: He is alert and oriented to person, place, and time. Psychiatric:         Mood and Affect: Mood normal.         Behavior: Behavior normal.         Thought Content:  Thought content normal.         Judgment: Judgment normal.             Imaging reviewed:    [facility] CT:  1.  6.0 x 5.5 cm confluent pancreatic head mass extending into the left   hepatic lobe and caudate lobe, increased in size since 9/25/2020. Persistent encasement of the robert hepatis vessels as described. 2.  Mild hepatic biliary ductal dilatation, increased. 3.  Mild splenomegaly, stable. 4.  Cholelithiasis. 5.  Interval resolution of the previously described haziness of the   mesenteric fat    Pancreas: 6.0 x 5.5 cm confluent mass in the superior aspect of the   pancreatic head (2:52), previously 5.0 x 4.5 cm, increased in size. The   mass encases the common hepatic artery and gastroduodenal artery. The   mass extends into the left hepatic lobe and caudate lobe suspicious for   localization, increased. Main pancreatic duct is non dilated. Radiation Safety and Treatment Support:  -previous Radiation history: No  -history of connective tissue disease: No  -history of autoimmune disease: No  -pregnant: not applicable  -fertility conservation and /or contraception discussed: not applicable  -nutrition consult prior to [address]: Yes  -PEG: No  -Dental evaluation prior to treatment:No  -Social Work requested: No  -Oncology Nurse Navigator requested: Yes  -pre + post treatment PT / Rehab / PM+R evaluation considered: Yes  -ICD: No   -ICD brand: -  -[facility] patient navigator: Swati Napier  -Nurse Practitioners for Radiation Oncology:    ---Joanna Farias, ESPERANZA, RN, FNP-C   ---ESPERANZA Desai, RN, FNP-BC        Assessment and Plan: Alda Wise is a pleasant and cooperative 72year old with a recent diagnosis of AJCC stage group III pancreatic cancer, unresectable. We recommend ablative radiation therapy with 15 fractions (using a SBRT technique) Herb Crawford et al [employer] Journal 2015, [facility]. 2019; 14: 95]. We no not have MR LINAC capability therefor fiducials will need place and we will utilize daily Symmetry CBCT with surface guidance.     The acute /chronic risks discussed today at consult include but are not limited to; small bowel and duodenal perforations with or without pt death, duodenum ulceration, gastric toxicity, hepatic toxicity, renal failure, SBO, nausea, lack of response, Ab pain, FTT, DGE, death) benefits (increase response rates over standard fraction), alternatives, process and logistics of external beam radiation were reviewed (SBRT). We answered all of the patient's questions to the best of our ability. The NCCN guidelines were reviewed prior to the consultation with this pt and applied PRN. The patient/family verbalized understanding (for all potential risks)and seemed satisfied. Radiation planning will commence within 14 - 21 days pending stent / fiducial placement PRN; the next step in management being the simulation scan, with external beam radiation to commence in a timely fashion thereafter. This case was reviewed in the multidisciplinary setting ([facility] tumor conference with consensus). The pt and wife declined referral for a third opinion. It was a pleasure meeting Mario Ospina today and we appreciate the referral and opportunity to be involved in his care. We had an extensive discussion today regarding the course to date (including a focused review of theapplicable radiographic and laboratory information), multidisciplinary approach to cancer care, and indications for external beam radiation therapy as a component therein. A literature review and multidisciplinary discussion was performed after seeing this patient due to the complexity of the medical decision making in this case. I personally spent greater than 80 minutes on this case and with this patient. I performed the complete history and physical as above at today's visit, at least 45 minutes was in direct discussion and  regarding disease management.          -fiducials (consult ordered, Dr. Yani Murillo)  -sim with SBRT technique, 15 fractions ablative. Ronald Thomas.  Diane Kim MD [address]  Radiation Oncology  Cell: [phone]:  Lori [address]: [phone]  [employer]:  [phone]   FAX:    [phone]  [address] [place] [address] Road:  [phone]   FAX:  [phone]        NOTE: This report was transcribed using voice recognition software. Every effort was made to ensure accuracy; however, inadvertent computerized transcription errors may be present.

## 2021-04-16 NOTE — TELEPHONE ENCOUNTER
I called and spoke to Mrs. Lee this morning regarding Edvin's scheduled appointment. She explained to me that he is not doing well, hard to get him in and out of the house. Hospice recommended he stop the Brilinta - once the current script is done, she will not be filling it. She wanted to thank Dr. Colton Doan for his treatment for her . At this time, not rescheduling appointment.

## 2024-11-14 NOTE — PLAN OF CARE
Problem: Falls - Risk of:  Goal: Will remain free from falls  Description: Will remain free from falls  Outcome: Met This Shift  Goal: Absence of physical injury  Description: Absence of physical injury  Outcome: Met This Shift     Problem: HEMODYNAMIC STATUS  Goal: Patient has stable vital signs and fluid balance  Outcome: Met This Shift     Problem: ACTIVITY INTOLERANCE/IMPAIRED MOBILITY  Goal: Mobility/activity is maintained at optimum level for patient  Outcome: Met This Shift     Problem: COMMUNICATION IMPAIRMENT  Goal: Ability to express needs and understand communication  Outcome: Met This Shift Digestive/Medications

## (undated) DEVICE — GUIDEWIRE VASC L260CM TIP L5CM 0.25FR STR RND TIP TUNGSTEN

## (undated) DEVICE — RETRIEVAL BALLOON CATHETER: Brand: EXTRACTOR™ PRO RX

## (undated) DEVICE — GOWN,SIRUS,FABRNF,L,20/CS: Brand: MEDLINE

## (undated) DEVICE — SET INSTR ART 1

## (undated) DEVICE — 1.5L THIN WALL CAN: Brand: CRD

## (undated) DEVICE — Z INACTIVE USE 2641837 CLIP LIG M BLU TI HRT SHP WIRE HORZ 600 PER BX

## (undated) DEVICE — CATHETER ETER IV 20GA L1IN POLYUR STR RADPQ INTROCAN SFTY

## (undated) DEVICE — KIT BEDSIDE REVITAL OX 500ML

## (undated) DEVICE — GOWN,SIRUS,FABRNF,XL,20/CS: Brand: MEDLINE

## (undated) DEVICE — BLOCK BITE 60FR CAREGUARD

## (undated) DEVICE — GOWN ISOLATN REG YEL M WT MULTIPLY SIDETIE LEV 2

## (undated) DEVICE — DOUBLE BASIN SET: Brand: MEDLINE INDUSTRIES, INC.

## (undated) DEVICE — Z DISCONTINUED PER MEDLINE USE 2425483 TAPE UMB L30IN DIA1/8IN WHT COT NONABSORBABLE W/O NDL FOR

## (undated) DEVICE — SYSTEM BX CAP BILI RAP EXCHG CAP LOK DEV COMPATIBLE W/ OLY

## (undated) DEVICE — TUBING, SUCTION, 1/4" X 10', STRAIGHT: Brand: MEDLINE

## (undated) DEVICE — TOWEL,OR,DSP,ST,BLUE,STD,6/PK,12PK/CS: Brand: MEDLINE

## (undated) DEVICE — SOLUTION IV IRRIG WATER 1000ML POUR BRL 2F7114

## (undated) DEVICE — SET SURG INSTR ART III

## (undated) DEVICE — GLOVE SURG SZ 75 STD WHT LTX SYN POLYMER BEAD REINF ANTI RL

## (undated) DEVICE — SURGICAL PROCEDURE PACK VASC MAJ CUST

## (undated) DEVICE — BLADE CLIPPER GEN PURP NS

## (undated) DEVICE — SYSTEM INJ BILI RAP REFIL CONT

## (undated) DEVICE — GEL US 20GM NONIRRITATING OVERWRAPPED FILE PCH TRNSMIT

## (undated) DEVICE — RX PUSHER: Brand: NAVIFLEX™ RX PUSHER

## (undated) DEVICE — GAUZE,SPONGE,4"X4",16PLY,XRAY,STRL,LF: Brand: MEDLINE

## (undated) DEVICE — PATIENT RETURN ELECTRODE, SINGLE-USE, CONTACT QUALITY MONITORING, ADULT, WITH 9FT CORD, FOR PATIENTS WEIGING OVER 33LBS. (15KG): Brand: MEGADYNE

## (undated) DEVICE — ELECTRODE PT RET AD L9FT HI MOIST COND ADH HYDRGEL CORDED

## (undated) DEVICE — VALVE SUCTION AIR H2O HYDR H2O JET CONN STRL ORCA POD + DISP

## (undated) DEVICE — TOTAL TRAY, 16FR 10ML SIL FOLEY, URN: Brand: MEDLINE

## (undated) DEVICE — LOOP VES W25MM THK1MM MAXI RED SIL FLD REPELLENT 100 PER

## (undated) DEVICE — SOLUTION IV 500ML 0.9% SOD CHL INJ USP CONT EXCEL

## (undated) DEVICE — MAGNETIC INSTR DRAPE 20X16: Brand: MEDLINE INDUSTRIES, INC.

## (undated) DEVICE — LABEL MED 4 IN SURG PANEL W/ PEN STRL

## (undated) DEVICE — GOWN,AURORA,NONREINF,RAGLAN,L,STERILE: Brand: MEDLINE

## (undated) DEVICE — GLOVE SURG L12IN SZ 65FNGR THK94MIL TRNSLUC YEL LTX

## (undated) DEVICE — GRADUATE

## (undated) DEVICE — 3M™ IOBAN™ 2 ANTIMICROBIAL INCISE DRAPE 6640EZ: Brand: IOBAN™ 2

## (undated) DEVICE — DILATOR ART

## (undated) DEVICE — CONTAINER SPEC COLL 960ML POLYPR TRIANG GRAD INTAKE/OUTPUT

## (undated) DEVICE — SPHINCTEROTOME: Brand: HYDRATOME RX 44

## (undated) DEVICE — SPONGE GZ 4IN 4IN 4 PLY N WVN AVANT

## (undated) DEVICE — 3M™ BAIR HUGGER® UNDERBODY BLANKET, FULL ACCESS, 10 PER CASE 63500: Brand: BAIR HUGGER™

## (undated) DEVICE — PACK,UNIV, II AURORA: Brand: MEDLINE

## (undated) DEVICE — SOLUTION IV IRRIG POUR BRL 0.9% SODIUM CHL 2F7124